# Patient Record
Sex: FEMALE | Race: BLACK OR AFRICAN AMERICAN | NOT HISPANIC OR LATINO | Employment: OTHER | URBAN - METROPOLITAN AREA
[De-identification: names, ages, dates, MRNs, and addresses within clinical notes are randomized per-mention and may not be internally consistent; named-entity substitution may affect disease eponyms.]

---

## 2019-02-28 ENCOUNTER — HOSPITAL ENCOUNTER (EMERGENCY)
Facility: HOSPITAL | Age: 84
Discharge: HOME | End: 2019-02-28
Attending: EMERGENCY MEDICINE
Payer: MEDICARE

## 2019-02-28 VITALS
OXYGEN SATURATION: 99 % | DIASTOLIC BLOOD PRESSURE: 74 MMHG | RESPIRATION RATE: 18 BRPM | TEMPERATURE: 98 F | WEIGHT: 240 LBS | BODY MASS INDEX: 38.74 KG/M2 | HEART RATE: 68 BPM | SYSTOLIC BLOOD PRESSURE: 186 MMHG

## 2019-02-28 DIAGNOSIS — J20.9 ACUTE BRONCHITIS WITH BRONCHOSPASM: Primary | ICD-10-CM

## 2019-02-28 DIAGNOSIS — J06.9 VIRAL URI WITH COUGH: ICD-10-CM

## 2019-02-28 DIAGNOSIS — R05.9 COUGH: ICD-10-CM

## 2019-02-28 LAB
CTP QC/QA: YES
FLUAV AG NPH QL: NEGATIVE
FLUBV AG NPH QL: NEGATIVE

## 2019-02-28 PROCEDURE — 87804 INFLUENZA ASSAY W/OPTIC: CPT

## 2019-02-28 PROCEDURE — 25000003 PHARM REV CODE 250: Performed by: EMERGENCY MEDICINE

## 2019-02-28 PROCEDURE — 63600175 PHARM REV CODE 636 W HCPCS: Performed by: EMERGENCY MEDICINE

## 2019-02-28 PROCEDURE — 25000242 PHARM REV CODE 250 ALT 637 W/ HCPCS: Performed by: EMERGENCY MEDICINE

## 2019-02-28 PROCEDURE — 94640 AIRWAY INHALATION TREATMENT: CPT

## 2019-02-28 PROCEDURE — 99284 EMERGENCY DEPT VISIT MOD MDM: CPT | Mod: 25

## 2019-02-28 RX ORDER — HYDRALAZINE HYDROCHLORIDE 25 MG/1
100 TABLET, FILM COATED ORAL
Status: COMPLETED | OUTPATIENT
Start: 2019-02-28 | End: 2019-02-28

## 2019-02-28 RX ORDER — PREDNISONE 20 MG/1
60 TABLET ORAL DAILY
Qty: 15 TABLET | Refills: 0 | Status: SHIPPED | OUTPATIENT
Start: 2019-02-28 | End: 2019-03-05

## 2019-02-28 RX ORDER — ALENDRONATE SODIUM 70 MG/1
70 TABLET ORAL
COMMUNITY

## 2019-02-28 RX ORDER — AMLODIPINE BESYLATE 5 MG/1
10 TABLET ORAL
Status: COMPLETED | OUTPATIENT
Start: 2019-02-28 | End: 2019-02-28

## 2019-02-28 RX ORDER — CLOPIDOGREL BISULFATE 75 MG/1
75 TABLET ORAL DAILY
COMMUNITY

## 2019-02-28 RX ORDER — FENOFIBRATE 160 MG/1
160 TABLET ORAL DAILY
COMMUNITY
End: 2023-07-12

## 2019-02-28 RX ORDER — ATORVASTATIN CALCIUM 80 MG/1
80 TABLET, FILM COATED ORAL DAILY
COMMUNITY
End: 2023-07-12

## 2019-02-28 RX ORDER — ALBUTEROL SULFATE 90 UG/1
1-2 AEROSOL, METERED RESPIRATORY (INHALATION) EVERY 6 HOURS PRN
Qty: 1 INHALER | Refills: 1 | Status: SHIPPED | OUTPATIENT
Start: 2019-02-28 | End: 2023-07-12

## 2019-02-28 RX ORDER — HYDRALAZINE HYDROCHLORIDE 100 MG/1
100 TABLET, FILM COATED ORAL 2 TIMES DAILY
COMMUNITY
End: 2023-07-12

## 2019-02-28 RX ORDER — ASPIRIN 81 MG/1
81 TABLET ORAL DAILY
Status: ON HOLD | COMMUNITY
End: 2023-07-18 | Stop reason: HOSPADM

## 2019-02-28 RX ORDER — LOSARTAN POTASSIUM 25 MG/1
100 TABLET ORAL DAILY
Status: DISCONTINUED | OUTPATIENT
Start: 2019-02-28 | End: 2019-02-28 | Stop reason: HOSPADM

## 2019-02-28 RX ORDER — HYDROCHLOROTHIAZIDE 25 MG/1
25 TABLET ORAL DAILY
COMMUNITY

## 2019-02-28 RX ORDER — AMLODIPINE BESYLATE 10 MG/1
10 TABLET ORAL DAILY
COMMUNITY

## 2019-02-28 RX ORDER — LOSARTAN POTASSIUM 100 MG/1
100 TABLET ORAL DAILY
COMMUNITY

## 2019-02-28 RX ORDER — IPRATROPIUM BROMIDE AND ALBUTEROL SULFATE 2.5; .5 MG/3ML; MG/3ML
3 SOLUTION RESPIRATORY (INHALATION)
Status: COMPLETED | OUTPATIENT
Start: 2019-02-28 | End: 2019-02-28

## 2019-02-28 RX ORDER — PROMETHAZINE HYDROCHLORIDE AND DEXTROMETHORPHAN HYDROBROMIDE 6.25; 15 MG/5ML; MG/5ML
5 SYRUP ORAL EVERY 6 HOURS PRN
Qty: 180 ML | Refills: 0 | Status: SHIPPED | OUTPATIENT
Start: 2019-02-28 | End: 2019-03-10

## 2019-02-28 RX ORDER — CLONIDINE HYDROCHLORIDE 0.1 MG/1
0.1 TABLET ORAL
Status: COMPLETED | OUTPATIENT
Start: 2019-02-28 | End: 2019-02-28

## 2019-02-28 RX ORDER — POTASSIUM CHLORIDE 750 MG/1
10 CAPSULE, EXTENDED RELEASE ORAL ONCE
COMMUNITY

## 2019-02-28 RX ORDER — PREDNISONE 20 MG/1
60 TABLET ORAL
Status: COMPLETED | OUTPATIENT
Start: 2019-02-28 | End: 2019-02-28

## 2019-02-28 RX ADMIN — IPRATROPIUM BROMIDE AND ALBUTEROL SULFATE 3 ML: .5; 3 SOLUTION RESPIRATORY (INHALATION) at 08:02

## 2019-02-28 RX ADMIN — PREDNISONE 60 MG: 20 TABLET ORAL at 08:02

## 2019-02-28 RX ADMIN — HYDRALAZINE HYDROCHLORIDE 100 MG: 25 TABLET ORAL at 09:02

## 2019-02-28 RX ADMIN — LOSARTAN POTASSIUM 100 MG: 25 TABLET, FILM COATED ORAL at 09:02

## 2019-02-28 RX ADMIN — CLONIDINE HYDROCHLORIDE 0.1 MG: 0.1 TABLET ORAL at 09:02

## 2019-02-28 RX ADMIN — AMLODIPINE BESYLATE 10 MG: 5 TABLET ORAL at 09:02

## 2019-02-28 NOTE — DISCHARGE INSTRUCTIONS
Prednisone, Ventolin, Phenergan DM as directed.  Please return immediately if you get worse or if new problems develop.  Please follow-up with your primary care doctor this week.  Return immediately for fever shortness of breath. Take her blood pressure medicines as directed.

## 2019-02-28 NOTE — ED PROVIDER NOTES
Encounter Date: 2/28/2019    SCRIBE #1 NOTE: I, Perry Bowling, am scribing for, and in the presence of,  Moi Goodwin MD. I have scribed the following portions of the note - Other sections scribed: HPI, ROS.       History     Chief Complaint   Patient presents with    Cough     productive cough x 1 wk; denies any pain; hx of HTN;     CC: Cough    HPI: This 84 y.o. Female with L above knee amputation, arthritis, hyperlipidemia and HTN presents to the ED for an evaluation of productive cough with clear sputum, rhinorrhea and sore throat for the past week. She did not take her BP meds this morning. Pt usually complies with at home meds. She denies fever, chills, chest pain, SOB, headaches, ear pain, abdominal pain, nausea, emesis, diarrhea or rash.      The history is provided by the patient. No  was used.     Review of patient's allergies indicates:  No Known Allergies  Past Medical History:   Diagnosis Date    Above knee amputation status 1994    Hyperlipidemia     Hypertension      Past Surgical History:   Procedure Laterality Date    LEG AMPUTATION THROUGH KNEE       History reviewed. No pertinent family history.  Social History     Tobacco Use    Smoking status: Former Smoker    Smokeless tobacco: Never Used   Substance Use Topics    Alcohol use: No    Drug use: No     Review of Systems   Constitutional: Negative for chills and fever.   HENT: Positive for rhinorrhea and sore throat. Negative for ear pain.    Eyes: Negative for redness.   Respiratory: Positive for cough. Negative for shortness of breath.    Cardiovascular: Negative for chest pain.   Gastrointestinal: Negative for abdominal pain, diarrhea, nausea and vomiting.   Genitourinary: Negative for dysuria and hematuria.   Musculoskeletal: Negative for back pain and neck pain.   Skin: Negative for rash.   Neurological: Negative for weakness, numbness and headaches.   Hematological: Does not bruise/bleed easily.    Psychiatric/Behavioral: The patient is not nervous/anxious.        Physical Exam     Initial Vitals [02/28/19 0826]   BP Pulse Resp Temp SpO2   (!) 227/103 70 20 97.9 °F (36.6 °C) 97 %      MAP       --         Physical Exam  The patient was examined specifically for the following:   General:No significant distress, Good color, Warm and dry. Head and neck:Scalp atraumatic, Neck supple. Neurological:Appropriate conversation, Gross motor deficits. Eyes:Conjugate gaze, Clear corneas. ENT: No epistaxis. Cardiac: Regular rate and rhythm, Grossly normal heart tones. Pulmonary: Wheezing, Rales. Gastrointestinal: Abdominal tenderness, Abdominal distention. Musculoskeletal: Extremity deformity, Apparent pain with range of motion of the joints. Skin: Rash.   The findings on examination were normal except for the following:  The patient's blood pressure is 227/103.  Lungs are remarkable for a mild bilateral wheezing.  There is no evidence of respiratory distress. I do not hear rales.  The patient is not febrile or warm to touch.  The abdomen is nontender there is a left above-the-knee amputation.  ED Course   Procedures  Labs Reviewed   INFLUENZA A AND B ANTIGEN   POCT INFLUENZA A/B          Imaging Results          X-Ray Chest 1 View (Final result)  Result time 02/28/19 09:19:41   Procedure changed from X-Ray Chest PA And Lateral     Final result by Dontrell Manuel MD (02/28/19 09:19:41)                 Impression:      Prominence of the cardiac silhouette, partially related to the magnification on this AP projection.    No focal pulmonary consolidation is identified.      Electronically signed by: Dontrell Manuel MD  Date:    02/28/2019  Time:    09:19             Narrative:    EXAMINATION:  XR CHEST 1 VIEW    CLINICAL HISTORY:  cough; Cough    TECHNIQUE:  Single frontal view of the chest was performed.    COMPARISON:  None    FINDINGS:  The cardiac silhouette is prominent in size although this is partially related to the  magnification on this AP projection.  Atherosclerotic calcification is present within the aortic arch.  Superior mediastinal structures are unremarkable.  Pulmonary vasculature is within normal limits.  The lungs are free of focal consolidations.  There is no evidence for pneumothorax or large pleural effusions.  Bony structures are grossly intact.                              Medical decision making:  Given the above, this patient presents to the emergency room with cough.  The patient has bilateral wheezing.  She has a history of smoking in the distant past.  There is no evidence of pulmonary edema.  The patient has no ankle swelling.  She is otherwise well.  The sputum is clear.  She is not febrile.  There is no pneumonia on chest x-ray.  I will discharge on Phenergan DM albuterol and prednisone.  I will have the patient follow up with primary care                Scribe Attestation:   Scribe #1: I performed the above scribed service and the documentation accurately describes the services I performed. I attest to the accuracy of the note.    Attending Attestation:           Physician Attestation for Scribe:  Physician Attestation Statement for Scribe #1: I, Moi Goodwin MD, reviewed documentation, as scribed by Perry Bowling in my presence, and it is both accurate and complete.                    Clinical Impression:       ICD-10-CM ICD-9-CM   1. Acute bronchitis with bronchospasm J20.9 466.0   2. Cough R05 786.2   3. Viral URI with cough J06.9 465.9    B97.89                                 Moi Goodwin MD  02/28/19 0933

## 2019-02-28 NOTE — ED TRIAGE NOTES
Pt arrived to ED with c/o cough x1 week. Pt denies any pain at this time. Hx high BP, did not take medicine this morning. NAD.   
The resident's documentation has been prepared under my direction and personally reviewed by me in its entirety. I confirm that the note above accurately reflects all work, treatment, procedures, and medical decision making performed by me.  nakia Scott MD

## 2019-06-19 ENCOUNTER — HOSPITAL ENCOUNTER (EMERGENCY)
Facility: HOSPITAL | Age: 84
Discharge: HOME | End: 2019-06-19
Attending: EMERGENCY MEDICINE
Payer: MEDICARE

## 2019-06-19 VITALS
WEIGHT: 240 LBS | HEART RATE: 63 BPM | RESPIRATION RATE: 18 BRPM | SYSTOLIC BLOOD PRESSURE: 150 MMHG | TEMPERATURE: 98 F | BODY MASS INDEX: 44.16 KG/M2 | DIASTOLIC BLOOD PRESSURE: 69 MMHG | OXYGEN SATURATION: 97 % | HEIGHT: 62 IN

## 2019-06-19 DIAGNOSIS — R05.9 COUGH: ICD-10-CM

## 2019-06-19 DIAGNOSIS — I10 UNCONTROLLED HYPERTENSION: Primary | ICD-10-CM

## 2019-06-19 LAB
ANION GAP SERPL CALC-SCNC: 10 MMOL/L (ref 8–16)
BNP SERPL-MCNC: 122 PG/ML (ref 0–99)
BUN SERPL-MCNC: 16 MG/DL (ref 8–23)
CALCIUM SERPL-MCNC: 9.2 MG/DL (ref 8.7–10.5)
CHLORIDE SERPL-SCNC: 107 MMOL/L (ref 95–110)
CO2 SERPL-SCNC: 26 MMOL/L (ref 23–29)
CREAT SERPL-MCNC: 0.9 MG/DL (ref 0.5–1.4)
EST. GFR  (AFRICAN AMERICAN): >60 ML/MIN/1.73 M^2
EST. GFR  (NON AFRICAN AMERICAN): 60 ML/MIN/1.73 M^2
GLUCOSE SERPL-MCNC: 100 MG/DL (ref 70–110)
POTASSIUM SERPL-SCNC: 4 MMOL/L (ref 3.5–5.1)
SODIUM SERPL-SCNC: 143 MMOL/L (ref 136–145)

## 2019-06-19 PROCEDURE — 80048 BASIC METABOLIC PNL TOTAL CA: CPT

## 2019-06-19 PROCEDURE — 96374 THER/PROPH/DIAG INJ IV PUSH: CPT

## 2019-06-19 PROCEDURE — 63600175 PHARM REV CODE 636 W HCPCS: Performed by: EMERGENCY MEDICINE

## 2019-06-19 PROCEDURE — 99284 EMERGENCY DEPT VISIT MOD MDM: CPT | Mod: 25

## 2019-06-19 PROCEDURE — 83880 ASSAY OF NATRIURETIC PEPTIDE: CPT

## 2019-06-19 PROCEDURE — 25000003 PHARM REV CODE 250: Performed by: EMERGENCY MEDICINE

## 2019-06-19 RX ORDER — AMLODIPINE BESYLATE 5 MG/1
10 TABLET ORAL
Status: COMPLETED | OUTPATIENT
Start: 2019-06-19 | End: 2019-06-19

## 2019-06-19 RX ORDER — CLONIDINE HYDROCHLORIDE 0.1 MG/1
0.1 TABLET ORAL
Status: COMPLETED | OUTPATIENT
Start: 2019-06-19 | End: 2019-06-19

## 2019-06-19 RX ORDER — LOSARTAN POTASSIUM 25 MG/1
100 TABLET ORAL DAILY
Status: DISCONTINUED | OUTPATIENT
Start: 2019-06-19 | End: 2019-06-19 | Stop reason: HOSPADM

## 2019-06-19 RX ORDER — HYDRALAZINE HYDROCHLORIDE 25 MG/1
100 TABLET, FILM COATED ORAL
Status: COMPLETED | OUTPATIENT
Start: 2019-06-19 | End: 2019-06-19

## 2019-06-19 RX ORDER — DIPHENHYDRAMINE HCL 25 MG
25 CAPSULE ORAL EVERY 6 HOURS PRN
Qty: 20 CAPSULE | Refills: 0 | Status: SHIPPED | OUTPATIENT
Start: 2019-06-19 | End: 2023-07-12

## 2019-06-19 RX ORDER — HYDRALAZINE HYDROCHLORIDE 20 MG/ML
10 INJECTION INTRAMUSCULAR; INTRAVENOUS
Status: COMPLETED | OUTPATIENT
Start: 2019-06-19 | End: 2019-06-19

## 2019-06-19 RX ORDER — BENZONATATE 100 MG/1
100 CAPSULE ORAL 3 TIMES DAILY PRN
Qty: 20 CAPSULE | Refills: 0 | Status: SHIPPED | OUTPATIENT
Start: 2019-06-19 | End: 2019-06-29

## 2019-06-19 RX ADMIN — LOSARTAN POTASSIUM 100 MG: 25 TABLET, FILM COATED ORAL at 09:06

## 2019-06-19 RX ADMIN — HYDRALAZINE HYDROCHLORIDE 10 MG: 20 INJECTION INTRAMUSCULAR; INTRAVENOUS at 10:06

## 2019-06-19 RX ADMIN — AMLODIPINE BESYLATE 10 MG: 5 TABLET ORAL at 09:06

## 2019-06-19 RX ADMIN — CLONIDINE HYDROCHLORIDE 0.1 MG: 0.1 TABLET ORAL at 09:06

## 2019-06-19 RX ADMIN — HYDRALAZINE HYDROCHLORIDE 100 MG: 25 TABLET ORAL at 09:06

## 2019-06-19 NOTE — ED TRIAGE NOTES
"Pt comes to ED today with complaints of a cold.  Pt reports that she's had a cold for about a month now.  Pt reports that she has also begun to lose her voice.  Pt came to ED today because her son "would not stop bugging her." so she decided to come in.  At this time, pt is alert and oriented x4, VSS and she appears to be in no acute distress at this time.  "

## 2019-06-19 NOTE — DISCHARGE INSTRUCTIONS
Please return immediately if you get worse or if new problems develop.  Please follow-up with her primary care doctor this week.  Benadryl and Tessalon for cough.  Rest.

## 2019-06-19 NOTE — ED PROVIDER NOTES
"Encounter Date: 6/19/2019    SCRIBE #1 NOTE: I, Merissamonica Condon, am scribing for, and in the presence of,  Moi Goodwin MD. I have scribed the following portions of the note - Other sections scribed: HPI and ROS.       History     Chief Complaint   Patient presents with    Cough     Pt with cough and chest congestion x 1 month. Denies fever. BBS clear      CC: Cough    HPI: This 84 y.o. Female, with a medical history of above knee amputation status, hyperlipidemia, and hypertension, presents to the ED c/o a non-productive cough. Pt reports that she has been experiencing the symptoms "every half an hour" for the last 1x month. She states that this is her second time being seen in the ED for the same symptoms. Pt denies sore throat, rhinorrhea, ear pain, eye pain, chest pain, shortness of breath, abdominal pain, emesis, diarrhea, dysuria, fever, chills and headache. No other associated symptoms. No alleviating factors. Pt notes that she did not take her blood pressure medications this morning.     The history is provided by the patient. No  was used.     Review of patient's allergies indicates:  No Known Allergies  Past Medical History:   Diagnosis Date    Above knee amputation status 1994    Hyperlipidemia     Hypertension      Past Surgical History:   Procedure Laterality Date    LEG AMPUTATION THROUGH KNEE       History reviewed. No pertinent family history.  Social History     Tobacco Use    Smoking status: Former Smoker    Smokeless tobacco: Never Used   Substance Use Topics    Alcohol use: No    Drug use: No     Review of Systems   Constitutional: Negative for chills and fever.   HENT: Negative for congestion, ear pain, rhinorrhea and sore throat.    Eyes: Negative for pain and visual disturbance.   Respiratory: Positive for cough (non-productive). Negative for shortness of breath.    Cardiovascular: Negative for chest pain.   Gastrointestinal: Negative for abdominal pain, " diarrhea, nausea and vomiting.   Genitourinary: Negative for dysuria.   Musculoskeletal: Negative for back pain and neck pain.   Skin: Negative for rash.   Neurological: Negative for headaches.       Physical Exam     Initial Vitals [06/19/19 0840]   BP Pulse Resp Temp SpO2   (!) 225/119 65 16 97.7 °F (36.5 °C) 98 %      MAP       --         Physical Exam  The patient was examined specifically for the following:   General:No significant distress, Good color, Warm and dry. Head and neck:Scalp atraumatic, Neck supple. Neurological:Appropriate conversation, Gross motor deficits. Eyes:Conjugate gaze, Clear corneas. ENT: No epistaxis. Cardiac: Regular rate and rhythm, Grossly normal heart tones. Pulmonary: Wheezing, Rales. Gastrointestinal: Abdominal tenderness, Abdominal distention. Musculoskeletal: Extremity deformity, Apparent pain with range of motion of the joints. Skin: Rash.   The findings on examination were normal except for the following:  The lungs are clear and free of wheezing rales or rhonchi.  The patient did not cough during the physical examination. Her blood pressure was 225/119.  There is no evidence of respiratory distress.  The patient had a left above-the-knee amputation.  ED Course   Procedures  Labs Reviewed   B-TYPE NATRIURETIC PEPTIDE - Abnormal; Notable for the following components:       Result Value     (*)     All other components within normal limits   BASIC METABOLIC PANEL          Imaging Results          X-Ray Chest 1 View (Final result)  Result time 06/19/19 10:44:16   Procedure changed from X-Ray Chest PA And Lateral     Final result by Eulogio Mejia MD (06/19/19 10:44:16)                 Impression:      Mild basilar atelectasis.  Otherwise, no acute radiographic findings in the chest.      Electronically signed by: Eulogio Mejia MD  Date:    06/19/2019  Time:    10:44             Narrative:    EXAMINATION:  XR CHEST 1 VIEW    CLINICAL HISTORY:  Chest pain;  Cough    TECHNIQUE:  Single frontal view of the chest was performed.    COMPARISON:  02/28/2019    FINDINGS:  Cardiomediastinal silhouette is enlarged but is stable.  There is aortic atherosclerosis.  Lungs are symmetrically expanded with mild basilar atelectasis.  No evidence of significant focal consolidation, large effusion or pneumothorax.  Bones demonstrate degenerative changes.                              Medical decision making:  Given the above, this patient presents to the emergency room with a cough.  The patient reports that I cough every half an hour.  Patient is maintained on losartan.  That may be the cause.  I will refer this patient to primary care for evaluation chest x-ray fails to reveal pneumonia.  The patient has a normal BNP.  Chemistries are unremarkable. Her cough may also be related to mild failure from uncontrolled hypertension.  Her blood pressure is nicely controlled in the emergency room.  I will discharge to outpatient evaluation and treatment.                Scribe Attestation:   Scribe #1: I performed the above scribed service and the documentation accurately describes the services I performed. I attest to the accuracy of the note.    Attending Attestation:           Physician Attestation for Scribe:  Physician Attestation Statement for Scribe #1: I, Moi Goodwin MD, reviewed documentation, as scribed by Merissa Condon in my presence, and it is both accurate and complete.                    Clinical Impression:       ICD-10-CM ICD-9-CM   1. Uncontrolled hypertension I10 401.9   2. Cough R05 786.2                                Moi Goodwin MD  06/19/19 2419

## 2020-05-30 ENCOUNTER — HOSPITAL ENCOUNTER (EMERGENCY)
Facility: HOSPITAL | Age: 85
Discharge: HOME | End: 2020-05-30
Attending: EMERGENCY MEDICINE
Payer: MEDICARE

## 2020-05-30 VITALS
SYSTOLIC BLOOD PRESSURE: 176 MMHG | RESPIRATION RATE: 18 BRPM | DIASTOLIC BLOOD PRESSURE: 77 MMHG | HEIGHT: 61 IN | HEART RATE: 58 BPM | BODY MASS INDEX: 41.54 KG/M2 | TEMPERATURE: 98 F | WEIGHT: 220 LBS | OXYGEN SATURATION: 91 %

## 2020-05-30 DIAGNOSIS — Z89.612 S/P AKA (ABOVE KNEE AMPUTATION) UNILATERAL, LEFT: ICD-10-CM

## 2020-05-30 DIAGNOSIS — M79.2 NEUROPATHIC PAIN, LEG, LEFT: Primary | ICD-10-CM

## 2020-05-30 DIAGNOSIS — M79.605 PAIN IN LEFT LEG: ICD-10-CM

## 2020-05-30 PROCEDURE — 25000003 PHARM REV CODE 250: Performed by: EMERGENCY MEDICINE

## 2020-05-30 PROCEDURE — 99284 EMERGENCY DEPT VISIT MOD MDM: CPT | Mod: 25

## 2020-05-30 RX ORDER — GABAPENTIN 100 MG/1
100 CAPSULE ORAL NIGHTLY
Qty: 30 CAPSULE | Refills: 0 | Status: SHIPPED | OUTPATIENT
Start: 2020-05-30 | End: 2023-07-12

## 2020-05-30 RX ORDER — ACETAMINOPHEN 500 MG
1000 TABLET ORAL EVERY 8 HOURS PRN
Qty: 30 TABLET | Refills: 0 | Status: ON HOLD | OUTPATIENT
Start: 2020-05-30 | End: 2023-07-18 | Stop reason: SDUPTHER

## 2020-05-30 RX ORDER — LIDOCAINE 50 MG/G
1 PATCH TOPICAL DAILY
Qty: 30 PATCH | Refills: 0 | Status: SHIPPED | OUTPATIENT
Start: 2020-05-30 | End: 2023-07-12

## 2020-05-30 RX ORDER — HYDROCODONE BITARTRATE AND ACETAMINOPHEN 5; 325 MG/1; MG/1
1 TABLET ORAL
Status: COMPLETED | OUTPATIENT
Start: 2020-05-30 | End: 2020-05-30

## 2020-05-30 RX ADMIN — HYDROCODONE BITARTRATE AND ACETAMINOPHEN 1 TABLET: 5; 325 TABLET ORAL at 09:05

## 2020-05-30 NOTE — DISCHARGE INSTRUCTIONS
X-ray show no emergent abnormality with your amputation site.  Your symptoms may be related to neuropathic pain or phantom limb pain.  You have been prescribed low-dose gabapentin as well as lidocaine patches.  Be cautious as gabapentin he cause you to become drowsy and decrease your coordination.  It is very important that you schedule close follow-up with your vascular surgeon, pain management doctor and primary physician to monitor and further evaluate your symptoms.  Return to the emergency department for any new, worsening or significantly concerning symptoms.    Thank you for coming to our Emergency Department today. It is important to remember that some problems are difficult to diagnose and may not be found during your first visit. Be sure to follow up with your primary care doctor and review any labs/imaging that was performed with them. If you do not have a primary care doctor, you may contact the one listed on your discharge paperwork or you may also call the [facility] Appointment Desk at [phone] to schedule an appointment with one.     All medications may potentially have side effects and it is impossible to predict which medications may give you side effects. If you feel that you are having a negative effect of any medication you should immediately stop taking them and seek medical attention.    Return to the ER with any questions/concerns, new/concerning symptoms, worsening or failure to improve. Do not drive or make any important decisions for 24 hours if you have received any pain medications, sedatives or mood altering drugs during your ER visit.

## 2020-05-30 NOTE — ED TRIAGE NOTES
Patient reports started to have pain in her left stump site and has been keeping her awake for two days. Reports pain level of 10, patient is noted to be calm, took Tylenol with out relief. Denies fall, or hitting site.

## 2020-05-30 NOTE — ED PROVIDER NOTES
Encounter Date: 5/30/2020    SCRIBE #1 NOTE: I, Jairon Coates, am scribing for, and in the presence of,  Cloeen Novoa MD. I have scribed the following portions of the note - Other sections scribed: HPI, ROS, PE.       History     Chief Complaint   Patient presents with    Leg Pain     Right AKA pain ,AKA since 1994     Abbie Barragan is a 85 y.o. female with history of left AKA (1994) who presents to the ED for evaluation of left leg pain that started yesterday. Patient states that feeling there is pain where her limb is missing. Denies any recent falls or injuries to the area. Denies any wounds or drainage anywhere. Denies taking any medications for this pain. Patient has a history of hypertension, but no other medical problems. She denies having had this pain in the past. She states that the amputation was done at [facility].       The history is provided by the patient.     Review of patient's allergies indicates:  No Known Allergies  Past Medical History:   Diagnosis Date    Above knee amputation status 1994    Hyperlipidemia     Hypertension      Past Surgical History:   Procedure Laterality Date    LEG AMPUTATION THROUGH KNEE       No family history on file.  Social History     Tobacco Use    Smoking status: Former Smoker    Smokeless tobacco: Never Used   Substance Use Topics    Alcohol use: No    Drug use: No     Review of Systems   Constitutional: Negative for chills and fever.   HENT: Negative for congestion and sore throat.    Eyes: Negative for visual disturbance.   Respiratory: Negative for cough and shortness of breath.    Cardiovascular: Negative for chest pain.   Gastrointestinal: Negative for abdominal pain, nausea and vomiting.   Genitourinary: Negative for dysuria and vaginal discharge.   Musculoskeletal: Positive for myalgias (left leg pain).   Skin: Negative for rash.   Neurological: Negative for headaches.   Psychiatric/Behavioral: Negative for decreased concentration.       Physical  Exam     Initial Vitals [05/30/20 0902]   BP Pulse Resp Temp SpO2   (!) 201/95 66 18 98.3 °F (36.8 °C) 96 %      MAP       --         Physical Exam    Nursing note and vitals reviewed.  Constitutional: She is not diaphoretic. No distress.   HENT:   Head: Normocephalic and atraumatic.   Mouth/Throat: Oropharynx is clear and moist.   Eyes: EOM are normal. Pupils are equal, round, and reactive to light. No scleral icterus.   Neck: Normal range of motion. Neck supple. No JVD present.   Cardiovascular: Normal rate, regular rhythm and intact distal pulses.   Pulmonary/Chest: Breath sounds normal. No stridor. No respiratory distress.   Abdominal: Soft. Bowel sounds are normal. She exhibits no distension. There is no tenderness.   Musculoskeletal: Normal range of motion. She exhibits no edema or tenderness.   Neurological: She is alert. She has normal strength. No cranial nerve deficit or sensory deficit. GCS score is 15. GCS eye subscore is 4. GCS verbal subscore is 5. GCS motor subscore is 6.   Skin: Skin is warm and dry.   Left aka site without tenderness erythema, induration, wound, or purulence.   Psychiatric: She has a normal mood and affect.         ED Course   Procedures  Labs Reviewed - No data to display       Imaging Results          X-Ray Femur Ap/Lat Left (Final result)  Result time 05/30/20 10:26:13    Final result by Louise Singh MD (05/30/20 10:26:13)                 Impression:      As above.      Electronically signed by: Louise Singh MD  Date:    05/30/2020  Time:    10:26             Narrative:    EXAMINATION:  XR FEMUR 2 VIEW LEFT    CLINICAL HISTORY:  Pain in left leg    TECHNIQUE:  AP and lateral views of the left femur were performed.    COMPARISON:  None    FINDINGS:  Patient is status post amputation of the left femur at the level of the mid shaft.  No acute fracture is seen.  There is some heterotopic ossification at the amputation site.  There are surgical clips in the soft tissues.   "There are degenerative changes at the hip joint with joint space narrowing.                                 Medical Decision Making:   History:   Old Medical Records: I decided to obtain old medical records.  Old Records Summarized: other records.       <> Summary of Records: Past ED visit for uncontrolled hypertension  Differential Diagnosis:   My initial differential diagnoses include but are not limited to phantom limb syndrome, neuropathic pain, occult infection, contusion, strain, sprain.   Clinical Tests:   Radiological Study: Ordered and Reviewed            Scribe Attestation:   Scribe #1: I performed the above scribed service and the documentation accurately describes the services I performed. I attest to the accuracy of the note.            ED Course as of May 30 1123   Sat May 30, 2020   0929 Patient is afebrile and in no acute distress at time of history and physical.  She is hypertensive but does not appear to be symptomatic with her hypertension.  She reports she took her schedule a blood pressure medications shortly prior to coming to the emergency department.  She complains only of vague pain to the left lower extremity stump that feels like "a new leg wants to come out" symptoms concerning for phantom limb syndrome.  Will give analgesia and obtain an x-ray to rule out bony abnormality.  Will monitor patient's blood pressure as she is asymptomatic with her hypertension and reports compliance with medications    [SG]   1035 X-rays notable for heterotopic ossification.  There are no obvious fractures erosive changes concerning for osteomyelitis.  Patient clinically does not have cellulitis.  Patient given analgesia in the emergency department.  On reassessment she is resting comfortably and reports improvement in symptoms.  Blood pressure has improved without antihypertensive.  Patient is clinically stable for discharge to follow up with her primary physician, vascular surgeon, pain management. " counseled on supportive care, appropriate medication usage, concerning symptoms for which to return to ER and the importance of follow up. Understanding and agreement with treatment plan was expressed.     [SG]      ED Course User Index  [SG] Coleen Novoa MD            This chart was completed using dictation software, as a result there may be some transcription errors.     Clinical Impression:       ICD-10-CM ICD-9-CM   1. Neuropathic pain, leg, left M79.2 355.8   2. Pain in left leg M79.605 729.5   3. S/P AKA (above knee amputation) unilateral, left Z89.612 V49.76         Disposition:   Disposition: Discharged  Condition: Stable     ED Disposition Condition    Discharge Stable        ED Prescriptions     Medication Sig Dispense Start Date End Date Auth. Provider    gabapentin (NEURONTIN) 100 MG capsule Take 1 capsule (100 mg total) by mouth every evening. 30 capsule 5/30/2020  Coleen Novoa MD    lidocaine (LIDODERM) 5 % Place 1 patch onto the skin once daily. Remove & Discard patch within 12 hours or as directed by MD 30 patch 5/30/2020  Coleen Novoa MD    acetaminophen (TYLENOL) 500 MG tablet Take 2 tablets (1,000 mg total) by mouth every 8 (eight) hours as needed for Pain. 30 tablet 5/30/2020  Coleen Novoa MD        Follow-up Information     Follow up With Specialties Details Why Contact Info    Kailash Carvalho NP Family Medicine Schedule an appointment as soon as possible for a visit   [address]  [address]  [facility] [address]  [phone]      Ray Walden MD Vascular Surgery Schedule an appointment as soon as possible for a visit   [address] [facility] [address]  [phone]      [facility]NAG[facility] Pain Medicine Schedule an appointment as soon as possible for a visit   [address] [facility] [id number] [phone]                                 IColeen , personally performed the services described in this documentation. All  medical record entries made by the scribe were at my direction and in my presence.  I have reviewed the chart and agree that the record reflects my personal performance and is accurate and complete.       Coleen Novoa MD  05/30/20 1125

## 2023-07-12 ENCOUNTER — HOSPITAL ENCOUNTER (INPATIENT)
Facility: HOSPITAL | Age: 88
LOS: 6 days | Discharge: HOME HEALTH | DRG: 872 | End: 2023-07-18
Attending: EMERGENCY MEDICINE | Admitting: EMERGENCY MEDICINE
Payer: MEDICARE

## 2023-07-12 DIAGNOSIS — R09.02 HYPOXIA: ICD-10-CM

## 2023-07-12 DIAGNOSIS — I48.91 ATRIAL FIBRILLATION, UNSPECIFIED TYPE: ICD-10-CM

## 2023-07-12 DIAGNOSIS — I48.91 A-FIB: ICD-10-CM

## 2023-07-12 DIAGNOSIS — J18.9 PNEUMONIA DUE TO INFECTIOUS ORGANISM, UNSPECIFIED LATERALITY, UNSPECIFIED PART OF LUNG: ICD-10-CM

## 2023-07-12 DIAGNOSIS — I49.9 ABNORMAL HEART RHYTHM: ICD-10-CM

## 2023-07-12 DIAGNOSIS — R07.9 CHEST PAIN: ICD-10-CM

## 2023-07-12 DIAGNOSIS — K81.9 CHOLECYSTITIS: Primary | ICD-10-CM

## 2023-07-12 DIAGNOSIS — R10.11 RIGHT UPPER QUADRANT ABDOMINAL PAIN: ICD-10-CM

## 2023-07-12 DIAGNOSIS — I10 HYPERTENSION, UNSPECIFIED TYPE: ICD-10-CM

## 2023-07-12 DIAGNOSIS — A41.9 SEPSIS, DUE TO UNSPECIFIED ORGANISM, UNSPECIFIED WHETHER ACUTE ORGAN DYSFUNCTION PRESENT: ICD-10-CM

## 2023-07-12 PROBLEM — I73.9 PAD (PERIPHERAL ARTERY DISEASE): Status: ACTIVE | Noted: 2023-07-12

## 2023-07-12 PROBLEM — I48.20 CHRONIC ATRIAL FIBRILLATION: Status: ACTIVE | Noted: 2023-07-12

## 2023-07-12 PROBLEM — I65.23 BILATERAL CAROTID ARTERY STENOSIS: Status: ACTIVE | Noted: 2022-07-14

## 2023-07-12 PROBLEM — K80.00 CHOLELITHIASIS WITH ACUTE CHOLECYSTITIS: Status: ACTIVE | Noted: 2023-07-12

## 2023-07-12 PROBLEM — I73.9 PVD (PERIPHERAL VASCULAR DISEASE): Status: ACTIVE | Noted: 2022-07-14

## 2023-07-12 PROBLEM — Z89.619: Status: ACTIVE | Noted: 2022-07-14

## 2023-07-12 LAB
ALBUMIN SERPL BCP-MCNC: 3.7 G/DL (ref 3.5–5.2)
ALLENS TEST: ABNORMAL
ALP SERPL-CCNC: 121 U/L (ref 55–135)
ALT SERPL W/O P-5'-P-CCNC: 9 U/L (ref 10–44)
ANION GAP SERPL CALC-SCNC: 13 MMOL/L (ref 8–16)
APTT PPP: 22.7 SEC (ref 21–32)
APTT PPP: <21 SEC (ref 21–32)
AST SERPL-CCNC: 15 U/L (ref 10–40)
BASOPHILS # BLD AUTO: 0.04 K/UL (ref 0–0.2)
BASOPHILS NFR BLD: 0.2 % (ref 0–1.9)
BILIRUB SERPL-MCNC: 0.9 MG/DL (ref 0.1–1)
BILIRUB UR QL STRIP: NEGATIVE
BNP SERPL-MCNC: 289 PG/ML (ref 0–99)
BUN SERPL-MCNC: 7 MG/DL (ref 8–23)
CALCIUM SERPL-MCNC: 9.9 MG/DL (ref 8.7–10.5)
CHLORIDE SERPL-SCNC: 93 MMOL/L (ref 95–110)
CLARITY UR: CLEAR
CO2 SERPL-SCNC: 24 MMOL/L (ref 23–29)
COLOR UR: COLORLESS
CREAT SERPL-MCNC: 0.7 MG/DL (ref 0.5–1.4)
DIFFERENTIAL METHOD: ABNORMAL
EOSINOPHIL # BLD AUTO: 0 K/UL (ref 0–0.5)
EOSINOPHIL NFR BLD: 0.1 % (ref 0–8)
ERYTHROCYTE [DISTWIDTH] IN BLOOD BY AUTOMATED COUNT: 14.7 % (ref 11.5–14.5)
EST. GFR  (NO RACE VARIABLE): >60 ML/MIN/1.73 M^2
GLUCOSE SERPL-MCNC: 131 MG/DL (ref 70–110)
GLUCOSE UR QL STRIP: NEGATIVE
HCT VFR BLD AUTO: 41.3 % (ref 37–48.5)
HGB BLD-MCNC: 13 G/DL (ref 12–16)
HGB UR QL STRIP: NEGATIVE
IMM GRANULOCYTES # BLD AUTO: 0.08 K/UL (ref 0–0.04)
IMM GRANULOCYTES NFR BLD AUTO: 0.4 % (ref 0–0.5)
INR PPP: 1.1 (ref 0.8–1.2)
KETONES UR QL STRIP: NEGATIVE
LACTATE SERPL-SCNC: 1.5 MMOL/L (ref 0.5–2.2)
LDH SERPL L TO P-CCNC: 2.24 MMOL/L (ref 0.5–2.2)
LEUKOCYTE ESTERASE UR QL STRIP: NEGATIVE
LIPASE SERPL-CCNC: 58 U/L (ref 4–60)
LYMPHOCYTES # BLD AUTO: 1.8 K/UL (ref 1–4.8)
LYMPHOCYTES NFR BLD: 9.8 % (ref 18–48)
MCH RBC QN AUTO: 25.8 PG (ref 27–31)
MCHC RBC AUTO-ENTMCNC: 31.5 G/DL (ref 32–36)
MCV RBC AUTO: 82 FL (ref 82–98)
MONOCYTES # BLD AUTO: 0.7 K/UL (ref 0.3–1)
MONOCYTES NFR BLD: 3.6 % (ref 4–15)
NEUTROPHILS # BLD AUTO: 15.4 K/UL (ref 1.8–7.7)
NEUTROPHILS NFR BLD: 85.9 % (ref 38–73)
NITRITE UR QL STRIP: NEGATIVE
NRBC BLD-RTO: 0 /100 WBC
PH UR STRIP: 7 [PH] (ref 5–8)
PLATELET # BLD AUTO: 251 K/UL (ref 150–450)
PMV BLD AUTO: 12.6 FL (ref 9.2–12.9)
POTASSIUM SERPL-SCNC: 3.2 MMOL/L (ref 3.5–5.1)
PROCALCITONIN SERPL IA-MCNC: 0.12 NG/ML
PROT SERPL-MCNC: 7.9 G/DL (ref 6–8.4)
PROT UR QL STRIP: NEGATIVE
PROTHROMBIN TIME: 11.1 SEC (ref 9–12.5)
RBC # BLD AUTO: 5.04 M/UL (ref 4–5.4)
SAMPLE: ABNORMAL
SITE: ABNORMAL
SODIUM SERPL-SCNC: 130 MMOL/L (ref 136–145)
SP GR UR STRIP: >1.03 (ref 1–1.03)
URN SPEC COLLECT METH UR: ABNORMAL
UROBILINOGEN UR STRIP-ACNC: NEGATIVE EU/DL
WBC # BLD AUTO: 17.91 K/UL (ref 3.9–12.7)

## 2023-07-12 PROCEDURE — 93010 EKG 12-LEAD: ICD-10-PCS | Mod: ,,, | Performed by: INTERNAL MEDICINE

## 2023-07-12 PROCEDURE — 99223 PR INITIAL HOSPITAL CARE,LEVL III: ICD-10-PCS | Mod: ,,, | Performed by: INTERNAL MEDICINE

## 2023-07-12 PROCEDURE — 83605 ASSAY OF LACTIC ACID: CPT

## 2023-07-12 PROCEDURE — 87186 SC STD MICRODIL/AGAR DIL: CPT | Performed by: STUDENT IN AN ORGANIZED HEALTH CARE EDUCATION/TRAINING PROGRAM

## 2023-07-12 PROCEDURE — 87077 CULTURE AEROBIC IDENTIFY: CPT | Performed by: STUDENT IN AN ORGANIZED HEALTH CARE EDUCATION/TRAINING PROGRAM

## 2023-07-12 PROCEDURE — 87070 CULTURE OTHR SPECIMN AEROBIC: CPT | Performed by: STUDENT IN AN ORGANIZED HEALTH CARE EDUCATION/TRAINING PROGRAM

## 2023-07-12 PROCEDURE — 87075 CULTR BACTERIA EXCEPT BLOOD: CPT | Performed by: STUDENT IN AN ORGANIZED HEALTH CARE EDUCATION/TRAINING PROGRAM

## 2023-07-12 PROCEDURE — 96375 TX/PRO/DX INJ NEW DRUG ADDON: CPT

## 2023-07-12 PROCEDURE — 11000001 HC ACUTE MED/SURG PRIVATE ROOM

## 2023-07-12 PROCEDURE — 94761 N-INVAS EAR/PLS OXIMETRY MLT: CPT

## 2023-07-12 PROCEDURE — 25000003 PHARM REV CODE 250: Performed by: EMERGENCY MEDICINE

## 2023-07-12 PROCEDURE — 63600175 PHARM REV CODE 636 W HCPCS: Performed by: EMERGENCY MEDICINE

## 2023-07-12 PROCEDURE — 25000003 PHARM REV CODE 250: Performed by: STUDENT IN AN ORGANIZED HEALTH CARE EDUCATION/TRAINING PROGRAM

## 2023-07-12 PROCEDURE — 85025 COMPLETE CBC W/AUTO DIFF WBC: CPT | Performed by: EMERGENCY MEDICINE

## 2023-07-12 PROCEDURE — 63600175 PHARM REV CODE 636 W HCPCS: Performed by: STUDENT IN AN ORGANIZED HEALTH CARE EDUCATION/TRAINING PROGRAM

## 2023-07-12 PROCEDURE — 87205 SMEAR GRAM STAIN: CPT | Performed by: STUDENT IN AN ORGANIZED HEALTH CARE EDUCATION/TRAINING PROGRAM

## 2023-07-12 PROCEDURE — 80053 COMPREHEN METABOLIC PANEL: CPT | Performed by: EMERGENCY MEDICINE

## 2023-07-12 PROCEDURE — 93010 ELECTROCARDIOGRAM REPORT: CPT | Mod: ,,, | Performed by: INTERNAL MEDICINE

## 2023-07-12 PROCEDURE — 85730 THROMBOPLASTIN TIME PARTIAL: CPT | Performed by: EMERGENCY MEDICINE

## 2023-07-12 PROCEDURE — 83880 ASSAY OF NATRIURETIC PEPTIDE: CPT | Performed by: EMERGENCY MEDICINE

## 2023-07-12 PROCEDURE — 25500020 PHARM REV CODE 255: Performed by: STUDENT IN AN ORGANIZED HEALTH CARE EDUCATION/TRAINING PROGRAM

## 2023-07-12 PROCEDURE — 84145 PROCALCITONIN (PCT): CPT | Performed by: STUDENT IN AN ORGANIZED HEALTH CARE EDUCATION/TRAINING PROGRAM

## 2023-07-12 PROCEDURE — 63600175 PHARM REV CODE 636 W HCPCS: Performed by: RADIOLOGY

## 2023-07-12 PROCEDURE — 83690 ASSAY OF LIPASE: CPT | Performed by: EMERGENCY MEDICINE

## 2023-07-12 PROCEDURE — 36415 COLL VENOUS BLD VENIPUNCTURE: CPT | Performed by: STUDENT IN AN ORGANIZED HEALTH CARE EDUCATION/TRAINING PROGRAM

## 2023-07-12 PROCEDURE — 99223 1ST HOSP IP/OBS HIGH 75: CPT | Mod: ,,, | Performed by: INTERNAL MEDICINE

## 2023-07-12 PROCEDURE — 25500020 PHARM REV CODE 255: Performed by: EMERGENCY MEDICINE

## 2023-07-12 PROCEDURE — 99900035 HC TECH TIME PER 15 MIN (STAT)

## 2023-07-12 PROCEDURE — 96366 THER/PROPH/DIAG IV INF ADDON: CPT

## 2023-07-12 PROCEDURE — 99285 EMERGENCY DEPT VISIT HI MDM: CPT | Mod: 25

## 2023-07-12 PROCEDURE — 93005 ELECTROCARDIOGRAM TRACING: CPT

## 2023-07-12 PROCEDURE — 96365 THER/PROPH/DIAG IV INF INIT: CPT

## 2023-07-12 PROCEDURE — 83605 ASSAY OF LACTIC ACID: CPT | Performed by: EMERGENCY MEDICINE

## 2023-07-12 PROCEDURE — 81003 URINALYSIS AUTO W/O SCOPE: CPT | Performed by: EMERGENCY MEDICINE

## 2023-07-12 PROCEDURE — 85730 THROMBOPLASTIN TIME PARTIAL: CPT | Mod: 91 | Performed by: STUDENT IN AN ORGANIZED HEALTH CARE EDUCATION/TRAINING PROGRAM

## 2023-07-12 PROCEDURE — 85610 PROTHROMBIN TIME: CPT | Performed by: EMERGENCY MEDICINE

## 2023-07-12 RX ORDER — NALOXONE HCL 0.4 MG/ML
0.02 VIAL (ML) INJECTION
Status: DISCONTINUED | OUTPATIENT
Start: 2023-07-12 | End: 2023-07-18 | Stop reason: HOSPADM

## 2023-07-12 RX ORDER — HEPARIN SODIUM,PORCINE/D5W 25000/250
0-40 INTRAVENOUS SOLUTION INTRAVENOUS CONTINUOUS
Status: DISCONTINUED | OUTPATIENT
Start: 2023-07-12 | End: 2023-07-13

## 2023-07-12 RX ORDER — FENTANYL CITRATE 50 UG/ML
INJECTION, SOLUTION INTRAMUSCULAR; INTRAVENOUS
Status: COMPLETED | OUTPATIENT
Start: 2023-07-12 | End: 2023-07-12

## 2023-07-12 RX ORDER — LOSARTAN POTASSIUM 25 MG/1
100 TABLET ORAL DAILY
Status: DISCONTINUED | OUTPATIENT
Start: 2023-07-12 | End: 2023-07-14

## 2023-07-12 RX ORDER — AMLODIPINE BESYLATE 5 MG/1
10 TABLET ORAL DAILY
Status: DISCONTINUED | OUTPATIENT
Start: 2023-07-12 | End: 2023-07-14

## 2023-07-12 RX ORDER — MORPHINE SULFATE 4 MG/ML
4 INJECTION, SOLUTION INTRAMUSCULAR; INTRAVENOUS
Status: COMPLETED | OUTPATIENT
Start: 2023-07-12 | End: 2023-07-12

## 2023-07-12 RX ORDER — IBUPROFEN 200 MG
24 TABLET ORAL
Status: DISCONTINUED | OUTPATIENT
Start: 2023-07-12 | End: 2023-07-18 | Stop reason: HOSPADM

## 2023-07-12 RX ORDER — GLUCAGON 1 MG
1 KIT INJECTION
Status: DISCONTINUED | OUTPATIENT
Start: 2023-07-12 | End: 2023-07-18 | Stop reason: HOSPADM

## 2023-07-12 RX ORDER — SODIUM CHLORIDE 0.9 % (FLUSH) 0.9 %
10 SYRINGE (ML) INJECTION EVERY 12 HOURS PRN
Status: DISCONTINUED | OUTPATIENT
Start: 2023-07-12 | End: 2023-07-18 | Stop reason: HOSPADM

## 2023-07-12 RX ORDER — ONDANSETRON 2 MG/ML
4 INJECTION INTRAMUSCULAR; INTRAVENOUS EVERY 8 HOURS PRN
Status: DISCONTINUED | OUTPATIENT
Start: 2023-07-12 | End: 2023-07-18 | Stop reason: HOSPADM

## 2023-07-12 RX ORDER — ROSUVASTATIN CALCIUM 40 MG/1
1 TABLET, COATED ORAL DAILY
COMMUNITY
Start: 2023-05-18

## 2023-07-12 RX ORDER — HYDROCODONE BITARTRATE AND ACETAMINOPHEN 5; 325 MG/1; MG/1
1 TABLET ORAL EVERY 6 HOURS PRN
Status: DISCONTINUED | OUTPATIENT
Start: 2023-07-12 | End: 2023-07-18 | Stop reason: HOSPADM

## 2023-07-12 RX ORDER — ONDANSETRON 2 MG/ML
4 INJECTION INTRAMUSCULAR; INTRAVENOUS
Status: COMPLETED | OUTPATIENT
Start: 2023-07-12 | End: 2023-07-12

## 2023-07-12 RX ORDER — MIDAZOLAM HYDROCHLORIDE 1 MG/ML
INJECTION INTRAMUSCULAR; INTRAVENOUS
Status: COMPLETED | OUTPATIENT
Start: 2023-07-12 | End: 2023-07-12

## 2023-07-12 RX ORDER — VIT C/E/ZN/COPPR/LUTEIN/ZEAXAN 250MG-90MG
1000 CAPSULE ORAL DAILY
COMMUNITY
Start: 2023-02-02

## 2023-07-12 RX ORDER — IBUPROFEN 200 MG
16 TABLET ORAL
Status: DISCONTINUED | OUTPATIENT
Start: 2023-07-12 | End: 2023-07-18 | Stop reason: HOSPADM

## 2023-07-12 RX ADMIN — FENTANYL CITRATE 50 MCG: 50 INJECTION, SOLUTION INTRAMUSCULAR; INTRAVENOUS at 03:07

## 2023-07-12 RX ADMIN — SODIUM CHLORIDE 1000 ML: 9 INJECTION, SOLUTION INTRAVENOUS at 01:07

## 2023-07-12 RX ADMIN — MIDAZOLAM HYDROCHLORIDE 2 MG: 1 INJECTION, SOLUTION INTRAMUSCULAR; INTRAVENOUS at 03:07

## 2023-07-12 RX ADMIN — IOHEXOL 30 ML: 300 INJECTION, SOLUTION INTRAVENOUS at 03:07

## 2023-07-12 RX ADMIN — MORPHINE SULFATE 4 MG: 4 INJECTION INTRAVENOUS at 10:07

## 2023-07-12 RX ADMIN — PIPERACILLIN AND TAZOBACTAM 4.5 G: 4; .5 INJECTION, POWDER, LYOPHILIZED, FOR SOLUTION INTRAVENOUS; PARENTERAL at 06:07

## 2023-07-12 RX ADMIN — ONDANSETRON 4 MG: 2 INJECTION INTRAMUSCULAR; INTRAVENOUS at 10:07

## 2023-07-12 RX ADMIN — LOSARTAN POTASSIUM 100 MG: 25 TABLET, FILM COATED ORAL at 02:07

## 2023-07-12 RX ADMIN — IOHEXOL 100 ML: 350 INJECTION, SOLUTION INTRAVENOUS at 11:07

## 2023-07-12 RX ADMIN — AMLODIPINE BESYLATE 10 MG: 5 TABLET ORAL at 02:07

## 2023-07-12 RX ADMIN — PIPERACILLIN AND TAZOBACTAM 4.5 G: 4; .5 INJECTION, POWDER, LYOPHILIZED, FOR SOLUTION INTRAVENOUS; PARENTERAL at 10:07

## 2023-07-12 RX ADMIN — HEPARIN SODIUM 12 UNITS/KG/HR: 10000 INJECTION, SOLUTION INTRAVENOUS at 08:07

## 2023-07-12 RX ADMIN — HEPARIN SODIUM 12 UNITS/KG/HR: 10000 INJECTION, SOLUTION INTRAVENOUS at 01:07

## 2023-07-12 NOTE — CONSULTS
[facility] Emergency Dept  Cardiology  Consult Note    Patient Name: Abbie Barragan  MRN: [id number]  Admission Date: 7/12/2023  Hospital Length of Stay: 0 days  Code Status: No Order   Attending Provider: Noble Peñaloza MD   Consulting Provider: Rell Mcfadden MD  Primary Care Physician: Kailash Carvalho NP  Principal Problem:Cholelithiasis with acute cholecystitis    Patient information was obtained from patient and ER records.     Inpatient consult to Cardiology  Consult performed by: Rell Mcfadden MD  Consult ordered by: Neeta Prabhakar MD        Subjective:     Chief Complaint:  A-fib       HPI: History is provided by independent historian. This is an 88 y.o. F who has a PMHx of Above knee amputation status, HLD, and HTN who presents to the ED for emergent evaluation of acute and constant RLQ abdominal pain with associated nausea, and vomiting that began yesterday. Pt reports 1 episode of watery emesis last night. She attempted taking Tylenol without relief. The pt denies a Hx of abdominal surgeries. Pt reports a Hx of amputation to the left lower extremity secondary to poor blood circulation. She takes Plavix. The pt's medications at home includes Tylenol, Plavix, Aspirin, Potassium, Rosuvastatin, Amlodipine 10mg, HCTZ 25mg, and Losartan 100mg. Pt states that she took her blood pressure medications this morning. Pt denies constipation, diarrhea, dysuria, or hip pain.    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     Current Outpatient Medications on File Prior to Encounter   Medication Sig    acetaminophen (TYLENOL) 500 MG tablet Take 2 tablets (1,000 mg total) by mouth every 8 (eight) hours as needed for Pain.    alendronate (FOSAMAX) 70 MG tablet Take 70 mg by mouth every 7 days.    amLODIPine (NORVASC) 10 MG tablet Take 10 mg by mouth once daily.    aspirin (ECOTRIN) 81 MG EC tablet Take 81 mg by mouth once daily.    cholecalciferol, vitamin D3, (VITAMIN D3) 25 mcg (1,000 unit) capsule Take 1,000 Units by mouth once daily.    clopidogrel (PLAVIX) 75 mg tablet Take 75 mg by mouth once daily.    hydroCHLOROthiazide (HYDRODIURIL) 25 MG tablet Take 25 mg by mouth once daily.    losartan (COZAAR) 100 MG tablet Take 100 mg by mouth once daily.    potassium chloride (MICRO-K) 10 MEQ CpSR Take 10 mEq by mouth once.    rosuvastatin (CRESTOR) 40 MG Tab Take 1 tablet by mouth once daily.    [DISCONTINUED] acyclovir (ZOVIRAX) 800 MG Tab Take 1 tablet (800 mg total) by mouth 5 (five) times daily.    [DISCONTINUED] albuterol (PROVENTIL/VENTOLIN HFA) 90 mcg/actuation inhaler Inhale 1-2 puffs into the lungs every 6 (six) hours as needed for Wheezing.    [DISCONTINUED] atorvastatin (LIPITOR) 80 MG tablet Take 80 mg by mouth once daily.    [DISCONTINUED] calcium carbonate-mag hydroxid 1,000-200 mg Chew Take by mouth.    [DISCONTINUED] diphenhydrAMINE (BENADRYL) 25 mg capsule Take 1 capsule (25 mg total) by mouth every 6 (six) hours as needed (Cough).    [DISCONTINUED] fenofibrate 160 MG Tab Take 160 mg by mouth once daily.    [DISCONTINUED] gabapentin (NEURONTIN) 100 MG capsule Take 1 capsule (100 mg total) by mouth every evening.    [DISCONTINUED]  hydrALAZINE (APRESOLINE) 100 MG tablet Take 100 mg by mouth 2 (two) times daily.    [DISCONTINUED] lidocaine (LIDODERM) 5 % Place 1 patch onto the skin once daily. Remove & Discard patch within 12 hours or as directed by MD    [DISCONTINUED] oxycodone-acetaminophen 5-325 mg (PERCOCET) 5-325 mg per tablet Take 1 tablet by mouth every 4 (four) hours as needed for Pain.    [DISCONTINUED] UNKNOWN TO PATIENT      Family History    None       Tobacco Use    Smoking status: Former    Smokeless tobacco: Never   Substance and Sexual Activity    Alcohol use: No    Drug use: Never    Sexual activity: Not on file     Review of Systems   Constitutional: Negative for decreased appetite.   HENT:  Negative for ear discharge.    Eyes:  Negative for blurred vision.   Respiratory:  Negative for hemoptysis.    Endocrine: Negative for polyphagia.   Hematologic/Lymphatic: Negative for adenopathy.   Skin:  Negative for color change.   Musculoskeletal:  Negative for joint swelling.   Genitourinary:  Negative for bladder incontinence.   Neurological:  Negative for brief paralysis.   Psychiatric/Behavioral:  Negative for hallucinations.    Allergic/Immunologic: Negative for hives.   Objective:     Vital Signs (Most Recent):  Temp: 97.8 °F (36.6 °C) (07/12/23 0752)  Pulse: 89 (07/12/23 1332)  Resp: 16 (07/12/23 1043)  BP: (!) 176/78 (07/12/23 1332)  SpO2: 98 % (07/12/23 1332) Vital Signs (24h Range):  Temp:  [97.8 °F (36.6 °C)] 97.8 °F (36.6 °C)  Pulse:  [77-92] 89  Resp:  [16-18] 16  SpO2:  [84 %-98 %] 98 %  BP: (164-238)/() 176/78     Weight: 124.3 kg (274 lb)  Body mass index is 51.77 kg/m².    SpO2: 98 %         Intake/Output Summary (Last 24 hours) at 7/12/2023 1359  Last data filed at 7/12/2023 1242  Gross per 24 hour   Intake 99 ml   Output --   Net 99 ml       Lines/Drains/Airways       Peripheral Intravenous Line  Duration                  Peripheral IV - Single Lumen 07/12/23 1000 20 G Left Forearm <1 day          Peripheral IV - Single Lumen 07/12/23 1311 20 G;1 3/4 in Right Forearm <1 day                     Physical Exam  Constitutional:       Appearance: She is well-developed.   HENT:      Head: Normocephalic and atraumatic.   Eyes:      Conjunctiva/sclera: Conjunctivae normal.      Pupils: Pupils are equal, round, and reactive to light.   Cardiovascular:      Rate and Rhythm: Normal rate. Rhythm irregular.      Pulses: Intact distal pulses.      Heart sounds: Normal heart sounds.   Pulmonary:      Effort: Pulmonary effort is normal.      Breath sounds: Normal breath sounds.   Abdominal:      General: Bowel sounds are normal.      Palpations: Abdomen is soft.   Musculoskeletal:         General: Normal range of motion.      Cervical back: Normal range of motion and neck supple.   Skin:     General: Skin is warm and dry.   Neurological:      Mental Status: She is alert and oriented to person, place, and time.        Significant Labs: All pertinent lab results from the last 24 hours have been reviewed.    Significant Imaging: Echocardiogram: 2D echo with color flow doppler: No results found for this or any previous visit.    Assessment and Plan:     * Cholelithiasis with acute cholecystitis  Per surgery. If echo stable then cleared at moderate cardiac risk    PAD (peripheral artery disease)  Followed at . Hx left AKA    Chronic atrial fibrillation  New Dx. Duration unknown - possibly chronic. Rate controlled. Check echo. On heparin - switch to DOAC if no invasive procedures planned. Will f/u prn        VTE Risk Mitigation (From admission, onward)         Ordered     heparin 25,000 units in dextrose 5% (100 units/ml) IV bolus from bag - ADDITIONAL PRN BOLUS - 60 units/kg  As needed (PRN)        Question:  Heparin Infusion Adjustment (DO NOT MODIFY ANSWER)  Answer:  [email or web address]\epic\Images\Pharmacy\HeparinInfusions\heparin LOW INTENSITY nomogram for [facility] [id number]    07/12/23 1244     heparin 25,000 units in dextrose 5%  (100 units/ml) IV bolus from bag - ADDITIONAL PRN BOLUS - 30 units/kg  As needed (PRN)        Question:  Heparin Infusion Adjustment (DO NOT MODIFY ANSWER)  Answer:  [email or web address]\epic\Images\Pharmacy\HeparinInfusions\heparin LOW INTENSITY nomogram for [facility] [id number]    07/12/23 1244     heparin 25,000 units in dextrose 5% 250 mL (100 units/mL) infusion LOW INTENSITY nomogram - [facility]  Continuous        Question Answer Comment   Heparin Infusion Adjustment (DO NOT MODIFY ANSWER) [email or web address]\epic\Images\Pharmacy\HeparinInfusions\heparin LOW INTENSITY nomogram for [facility] [id number]    Begin at (in units/kg/hr) 12        07/12/23 1244                Thank you for your consult. I will follow-up with patient. Please contact us if you have any additional questions.    Rell Mcfadden MD  Cardiology   [facility] - Emergency Dept

## 2023-07-12 NOTE — HPI
HPI: History is provided by independent historian. This is an 88 y.o. F who has a PMHx of Above knee amputation status, HLD, and HTN who presents to the ED for emergent evaluation of acute and constant RLQ abdominal pain with associated nausea, and vomiting that began yesterday. Pt reports 1 episode of watery emesis last night. She attempted taking Tylenol without relief. The pt denies a Hx of abdominal surgeries. Pt reports a Hx of amputation to the left lower extremity secondary to poor blood circulation. She takes Plavix. The pt's medications at home includes Tylenol, Plavix, Aspirin, Potassium, Rosuvastatin, Amlodipine 10mg, HCTZ 25mg, and Losartan 100mg. Pt states that she took her blood pressure medications this morning. Pt denies constipation, diarrhea, dysuria, or hip pain.    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

## 2023-07-12 NOTE — CONSULTS
Inpatient Radiology Pre-procedure Note    History of Present Illness:  Abbie Barragan is a 88 y.o. female who arrived to [facility] ED with 1 day of RUQ pain associated with N/V with new CT revealing hydropic GB, gallstones, mural hyperemia and thickening, pericholecystic fluid and pericholecystic fat stranding with constellation of imaging, lab and PE findings consistent with acute calculus cholecystitis requiring image-guided decompression and fluid-sampling for symptom relief, source control, diagnosis and treatment planning.    A new inpatient IR consult received for US and fluoroscopic-guided placement of a RUQ-approach cholecystostomy tube.    Admission H&P reviewed.  Past Medical History:   Diagnosis Date    Above knee amputation status 1994    History of DVT of lower extremity 1994    LEFT    Hyperlipidemia     Hypertension     PAD (peripheral artery disease)      Past Surgical History:   Procedure Laterality Date    LEG AMPUTATION THROUGH KNEE      PARTIAL HYSTERECTOMY      1960's    VASCULAR SURGERY Left 1994    AORTOFEM BYPASS     Review of Systems:   As documented in primary team H&P    Home Meds:   Prior to Admission medications    Medication Sig Start Date End Date Taking? Authorizing Provider   acetaminophen (TYLENOL) 500 MG tablet Take 2 tablets (1,000 mg total) by mouth every 8 (eight) hours as needed for Pain. 5/30/20  Yes Coleen Novoa MD   alendronate (FOSAMAX) 70 MG tablet Take 70 mg by mouth every 7 days.   Yes Historical Provider   amLODIPine (NORVASC) 10 MG tablet Take 10 mg by mouth once daily.   Yes Historical Provider   aspirin (ECOTRIN) 81 MG EC tablet Take 81 mg by mouth once daily.   Yes Historical Provider   cholecalciferol, vitamin D3, (VITAMIN D3) 25 mcg (1,000 unit) capsule Take 1,000 Units by mouth once daily. 2/2/23  Yes Historical Provider   clopidogrel (PLAVIX) 75 mg tablet Take 75 mg by mouth once daily.   Yes Historical Provider   hydroCHLOROthiazide (HYDRODIURIL) 25 MG tablet  Take 25 mg by mouth once daily.   Yes Historical Provider   losartan (COZAAR) 100 MG tablet Take 100 mg by mouth once daily.   Yes Historical Provider   potassium chloride (MICRO-K) 10 MEQ CpSR Take 10 mEq by mouth once.   Yes Historical Provider   rosuvastatin (CRESTOR) 40 MG Tab Take 1 tablet by mouth once daily. 5/18/23  Yes Historical Provider   acyclovir (ZOVIRAX) 800 MG Tab Take 1 tablet (800 mg total) by mouth 5 (five) times daily. 3/2/14 7/12/23  Moi Goodwin MD   albuterol (PROVENTIL/VENTOLIN HFA) 90 mcg/actuation inhaler Inhale 1-2 puffs into the lungs every 6 (six) hours as needed for Wheezing. 2/28/19 7/12/23  Moi Goodwin MD   atorvastatin (LIPITOR) 80 MG tablet Take 80 mg by mouth once daily.  7/12/23  Historical Provider   calcium carbonate-mag hydroxid 1,000-200 mg Chew Take by mouth.  7/12/23  Historical Provider   diphenhydrAMINE (BENADRYL) 25 mg capsule Take 1 capsule (25 mg total) by mouth every 6 (six) hours as needed (Cough). 6/19/19 7/12/23  Moi Goodwin MD   fenofibrate 160 MG Tab Take 160 mg by mouth once daily.  7/12/23  Historical Provider   gabapentin (NEURONTIN) 100 MG capsule Take 1 capsule (100 mg total) by mouth every evening. 5/30/20 7/12/23  Coleen Novoa MD   hydrALAZINE (APRESOLINE) 100 MG tablet Take 100 mg by mouth 2 (two) times daily.  7/12/23  Historical Provider   lidocaine (LIDODERM) 5 % Place 1 patch onto the skin once daily. Remove & Discard patch within 12 hours or as directed by MD 5/30/20 7/12/23  Coleen Novoa MD   oxycodone-acetaminophen 5-325 mg (PERCOCET) 5-325 mg per tablet Take 1 tablet by mouth every 4 (four) hours as needed for Pain. 3/2/14 7/12/23  Moi Goodwin MD   UNKNOWN TO PATIENT   7/12/23  Historical Provider     Scheduled Meds:    sodium chloride 0.9%  1,000 mL Intravenous ED 1 Time     Continuous Infusions:    heparin (porcine) in D5W 12 Units/kg/hr (07/12/23 1340)     PRN Meds:heparin (PORCINE), heparin  (PORCINE)    Anticoagulants/Antiplatelets: aspirin, Plavix, and Heparin    Allergies: Review of patient's allergies indicates:  No Known Allergies    Sedation Hx: have not been any systemic reactions    Labs:  Recent Labs   Lab 07/12/23  1311   INR 1.1       Recent Labs   Lab 07/12/23  0940   WBC 17.91*   HGB 13.0   HCT 41.3   MCV 82         Recent Labs   Lab 07/12/23  0940   *   *   K 3.2*   CL 93*   CO2 24   BUN 7*   CREATININE 0.7   CALCIUM 9.9   ALT 9*   AST 15   ALBUMIN 3.7   BILITOT 0.9     Vitals:  Temp: 97.8 °F (36.6 °C) (07/12/23 0752)  Pulse: 89 (07/12/23 1332)  Resp: 16 (07/12/23 1043)  BP: (!) 176/78 (07/12/23 1332)  SpO2: 98 % (07/12/23 1332)     Physical Exam:  ASA: III  Mallampati: II    General: no acute distress  Mental Status: alert and oriented to person, place and time  HEENT: normocephalic, atraumatic  Chest: unlabored breathing  Heart: regular heart rate  Abdomen: nondistended  Extremity: moves all extremities    A/P:  88 y.o. female who arrived to [facility] ED with 1 day of RUQ pain associated with N/V with new CT revealing hydropic GB, gallstones, mural hyperemia and thickening, pericholecystic fluid and pericholecystic fat stranding with constellation of imaging, lab and PE findings consistent with acute calculus cholecystitis requiring image-guided decompression and fluid-sampling for symptom relief, source control, diagnosis and treatment planning.    Acute calculus cholecystitis - Will attempt US and fluoroscopic-guided placement of a RUQ-approach cholecystostomy tube with local anesthetic and moderate conscious sedation at approximately 1500 today.    Please continue to hold all non-essential anti-platelets, anti-coagulants and keep pt NPO.    Please place all pertinent fluid studies in epic prior to the procedure to ensure that the studies the ordering provider/team deem appropriate are performed.    Risks (including, but not limited to, pain, bleeding, infection,  damage to nearby structures, failure to obtain sufficient material for a diagnosis, the need for additional procedures, and death), benefits, and alternatives were discussed with the patient. All questions were answered to the best of my abilities. The patient wishes to proceed with the procedure. Written informed consent was obtained.    Thank you for considering IR for the care of your patient.     Nicola Jeffrey MD  Interventional Radiology

## 2023-07-12 NOTE — H&P
"[facility] Medicine  History & Physical    Patient Name: Abbie Barragan  MRN: [id number]  Patient Class: IP- Inpatient  Admission Date: 7/12/2023  Attending Physician: Noble Peñaloza MD   Primary Care Provider: Kailash Carvalho NP         Patient information was obtained from patient, past medical records and ER records.     Subjective:     Principal Problem:Sepsis    Chief Complaint:   Chief Complaint   Patient presents with    Hip Pain     Pt reports right hip pain since yesterday, denies fall or injury. Pt uses a wheelchair for mobility. Pt has left BKA. Pt hypertensive in triage (238/100), reports took her BP meds this morning.         HPI: Ms. Barragan is an 88 year old female with a past medical history of left AKA, hypertension, PAD who presents to the ED today for abdominal pain. She states it's on her right lower abdomen and thought it was her hip. This started yesterday and has progressively gotten worse. She was also found to be severely hypertensive in ED but improved with pain medications. She believes that she had fevers at home but denies any issues breathing, chest pain, diarrhea.  In the ED, she was found to have WBC 17.91, Na 130, K 3.2, Cl 93,  and lactate 2.24. CT scan showed "gallbladder distended with a few gallstones with hyperemia of the gallbladder wall, gallbladder wall thickening and pericholecystic fluid. Concerning for acute cholecystitis." Patient also noted to be in atrial fibrillation with rate 80s in ED. General Surgery and Cardiology consulted. General surgery recommending IR cholecystostomy drain. IR consulted, drain to be placed today.      Past Medical History:   Diagnosis Date    Above knee amputation status 1994    History of DVT of lower extremity 1994    LEFT    Hyperlipidemia     Hypertension     PAD (peripheral artery disease)        Past Surgical History:   Procedure Laterality Date    LEG AMPUTATION THROUGH KNEE      PARTIAL " HYSTERECTOMY      1960's    VASCULAR SURGERY Left 1994    AORTOFEM BYPASS       Review of patient's allergies indicates:  No Known Allergies    No current facility-administered medications on file prior to encounter.     Current Outpatient Medications on File Prior to Encounter   Medication Sig    acetaminophen (TYLENOL) 500 MG tablet Take 2 tablets (1,000 mg total) by mouth every 8 (eight) hours as needed for Pain.    alendronate (FOSAMAX) 70 MG tablet Take 70 mg by mouth every 7 days.    amLODIPine (NORVASC) 10 MG tablet Take 10 mg by mouth once daily.    aspirin (ECOTRIN) 81 MG EC tablet Take 81 mg by mouth once daily.    cholecalciferol, vitamin D3, (VITAMIN D3) 25 mcg (1,000 unit) capsule Take 1,000 Units by mouth once daily.    clopidogrel (PLAVIX) 75 mg tablet Take 75 mg by mouth once daily.    hydroCHLOROthiazide (HYDRODIURIL) 25 MG tablet Take 25 mg by mouth once daily.    losartan (COZAAR) 100 MG tablet Take 100 mg by mouth once daily.    potassium chloride (MICRO-K) 10 MEQ CpSR Take 10 mEq by mouth once.    rosuvastatin (CRESTOR) 40 MG Tab Take 1 tablet by mouth once daily.    [DISCONTINUED] acyclovir (ZOVIRAX) 800 MG Tab Take 1 tablet (800 mg total) by mouth 5 (five) times daily.    [DISCONTINUED] albuterol (PROVENTIL/VENTOLIN HFA) 90 mcg/actuation inhaler Inhale 1-2 puffs into the lungs every 6 (six) hours as needed for Wheezing.    [DISCONTINUED] atorvastatin (LIPITOR) 80 MG tablet Take 80 mg by mouth once daily.    [DISCONTINUED] calcium carbonate-mag hydroxid 1,000-200 mg Chew Take by mouth.    [DISCONTINUED] diphenhydrAMINE (BENADRYL) 25 mg capsule Take 1 capsule (25 mg total) by mouth every 6 (six) hours as needed (Cough).    [DISCONTINUED] fenofibrate 160 MG Tab Take 160 mg by mouth once daily.    [DISCONTINUED] gabapentin (NEURONTIN) 100 MG capsule Take 1 capsule (100 mg total) by mouth every evening.    [DISCONTINUED] hydrALAZINE (APRESOLINE) 100 MG tablet Take 100 mg by mouth  2 (two) times daily.    [DISCONTINUED] lidocaine (LIDODERM) 5 % Place 1 patch onto the skin once daily. Remove & Discard patch within 12 hours or as directed by MD    [DISCONTINUED] oxycodone-acetaminophen 5-325 mg (PERCOCET) 5-325 mg per tablet Take 1 tablet by mouth every 4 (four) hours as needed for Pain.    [DISCONTINUED] UNKNOWN TO PATIENT      Family History    None       Tobacco Use    Smoking status: Former    Smokeless tobacco: Never   Substance and Sexual Activity    Alcohol use: No    Drug use: Never    Sexual activity: Not on file     Review of Systems  Objective:     Vital Signs (Most Recent):  Temp: 97.8 °F (36.6 °C) (07/12/23 0752)  Pulse: 96 (07/12/23 1503)  Resp: 14 (07/12/23 1503)  BP: (!) 209/95 (07/12/23 1503)  SpO2: 95 % (07/12/23 1503) Vital Signs (24h Range):  Temp:  [97.8 °F (36.6 °C)] 97.8 °F (36.6 °C)  Pulse:  [77-96] 96  Resp:  [14-18] 14  SpO2:  [84 %-98 %] 95 %  BP: (164-238)/() 209/95     Weight: 124.3 kg (274 lb)  Body mass index is 51.77 kg/m².     Physical Exam  Vitals and nursing note reviewed.   Constitutional:       General: She is not in acute distress.     Appearance: She is obese. She is ill-appearing.   HENT:      Head: Normocephalic.   Cardiovascular:      Rate and Rhythm: Normal rate and regular rhythm.      Pulses: Normal pulses.   Pulmonary:      Effort: Pulmonary effort is normal. No respiratory distress.   Abdominal:      General: Bowel sounds are normal.      Tenderness: There is abdominal tenderness.   Musculoskeletal:         General: No swelling.      Comments: Left AKA   Neurological:      General: No focal deficit present.      Mental Status: She is alert and oriented to person, place, and time.   Psychiatric:         Mood and Affect: Mood normal.         Thought Content: Thought content normal.         Judgment: Judgment normal.              Significant Labs: All pertinent labs within the past 24 hours have been reviewed.    Significant Imaging: I  have reviewed all pertinent imaging results/findings within the past 24 hours.    Assessment/Plan:     * Sepsis  This patient does have evidence of infective focus  My overall impression is sepsis.  Source: acute cholecystitis  Antibiotics given-   Antibiotics (72h ago, onward)    Start     Stop Route Frequency Ordered    07/12/23 1845  piperacillin-tazobactam (ZOSYN) 4.5 g in dextrose 5 % in water (D5W) 5 % 100 mL IVPB (MB+)         -- IV Every 8 hours (non-standard times) 07/12/23 1442        Latest lactate reviewed-  Recent Labs   Lab 07/12/23  1311   LACTATE 1.5     Organ dysfunction indicated by Acute respiratory failure    Fluid challenge Ideal Body Weight- The patient's ideal body weight is Ideal body weight: 47.8 kg (105 lb 6.1 oz) which will be used to calculate fluid bolus of 30 ml/kg for treatment of septic shock.      Post- resuscitation assessment Yes Perfusion exam was performed within 6 hours of septic shock presentation after bolus shows Adequate tissue perfusion assessed by non-invasive monitoring       Will Not start Pressors- Levophed for MAP of 65  Source control achieved by: Cholecystostomy drain and IV antibiotics    Cholelithiasis with acute cholecystitis  Acute cholecystitis, on plavix last dose this AM and has hx of ex-laparotomy  - General surgery consulted and recommending IR placement of cholecystostomy drain  - plan for drain today      PVD (peripheral vascular disease)  -noted  -resume plavix once cleared by Gen Surgery      Chronic atrial fibrillation  Patient with Paroxysmal (<7 days) atrial fibrillation which is controlled currently with no meds. Patient is currently in atrial fibrillation.[id number] Score: 4. . Anticoagulation on heparin drip, held for procedure  - check echocardiogram  - check tsh  - appreciate Cardiology recommendations      VTE Risk Mitigation (From admission, onward)         Ordered     IP VTE HIGH RISK PATIENT  Once         07/12/23 1427     Place sequential  compression device  Until discontinued         07/12/23 1427     heparin 25,000 units in dextrose 5% (100 units/ml) IV bolus from bag - ADDITIONAL PRN BOLUS - 60 units/kg  As needed (PRN)        Question:  Heparin Infusion Adjustment (DO NOT MODIFY ANSWER)  Answer:  [email or web address]\epic\Images\Pharmacy\HeparinInfusions\heparin LOW INTENSITY nomogram for [facility] [id number]    07/12/23 1244     heparin 25,000 units in dextrose 5% (100 units/ml) IV bolus from bag - ADDITIONAL PRN BOLUS - 30 units/kg  As needed (PRN)        Question:  Heparin Infusion Adjustment (DO NOT MODIFY ANSWER)  Answer:  [email or web address]\epic\Images\Pharmacy\HeparinInfusions\heparin LOW INTENSITY nomogram for [facility] [id number]    07/12/23 1244     heparin 25,000 units in dextrose 5% 250 mL (100 units/mL) infusion LOW INTENSITY nomogram - [facility]  Continuous        Question Answer Comment   Heparin Infusion Adjustment (DO NOT MODIFY ANSWER) [email or web address]\epic\Images\Pharmacy\HeparinInfusions\heparin LOW INTENSITY nomogram for [facility] [id number]    Begin at (in units/kg/hr) 12        07/12/23 1244                           Noble Peñaloza MD  Department of Hospital Medicine  [facility] [place]

## 2023-07-12 NOTE — ED NOTES
Pt.'s oxygen saturation assessed at 84% on room air via pulse oximetry. Pt denies use of at home O2. 3L of O2 placed via nasal cannula. Oxygenation increased to 95%. MD notified.

## 2023-07-12 NOTE — ASSESSMENT & PLAN NOTE
This is an 88-year-old female patient is presenting to the ED with a 1 day history of right upper quadrant abdominal pain nausea and vomiting who has a diagnosis of acute cholecystitis.  Given her medical comorbidities and the fact that she took her Plavix this morning she is at very high risk for a surgical complication should we proceed with a laparoscopic cholecystectomy, therefore we will recommend placement of a cholecystostomy tube at this time for management of her acute cholecystitis.    -patient will be admitted to Hospital Medicine   -recommend broad-spectrum antibiotics, Zosyn is appropriate   -multimodal pain control   -we will have her follow-up with general surgery as an outpatient to discuss surgical intervention after she is out of this acute phase, as well as the fact that she will have to have her Plavix held for at least 5 days to decrease the risk of surgical morbidity and mortality  -general surgery will follow along

## 2023-07-12 NOTE — ED NOTES
Consulted with MD about plan for pt to go to surgery regarding starting patient on Heparin drip. Per Dr. Prabhakar, ok to start pt on Heparin d/t pt not going to surgery in the near future.

## 2023-07-12 NOTE — BRIEF OP NOTE
Radiology Post-Procedure Note    Pre Op Diagnosis: Acute calculus cholecystitis  Post Op Diagnosis: Same    Procedure: 1. US and fluoroscopic-guided placement of a 10-Fr RUQ-approach cholecystostomy tube    Procedure performed by: Nicola Jeffrey MD    Written Informed Consent Obtained: Yes  Specimen Removed: YES, 20-cc's of moderately thick, turbid and debris-filled yellow blood-tinged bile  Estimated Blood Loss: none    Findings:   Cholangiogram with multiple filling defects/stones in the GB lumen with partial opacification of the proximal cystic duct however, there is no visualization/opacification of the mid and distal segments of the cystic duct, the CBD and small bowel consistent with suspicions of cystic duct obstruction and acute calculus cholecystitis.    Successful US and fluoroscopic-guided placement of a 10-Fr RUQ-approach cholecystostomy tube with local anesthetic and moderate conscious sedation. Patient tolerated the procedure well. No immediate post-procedural complications noted.     Drain must be flushed with 10-cc of sterile NS BID to assist with drainage and prevent catheter clogging for as long as drain remains indwelling. Consider removal of drainage catheter when daily input = daily output (ie output = 20-cc/day) for 2-3 consecutive days.     Recommend patient follow up in Surgery Clinic after discharge to assess if patient will be a candidate for cholecystectomy after optimization and acute phase of disease process complete. If patient is still deemed not to be a candidate for cholecystectomy at that time, patient will need to be scheduled for cholangiogram via existing catheter and cholecystostomy tube exchange vs removal in 4 weeks from date of placement (ie week of 8/7/23).    Thank you for considering IR for the care of your patient.     Nicola Jeffrey MD  Interventional Radiology

## 2023-07-12 NOTE — HPI
"Ms. Barragan is an 88 year old female with a past medical history of left AKA, hypertension, PAD who presents to the ED today for abdominal pain. She states it's on her right lower abdomen and thought it was her hip. This started yesterday and has progressively gotten worse. She was also found to be severely hypertensive in ED but improved with pain medications. She believes that she had fevers at home but denies any issues breathing, chest pain, diarrhea.  In the ED, she was found to have WBC 17.91, Na 130, K 3.2, Cl 93,  and lactate 2.24. CT scan showed "gallbladder distended with a few gallstones with hyperemia of the gallbladder wall, gallbladder wall thickening and pericholecystic fluid. Concerning for acute cholecystitis." Patient also noted to be in atrial fibrillation with rate 80s in ED. General Surgery and Cardiology consulted. General surgery recommending IR cholecystostomy drain. IR consulted, drain to be placed today.  "

## 2023-07-12 NOTE — ED PROVIDER NOTES
Encounter Date: 7/12/2023    SCRIBE #1 NOTE: I, Camille Tidwell, am scribing for, and in the presence of,  Neeta Prabhakar MD. Other sections scribed: HPI, ROS, PE.     History     Chief Complaint   Patient presents with    Hip Pain     Pt reports right hip pain since yesterday, denies fall or injury. Pt uses a wheelchair for mobility. Pt has left BKA. Pt hypertensive in triage (238/100), reports took her BP meds this morning.      CC: Abdominal pain    HPI: History is provided by independent historian. This is an 88 y.o. F who has a PMHx of Above knee amputation status, HLD, and HTN who presents to the ED for emergent evaluation of acute and constant RLQ abdominal pain with associated nausea, and vomiting that began yesterday. Pt reports 1 episode of watery emesis last night. She attempted taking Tylenol without relief. The pt denies a Hx of abdominal surgeries. Pt reports a Hx of amputation to the left lower extremity secondary to poor blood circulation. She takes Plavix. The pt's medications at home includes Tylenol, Plavix, Aspirin, Potassium, Rosuvastatin, Amlodipine 10mg, HCTZ 25mg, and Losartan 100mg. Pt states that she took her blood pressure medications this morning. Pt denies constipation, diarrhea, dysuria, or hip pain. She denies previous abdominal surgeries.       The history is provided by the patient. No  was used.   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    Hyperlipidemia     Hypertension     PAD (peripheral artery disease)      Past Surgical History:   Procedure Laterality Date    LEG AMPUTATION THROUGH KNEE      PARTIAL HYSTERECTOMY      1960's    VASCULAR SURGERY Left 1994    AORTOFEM BYPASS     History reviewed. No pertinent family history.  Social History     Tobacco Use    Smoking status: Former    Smokeless tobacco: Never    Substance Use Topics    Alcohol use: No    Drug use: Never     Review of Systems   Constitutional:  Negative for chills and fever.   HENT:  Negative for congestion and sore throat.    Eyes:  Negative for visual disturbance.   Respiratory:  Negative for cough and shortness of breath.    Cardiovascular:  Negative for chest pain.   Gastrointestinal:  Positive for abdominal pain, nausea and vomiting. Negative for constipation and diarrhea.   Genitourinary:  Negative for dysuria.   Musculoskeletal:  Negative for arthralgias.   Skin:  Negative for rash.   Neurological:  Negative for headaches.   Psychiatric/Behavioral:  Negative for decreased concentration.      Physical Exam     Initial Vitals [07/12/23 0752]   BP Pulse Resp Temp SpO2   (!) 238/100 84 18 97.8 °F (36.6 °C) 96 %      MAP       --         Physical Exam    Nursing note and vitals reviewed.  Constitutional: She appears well-developed and well-nourished. She is not diaphoretic. No distress.   Body mass index is 51.77 kg/m².     HENT:   Mouth/Throat: Oropharynx is clear and moist.   Eyes: Pupils are equal, round, and reactive to light.   Neck: Neck supple.   Cardiovascular:  Normal rate and regular rhythm.           Pulmonary/Chest: Breath sounds normal.   Abdominal:   There is tenderness in the right upper abdomen, and mid abdomen without rigidity, or guarding.   Musculoskeletal:         General: No edema.      Cervical back: Neck supple.      Right hip: Normal. No deformity or bony tenderness. Normal range of motion.      Comments: Left lower leg amputation.     Neurological: She is alert and oriented to person, place, and time.   Skin: Skin is warm and dry.   Psychiatric: She has a normal mood and affect.       ED Course   Critical Care    Date/Time: 7/12/2023 2:40 PM  Performed by: Neeta Prabhakar MD  Authorized by: Noble Peñaloza MD   Total critical care time (exclusive of procedural time) : 0 minutes  Comments: Please put in 35 minutes of critical  care due to patient having a high risk of respiratory failure, infection requiring IV antibiotics.   Separate from teaching and exclusive of procedure and ekg time  Includes:  Time at bedside  Time reviewing test results  Time discussing case with staff  Time documenting the medical record  Time spent with family members  Time spent with consults  Management          Labs Reviewed   CBC W/ AUTO DIFFERENTIAL - Abnormal; Notable for the following components:       Result Value    WBC 17.91 (*)     MCH 25.8 (*)     MCHC 31.5 (*)     RDW 14.7 (*)     Gran # (ANC) 15.4 (*)     Immature Grans (Abs) 0.08 (*)     Gran % 85.9 (*)     Lymph % 9.8 (*)     Mono % 3.6 (*)     All other components within normal limits   COMPREHENSIVE METABOLIC PANEL - Abnormal; Notable for the following components:    Sodium 130 (*)     Potassium 3.2 (*)     Chloride 93 (*)     Glucose 131 (*)     BUN 7 (*)     ALT 9 (*)     All other components within normal limits   B-TYPE NATRIURETIC PEPTIDE - Abnormal; Notable for the following components:     (*)     All other components within normal limits   ISTAT LACTATE - Abnormal; Notable for the following components:    POC Lactate 2.24 (*)     All other components within normal limits   LIPASE   B-TYPE NATRIURETIC PEPTIDE   LACTIC ACID, PLASMA    Narrative:     Draw baseline aPTT prior to starting the heparin bolus or  infusion  (if patient is on warfarin prior to heparin therapy)   APTT    Narrative:     Draw baseline aPTT prior to starting the heparin bolus or  infusion  (if patient is on warfarin prior to heparin therapy)   PROTIME-INR    Narrative:     Draw baseline aPTT prior to starting the heparin bolus or  infusion  (if patient is on warfarin prior to heparin therapy)   URINALYSIS, REFLEX TO URINE CULTURE   PROCALCITONIN        ECG Results              EKG 12-lead (Preliminary result)  Result time 07/12/23 12:45:38      Wet Read by Neeta Prabhakar MD (07/12/23 12:45:38, [facility] [place]  Emergency Dept, Emergency Medicine)    Atrial fibrillation, rate 72 beats per minute,  milliseconds, no STEMI.  Right bundle branch block present.                                  Imaging Results              X-Ray Chest AP Portable (Final result)  Result time 07/12/23 12:10:23      Final result by Karlos Hilton MD (07/12/23 12:10:23)                   Impression:      Ill-defined basilar opacities may relate to atelectasis, aspiration or pneumonia.      Electronically signed by: Karlos Hilton  Date:    07/12/2023  Time:    12:10               Narrative:    EXAMINATION:  XR CHEST AP PORTABLE    CLINICAL HISTORY:  Hypoxemia    TECHNIQUE:  Single frontal view of the chest was performed.    COMPARISON:  Chest radiograph performed 06/19/2019    FINDINGS:  Monitoring leads overlie the chest.    Grossly unchanged cardiomediastinal contours, again noting enlargement the cardiac silhouette.  Ill-defined basilar opacities, left greater than right.    No definite pneumothorax or large volume pleural effusion.    No acute findings the visualized abdomen.    Osseous and soft tissue structures appear without definite acute change.                                       CT Abdomen Pelvis With Contrast (Final result)  Result time 07/12/23 11:32:38      Final result by Karlos Taylor MD (07/12/23 11:32:38)                   Impression:      Gallbladder is distended with a few gallstones with hyperemia of the gallbladder wall, gallbladder wall thickening, and pericholecystic fluid.  Findings are concerning for acute cholecystitis.  Clinical correlation is recommended.  Consider right upper quadrant ultrasound as clinically indicated.    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      Electronically signed by: Karlos Taylor MD  Date:    07/12/2023  Time:    11:32               Narrative:    EXAMINATION:  CT ABDOMEN PELVIS WITH CONTRAST    CLINICAL HISTORY:  RLQ abdominal pain (Age  >= 14y);and RUQ pain;    TECHNIQUE:  Axial images of the abdomen and pelvis were acquired  after the use of 100 cc [id number] IV contrast. No oral contrast was administered.  Coronal and sagittal reconstructions were also obtained    COMPARISON:  None    FINDINGS:  Heart: Normal in size. No pericardial effusion. Moderate calcific coronary atherosclerosis.    Lungs: Small amount of opacity in the lower lobes bilaterally which may be seen with atelectasis.    Liver: Liver is enlarged in size measuring 20.2 cm.  Diffuse hypoattenuation of the liver suggestive of hepatic steatosis.  There is increased attenuation surrounding the gallbladder fossa, likely reactive from adjacent gallbladder.    Gallbladder: The gallbladder is distended with diffuse wall thickening and hyperemia.  A few gallstones are visualized.  There is a moderate amount of pericholecystic free fluid.    Bile Ducts: No evidence of dilated ducts.    Pancreas: No mass or peripancreatic fat stranding.    Spleen: Unremarkable.    Stomach and duodenum: Unremarkable.    Adrenals: Unremarkable.    Kidneys/ Ureters: Normal in size and location. Normal enhancement. No hydronephrosis or nephrolithiasis. No ureteral dilatation.    Bladder: No evidence of wall thickening.    Reproductive organs: Uterus is surgically absent.    Bowel/Mesentery: Small bowel is normal in caliber with no evidence of obstruction. No evidence of inflammation or wall thickening.  Colon demonstrates no focal wall thickening.    Lymph nodes: No lymphadenapathy.    Vasculature: No aneurysm. Severe calcific atherosclerosis.  High-grade stenosis of the SMA.  Postsurgical changes of the aorta with a bypass extending from the infrarenal aorta to the bilateral common femoral arteries.    Abdominal wall:  Ventral hernia containing a loop of small bowel.  No evidence of obstruction.    Bones: No acute fracture. No suspicious osseous lesions.                                       Medications   heparin  25,000 units in dextrose 5% 250 mL (100 units/mL) infusion LOW INTENSITY nomogram - [facility] (0 Units/kg/hr × 78.4 kg (Adjusted) Intravenous Stopped 7/12/23 1419)   heparin 25,000 units in dextrose 5% (100 units/ml) IV bolus from bag - ADDITIONAL PRN BOLUS - 60 units/kg (has no administration in time range)   heparin 25,000 units in dextrose 5% (100 units/ml) IV bolus from bag - ADDITIONAL PRN BOLUS - 30 units/kg (has no administration in time range)   amLODIPine tablet 10 mg (10 mg Oral Given 7/12/23 1437)   losartan tablet 100 mg (100 mg Oral Given 7/12/23 1437)   sodium chloride 0.9% flush 10 mL (has no administration in time range)   naloxone 0.4 mg/mL injection 0.02 mg (has no administration in time range)   glucose chewable tablet 16 g (has no administration in time range)   glucose chewable tablet 24 g (has no administration in time range)   glucagon (human recombinant) injection 1 mg (has no administration in time range)   dextrose 10% bolus 125 mL 125 mL (has no administration in time range)   dextrose 10% bolus 250 mL 250 mL (has no administration in time range)   HYDROcodone-acetaminophen 5-325 mg per tablet 1 tablet (has no administration in time range)   ondansetron injection 4 mg (has no administration in time range)   morphine injection 4 mg (4 mg Intravenous Given 7/12/23 1043)   ondansetron injection 4 mg (4 mg Intravenous Given 7/12/23 1044)   piperacillin-tazobactam (ZOSYN) 4.5 g in dextrose 5 % in water (D5W) 5 % 100 mL IVPB (MB+) (0 g Intravenous Stopped 7/12/23 1242)   iohexoL (OMNIPAQUE 350) injection 100 mL (100 mLs Intravenous Given 7/12/23 1114)   sodium chloride 0.9% bolus 1,000 mL 1,000 mL (1,000 mLs Intravenous New Bag 7/12/23 1311)   heparin 25,000 units in dextrose 5% (100 units/ml) IV bolus from bag INITIAL BOLUS (4,700 Units Intravenous Bolus from Bag 7/12/23 1340)     Medical Decision Making:   History:   Old Medical Records: I decided to obtain old medical records.  Old Records  "Summarized: other records.       <> Summary of Records: External documents reviewed by me.    Initial Assessment:   88-year-old female with history of hypertension, peripheral vascular disease presents to the emergency department with abdominal pain.  Symptoms started yesterday, describes constant pain to the right-sided abdomen.  She had some nausea and vomiting last night.  She denies fever, urinary symptoms, constipation or diarrhea.  Last bowel movement yesterday.  No previous abdominal surgeries.  On exam, the patient is hypertensive.  Abdomen is soft, tender in the right upper and right mid abdomen without rigidity or guarding.  She is noted to have a left lower leg amputation.  Differential includes but not limited to mesenteric ischemia, appendicitis, cholecystitis, cholelithiasis.  Workup initiated with labs, CT abdomen pelvis, treating with morphine and Zofran.  Patient reports taking her blood pressure medications this morning prior to coming to the ED, will monitor blood pressure.    Clinical Tests:   Lab Tests: Reviewed and Ordered  Radiological Study: Reviewed and Ordered  Sepsis Perfusion Assessment: "I attest a sepsis perfusion exam was performed within 6 hours of sepsis, severe sepsis, or septic shock presentation, following fluid resuscitation."  ED Management:  Patient's lactic acid elevated, there is a leukocytosis.  I gave 1 L of fluids, I did not give a 30 cc/kilos bolus due to the patient's blood pressure being very elevated.  Shared decision making with patient.  Family member updated at bedside with patient regarding test results and care plan.          Scribe Attestation:   Scribe #1: I performed the above scribed service and the documentation accurately describes the services I performed. I attest to the accuracy of the note.      ED Course as of 07/12/23 1442   Wed Jul 12, 2023   1224 Patient reassessed, she reports feeling improved.  Test results reviewed with patient, appears to have " cholecystitis, also possible pneumonia with hypoxia.  The patient is hesitant to be admitted to the hospital but agreeable after discussion.  She is been covered with Zosyn.  Labs show leukocytosis, lactate 2.24, sodium 130, potassium 3.2, .  A repeat lactate has been ordered.  On reassessment, patient appears to be in AFib on the monitor with a heart rate of 70-80.  I do not see history of AFib in her chart, will perform EKG. [LH]   1228 Case reviewed with Dr. Bolivar, general surgery, for evaluation.  [LH]   1244 Patient's EKG shows atrial fibrillation, rate controlled.  The case was discussed with Dr. Peñaloza for hospital admission to continue her workup.  I will start her on a heparin drip and place a consult to cardiology. [LH]      ED Course User Index  [LH] Neeta Prabhakar MD               I, Neeta Prabhakar MD, personally performed the services described in this documentation. All medical record entries made by the scribe were at my direction and in my presence. I have reviewed the chart and agree that the record reflects my personal performance and is accurate and complete.    This dictation has been generated using M-Modal Fluency Direct dictation; some phonetic errors may occur.       Clinical Impression:   Final diagnoses:  [R10.11] Right upper quadrant abdominal pain  [I10] Hypertension, unspecified type  [R09.02] Hypoxia  [I49.9] Abnormal heart rhythm  [K81.9] Cholecystitis (Primary)  [J18.9] Pneumonia due to infectious organism, unspecified laterality, unspecified part of lung  [I48.91] Atrial fibrillation, unspecified type  [A41.9] Sepsis, due to unspecified organism, unspecified whether acute organ dysfunction present        ED Disposition Condition    Admit Stable                Neeta Prabhakar MD  07/12/23 3755

## 2023-07-12 NOTE — PLAN OF CARE
Problem: Adult Inpatient Plan of Care  Goal: Plan of Care Review  Outcome: Ongoing, Progressing  Flowsheets (Taken 7/12/2023 1730)  Plan of Care Reviewed With: patient  Goal: Patient-Specific Goal (Individualized)  Outcome: Ongoing, Progressing     Problem: Adult Inpatient Plan of Care  Goal: Plan of Care Review  Outcome: Ongoing, Progressing  Flowsheets (Taken 7/12/2023 1730)  Plan of Care Reviewed With: patient     Problem: Adult Inpatient Plan of Care  Goal: Plan of Care Review  Outcome: Ongoing, Progressing  Flowsheets (Taken 7/12/2023 1730)  Plan of Care Reviewed With: patient     Problem: Adult Inpatient Plan of Care  Goal: Patient-Specific Goal (Individualized)  Outcome: Ongoing, Progressing     Problem: Adult Inpatient Plan of Care  Goal: Patient-Specific Goal (Individualized)  Outcome: Ongoing, Progressing

## 2023-07-12 NOTE — ED NOTES
Stopped Heparin drip Per verbal order by Dr. Prabhakar in preparation for patient procedure. Per Dr. Prabhakar, ok to resume Heparin therapy 4 hours post procedure.

## 2023-07-12 NOTE — ASSESSMENT & PLAN NOTE
New Dx. Duration unknown - possibly chronic. Rate controlled. Check echo. On heparin - switch to DOAC if no invasive procedures planned. Will f/u prn

## 2023-07-12 NOTE — Clinical Note
Diagnosis: Right upper quadrant abdominal pain [397600]   Admitting Provider:: SAMEER JEFFREY [[id number]]   Future Attending Provider: SASHA GRAVES [[id number]]   Reason for IP Medical Treatment  (Clinical interventions that can only be accomplished in the IP setting? ) :: IV antibiotics, cardiology and general surgery consultation   I certify that Inpatient services for greater than or equal to 2 midnights are medically necessary:: Yes   Plans for Post-Acute care--if anticipated (pick the single best option):: B. Discharge home with medical equipment

## 2023-07-12 NOTE — CONSULTS
[facility] Emergency Dept  General Surgery  Consult Note    Patient Name: Abbie Barragan  MRN: [id number]  Code Status: No Order  Admission Date: 7/12/2023  Hospital Length of Stay: 0 days  Attending Physician: Noble Peñaloza MD  Primary Care Provider: Kailash Carvalho NP    Patient information was obtained from patient and ER records.     Inpatient consult to General surgery  Consult performed by: Franky Bolivar MD  Consult ordered by: Neeta Prabhakar MD        Subjective:     Principal Problem: Cholelithiasis with acute cholecystitis    History of Present Illness: This is an 88-year-old female patient with a past medical history of peripheral vascular disease, hypertension and a past surgical history of left above-the-knee amputation and which he reports as abdominal aortic work via an open laparotomy incision.  She presents to the ED with a one-day history of right upper quadrant abdominal pain that is associated with nausea and vomiting.  She last vomited while in the ED. She does feel that she is had some fevers and chills as well.     She states that the only medications that she takes are some blood pressure medications as well as Plavix which she last took this morning.    In the ED, CT scan of her abdomen and pelvis was performed which showed evidence of acute cholecystitis.  She has a leukocytosis of 17.9, she is afebrile and hypertensive.  General surgery was consulted in the setting of acute cholecystitis for evaluation and management.      No current facility-administered medications on file prior to encounter.     Current Outpatient Medications on File Prior to Encounter   Medication Sig    acetaminophen (TYLENOL) 500 MG tablet Take 2 tablets (1,000 mg total) by mouth every 8 (eight) hours as needed for Pain.    acyclovir (ZOVIRAX) 800 MG Tab Take 1 tablet (800 mg total) by mouth 5 (five) times daily.    albuterol (PROVENTIL/VENTOLIN HFA) 90 mcg/actuation inhaler Inhale 1-2 puffs into  the lungs every 6 (six) hours as needed for Wheezing.    alendronate (FOSAMAX) 70 MG tablet Take 70 mg by mouth every 7 days.    amLODIPine (NORVASC) 10 MG tablet Take 10 mg by mouth once daily.    aspirin (ECOTRIN) 81 MG EC tablet Take 81 mg by mouth once daily.    atorvastatin (LIPITOR) 80 MG tablet Take 80 mg by mouth once daily.    calcium carbonate-mag hydroxid 1,000-200 mg Chew Take by mouth.    clopidogrel (PLAVIX) 75 mg tablet Take 75 mg by mouth once daily.    diphenhydrAMINE (BENADRYL) 25 mg capsule Take 1 capsule (25 mg total) by mouth every 6 (six) hours as needed (Cough).    fenofibrate 160 MG Tab Take 160 mg by mouth once daily.    gabapentin (NEURONTIN) 100 MG capsule Take 1 capsule (100 mg total) by mouth every evening.    hydrALAZINE (APRESOLINE) 100 MG tablet Take 100 mg by mouth 2 (two) times daily.    hydroCHLOROthiazide (HYDRODIURIL) 25 MG tablet Take 25 mg by mouth once daily.    lidocaine (LIDODERM) 5 % Place 1 patch onto the skin once daily. Remove & Discard patch within 12 hours or as directed by MD    losartan (COZAAR) 100 MG tablet Take 100 mg by mouth once daily.    oxycodone-acetaminophen 5-325 mg (PERCOCET) 5-325 mg per tablet Take 1 tablet by mouth every 4 (four) hours as needed for Pain.    potassium chloride (MICRO-K) 10 MEQ CpSR Take 10 mEq by mouth once.    UNKNOWN TO PATIENT        Review of patient's allergies indicates:  No Known Allergies    Past Medical History:   Diagnosis Date    Above knee amputation status 1994    Hyperlipidemia     Hypertension      Past Surgical History:   Procedure Laterality Date    LEG AMPUTATION THROUGH KNEE       Family History    None       Tobacco Use    Smoking status: Former    Smokeless tobacco: Never   Substance and Sexual Activity    Alcohol use: No    Drug use: No    Sexual activity: Not on file     Review of Systems   Constitutional:  Positive for chills and fever.   Respiratory:  Negative for shortness of breath.     Cardiovascular:  Negative for chest pain.   Gastrointestinal:  Positive for abdominal pain, nausea and vomiting. Negative for constipation and diarrhea.   Skin: Negative.    Hematological: Negative.    Objective:     Vital Signs (Most Recent):  Temp: 97.8 °F (36.6 °C) (07/12/23 0752)  Pulse: 92 (07/12/23 1302)  Resp: 16 (07/12/23 1043)  BP: (!) 191/90 (07/12/23 1302)  SpO2: 97 % (07/12/23 1302) Vital Signs (24h Range):  Temp:  [97.8 °F (36.6 °C)] 97.8 °F (36.6 °C)  Pulse:  [77-92] 92  Resp:  [16-18] 16  SpO2:  [84 %-97 %] 97 %  BP: (164-238)/() 191/90     Weight: 124.3 kg (274 lb)  Body mass index is 51.77 kg/m².     Physical Exam  Vitals and nursing note reviewed.   Constitutional:       General: She is not in acute distress.     Appearance: Normal appearance. She is not toxic-appearing.   HENT:      Head: Normocephalic and atraumatic.   Cardiovascular:      Rate and Rhythm: Rhythm irregular.      Comments: Hypertension  Pulmonary:      Effort: Pulmonary effort is normal. No respiratory distress.      Comments: Nasal cannula  Abdominal:      General: Abdomen is flat. There is no distension.      Palpations: Abdomen is soft.      Tenderness: There is abdominal tenderness (Right upper quadrant). There is no guarding.      Comments: Well-healed midline laparotomy scar   Musculoskeletal:      Comments: Left above-the-knee amputation well-healed scar   Skin:     General: Skin is warm and dry.   Neurological:      General: No focal deficit present.      Mental Status: She is alert and oriented to person, place, and time.          I have reviewed all pertinent lab results within the past 24 hours.  CBC:   Recent Labs   Lab 07/12/23  0940   WBC 17.91*   RBC 5.04   HGB 13.0   HCT 41.3      MCV 82   MCH 25.8*   MCHC 31.5*     CMP:   Recent Labs   Lab 07/12/23  0940   *   CALCIUM 9.9   ALBUMIN 3.7   PROT 7.9   *   K 3.2*   CO2 24   CL 93*   BUN 7*   CREATININE 0.7   ALKPHOS 121   ALT 9*   AST 15    BILITOT 0.9       Significant Diagnostics:  I have reviewed all pertinent imaging results/findings within the past 24 hours.      Assessment/Plan:     * Cholelithiasis with acute cholecystitis  This is an 88-year-old female patient is presenting to the ED with a 1 day history of right upper quadrant abdominal pain nausea and vomiting who has a diagnosis of acute cholecystitis.  Given her medical comorbidities and the fact that she took her Plavix this morning she is at very high risk for a surgical complication should we proceed with a laparoscopic cholecystectomy, therefore we will recommend placement of a cholecystostomy tube at this time for management of her acute cholecystitis.    -patient will be admitted to Hospital Medicine   -recommend broad-spectrum antibiotics, Zosyn is appropriate   -multimodal pain control   -we will have her follow-up with general surgery as an outpatient to discuss surgical intervention after she is out of this acute phase, as well as the fact that she will have to have her Plavix held for at least 5 days to decrease the risk of surgical morbidity and mortality  -general surgery will follow along      VTE Risk Mitigation (From admission, onward)         Ordered     heparin 25,000 units in dextrose 5% (100 units/ml) IV bolus from bag - ADDITIONAL PRN BOLUS - 60 units/kg  As needed (PRN)        Question:  Heparin Infusion Adjustment (DO NOT MODIFY ANSWER)  Answer:  \\[email or web address]\epic\Images\Pharmacy\HeparinInfusions\heparin LOW INTENSITY nomogram for [facility] [id number].pdf    07/12/23 1244     heparin 25,000 units in dextrose 5% (100 units/ml) IV bolus from bag - ADDITIONAL PRN BOLUS - 30 units/kg  As needed (PRN)        Question:  Heparin Infusion Adjustment (DO NOT MODIFY ANSWER)  Answer:  \\[email or web address]\epic\Images\Pharmacy\HeparinInfusions\heparin LOW INTENSITY nomogram for [facility] [id number].pdf    07/12/23 1244     heparin 25,000 units in dextrose 5% (100 units/ml) IV bolus from bag INITIAL  BOLUS  Once        Question:  Heparin Infusion Adjustment (DO NOT MODIFY ANSWER)  Answer:  [email or web address]\epic\Images\Pharmacy\HeparinInfusions\heparin LOW INTENSITY nomogram for [facility] [id number].pdf    07/12/23 1244     heparin 25,000 units in dextrose 5% 250 mL (100 units/mL) infusion LOW INTENSITY nomogram - [facility]  Continuous        Question Answer Comment   Heparin Infusion Adjustment (DO NOT MODIFY ANSWER) \\[email or web address]\epic\Images\Pharmacy\HeparinInfusions\heparin LOW INTENSITY nomogram for [facility] [id number].pdf    Begin at (in units/kg/hr) 12        07/12/23 1244                Thank you for your consult. I will follow-up with patient. Please contact us if you have any additional questions.    Franky Bolivar MD  General Surgery  [facility] [place] - Emergency Dept

## 2023-07-12 NOTE — SUBJECTIVE & OBJECTIVE
Past Medical History:   Diagnosis Date    Above knee amputation status 1994    History of DVT of lower extremity 1994    LEFT    Hyperlipidemia     Hypertension     PAD (peripheral artery disease)        Past Surgical History:   Procedure Laterality Date    LEG AMPUTATION THROUGH KNEE      PARTIAL HYSTERECTOMY      1960's    VASCULAR SURGERY Left 1994    AORTOFEM BYPASS       Review of patient's allergies indicates:  No Known Allergies    No current facility-administered medications on file prior to encounter.     Current Outpatient Medications on File Prior to Encounter   Medication Sig    acetaminophen (TYLENOL) 500 MG tablet Take 2 tablets (1,000 mg total) by mouth every 8 (eight) hours as needed for Pain.    alendronate (FOSAMAX) 70 MG tablet Take 70 mg by mouth every 7 days.    amLODIPine (NORVASC) 10 MG tablet Take 10 mg by mouth once daily.    aspirin (ECOTRIN) 81 MG EC tablet Take 81 mg by mouth once daily.    cholecalciferol, vitamin D3, (VITAMIN D3) 25 mcg (1,000 unit) capsule Take 1,000 Units by mouth once daily.    clopidogrel (PLAVIX) 75 mg tablet Take 75 mg by mouth once daily.    hydroCHLOROthiazide (HYDRODIURIL) 25 MG tablet Take 25 mg by mouth once daily.    losartan (COZAAR) 100 MG tablet Take 100 mg by mouth once daily.    potassium chloride (MICRO-K) 10 MEQ CpSR Take 10 mEq by mouth once.    rosuvastatin (CRESTOR) 40 MG Tab Take 1 tablet by mouth once daily.    [DISCONTINUED] acyclovir (ZOVIRAX) 800 MG Tab Take 1 tablet (800 mg total) by mouth 5 (five) times daily.    [DISCONTINUED] albuterol (PROVENTIL/VENTOLIN HFA) 90 mcg/actuation inhaler Inhale 1-2 puffs into the lungs every 6 (six) hours as needed for Wheezing.    [DISCONTINUED] atorvastatin (LIPITOR) 80 MG tablet Take 80 mg by mouth once daily.    [DISCONTINUED] calcium carbonate-mag hydroxid 1,000-200 mg Chew Take by mouth.    [DISCONTINUED] diphenhydrAMINE (BENADRYL) 25 mg capsule Take 1 capsule (25 mg total) by mouth every 6 (six) hours  as needed (Cough).    [DISCONTINUED] fenofibrate 160 MG Tab Take 160 mg by mouth once daily.    [DISCONTINUED] gabapentin (NEURONTIN) 100 MG capsule Take 1 capsule (100 mg total) by mouth every evening.    [DISCONTINUED] hydrALAZINE (APRESOLINE) 100 MG tablet Take 100 mg by mouth 2 (two) times daily.    [DISCONTINUED] lidocaine (LIDODERM) 5 % Place 1 patch onto the skin once daily. Remove & Discard patch within 12 hours or as directed by MD    [DISCONTINUED] oxycodone-acetaminophen 5-325 mg (PERCOCET) 5-325 mg per tablet Take 1 tablet by mouth every 4 (four) hours as needed for Pain.    [DISCONTINUED] UNKNOWN TO PATIENT      Family History    None       Tobacco Use    Smoking status: Former    Smokeless tobacco: Never   Substance and Sexual Activity    Alcohol use: No    Drug use: Never    Sexual activity: Not on file     Review of Systems  Objective:     Vital Signs (Most Recent):  Temp: 97.8 °F (36.6 °C) (07/12/23 0752)  Pulse: 96 (07/12/23 1503)  Resp: 14 (07/12/23 1503)  BP: (!) 209/95 (07/12/23 1503)  SpO2: 95 % (07/12/23 1503) Vital Signs (24h Range):  Temp:  [97.8 °F (36.6 °C)] 97.8 °F (36.6 °C)  Pulse:  [77-96] 96  Resp:  [14-18] 14  SpO2:  [84 %-98 %] 95 %  BP: (164-238)/() 209/95     Weight: 124.3 kg (274 lb)  Body mass index is 51.77 kg/m².     Physical Exam  Vitals and nursing note reviewed.   Constitutional:       General: She is not in acute distress.     Appearance: She is obese. She is ill-appearing.   HENT:      Head: Normocephalic.   Cardiovascular:      Rate and Rhythm: Normal rate and regular rhythm.      Pulses: Normal pulses.   Pulmonary:      Effort: Pulmonary effort is normal. No respiratory distress.   Abdominal:      General: Bowel sounds are normal.      Tenderness: There is abdominal tenderness.   Musculoskeletal:         General: No swelling.      Comments: Left AKA   Neurological:      General: No focal deficit present.      Mental Status: She is alert and oriented to person,  place, and time.   Psychiatric:         Mood and Affect: Mood normal.         Thought Content: Thought content normal.         Judgment: Judgment normal.              Significant Labs: All pertinent labs within the past 24 hours have been reviewed.    Significant Imaging: I have reviewed all pertinent imaging results/findings within the past 24 hours.

## 2023-07-12 NOTE — SUBJECTIVE & OBJECTIVE
Past Medical History:   Diagnosis Date    Above knee amputation status 1994    History of DVT of lower extremity 1994    LEFT    Hyperlipidemia     Hypertension     PAD (peripheral artery disease)        Past Surgical History:   Procedure Laterality Date    LEG AMPUTATION THROUGH KNEE      PARTIAL HYSTERECTOMY      1960's    VASCULAR SURGERY Left 1994    AORTOFEM BYPASS       Review of patient's allergies indicates:  No Known Allergies    No current facility-administered medications on file prior to encounter.     Current Outpatient Medications on File Prior to Encounter   Medication Sig    acetaminophen (TYLENOL) 500 MG tablet Take 2 tablets (1,000 mg total) by mouth every 8 (eight) hours as needed for Pain.    alendronate (FOSAMAX) 70 MG tablet Take 70 mg by mouth every 7 days.    amLODIPine (NORVASC) 10 MG tablet Take 10 mg by mouth once daily.    aspirin (ECOTRIN) 81 MG EC tablet Take 81 mg by mouth once daily.    cholecalciferol, vitamin D3, (VITAMIN D3) 25 mcg (1,000 unit) capsule Take 1,000 Units by mouth once daily.    clopidogrel (PLAVIX) 75 mg tablet Take 75 mg by mouth once daily.    hydroCHLOROthiazide (HYDRODIURIL) 25 MG tablet Take 25 mg by mouth once daily.    losartan (COZAAR) 100 MG tablet Take 100 mg by mouth once daily.    potassium chloride (MICRO-K) 10 MEQ CpSR Take 10 mEq by mouth once.    rosuvastatin (CRESTOR) 40 MG Tab Take 1 tablet by mouth once daily.    [DISCONTINUED] acyclovir (ZOVIRAX) 800 MG Tab Take 1 tablet (800 mg total) by mouth 5 (five) times daily.    [DISCONTINUED] albuterol (PROVENTIL/VENTOLIN HFA) 90 mcg/actuation inhaler Inhale 1-2 puffs into the lungs every 6 (six) hours as needed for Wheezing.    [DISCONTINUED] atorvastatin (LIPITOR) 80 MG tablet Take 80 mg by mouth once daily.    [DISCONTINUED] calcium carbonate-mag hydroxid 1,000-200 mg Chew Take by mouth.    [DISCONTINUED] diphenhydrAMINE (BENADRYL) 25 mg capsule Take 1 capsule (25 mg total) by mouth every 6 (six) hours  as needed (Cough).    [DISCONTINUED] fenofibrate 160 MG Tab Take 160 mg by mouth once daily.    [DISCONTINUED] gabapentin (NEURONTIN) 100 MG capsule Take 1 capsule (100 mg total) by mouth every evening.    [DISCONTINUED] hydrALAZINE (APRESOLINE) 100 MG tablet Take 100 mg by mouth 2 (two) times daily.    [DISCONTINUED] lidocaine (LIDODERM) 5 % Place 1 patch onto the skin once daily. Remove & Discard patch within 12 hours or as directed by MD    [DISCONTINUED] oxycodone-acetaminophen 5-325 mg (PERCOCET) 5-325 mg per tablet Take 1 tablet by mouth every 4 (four) hours as needed for Pain.    [DISCONTINUED] UNKNOWN TO PATIENT      Family History    None       Tobacco Use    Smoking status: Former    Smokeless tobacco: Never   Substance and Sexual Activity    Alcohol use: No    Drug use: Never    Sexual activity: Not on file     Review of Systems   Constitutional: Negative for decreased appetite.   HENT:  Negative for ear discharge.    Eyes:  Negative for blurred vision.   Respiratory:  Negative for hemoptysis.    Endocrine: Negative for polyphagia.   Hematologic/Lymphatic: Negative for adenopathy.   Skin:  Negative for color change.   Musculoskeletal:  Negative for joint swelling.   Genitourinary:  Negative for bladder incontinence.   Neurological:  Negative for brief paralysis.   Psychiatric/Behavioral:  Negative for hallucinations.    Allergic/Immunologic: Negative for hives.   Objective:     Vital Signs (Most Recent):  Temp: 97.8 °F (36.6 °C) (07/12/23 0752)  Pulse: 89 (07/12/23 1332)  Resp: 16 (07/12/23 1043)  BP: (!) 176/78 (07/12/23 1332)  SpO2: 98 % (07/12/23 1332) Vital Signs (24h Range):  Temp:  [97.8 °F (36.6 °C)] 97.8 °F (36.6 °C)  Pulse:  [77-92] 89  Resp:  [16-18] 16  SpO2:  [84 %-98 %] 98 %  BP: (164-238)/() 176/78     Weight: 124.3 kg (274 lb)  Body mass index is 51.77 kg/m².    SpO2: 98 %         Intake/Output Summary (Last 24 hours) at 7/12/2023 1352  Last data filed at 7/12/2023 1242  Gross per 24  hour   Intake 99 ml   Output --   Net 99 ml       Lines/Drains/Airways       Peripheral Intravenous Line  Duration                  Peripheral IV - Single Lumen 07/12/23 1000 20 G Left Forearm <1 day         Peripheral IV - Single Lumen 07/12/23 1311 20 G;1 3/4 in Right Forearm <1 day                     Physical Exam  Constitutional:       Appearance: She is well-developed.   HENT:      Head: Normocephalic and atraumatic.   Eyes:      Conjunctiva/sclera: Conjunctivae normal.      Pupils: Pupils are equal, round, and reactive to light.   Cardiovascular:      Rate and Rhythm: Normal rate. Rhythm irregular.      Pulses: Intact distal pulses.      Heart sounds: Normal heart sounds.   Pulmonary:      Effort: Pulmonary effort is normal.      Breath sounds: Normal breath sounds.   Abdominal:      General: Bowel sounds are normal.      Palpations: Abdomen is soft.   Musculoskeletal:         General: Normal range of motion.      Cervical back: Normal range of motion and neck supple.   Skin:     General: Skin is warm and dry.   Neurological:      Mental Status: She is alert and oriented to person, place, and time.        Significant Labs: All pertinent lab results from the last 24 hours have been reviewed.    Significant Imaging: Echocardiogram: 2D echo with color flow doppler: No results found for this or any previous visit.

## 2023-07-12 NOTE — ASSESSMENT & PLAN NOTE
Acute cholecystitis, on plavix last dose this AM and has hx of ex-laparotomy  - General surgery consulted and recommending IR placement of cholecystostomy drain  - plan for drain today

## 2023-07-12 NOTE — ASSESSMENT & PLAN NOTE
Patient with Paroxysmal (<7 days) atrial fibrillation which is controlled currently with no meds. Patient is currently in atrial fibrillation.[id number] Score: 4. . Anticoagulation on heparin drip, held for procedure  - check echocardiogram  - check tsh  - appreciate Cardiology recommendations

## 2023-07-12 NOTE — NURSING
[facility] [place]  Nurses Note -- 4 Eyes      7/12/2023       Skin assessed on: Admit      [x] No Pressure Injuries Present    []Prevention Measures Documented    [] Yes LDA  for Pressure Injury Previously documented     [] Yes New Pressure Injury Discovered   [] LDA for New Pressure Injury Added      Attending RN:  Debo Thacker, RN     Second : Irma

## 2023-07-12 NOTE — SUBJECTIVE & OBJECTIVE
No current facility-administered medications on file prior to encounter.     Current Outpatient Medications on File Prior to Encounter   Medication Sig    acetaminophen (TYLENOL) 500 MG tablet Take 2 tablets (1,000 mg total) by mouth every 8 (eight) hours as needed for Pain.    acyclovir (ZOVIRAX) 800 MG Tab Take 1 tablet (800 mg total) by mouth 5 (five) times daily.    albuterol (PROVENTIL/VENTOLIN HFA) 90 mcg/actuation inhaler Inhale 1-2 puffs into the lungs every 6 (six) hours as needed for Wheezing.    alendronate (FOSAMAX) 70 MG tablet Take 70 mg by mouth every 7 days.    amLODIPine (NORVASC) 10 MG tablet Take 10 mg by mouth once daily.    aspirin (ECOTRIN) 81 MG EC tablet Take 81 mg by mouth once daily.    atorvastatin (LIPITOR) 80 MG tablet Take 80 mg by mouth once daily.    calcium carbonate-mag hydroxid 1,000-200 mg Chew Take by mouth.    clopidogrel (PLAVIX) 75 mg tablet Take 75 mg by mouth once daily.    diphenhydrAMINE (BENADRYL) 25 mg capsule Take 1 capsule (25 mg total) by mouth every 6 (six) hours as needed (Cough).    fenofibrate 160 MG Tab Take 160 mg by mouth once daily.    gabapentin (NEURONTIN) 100 MG capsule Take 1 capsule (100 mg total) by mouth every evening.    hydrALAZINE (APRESOLINE) 100 MG tablet Take 100 mg by mouth 2 (two) times daily.    hydroCHLOROthiazide (HYDRODIURIL) 25 MG tablet Take 25 mg by mouth once daily.    lidocaine (LIDODERM) 5 % Place 1 patch onto the skin once daily. Remove & Discard patch within 12 hours or as directed by MD    losartan (COZAAR) 100 MG tablet Take 100 mg by mouth once daily.    oxycodone-acetaminophen 5-325 mg (PERCOCET) 5-325 mg per tablet Take 1 tablet by mouth every 4 (four) hours as needed for Pain.    potassium chloride (MICRO-K) 10 MEQ CpSR Take 10 mEq by mouth once.    UNKNOWN TO PATIENT        Review of patient's allergies indicates:  No Known Allergies    Past Medical History:   Diagnosis Date    Above knee amputation status 1994     Hyperlipidemia     Hypertension      Past Surgical History:   Procedure Laterality Date    LEG AMPUTATION THROUGH KNEE       Family History    None       Tobacco Use    Smoking status: Former    Smokeless tobacco: Never   Substance and Sexual Activity    Alcohol use: No    Drug use: No    Sexual activity: Not on file     Review of Systems   Constitutional:  Positive for chills and fever.   Respiratory:  Negative for shortness of breath.    Cardiovascular:  Negative for chest pain.   Gastrointestinal:  Positive for abdominal pain, nausea and vomiting. Negative for constipation and diarrhea.   Skin: Negative.    Hematological: Negative.    Objective:     Vital Signs (Most Recent):  Temp: 97.8 °F (36.6 °C) (07/12/23 0752)  Pulse: 92 (07/12/23 1302)  Resp: 16 (07/12/23 1043)  BP: (!) 191/90 (07/12/23 1302)  SpO2: 97 % (07/12/23 1302) Vital Signs (24h Range):  Temp:  [97.8 °F (36.6 °C)] 97.8 °F (36.6 °C)  Pulse:  [77-92] 92  Resp:  [16-18] 16  SpO2:  [84 %-97 %] 97 %  BP: (164-238)/() 191/90     Weight: 124.3 kg (274 lb)  Body mass index is 51.77 kg/m².     Physical Exam  Vitals and nursing note reviewed.   Constitutional:       General: She is not in acute distress.     Appearance: Normal appearance. She is not toxic-appearing.   HENT:      Head: Normocephalic and atraumatic.   Cardiovascular:      Rate and Rhythm: Rhythm irregular.      Comments: Hypertension  Pulmonary:      Effort: Pulmonary effort is normal. No respiratory distress.      Comments: Nasal cannula  Abdominal:      General: Abdomen is flat. There is no distension.      Palpations: Abdomen is soft.      Tenderness: There is abdominal tenderness (Right upper quadrant). There is no guarding.      Comments: Well-healed midline laparotomy scar   Musculoskeletal:      Comments: Left above-the-knee amputation well-healed scar   Skin:     General: Skin is warm and dry.   Neurological:      General: No focal deficit present.      Mental Status: She is  alert and oriented to person, place, and time.          I have reviewed all pertinent lab results within the past 24 hours.  CBC:   Recent Labs   Lab 07/12/23  0940   WBC 17.91*   RBC 5.04   HGB 13.0   HCT 41.3      MCV 82   MCH 25.8*   MCHC 31.5*     CMP:   Recent Labs   Lab 07/12/23  0940   *   CALCIUM 9.9   ALBUMIN 3.7   PROT 7.9   *   K 3.2*   CO2 24   CL 93*   BUN 7*   CREATININE 0.7   ALKPHOS 121   ALT 9*   AST 15   BILITOT 0.9       Significant Diagnostics:  I have reviewed all pertinent imaging results/findings within the past 24 hours.

## 2023-07-12 NOTE — ASSESSMENT & PLAN NOTE
This patient does have evidence of infective focus  My overall impression is sepsis.  Source: acute cholecystitis  Antibiotics given-   Antibiotics (72h ago, onward)    Start     Stop Route Frequency Ordered    07/12/23 1845  piperacillin-tazobactam (ZOSYN) 4.5 g in dextrose 5 % in water (D5W) 5 % 100 mL IVPB (MB+)         -- IV Every 8 hours (non-standard times) 07/12/23 1442        Latest lactate reviewed-  Recent Labs   Lab 07/12/23  1311   LACTATE 1.5     Organ dysfunction indicated by Acute respiratory failure    Fluid challenge Ideal Body Weight- The patient's ideal body weight is Ideal body weight: 47.8 kg (105 lb 6.1 oz) which will be used to calculate fluid bolus of 30 ml/kg for treatment of septic shock.      Post- resuscitation assessment Yes Perfusion exam was performed within 6 hours of septic shock presentation after bolus shows Adequate tissue perfusion assessed by non-invasive monitoring       Will Not start Pressors- Levophed for MAP of 65  Source control achieved by: Cholecystostomy drain and IV antibiotics

## 2023-07-12 NOTE — HPI
This is an 88-year-old female patient with a past medical history of peripheral vascular disease, hypertension and a past surgical history of left above-the-knee amputation and which he reports as abdominal aortic work via an open laparotomy incision.  She presents to the ED with a one-day history of right upper quadrant abdominal pain that is associated with nausea and vomiting.  She last vomited while in the ED. She does feel that she is had some fevers and chills as well.     She states that the only medications that she takes are some blood pressure medications as well as Plavix which she last took this morning.    In the ED, CT scan of her abdomen and pelvis was performed which showed evidence of acute cholecystitis.  She has a leukocytosis of 17.9, she is afebrile and hypertensive.  General surgery was consulted in the setting of acute cholecystitis for evaluation and management.

## 2023-07-13 LAB
ALBUMIN SERPL BCP-MCNC: 3 G/DL (ref 3.5–5.2)
ALP SERPL-CCNC: 131 U/L (ref 55–135)
ALT SERPL W/O P-5'-P-CCNC: 33 U/L (ref 10–44)
ANION GAP SERPL CALC-SCNC: 14 MMOL/L (ref 8–16)
APTT PPP: 40.2 SEC (ref 21–32)
APTT PPP: 42.9 SEC (ref 21–32)
ASCENDING AORTA: 3.57 CM
AST SERPL-CCNC: 35 U/L (ref 10–40)
AV INDEX (PROSTH): 0.73
AV MEAN GRADIENT: 4 MMHG
AV PEAK GRADIENT: 8 MMHG
AV VALVE AREA: 2.56 CM2
AV VELOCITY RATIO: 0.7
BASOPHILS # BLD AUTO: 0.03 K/UL (ref 0–0.2)
BASOPHILS # BLD AUTO: 0.03 K/UL (ref 0–0.2)
BASOPHILS NFR BLD: 0.2 % (ref 0–1.9)
BASOPHILS NFR BLD: 0.2 % (ref 0–1.9)
BILIRUB SERPL-MCNC: 1.3 MG/DL (ref 0.1–1)
BSA FOR ECHO PROCEDURE: 2.83 M2
BUN SERPL-MCNC: 13 MG/DL (ref 8–23)
CALCIUM SERPL-MCNC: 9.9 MG/DL (ref 8.7–10.5)
CHLORIDE SERPL-SCNC: 93 MMOL/L (ref 95–110)
CO2 SERPL-SCNC: 26 MMOL/L (ref 23–29)
CREAT SERPL-MCNC: 0.9 MG/DL (ref 0.5–1.4)
CV ECHO LV RWT: 0.47 CM
DIFFERENTIAL METHOD: ABNORMAL
DIFFERENTIAL METHOD: ABNORMAL
DOP CALC AO PEAK VEL: 1.41 M/S
DOP CALC AO VTI: 25.5 CM
DOP CALC LVOT AREA: 3.5 CM2
DOP CALC LVOT DIAMETER: 2.11 CM
DOP CALC LVOT PEAK VEL: 0.98 M/S
DOP CALC LVOT STROKE VOLUME: 65.35 CM3
DOP CALCLVOT PEAK VEL VTI: 18.7 CM
E WAVE DECELERATION TIME: 259.33 MSEC
E/A RATIO: 4.45
E/E' RATIO: 17.82 M/S
ECHO LV POSTERIOR WALL: 1 CM (ref 0.6–1.1)
EJECTION FRACTION: 55 %
EOSINOPHIL # BLD AUTO: 0 K/UL (ref 0–0.5)
EOSINOPHIL # BLD AUTO: 0 K/UL (ref 0–0.5)
EOSINOPHIL NFR BLD: 0.1 % (ref 0–8)
EOSINOPHIL NFR BLD: 0.1 % (ref 0–8)
ERYTHROCYTE [DISTWIDTH] IN BLOOD BY AUTOMATED COUNT: 15 % (ref 11.5–14.5)
ERYTHROCYTE [DISTWIDTH] IN BLOOD BY AUTOMATED COUNT: 15 % (ref 11.5–14.5)
EST. GFR  (NO RACE VARIABLE): >60 ML/MIN/1.73 M^2
FRACTIONAL SHORTENING: 34 % (ref 28–44)
GLUCOSE SERPL-MCNC: 97 MG/DL (ref 70–110)
HCT VFR BLD AUTO: 42 % (ref 37–48.5)
HCT VFR BLD AUTO: 42 % (ref 37–48.5)
HGB BLD-MCNC: 13.1 G/DL (ref 12–16)
HGB BLD-MCNC: 13.1 G/DL (ref 12–16)
IMM GRANULOCYTES # BLD AUTO: 0.09 K/UL (ref 0–0.04)
IMM GRANULOCYTES # BLD AUTO: 0.09 K/UL (ref 0–0.04)
IMM GRANULOCYTES NFR BLD AUTO: 0.6 % (ref 0–0.5)
IMM GRANULOCYTES NFR BLD AUTO: 0.6 % (ref 0–0.5)
INTERVENTRICULAR SEPTUM: 1.04 CM (ref 0.6–1.1)
IVC DIAMETER: 2.03 CM
IVRT: 168.41 MSEC
LA MAJOR: 7.3 CM
LA MINOR: 4.73 CM
LA WIDTH: 4.7 CM
LEFT ATRIUM SIZE: 5.48 CM
LEFT ATRIUM VOLUME INDEX: 69.1 ML/M2
LEFT ATRIUM VOLUME: 125.67 CM3
LEFT INTERNAL DIMENSION IN SYSTOLE: 2.8 CM (ref 2.1–4)
LEFT VENTRICLE DIASTOLIC VOLUME INDEX: 43.66 ML/M2
LEFT VENTRICLE DIASTOLIC VOLUME: 79.46 ML
LEFT VENTRICLE MASS INDEX: 78 G/M2
LEFT VENTRICLE SYSTOLIC VOLUME INDEX: 16.2 ML/M2
LEFT VENTRICLE SYSTOLIC VOLUME: 29.52 ML
LEFT VENTRICULAR INTERNAL DIMENSION IN DIASTOLE: 4.22 CM (ref 3.5–6)
LEFT VENTRICULAR MASS: 142.18 G
LV LATERAL E/E' RATIO: 16.33 M/S
LV SEPTAL E/E' RATIO: 19.6 M/S
LVOT MG: 2.18 MMHG
LVOT MV: 0.7 CM/S
LYMPHOCYTES # BLD AUTO: 2.8 K/UL (ref 1–4.8)
LYMPHOCYTES # BLD AUTO: 2.8 K/UL (ref 1–4.8)
LYMPHOCYTES NFR BLD: 17.3 % (ref 18–48)
LYMPHOCYTES NFR BLD: 17.3 % (ref 18–48)
MAGNESIUM SERPL-MCNC: 1.6 MG/DL (ref 1.6–2.6)
MCH RBC QN AUTO: 25.9 PG (ref 27–31)
MCH RBC QN AUTO: 25.9 PG (ref 27–31)
MCHC RBC AUTO-ENTMCNC: 31.2 G/DL (ref 32–36)
MCHC RBC AUTO-ENTMCNC: 31.2 G/DL (ref 32–36)
MCV RBC AUTO: 83 FL (ref 82–98)
MCV RBC AUTO: 83 FL (ref 82–98)
MONOCYTES # BLD AUTO: 0.9 K/UL (ref 0.3–1)
MONOCYTES # BLD AUTO: 0.9 K/UL (ref 0.3–1)
MONOCYTES NFR BLD: 5.7 % (ref 4–15)
MONOCYTES NFR BLD: 5.7 % (ref 4–15)
MV PEAK A VEL: 0.22 M/S
MV PEAK E VEL: 0.98 M/S
MV STENOSIS PRESSURE HALF TIME: 75.21 MS
MV VALVE AREA P 1/2 METHOD: 2.93 CM2
NEUTROPHILS # BLD AUTO: 12.3 K/UL (ref 1.8–7.7)
NEUTROPHILS # BLD AUTO: 12.3 K/UL (ref 1.8–7.7)
NEUTROPHILS NFR BLD: 76.1 % (ref 38–73)
NEUTROPHILS NFR BLD: 76.1 % (ref 38–73)
NRBC BLD-RTO: 0 /100 WBC
NRBC BLD-RTO: 0 /100 WBC
PHOSPHATE SERPL-MCNC: 3.6 MG/DL (ref 2.7–4.5)
PISA TR MAX VEL: 2.94 M/S
PLATELET # BLD AUTO: 263 K/UL (ref 150–450)
PLATELET # BLD AUTO: 263 K/UL (ref 150–450)
PMV BLD AUTO: 13.1 FL (ref 9.2–12.9)
PMV BLD AUTO: 13.1 FL (ref 9.2–12.9)
POTASSIUM SERPL-SCNC: 3.1 MMOL/L (ref 3.5–5.1)
PROT SERPL-MCNC: 7.1 G/DL (ref 6–8.4)
PV PEAK VELOCITY: 0.89 CM/S
RA MAJOR: 5.41 CM
RA PRESSURE: 3 MMHG
RA WIDTH: 4.36 CM
RBC # BLD AUTO: 5.06 M/UL (ref 4–5.4)
RBC # BLD AUTO: 5.06 M/UL (ref 4–5.4)
RIGHT VENTRICULAR END-DIASTOLIC DIMENSION: 3.73 CM
SINUS: 3.18 CM
SODIUM SERPL-SCNC: 133 MMOL/L (ref 136–145)
STJ: 2.54 CM
TDI LATERAL: 0.06 M/S
TDI SEPTAL: 0.05 M/S
TDI: 0.06 M/S
TR MAX PG: 35 MMHG
TRICUSPID ANNULAR PLANE SYSTOLIC EXCURSION: 1.39 CM
TSH SERPL DL<=0.005 MIU/L-ACNC: 0.9 UIU/ML (ref 0.4–4)
TV PEAK GRADIENT: 0.87 MMHG
TV REST PULMONARY ARTERY PRESSURE: 38 MMHG
WBC # BLD AUTO: 16.15 K/UL (ref 3.9–12.7)
WBC # BLD AUTO: 16.15 K/UL (ref 3.9–12.7)

## 2023-07-13 PROCEDURE — 25000003 PHARM REV CODE 250: Performed by: STUDENT IN AN ORGANIZED HEALTH CARE EDUCATION/TRAINING PROGRAM

## 2023-07-13 PROCEDURE — 99223 1ST HOSP IP/OBS HIGH 75: CPT | Mod: ,,, | Performed by: SURGERY

## 2023-07-13 PROCEDURE — 99232 SBSQ HOSP IP/OBS MODERATE 35: CPT | Mod: ,,, | Performed by: SURGERY

## 2023-07-13 PROCEDURE — 94761 N-INVAS EAR/PLS OXIMETRY MLT: CPT

## 2023-07-13 PROCEDURE — 85730 THROMBOPLASTIN TIME PARTIAL: CPT | Performed by: STUDENT IN AN ORGANIZED HEALTH CARE EDUCATION/TRAINING PROGRAM

## 2023-07-13 PROCEDURE — 99232 PR SUBSEQUENT HOSPITAL CARE,LEVL II: ICD-10-PCS | Mod: ,,, | Performed by: SURGERY

## 2023-07-13 PROCEDURE — 11000001 HC ACUTE MED/SURG PRIVATE ROOM

## 2023-07-13 PROCEDURE — 97530 THERAPEUTIC ACTIVITIES: CPT

## 2023-07-13 PROCEDURE — 99223 PR INITIAL HOSPITAL CARE,LEVL III: ICD-10-PCS | Mod: ,,, | Performed by: SURGERY

## 2023-07-13 PROCEDURE — 84443 ASSAY THYROID STIM HORMONE: CPT | Performed by: STUDENT IN AN ORGANIZED HEALTH CARE EDUCATION/TRAINING PROGRAM

## 2023-07-13 PROCEDURE — 97161 PT EVAL LOW COMPLEX 20 MIN: CPT

## 2023-07-13 PROCEDURE — 63600175 PHARM REV CODE 636 W HCPCS: Performed by: STUDENT IN AN ORGANIZED HEALTH CARE EDUCATION/TRAINING PROGRAM

## 2023-07-13 PROCEDURE — 84100 ASSAY OF PHOSPHORUS: CPT | Performed by: STUDENT IN AN ORGANIZED HEALTH CARE EDUCATION/TRAINING PROGRAM

## 2023-07-13 PROCEDURE — 85025 COMPLETE CBC W/AUTO DIFF WBC: CPT | Performed by: EMERGENCY MEDICINE

## 2023-07-13 PROCEDURE — 94799 UNLISTED PULMONARY SVC/PX: CPT

## 2023-07-13 PROCEDURE — 99900035 HC TECH TIME PER 15 MIN (STAT)

## 2023-07-13 PROCEDURE — 83735 ASSAY OF MAGNESIUM: CPT | Performed by: STUDENT IN AN ORGANIZED HEALTH CARE EDUCATION/TRAINING PROGRAM

## 2023-07-13 PROCEDURE — 80053 COMPREHEN METABOLIC PANEL: CPT | Performed by: STUDENT IN AN ORGANIZED HEALTH CARE EDUCATION/TRAINING PROGRAM

## 2023-07-13 PROCEDURE — 36415 COLL VENOUS BLD VENIPUNCTURE: CPT | Performed by: STUDENT IN AN ORGANIZED HEALTH CARE EDUCATION/TRAINING PROGRAM

## 2023-07-13 PROCEDURE — 27000221 HC OXYGEN, UP TO 24 HOURS

## 2023-07-13 RX ORDER — CLOPIDOGREL BISULFATE 75 MG/1
75 TABLET ORAL DAILY
Status: DISCONTINUED | OUTPATIENT
Start: 2023-07-13 | End: 2023-07-18 | Stop reason: HOSPADM

## 2023-07-13 RX ORDER — POTASSIUM CHLORIDE 20 MEQ/1
40 TABLET, EXTENDED RELEASE ORAL ONCE
Status: COMPLETED | OUTPATIENT
Start: 2023-07-13 | End: 2023-07-13

## 2023-07-13 RX ORDER — HEPARIN SODIUM,PORCINE/D5W 25000/250
0-40 INTRAVENOUS SOLUTION INTRAVENOUS CONTINUOUS
Status: DISPENSED | OUTPATIENT
Start: 2023-07-13 | End: 2023-07-13

## 2023-07-13 RX ADMIN — PIPERACILLIN AND TAZOBACTAM 4.5 G: 4; .5 INJECTION, POWDER, LYOPHILIZED, FOR SOLUTION INTRAVENOUS; PARENTERAL at 03:07

## 2023-07-13 RX ADMIN — APIXABAN 5 MG: 5 TABLET, FILM COATED ORAL at 09:07

## 2023-07-13 RX ADMIN — AMLODIPINE BESYLATE 10 MG: 5 TABLET ORAL at 07:07

## 2023-07-13 RX ADMIN — LOSARTAN POTASSIUM 100 MG: 25 TABLET, FILM COATED ORAL at 07:07

## 2023-07-13 RX ADMIN — PIPERACILLIN AND TAZOBACTAM 4.5 G: 4; .5 INJECTION, POWDER, LYOPHILIZED, FOR SOLUTION INTRAVENOUS; PARENTERAL at 06:07

## 2023-07-13 RX ADMIN — PIPERACILLIN AND TAZOBACTAM 4.5 G: 4; .5 INJECTION, POWDER, LYOPHILIZED, FOR SOLUTION INTRAVENOUS; PARENTERAL at 11:07

## 2023-07-13 RX ADMIN — CLOPIDOGREL BISULFATE 75 MG: 75 TABLET ORAL at 02:07

## 2023-07-13 RX ADMIN — POTASSIUM CHLORIDE 40 MEQ: 1500 TABLET, EXTENDED RELEASE ORAL at 08:07

## 2023-07-13 NOTE — PLAN OF CARE
Problem: Adult Inpatient Plan of Care  Goal: Plan of Care Review  Outcome: Ongoing, Progressing  Goal: Absence of Hospital-Acquired Illness or Injury  Outcome: Ongoing, Progressing  Goal: Optimal Comfort and Wellbeing  Outcome: Ongoing, Progressing     Problem: Adult Inpatient Plan of Care  Goal: Plan of Care Review  Outcome: Ongoing, Progressing     Problem: Adult Inpatient Plan of Care  Goal: Absence of Hospital-Acquired Illness or Injury  Outcome: Ongoing, Progressing     Problem: Adult Inpatient Plan of Care  Goal: Optimal Comfort and Wellbeing  Outcome: Ongoing, Progressing

## 2023-07-13 NOTE — NURSING
[facility]  Nurses Note -- 4 Eyes      7/13/2023       Skin assessed on: Q Shift      [x] No Pressure Injuries Present    []Prevention Measures Documented    [] Yes LDA  for Pressure Injury Previously documented     [] Yes New Pressure Injury Discovered   [] LDA for New Pressure Injury Added      Attending RN:  Virginia Flores RN     Second RN:  Bharti CORTEZ

## 2023-07-13 NOTE — ASSESSMENT & PLAN NOTE
This patient does have evidence of infective focus  My overall impression is sepsis.  Source: acute cholecystitis  Antibiotics given-   Antibiotics (72h ago, onward)    Start     Stop Route Frequency Ordered    07/12/23 1845  piperacillin-tazobactam (ZOSYN) 4.5 g in dextrose 5 % in water (D5W) 5 % 100 mL IVPB (MB+)         -- IV Every 8 hours (non-standard times) 07/12/23 1442        Latest lactate reviewed-  Recent Labs   Lab 07/12/23  1311   LACTATE 1.5     Organ dysfunction indicated by Acute respiratory failure    Fluid challenge Ideal Body Weight- The patient's ideal body weight is Ideal body weight: 47.8 kg (105 lb 6.1 oz) which will be used to calculate fluid bolus of 30 ml/kg for treatment of septic shock.      Post- resuscitation assessment Yes Perfusion exam was performed within 6 hours of septic shock presentation after bolus shows Adequate tissue perfusion assessed by non-invasive monitoring       Will Not start Pressors- Levophed for MAP of 65  Source control achieved by: Cholecystostomy drain and IV antibiotics  - WBC trending down

## 2023-07-13 NOTE — PROGRESS NOTES
[facility] Surg  General Surgery  Progress Note    Subjective:     History of Present Illness:  This is an 88-year-old female patient with a past medical history of peripheral vascular disease, hypertension and a past surgical history of left above-the-knee amputation and which he reports as abdominal aortic work via an open laparotomy incision.  She presents to the ED with a one-day history of right upper quadrant abdominal pain that is associated with nausea and vomiting.  She last vomited while in the ED. She does feel that she is had some fevers and chills as well.     She states that the only medications that she takes are some blood pressure medications as well as Plavix which she last took this morning.    In the ED, CT scan of her abdomen and pelvis was performed which showed evidence of acute cholecystitis.  She has a leukocytosis of 17.9, she is afebrile and hypertensive.  General surgery was consulted in the setting of acute cholecystitis for evaluation and management.      Post-Op Info:  * No surgery found *         Interval History: Patient seen and examined. No acute events overnight. S/p cholecystostomy tube placement.     Medications:  Continuous Infusions:   heparin (porcine) in D5W 12 Units/kg/hr (07/12/23 2009)     Scheduled Meds:   amLODIPine  10 mg Oral Daily    losartan  100 mg Oral Daily    piperacillin-tazobactam (Zosyn) IV (PEDS and ADULTS) (extended infusion is not appropriate)  4.5 g Intravenous Q8H    potassium chloride  40 mEq Oral Once     PRN Meds:dextrose 10%, dextrose 10%, glucagon (human recombinant), glucose, glucose, heparin (PORCINE), heparin (PORCINE), HYDROcodone-acetaminophen, naloxone, ondansetron, sodium chloride 0.9%     Review of patient's allergies indicates:  No Known Allergies  Objective:     Vital Signs (Most Recent):  Temp: 97.8 °F (36.6 °C) (07/13/23 0720)  Pulse: 75 (07/13/23 0720)  Resp: 20 (07/13/23 0720)  BP: 137/80 (07/13/23 0720)  SpO2: 98 % (07/13/23  0720) Vital Signs (24h Range):  Temp:  [97.7 °F (36.5 °C)-98.7 °F (37.1 °C)] 97.8 °F (36.6 °C)  Pulse:  [75-97] 75  Resp:  [14-20] 20  SpO2:  [84 %-99 %] 98 %  BP: (121-219)/(59-95) 137/80     Weight: 82.8 kg (182 lb 9.6 oz)  Body mass index is 34.5 kg/m².    Intake/Output - Last 3 Shifts         07/11 0700 07/12 0659 07/12 0700 07/13 0659 07/13 0700 07/14 0659    P.O.  120     I.V. (mL/kg)  48.4 (0.6)     Other  10     IV Piggyback  1098     Total Intake(mL/kg)  1276.4 (15.4)     Urine (mL/kg/hr)  0     Other  50 80    Stool  0     Total Output  50 80    Net  +1226.4 -80           Urine Occurrence  1 x     Stool Occurrence  0 x              Physical Exam  Vitals and nursing note reviewed.   Constitutional:       General: She is not in acute distress.     Appearance: Normal appearance. She is not toxic-appearing.   HENT:      Head: Normocephalic and atraumatic.   Cardiovascular:      Rate and Rhythm: Rhythm irregular.      Comments: Hypertension  Pulmonary:      Effort: Pulmonary effort is normal. No respiratory distress.      Comments: Nasal cannula  Abdominal:      General: Abdomen is flat. There is no distension.      Palpations: Abdomen is soft.      Tenderness: There is abdominal tenderness (Right upper quadrant). There is no guarding.      Comments: Myah tube in place draining bile  Well-healed midline laparotomy scar   Musculoskeletal:      Comments: Left above-the-knee amputation well-healed scar   Skin:     General: Skin is warm and dry.   Neurological:      General: No focal deficit present.      Mental Status: She is alert and oriented to person, place, and time.        Significant Labs:  I have reviewed all pertinent lab results within the past 24 hours.  CBC:   Recent Labs   Lab 07/13/23  6392   WBC 16.15*  16.15*   RBC 5.06  5.06   HGB 13.1  13.1   HCT 42.0  42.0     263   MCV 83  83   MCH 25.9*  25.9*   MCHC 31.2*  31.2*     CMP:   Recent Labs   Lab 07/13/23 0419   GLU 97   CALCIUM  9.9   ALBUMIN 3.0*   PROT 7.1   *   K 3.1*   CO2 26   CL 93*   BUN 13   CREATININE 0.9   ALKPHOS 131   ALT 33   AST 35   BILITOT 1.3*       Significant Diagnostics:  I have reviewed all pertinent imaging results/findings within the past 24 hours.    Assessment/Plan:     Cholelithiasis with acute cholecystitis  This is an 88-year-old female patient is presenting to the ED with a 1 day history of right upper quadrant abdominal pain nausea and vomiting who has a diagnosis of acute cholecystitis.  Given her medical comorbidities and the fact that she took her Plavix this morning she is at very high risk for a surgical complication should we proceed with a laparoscopic cholecystectomy, therefore we will recommend placement of a cholecystostomy tube at this time for management of her acute cholecystitis.    - patient seen and examined this morning  - continue antibiotics  - lata tube in place   -multimodal pain control   - we will have her follow-up with general surgery as an outpatient to discuss surgical intervention after she is out of this acute phase, as well as the fact that she will have to have her Plavix held for at least 5 days to decrease the risk of surgical morbidity and mortality  -general surgery will follow along        Isra Baca MD  General Surgery  [facility]

## 2023-07-13 NOTE — MED STUDENT SUBJECTIVE & OBJECTIVE
"Medical Student Subjective & Objective     Principal Problem: Sepsis    Chief Complaint:   Chief Complaint   Patient presents with    Hip Pain     Pt reports right hip pain since yesterday, denies fall or injury. Pt uses a wheelchair for mobility. Pt has left BKA. Pt hypertensive in triage (238/100), reports took her BP meds this morning.        HPI: Ms. Barragan is an 88 year old female with a past medical history of left AKA, hypertension, PAD who presents to the ED today for abdominal pain. She states it's on her right lower abdomen and thought it was her hip. This started yesterday and has progressively gotten worse. She was also found to be severely hypertensive in ED but improved with pain medications. She believes that she had fevers at home but denies any issues breathing, chest pain, diarrhea.  In the ED, she was found to have WBC 17.91, Na 130, K 3.2, Cl 93,  and lactate 2.24. CT scan showed "gallbladder distended with a few gallstones with hyperemia of the gallbladder wall, gallbladder wall thickening and pericholecystic fluid. Concerning for acute cholecystitis." Patient also noted to be in atrial fibrillation with rate 80s in ED. General Surgery and Cardiology consulted. General surgery recommending IR cholecystostomy drain. IR consulted, drain to be placed today.    Hospital Course: Cholecystostomy drain placed 7/12/23. Per surgery, drain will need to be in place for 4-6 weeks and f/u as outpatient for possible cholecystectomy after holding AC for 5 days.    Interval History: NAEO. Patient is resting comfortably with no complaints at this time. States that she is feeling much better than yesterday. Reports no issues with drain. She is not on oxygen at home. She denies any fever, chills, abd pain, CP, SOB, N/V/D, dysuria, HA, dizziness, or any other sxs at this time.     Past Medical History:   Diagnosis Date    Above knee amputation status 1994    History of DVT of lower extremity 1994    LEFT    " Hyperlipidemia     Hypertension     PAD (peripheral artery disease)        Past Surgical History:   Procedure Laterality Date    LEG AMPUTATION THROUGH KNEE      PARTIAL HYSTERECTOMY      1960's    VASCULAR SURGERY Left 1994    AORTOFEM BYPASS       Review of patient's allergies indicates:  No Known Allergies    No current facility-administered medications on file prior to encounter.     Current Outpatient Medications on File Prior to Encounter   Medication Sig    acetaminophen (TYLENOL) 500 MG tablet Take 2 tablets (1,000 mg total) by mouth every 8 (eight) hours as needed for Pain.    alendronate (FOSAMAX) 70 MG tablet Take 70 mg by mouth every 7 days.    amLODIPine (NORVASC) 10 MG tablet Take 10 mg by mouth once daily.    aspirin (ECOTRIN) 81 MG EC tablet Take 81 mg by mouth once daily.    cholecalciferol, vitamin D3, (VITAMIN D3) 25 mcg (1,000 unit) capsule Take 1,000 Units by mouth once daily.    clopidogrel (PLAVIX) 75 mg tablet Take 75 mg by mouth once daily.    hydroCHLOROthiazide (HYDRODIURIL) 25 MG tablet Take 25 mg by mouth once daily.    losartan (COZAAR) 100 MG tablet Take 100 mg by mouth once daily.    potassium chloride (MICRO-K) 10 MEQ CpSR Take 10 mEq by mouth once.    rosuvastatin (CRESTOR) 40 MG Tab Take 1 tablet by mouth once daily.     Family History    None       Tobacco Use    Smoking status: Former    Smokeless tobacco: Never   Substance and Sexual Activity    Alcohol use: No    Drug use: Never    Sexual activity: Not on file     Review of Systems   Constitutional:  Negative for chills and fever.   HENT:  Negative for congestion, sore throat and trouble swallowing.    Eyes:  Negative for visual disturbance.   Respiratory:  Negative for cough and shortness of breath.    Cardiovascular:  Negative for chest pain and leg swelling.   Gastrointestinal:  Negative for abdominal pain, constipation, diarrhea, nausea and vomiting.   Genitourinary:  Negative for dysuria.   Musculoskeletal:  Negative for  back pain.   Skin:  Negative for rash.   Neurological:  Negative for dizziness, weakness, numbness and headaches.   Objective:     Vital Signs (Most Recent):  Temp: 97.8 °F (36.6 °C) (07/13/23 0720)  Pulse: 71 (07/13/23 0859)  Resp: 20 (07/13/23 0720)  BP: 137/80 (07/13/23 0720)  SpO2: 96 % (07/13/23 0859) Vital Signs (24h Range):  Temp:  [97.7 °F (36.5 °C)-98.7 °F (37.1 °C)] 97.8 °F (36.6 °C)  Pulse:  [71-97] 71  Resp:  [14-20] 20  SpO2:  [94 %-99 %] 96 %  BP: (121-219)/(59-95) 137/80     Weight: 82.8 kg (182 lb 9.6 oz)  Body mass index is 34.5 kg/m².    Intake/Output Summary (Last 24 hours) at 7/13/2023 1051  Last data filed at 7/13/2023 1008  Gross per 24 hour   Intake 1396.41 ml   Output 130 ml   Net 1266.41 ml      Physical Exam  Vitals and nursing note reviewed.   Constitutional:       General: She is not in acute distress.     Appearance: Normal appearance.   HENT:      Head: Normocephalic and atraumatic.      Right Ear: External ear normal.      Left Ear: External ear normal.      Nose: Nose normal.      Mouth/Throat:      Mouth: Mucous membranes are moist.   Eyes:      Extraocular Movements: Extraocular movements intact.      Pupils: Pupils are equal, round, and reactive to light.   Cardiovascular:      Pulses: Normal pulses.      Comments: Irregularly irregular rate and rhythm  Pulmonary:      Effort: Pulmonary effort is normal. No respiratory distress.      Breath sounds: Normal breath sounds.      Comments: On 2L of O2 NC  Abdominal:      General: Abdomen is flat. There is no distension.      Palpations: Abdomen is soft.      Tenderness: There is no abdominal tenderness. There is no guarding or rebound.      Comments: Cholecystostomy drain in place to R abdomen, draining appropriately.    Musculoskeletal:         General: Normal range of motion.      Cervical back: Normal range of motion.      Comments: Left AKA noted   Skin:     General: Skin is warm.   Neurological:      General: No focal deficit  present.      Mental Status: She is alert and oriented to person, place, and time.   Psychiatric:         Mood and Affect: Mood normal.       Significant Labs: All pertinent labs within the past 24 hours have been reviewed.  BMP:   Recent Labs   Lab 07/13/23  0419   GLU 97   *   K 3.1*   CL 93*   CO2 26   BUN 13   CREATININE 0.9   CALCIUM 9.9   MG 1.6     CBC:   Recent Labs   Lab 07/12/23  0940 07/13/23  0419   WBC 17.91* 16.15*  16.15*   HGB 13.0 13.1  13.1   HCT 41.3 42.0  42.0    263  263     CMP:   Recent Labs   Lab 07/12/23  0940 07/13/23  0419   * 133*   K 3.2* 3.1*   CL 93* 93*   CO2 24 26   * 97   BUN 7* 13   CREATININE 0.7 0.9   CALCIUM 9.9 9.9   PROT 7.9 7.1   ALBUMIN 3.7 3.0*   BILITOT 0.9 1.3*   ALKPHOS 121 131   AST 15 35   ALT 9* 33   ANIONGAP 13 14     Cardiac Markers:   Recent Labs   Lab 07/12/23  0940   *     Coagulation:   Recent Labs   Lab 07/12/23  1311 07/12/23  1936 07/13/23  0817   INR 1.1  --   --    APTT 22.7   < > 42.9*    < > = values in this interval not displayed.     TSH: No results for input(s): TSH in the last 4320 hours.    Significant Imaging: I have reviewed all pertinent imaging results/findings within the past 24 hours.  Echo: pending  Assessment/Plan:      * Sepsis  This patient does have evidence of infective focus  My overall impression is sepsis.  Source: acute cholecystitis  Antibiotics given-             Antibiotics (72h ago, onward)     Start     Stop Route Frequency Ordered     07/12/23 1845   piperacillin-tazobactam (ZOSYN) 4.5 g in dextrose 5 % in water (D5W) 5 % 100 mL IVPB (MB+)         -- IV Every 8 hours (non-standard times) 07/12/23 1442          Latest lactate reviewed-      Recent Labs   Lab 07/12/23  1311   LACTATE 1.5      Organ dysfunction indicated by Acute respiratory failure   Fluid challenge Ideal Body Weight- The patient's ideal body weight is Ideal body weight: 47.8 kg (105 lb 6.1 oz) which will be used to calculate  fluid bolus of 30 ml/kg for treatment of septic shock.     Post- resuscitation assessment Yes Perfusion exam was performed within 6 hours of septic shock presentation after bolus shows Adequate tissue perfusion assessed by non-invasive monitoring    Will Not start Pressors- Levophed for MAP of 65  Source control achieved by: Cholecystostomy drain and IV antibiotics     -awaiting cultures, will switch to PO abx   -ID consult    Cholelithiasis with acute cholecystitis  - IR placement of cholecystostomy drain 7/12/23  - WBC 16.15 today  -Awaiting cultures, plan to switch to PO abx for discharge  -ID consult  -Come down on O2, currently on 2 L NC        PVD (peripheral vascular disease)  -Noted, history of L AKA, transfers herself from bed to chair at home  -Takes ASA and plavix at home.   -resume plavix once cleared by Gen Surgery        Chronic atrial fibrillation  Patient with Paroxysmal (<7 days) atrial fibrillation which is controlled currently with no meds. Patient is currently in atrial fibrillation.[id number] Score: 4. . Anticoagulation on heparin drip, held for procedure  - Echo pending  - check tsh  - appreciate Cardiology recommendations  - Currently on heparin, plan to switch to oral eliquis and d/c ASA for dual with plavix and eliquis.     Hypokalemia   -replace potassium     Essential Hypertension  -Continue losartan, amlodipine           VTE Risk Mitigation (From admission, onward)           Ordered       IP VTE HIGH RISK PATIENT  Once         07/12/23 1427       Place sequential compression device  Until discontinued         07/12/23 1427       heparin 25,000 units in dextrose 5% (100 units/ml) IV bolus from bag - ADDITIONAL PRN BOLUS - 60 units/kg  As needed (PRN)        Question:  Heparin Infusion Adjustment (DO NOT MODIFY ANSWER)  Answer:  [email or web address]\Images\Pharmacy\HeparinInfusions\heparin LOW INTENSITY nomogram for [facility] [id number]    07/12/23 1244       heparin 25,000 units in dextrose 5%  (100 units/ml) IV bolus from bag - ADDITIONAL PRN BOLUS - 30 units/kg  As needed (PRN)        Question:  Heparin Infusion Adjustment (DO NOT MODIFY ANSWER)  Answer:  [email or web address]\Images\Pharmacy\HeparinInfusions\heparin LOW INTENSITY nomogram for [facility] [id number]    07/12/23 1244       heparin 25,000 units in dextrose 5% 250 mL (100 units/mL) infusion LOW INTENSITY nomogram - [facility]  Continuous        Question Answer Comment   Heparin Infusion Adjustment (DO NOT MODIFY ANSWER) [email or web address]\[email or web address]\Images\Pharmacy\HeparinInfusions\heparin LOW INTENSITY nomogram for [facility] [id number]     Begin at (in units/kg/hr) 12         07/12/23 1244                                   KELTON Hickman-S  [facility]            Medical Student Subjective & Objective

## 2023-07-13 NOTE — ASSESSMENT & PLAN NOTE
-noted  -resume plavix tomorrow  - stop aspirin as patient will now be on Eliquis for atrial fibrillation  - discussed with patient

## 2023-07-13 NOTE — MEDICAL/APP STUDENT
"Medical Student Subjective & Objective     Principal Problem: Sepsis    Chief Complaint:   Chief Complaint   Patient presents with    Hip Pain     Pt reports right hip pain since yesterday, denies fall or injury. Pt uses a wheelchair for mobility. Pt has left BKA. Pt hypertensive in triage (238/100), reports took her BP meds this morning.        HPI: Ms. Barragan is an 88 year old female with a past medical history of left AKA, hypertension, PAD who presents to the ED today for abdominal pain. She states it's on her right lower abdomen and thought it was her hip. This started yesterday and has progressively gotten worse. She was also found to be severely hypertensive in ED but improved with pain medications. She believes that she had fevers at home but denies any issues breathing, chest pain, diarrhea.  In the ED, she was found to have WBC 17.91, Na 130, K 3.2, Cl 93,  and lactate 2.24. CT scan showed "gallbladder distended with a few gallstones with hyperemia of the gallbladder wall, gallbladder wall thickening and pericholecystic fluid. Concerning for acute cholecystitis." Patient also noted to be in atrial fibrillation with rate 80s in ED. General Surgery and Cardiology consulted. General surgery recommending IR cholecystostomy drain. IR consulted, drain to be placed today.    Hospital Course: Cholecystostomy drain placed 7/12/23. Per surgery, drain will need to be in place for 4-6 weeks and f/u as outpatient for possible cholecystectomy after holding AC for 5 days.    Interval History: NAEO. Patient is resting comfortably with no complaints at this time. States that she is feeling much better than yesterday. Reports no issues with drain. She is not on oxygen at home. She denies any fever, chills, abd pain, CP, SOB, N/V/D, dysuria, HA, dizziness, or any other sxs at this time.     Past Medical History:   Diagnosis Date    Above knee amputation status 1994    History of DVT of lower extremity 1994    LEFT    " Hyperlipidemia     Hypertension     PAD (peripheral artery disease)        Past Surgical History:   Procedure Laterality Date    LEG AMPUTATION THROUGH KNEE      PARTIAL HYSTERECTOMY      1960's    VASCULAR SURGERY Left 1994    AORTOFEM BYPASS       Review of patient's allergies indicates:  No Known Allergies    No current facility-administered medications on file prior to encounter.     Current Outpatient Medications on File Prior to Encounter   Medication Sig    acetaminophen (TYLENOL) 500 MG tablet Take 2 tablets (1,000 mg total) by mouth every 8 (eight) hours as needed for Pain.    alendronate (FOSAMAX) 70 MG tablet Take 70 mg by mouth every 7 days.    amLODIPine (NORVASC) 10 MG tablet Take 10 mg by mouth once daily.    aspirin (ECOTRIN) 81 MG EC tablet Take 81 mg by mouth once daily.    cholecalciferol, vitamin D3, (VITAMIN D3) 25 mcg (1,000 unit) capsule Take 1,000 Units by mouth once daily.    clopidogrel (PLAVIX) 75 mg tablet Take 75 mg by mouth once daily.    hydroCHLOROthiazide (HYDRODIURIL) 25 MG tablet Take 25 mg by mouth once daily.    losartan (COZAAR) 100 MG tablet Take 100 mg by mouth once daily.    potassium chloride (MICRO-K) 10 MEQ CpSR Take 10 mEq by mouth once.    rosuvastatin (CRESTOR) 40 MG Tab Take 1 tablet by mouth once daily.     Family History    None       Tobacco Use    Smoking status: Former    Smokeless tobacco: Never   Substance and Sexual Activity    Alcohol use: No    Drug use: Never    Sexual activity: Not on file     Review of Systems   Constitutional:  Negative for chills and fever.   HENT:  Negative for congestion, sore throat and trouble swallowing.    Eyes:  Negative for visual disturbance.   Respiratory:  Negative for cough and shortness of breath.    Cardiovascular:  Negative for chest pain and leg swelling.   Gastrointestinal:  Negative for abdominal pain, constipation, diarrhea, nausea and vomiting.   Genitourinary:  Negative for dysuria.   Musculoskeletal:  Negative for  back pain.   Skin:  Negative for rash.   Neurological:  Negative for dizziness, weakness, numbness and headaches.   Objective:     Vital Signs (Most Recent):  Temp: 97.8 °F (36.6 °C) (07/13/23 0720)  Pulse: 71 (07/13/23 0859)  Resp: 20 (07/13/23 0720)  BP: 137/80 (07/13/23 0720)  SpO2: 96 % (07/13/23 0859) Vital Signs (24h Range):  Temp:  [97.7 °F (36.5 °C)-98.7 °F (37.1 °C)] 97.8 °F (36.6 °C)  Pulse:  [71-97] 71  Resp:  [14-20] 20  SpO2:  [94 %-99 %] 96 %  BP: (121-219)/(59-95) 137/80     Weight: 82.8 kg (182 lb 9.6 oz)  Body mass index is 34.5 kg/m².    Intake/Output Summary (Last 24 hours) at 7/13/2023 1051  Last data filed at 7/13/2023 1008  Gross per 24 hour   Intake 1396.41 ml   Output 130 ml   Net 1266.41 ml      Physical Exam  Vitals and nursing note reviewed.   Constitutional:       General: She is not in acute distress.     Appearance: Normal appearance.   HENT:      Head: Normocephalic and atraumatic.      Right Ear: External ear normal.      Left Ear: External ear normal.      Nose: Nose normal.      Mouth/Throat:      Mouth: Mucous membranes are moist.   Eyes:      Extraocular Movements: Extraocular movements intact.      Pupils: Pupils are equal, round, and reactive to light.   Cardiovascular:      Pulses: Normal pulses.      Comments: Irregularly irregular rate and rhythm  Pulmonary:      Effort: Pulmonary effort is normal. No respiratory distress.      Breath sounds: Normal breath sounds.      Comments: On 2L of O2 NC  Abdominal:      General: Abdomen is flat. There is no distension.      Palpations: Abdomen is soft.      Tenderness: There is no abdominal tenderness. There is no guarding or rebound.      Comments: Cholecystostomy drain in place to R abdomen, draining appropriately.    Musculoskeletal:         General: Normal range of motion.      Cervical back: Normal range of motion.      Comments: Left AKA noted   Skin:     General: Skin is warm.   Neurological:      General: No focal deficit  present.      Mental Status: She is alert and oriented to person, place, and time.   Psychiatric:         Mood and Affect: Mood normal.       Significant Labs: All pertinent labs within the past 24 hours have been reviewed.  BMP:   Recent Labs   Lab 07/13/23  0419   GLU 97   *   K 3.1*   CL 93*   CO2 26   BUN 13   CREATININE 0.9   CALCIUM 9.9   MG 1.6     CBC:   Recent Labs   Lab 07/12/23  0940 07/13/23  0419   WBC 17.91* 16.15*  16.15*   HGB 13.0 13.1  13.1   HCT 41.3 42.0  42.0    263  263     CMP:   Recent Labs   Lab 07/12/23  0940 07/13/23  0419   * 133*   K 3.2* 3.1*   CL 93* 93*   CO2 24 26   * 97   BUN 7* 13   CREATININE 0.7 0.9   CALCIUM 9.9 9.9   PROT 7.9 7.1   ALBUMIN 3.7 3.0*   BILITOT 0.9 1.3*   ALKPHOS 121 131   AST 15 35   ALT 9* 33   ANIONGAP 13 14     Cardiac Markers:   Recent Labs   Lab 07/12/23  0940   *     Coagulation:   Recent Labs   Lab 07/12/23  1311 07/12/23  1936 07/13/23  0817   INR 1.1  --   --    APTT 22.7   < > 42.9*    < > = values in this interval not displayed.     TSH: No results for input(s): TSH in the last 4320 hours.    Significant Imaging: I have reviewed all pertinent imaging results/findings within the past 24 hours.  Echo 7/13/23:   The left ventricle is normal in size with concentric hypertrophy and normal systolic function.  The estimated ejection fraction is 55%.  Grade III left ventricular diastolic dysfunction.  Normal right ventricular size with normal right ventricular systolic function.  Severe left atrial enlargement.  Mild right atrial enlargement.  Moderate tricuspid regurgitation.  Mild pulmonic regurgitation.  Normal central venous pressure (3 mmHg).  The estimated PA systolic pressure is 38 mmHg.     Assessment/Plan:      * Sepsis  This patient does have evidence of infective focus  My overall impression is sepsis.  Source: acute cholecystitis  Antibiotics given-             Antibiotics (72h ago, onward)     Start     Stop  Route Frequency Ordered     07/12/23 1845   piperacillin-tazobactam (ZOSYN) 4.5 g in dextrose 5 % in water (D5W) 5 % 100 mL IVPB (MB+)         -- IV Every 8 hours (non-standard times) 07/12/23 1442          Latest lactate reviewed-      Recent Labs   Lab 07/12/23  1311   LACTATE 1.5      Organ dysfunction indicated by Acute respiratory failure   Fluid challenge Ideal Body Weight- The patient's ideal body weight is Ideal body weight: 47.8 kg (105 lb 6.1 oz) which will be used to calculate fluid bolus of 30 ml/kg for treatment of septic shock.     Post- resuscitation assessment Yes Perfusion exam was performed within 6 hours of septic shock presentation after bolus shows Adequate tissue perfusion assessed by non-invasive monitoring    Will Not start Pressors- Levophed for MAP of 65  Source control achieved by: Cholecystostomy drain and IV antibiotics     -awaiting cultures, will switch to PO abx   -ID consult    Cholelithiasis with acute cholecystitis  - IR placement of cholecystostomy drain 7/12/23, plan for outpatient cholecystectomy with echo and cardiac clearance-will need to hold AC 5 days prior  - WBC 16.15 today  -Awaiting cultures, plan to switch to PO abx for discharge  -ID consult  -Come down on O2, currently on 2 L NC        PVD (peripheral vascular disease)  -Noted, history of L AKA, transfers herself from bed to chair at home  -Takes ASA and plavix at home.   -resume plavix once cleared by Gen Surgery        Chronic atrial fibrillation  Patient with Paroxysmal (<7 days) atrial fibrillation which is controlled currently with no meds. Patient is currently in atrial fibrillation.[id number] Score: 4. . Anticoagulation on heparin drip, held for procedure  - Echo reviewed  - check tsh  - appreciate Cardiology recommendations  - Currently on heparin, plan to switch to oral eliquis and d/c ASA for dual with plavix and eliquis.     Hypokalemia   -replace potassium     Essential Hypertension  -Continue losartan,  amlodipine           VTE Risk Mitigation (From admission, onward)           Ordered       IP VTE HIGH RISK PATIENT  Once         07/12/23 1427       Place sequential compression device  Until discontinued         07/12/23 1427       heparin 25,000 units in dextrose 5% (100 units/ml) IV bolus from bag - ADDITIONAL PRN BOLUS - 60 units/kg  As needed (PRN)        Question:  Heparin Infusion Adjustment (DO NOT MODIFY ANSWER)  Answer:  \\[email or web address]\epic\Images\Pharmacy\HeparinInfusions\heparin LOW INTENSITY nomogram for [facility] [id number].pdf    07/12/23 1244       heparin 25,000 units in dextrose 5% (100 units/ml) IV bolus from bag - ADDITIONAL PRN BOLUS - 30 units/kg  As needed (PRN)        Question:  Heparin Infusion Adjustment (DO NOT MODIFY ANSWER)  Answer:  \\[email or web address]\epic\Images\Pharmacy\HeparinInfusions\heparin LOW INTENSITY nomogram for [facility] [id number].pdf    07/12/23 1244       heparin 25,000 units in dextrose 5% 250 mL (100 units/mL) infusion LOW INTENSITY nomogram - [facility]  Continuous        Question Answer Comment   Heparin Infusion Adjustment (DO NOT MODIFY ANSWER) \\[email or web address]\epic\Images\Pharmacy\HeparinInfusions\heparin LOW INTENSITY nomogram for [facility] [id number].pdf     Begin at (in units/kg/hr) 12         07/12/23 1244                                   KELTON Hickman-S  [facility] Medicine  [facility]            Medical Student Subjective & Objective

## 2023-07-13 NOTE — ASSESSMENT & PLAN NOTE
Patient with Paroxysmal (<7 days) atrial fibrillation which is controlled currently with no meds. Patient is currently in atrial fibrillation.[id number] Score: 4. . Anticoagulation on heparin drip - transition to Eliquis  - check echocardiogram - noted diastolic dysfunction with normal EF  - check tsh - pending  - appreciate Cardiology recommendations

## 2023-07-13 NOTE — ASSESSMENT & PLAN NOTE
This is an 88-year-old female patient is presenting to the ED with a 1 day history of right upper quadrant abdominal pain nausea and vomiting who has a diagnosis of acute cholecystitis.  Given her medical comorbidities and the fact that she took her Plavix this morning she is at very high risk for a surgical complication should we proceed with a laparoscopic cholecystectomy, therefore we will recommend placement of a cholecystostomy tube at this time for management of her acute cholecystitis.    - patient seen and examined this morning  - continue antibiotics  - lata tube in place   -multimodal pain control   - we will have her follow-up with general surgery as an outpatient to discuss surgical intervention after she is out of this acute phase, as well as the fact that she will have to have her Plavix held for at least 5 days to decrease the risk of surgical morbidity and mortality  -general surgery will follow along

## 2023-07-13 NOTE — PLAN OF CARE
Problem: Physical Therapy  Goal: Physical Therapy Goal  Description: Goals to be met by: 23     Patient will increase functional independence with mobility by performin. Pt to be mod I with bed mobility.  2. Pt to transfer bed<>wheelchair with (S).  3. Pt to be (I) with written HEP.    Outcome: Ongoing, Progressing   Initial eval completed, see in chart for details.

## 2023-07-13 NOTE — PT/OT/SLP EVAL
Physical Therapy Evaluation and Treatment    Patient Name: Abbie Barragan   MRN: [id number]  Recent Surgery: * No surgery found *      Recommendations:     Discharge Recommendations: home health PT   Discharge Equipment Recommendations: none       Assessment:     Abbie Barragan is a 88 y.o. female admitted with a medical diagnosis of Sepsis. She presents with the following impairments/functional limitations: impaired endurance, weakness, impaired functional mobility, pain, impaired skin.     Rehab Prognosis: Good; patient would benefit from acute PT services to address these deficits and reach maximum level of function.    Plan:     During this hospitalization, patient to be seen 3 x/week to address the above listed problems via therapeutic activities, therapeutic exercises    Plan of Care Expires: 07/20/23    Subjective     Chief Complaint: Pain (R) side at incision site  Patient Comments/Goals: Pt agreeable to PT evaluation and treatment.  Pain/Comfort:  Pain Rating 1: 8/10  Location - Side 1: Right  Location - Orientation 1: lower  Location 1: abdomen  Pain Addressed 1: Reposition    Social History:  Living Environment: Patient lives alone in a first floor apartment   Prior Level of Function: Prior to admission, patient was modified independent  Equipment Used at Home: wheelchair, shower chair, grab bar  DME owned (not currently used): none  Assistance Upon Discharge: unknown    Objective:     Communicated with Virginia villalba prior to session. Patient found supine with bed alarm, oxygen, pressure relief boots, PureWick, peripheral IV, telemetry, Other (comments) (boot (L) foot; biliary tube) upon PT entry to room.    General Precautions: Standard, fall   Orthopedic Precautions: N/A   Braces: N/A    Respiratory Status: Nasal cannula, flow 2 L/min    Exams:  Cognition: Patient is oriented to Person, Place, Time, Situation  RLE ROM:  (R) AKA  RLE Strength:  NT  LLE ROM: WFL  LLE Strength: WFL  NT    Functional  Mobility:  Gait belt applied - Yes  Bed Mobility  Supine to Sit: stand by assistance for all mobility.  Transfers  Bed to Chair: contact guard assistance and minimum assistance with no AD using Stand Pivot  Gait  Non-ambulatory  Balance  Sitting: modified independence and supervision  Standing:  NT      Therapeutic Activities and Exercises:   Patient educated on role of acute care PT and PT POC  Nurse, Virginia present to observe transfer and to assist patient back to bed.    AM-PAC 6 CLICK MOBILITY  Total Score:16    Patient left  in wheelchair  with all lines intact, call button in reach, and PCT and daughter present.    GOALS:   Multidisciplinary Problems       Physical Therapy Goals          Problem: Physical Therapy    Goal Priority Disciplines Outcome Goal Variances Interventions   Physical Therapy Goal     PT, PT/OT Ongoing, Progressing     Description: Goals to be met by: 23     Patient will increase functional independence with mobility by performin. Pt to be mod I with bed mobility.  2. Pt to transfer bed<>wheelchair with (S).  3. Pt to be (I) with written HEP.                         History:     Past Medical History:   Diagnosis Date    Above knee amputation status     History of DVT of lower extremity     LEFT    Hyperlipidemia     Hypertension     PAD (peripheral artery disease)        Past Surgical History:   Procedure Laterality Date    LEG AMPUTATION THROUGH KNEE      PARTIAL HYSTERECTOMY          VASCULAR SURGERY Left     AORTOFEM BYPASS       Time Tracking:     PT Received On: 23  PT Start Time: 1547  PT Stop Time: 1610  PT Total Time (min): 23 min     Billable Minutes: Evaluation 15 and Therapeutic Activity 8    2023

## 2023-07-13 NOTE — PROGRESS NOTES
"[facility] Medicine  Progress Note    Patient Name: Abbie Barragan  MRN: [id number]  Patient Class: IP- Inpatient   Admission Date: 7/12/2023  Length of Stay: 1 days  Attending Physician: Noble Peñaloza MD  Primary Care Provider: Kailash Carvalho NP        Subjective:     Principal Problem:Sepsis        HPI:  Ms. Barragan is an 88 year old female with a past medical history of left AKA, hypertension, PAD who presents to the ED today for abdominal pain. She states it's on her right lower abdomen and thought it was her hip. This started yesterday and has progressively gotten worse. She was also found to be severely hypertensive in ED but improved with pain medications. She believes that she had fevers at home but denies any issues breathing, chest pain, diarrhea.  In the ED, she was found to have WBC 17.91, Na 130, K 3.2, Cl 93,  and lactate 2.24. CT scan showed "gallbladder distended with a few gallstones with hyperemia of the gallbladder wall, gallbladder wall thickening and pericholecystic fluid. Concerning for acute cholecystitis." Patient also noted to be in atrial fibrillation with rate 80s in ED. General Surgery and Cardiology consulted. General surgery recommending IR cholecystostomy drain. IR consulted, drain to be placed today.      Overview/Hospital Course:  No notes on file    Interval History: no acute issues, feeling better today. Pain has improved, no difficult breathing today. Drain in place with bilious output.    Review of Systems  Objective:     Vital Signs (Most Recent):  Temp: 97.9 °F (36.6 °C) (07/13/23 1109)  Pulse: 82 (07/13/23 1109)  Resp: 20 (07/13/23 1109)  BP: 127/60 (07/13/23 1109)  SpO2: 96 % (07/13/23 1109) Vital Signs (24h Range):  Temp:  [97.7 °F (36.5 °C)-98.7 °F (37.1 °C)] 97.9 °F (36.6 °C)  Pulse:  [71-97] 82  Resp:  [14-20] 20  SpO2:  [94 %-99 %] 96 %  BP: (121-209)/(59-95) 127/60     Weight: 82.8 kg (182 lb 9.6 oz)  Body mass index is 34.5 " kg/m².    Intake/Output Summary (Last 24 hours) at 7/13/2023 1408  Last data filed at 7/13/2023 1008  Gross per 24 hour   Intake 1297.41 ml   Output 130 ml   Net 1167.41 ml         Physical Exam  Vitals and nursing note reviewed.   Constitutional:       General: She is not in acute distress.     Appearance: She is obese. She is ill-appearing.   HENT:      Head: Normocephalic.   Cardiovascular:      Rate and Rhythm: Normal rate and regular rhythm.      Pulses: Normal pulses.   Pulmonary:      Effort: Pulmonary effort is normal. No respiratory distress.   Abdominal:      General: Bowel sounds are normal.      Comments: Right sided cholecystostomy drain in place, bilious output with some noted blood   Musculoskeletal:         General: No swelling.      Comments: Left AKA   Neurological:      General: No focal deficit present.      Mental Status: She is alert and oriented to person, place, and time.   Psychiatric:         Mood and Affect: Mood normal.         Thought Content: Thought content normal.         Judgment: Judgment normal.           Significant Labs: All pertinent labs within the past 24 hours have been reviewed.    Significant Imaging: I have reviewed all pertinent imaging results/findings within the past 24 hours.      Assessment/Plan:      * Sepsis  This patient does have evidence of infective focus  My overall impression is sepsis.  Source: acute cholecystitis  Antibiotics given-   Antibiotics (72h ago, onward)    Start     Stop Route Frequency Ordered    07/12/23 1845  piperacillin-tazobactam (ZOSYN) 4.5 g in dextrose 5 % in water (D5W) 5 % 100 mL IVPB (MB+)         -- IV Every 8 hours (non-standard times) 07/12/23 1442        Latest lactate reviewed-  Recent Labs   Lab 07/12/23  1311   LACTATE 1.5     Organ dysfunction indicated by Acute respiratory failure    Fluid challenge Ideal Body Weight- The patient's ideal body weight is Ideal body weight: 47.8 kg (105 lb 6.1 oz) which will be used to calculate  fluid bolus of 30 ml/kg for treatment of septic shock.      Post- resuscitation assessment Yes Perfusion exam was performed within 6 hours of septic shock presentation after bolus shows Adequate tissue perfusion assessed by non-invasive monitoring       Will Not start Pressors- Levophed for MAP of 65  Source control achieved by: Cholecystostomy drain and IV antibiotics  - WBC trending down    Cholelithiasis with acute cholecystitis  Acute cholecystitis, on plavix last dose this AM and has hx of ex-laparotomy  - General surgery consulted and recommending IR placement of cholecystostomy drain  - doing well with drain and flushing bid  - Currently on zosyn and awaiting culture data  - will consult ID for recommendations for antibiotics    - Plan to follow up with surgery clinic at discharge to see if candidate for cholecystectomy once acute phase has been treated.   - If deemed not candidate for cholecystectomy, will need cholangiogram and cholecystostomy tube exchange vs removal around 8/7/23  -     PVD (peripheral vascular disease)  -noted  -resume plavix tomorrow  - stop aspirin as patient will now be on Eliquis for atrial fibrillation  - discussed with patient      Chronic atrial fibrillation  Patient with Paroxysmal (<7 days) atrial fibrillation which is controlled currently with no meds. Patient is currently in atrial fibrillation.[id number] Score: 4. . Anticoagulation on heparin drip - transition to Eliquis  - check echocardiogram - noted diastolic dysfunction with normal EF  - check tsh - pending  - appreciate Cardiology recommendations      VTE Risk Mitigation (From admission, onward)         Ordered     apixaban tablet 5 mg  2 times daily         07/13/23 1415     heparin 25,000 units in dextrose 5% 250 mL (100 units/mL) infusion LOW INTENSITY nomogram - [facility]  Continuous        Question Answer Comment   Heparin Infusion Adjustment (DO NOT MODIFY ANSWER)  \\[email or web address]\epic\Images\Pharmacy\HeparinInfusions\heparin LOW INTENSITY nomogram for [facility] [id number].pdf    Begin at (in units/kg/hr) 12        07/13/23 1415     IP VTE HIGH RISK PATIENT  Once         07/12/23 1427     Place sequential compression device  Until discontinued         07/12/23 1427     heparin 25,000 units in dextrose 5% (100 units/ml) IV bolus from bag - ADDITIONAL PRN BOLUS - 60 units/kg  As needed (PRN)        Question:  Heparin Infusion Adjustment (DO NOT MODIFY ANSWER)  Answer:  \\[email or web address]\epic\Images\Pharmacy\HeparinInfusions\heparin LOW INTENSITY nomogram for [facility] [id number].pdf    07/12/23 1244     heparin 25,000 units in dextrose 5% (100 units/ml) IV bolus from bag - ADDITIONAL PRN BOLUS - 30 units/kg  As needed (PRN)        Question:  Heparin Infusion Adjustment (DO NOT MODIFY ANSWER)  Answer:  \\[email or web address]\epic\Images\Pharmacy\HeparinInfusions\heparin LOW INTENSITY nomogram for [facility] [id number].pdf    07/12/23 1244                Discharge Planning   JULIÁN:      Code Status: Full Code   Is the patient medically ready for discharge?:     Reason for patient still in hospital (select all that apply): Treatment  Discharge Plan A: Home                  Noble Peñaloza MD  Department of Hospital Medicine   [facility] [place]

## 2023-07-13 NOTE — SUBJECTIVE & OBJECTIVE
Interval History: Patient seen and examined. No acute events overnight. S/p cholecystostomy tube placement.     Medications:  Continuous Infusions:   heparin (porcine) in D5W 12 Units/kg/hr (07/12/23 2009)     Scheduled Meds:   amLODIPine  10 mg Oral Daily    losartan  100 mg Oral Daily    piperacillin-tazobactam (Zosyn) IV (PEDS and ADULTS) (extended infusion is not appropriate)  4.5 g Intravenous Q8H    potassium chloride  40 mEq Oral Once     PRN Meds:dextrose 10%, dextrose 10%, glucagon (human recombinant), glucose, glucose, heparin (PORCINE), heparin (PORCINE), HYDROcodone-acetaminophen, naloxone, ondansetron, sodium chloride 0.9%     Review of patient's allergies indicates:  No Known Allergies  Objective:     Vital Signs (Most Recent):  Temp: 97.8 °F (36.6 °C) (07/13/23 0720)  Pulse: 75 (07/13/23 0720)  Resp: 20 (07/13/23 0720)  BP: 137/80 (07/13/23 0720)  SpO2: 98 % (07/13/23 0720) Vital Signs (24h Range):  Temp:  [97.7 °F (36.5 °C)-98.7 °F (37.1 °C)] 97.8 °F (36.6 °C)  Pulse:  [75-97] 75  Resp:  [14-20] 20  SpO2:  [84 %-99 %] 98 %  BP: (121-219)/(59-95) 137/80     Weight: 82.8 kg (182 lb 9.6 oz)  Body mass index is 34.5 kg/m².    Intake/Output - Last 3 Shifts         07/11 0700 07/12 0659 07/12 0700 07/13 0659 07/13 0700 07/14 0659    P.O.  120     I.V. (mL/kg)  48.4 (0.6)     Other  10     IV Piggyback  1098     Total Intake(mL/kg)  1276.4 (15.4)     Urine (mL/kg/hr)  0     Other  50 80    Stool  0     Total Output  50 80    Net  +1226.4 -80           Urine Occurrence  1 x     Stool Occurrence  0 x              Physical Exam  Vitals and nursing note reviewed.   Constitutional:       General: She is not in acute distress.     Appearance: Normal appearance. She is not toxic-appearing.   HENT:      Head: Normocephalic and atraumatic.   Cardiovascular:      Rate and Rhythm: Rhythm irregular.      Comments: Hypertension  Pulmonary:      Effort: Pulmonary effort is normal. No respiratory distress.       Comments: Nasal cannula  Abdominal:      General: Abdomen is flat. There is no distension.      Palpations: Abdomen is soft.      Tenderness: There is abdominal tenderness (Right upper quadrant). There is no guarding.      Comments: Myah tube in place draining bile  Well-healed midline laparotomy scar   Musculoskeletal:      Comments: Left above-the-knee amputation well-healed scar   Skin:     General: Skin is warm and dry.   Neurological:      General: No focal deficit present.      Mental Status: She is alert and oriented to person, place, and time.        Significant Labs:  I have reviewed all pertinent lab results within the past 24 hours.  CBC:   Recent Labs   Lab 07/13/23 0419   WBC 16.15*  16.15*   RBC 5.06  5.06   HGB 13.1  13.1   HCT 42.0  42.0     263   MCV 83  83   MCH 25.9*  25.9*   MCHC 31.2*  31.2*     CMP:   Recent Labs   Lab 07/13/23 0419   GLU 97   CALCIUM 9.9   ALBUMIN 3.0*   PROT 7.1   *   K 3.1*   CO2 26   CL 93*   BUN 13   CREATININE 0.9   ALKPHOS 131   ALT 33   AST 35   BILITOT 1.3*       Significant Diagnostics:  I have reviewed all pertinent imaging results/findings within the past 24 hours.

## 2023-07-13 NOTE — SUBJECTIVE & OBJECTIVE
Interval History: no acute issues, feeling better today. Pain has improved, no difficult breathing today. Drain in place with bilious output.    Review of Systems  Objective:     Vital Signs (Most Recent):  Temp: 97.9 °F (36.6 °C) (07/13/23 1109)  Pulse: 82 (07/13/23 1109)  Resp: 20 (07/13/23 1109)  BP: 127/60 (07/13/23 1109)  SpO2: 96 % (07/13/23 1109) Vital Signs (24h Range):  Temp:  [97.7 °F (36.5 °C)-98.7 °F (37.1 °C)] 97.9 °F (36.6 °C)  Pulse:  [71-97] 82  Resp:  [14-20] 20  SpO2:  [94 %-99 %] 96 %  BP: (121-209)/(59-95) 127/60     Weight: 82.8 kg (182 lb 9.6 oz)  Body mass index is 34.5 kg/m².    Intake/Output Summary (Last 24 hours) at 7/13/2023 1408  Last data filed at 7/13/2023 1008  Gross per 24 hour   Intake 1297.41 ml   Output 130 ml   Net 1167.41 ml         Physical Exam  Vitals and nursing note reviewed.   Constitutional:       General: She is not in acute distress.     Appearance: She is obese. She is ill-appearing.   HENT:      Head: Normocephalic.   Cardiovascular:      Rate and Rhythm: Normal rate and regular rhythm.      Pulses: Normal pulses.   Pulmonary:      Effort: Pulmonary effort is normal. No respiratory distress.   Abdominal:      General: Bowel sounds are normal.      Comments: Right sided cholecystostomy drain in place, bilious output with some noted blood   Musculoskeletal:         General: No swelling.      Comments: Left AKA   Neurological:      General: No focal deficit present.      Mental Status: She is alert and oriented to person, place, and time.   Psychiatric:         Mood and Affect: Mood normal.         Thought Content: Thought content normal.         Judgment: Judgment normal.           Significant Labs: All pertinent labs within the past 24 hours have been reviewed.    Significant Imaging: I have reviewed all pertinent imaging results/findings within the past 24 hours.

## 2023-07-13 NOTE — PLAN OF CARE
Case Management Assessment     PCP: Kailash Carvalho NP    Pharmacy:   [id number] - [place], [place] - [address]  [address] [address]  Phone: [phone] Fax: [phone]        Patient Arrived From: Home  Existing Help at Home: son and family members    Barriers to Discharge: no barriers    Discharge Plan:    A. Home   B. Home       07/13/23 1154   Discharge Assessment   Assessment Type Discharge Planning Assessment   Confirmed/corrected address, phone number and insurance Yes   Confirmed Demographics Correct on Facesheet   Source of Information health record   Communicated JULIÁN with patient/caregiver Date not available/Unable to determine   Reason For Admission Sepsis   People in Home alone   Do you expect to return to your current living situation? Yes   Do you have help at home or someone to help you manage your care at home? Yes   Who are your caregiver(s) and their phone number(s)? Vernell Barragan (Son)   [phone] (Mobile)   Prior to hospitilization cognitive status: Alert/Oriented   Current cognitive status: Alert/Oriented   Equipment Currently Used at Home bath bench;grab bar;shower chair  (power wheelchair)   Readmission within 30 days? No   Patient currently being followed by outpatient case management? No   Do you currently have service(s) that help you manage your care at home? No   Do you take prescription medications? Yes   Do you have prescription coverage? Yes   Coverage PHN   Do you have any problems affording any of your prescribed medications? No   Is the patient taking medications as prescribed? yes   Who is going to help you get home at discharge? Vernell Barragan (Son)   [phone] (Mobile)   How do you get to doctors appointments? family or friend will provide   Are you on dialysis? No   Do you take coumadin? No   Discharge Plan A Home   Discharge Plan B Home   Transition of Care Barriers None

## 2023-07-13 NOTE — ASSESSMENT & PLAN NOTE
Acute cholecystitis, on plavix last dose this AM and has hx of ex-laparotomy  - General surgery consulted and recommending IR placement of cholecystostomy drain  - doing well with drain and flushing bid  - Currently on zosyn and awaiting culture data  - will consult ID for recommendations for antibiotics    - Plan to follow up with surgery clinic at discharge to see if candidate for cholecystectomy once acute phase has been treated.   - If deemed not candidate for cholecystectomy, will need cholangiogram and cholecystostomy tube exchange vs removal around 8/7/23  -

## 2023-07-14 PROBLEM — D64.9 ANEMIA: Status: ACTIVE | Noted: 2023-07-14

## 2023-07-14 LAB
ALBUMIN SERPL BCP-MCNC: 2.7 G/DL (ref 3.5–5.2)
ALP SERPL-CCNC: 115 U/L (ref 55–135)
ALT SERPL W/O P-5'-P-CCNC: 23 U/L (ref 10–44)
ANION GAP SERPL CALC-SCNC: 12 MMOL/L (ref 8–16)
APTT PPP: 21.4 SEC (ref 21–32)
AST SERPL-CCNC: 20 U/L (ref 10–40)
BACTERIA FLD AEROBE CULT: ABNORMAL
BASOPHILS # BLD AUTO: 0.02 K/UL (ref 0–0.2)
BASOPHILS # BLD AUTO: 0.02 K/UL (ref 0–0.2)
BASOPHILS NFR BLD: 0.2 % (ref 0–1.9)
BASOPHILS NFR BLD: 0.2 % (ref 0–1.9)
BILIRUB SERPL-MCNC: 1 MG/DL (ref 0.1–1)
BUN SERPL-MCNC: 16 MG/DL (ref 8–23)
CALCIUM SERPL-MCNC: 9.2 MG/DL (ref 8.7–10.5)
CHLORIDE SERPL-SCNC: 95 MMOL/L (ref 95–110)
CO2 SERPL-SCNC: 25 MMOL/L (ref 23–29)
CREAT SERPL-MCNC: 0.8 MG/DL (ref 0.5–1.4)
DIFFERENTIAL METHOD: ABNORMAL
DIFFERENTIAL METHOD: ABNORMAL
EOSINOPHIL # BLD AUTO: 0.1 K/UL (ref 0–0.5)
EOSINOPHIL # BLD AUTO: 0.1 K/UL (ref 0–0.5)
EOSINOPHIL NFR BLD: 0.5 % (ref 0–8)
EOSINOPHIL NFR BLD: 0.5 % (ref 0–8)
ERYTHROCYTE [DISTWIDTH] IN BLOOD BY AUTOMATED COUNT: 15 % (ref 11.5–14.5)
ERYTHROCYTE [DISTWIDTH] IN BLOOD BY AUTOMATED COUNT: 15 % (ref 11.5–14.5)
EST. GFR  (NO RACE VARIABLE): >60 ML/MIN/1.73 M^2
GLUCOSE SERPL-MCNC: 92 MG/DL (ref 70–110)
GRAM STN SPEC: ABNORMAL
GRAM STN SPEC: ABNORMAL
HCT VFR BLD AUTO: 32.6 % (ref 37–48.5)
HCT VFR BLD AUTO: 32.6 % (ref 37–48.5)
HGB BLD-MCNC: 10 G/DL (ref 12–16)
HGB BLD-MCNC: 10 G/DL (ref 12–16)
IMM GRANULOCYTES # BLD AUTO: 0.05 K/UL (ref 0–0.04)
IMM GRANULOCYTES # BLD AUTO: 0.05 K/UL (ref 0–0.04)
IMM GRANULOCYTES NFR BLD AUTO: 0.4 % (ref 0–0.5)
IMM GRANULOCYTES NFR BLD AUTO: 0.4 % (ref 0–0.5)
LYMPHOCYTES # BLD AUTO: 2.4 K/UL (ref 1–4.8)
LYMPHOCYTES # BLD AUTO: 2.4 K/UL (ref 1–4.8)
LYMPHOCYTES NFR BLD: 20.5 % (ref 18–48)
LYMPHOCYTES NFR BLD: 20.5 % (ref 18–48)
MAGNESIUM SERPL-MCNC: 1.8 MG/DL (ref 1.6–2.6)
MCH RBC QN AUTO: 25.5 PG (ref 27–31)
MCH RBC QN AUTO: 25.5 PG (ref 27–31)
MCHC RBC AUTO-ENTMCNC: 30.7 G/DL (ref 32–36)
MCHC RBC AUTO-ENTMCNC: 30.7 G/DL (ref 32–36)
MCV RBC AUTO: 83 FL (ref 82–98)
MCV RBC AUTO: 83 FL (ref 82–98)
MONOCYTES # BLD AUTO: 0.5 K/UL (ref 0.3–1)
MONOCYTES # BLD AUTO: 0.5 K/UL (ref 0.3–1)
MONOCYTES NFR BLD: 4.6 % (ref 4–15)
MONOCYTES NFR BLD: 4.6 % (ref 4–15)
NEUTROPHILS # BLD AUTO: 8.5 K/UL (ref 1.8–7.7)
NEUTROPHILS # BLD AUTO: 8.5 K/UL (ref 1.8–7.7)
NEUTROPHILS NFR BLD: 73.8 % (ref 38–73)
NEUTROPHILS NFR BLD: 73.8 % (ref 38–73)
NRBC BLD-RTO: 0 /100 WBC
NRBC BLD-RTO: 0 /100 WBC
PHOSPHATE SERPL-MCNC: 2.8 MG/DL (ref 2.7–4.5)
PLATELET # BLD AUTO: ABNORMAL K/UL (ref 150–450)
PLATELET # BLD AUTO: ABNORMAL K/UL (ref 150–450)
PMV BLD AUTO: ABNORMAL FL (ref 9.2–12.9)
PMV BLD AUTO: ABNORMAL FL (ref 9.2–12.9)
POTASSIUM SERPL-SCNC: 3.4 MMOL/L (ref 3.5–5.1)
PROT SERPL-MCNC: 6.7 G/DL (ref 6–8.4)
RBC # BLD AUTO: 3.92 M/UL (ref 4–5.4)
RBC # BLD AUTO: 3.92 M/UL (ref 4–5.4)
SODIUM SERPL-SCNC: 132 MMOL/L (ref 136–145)
WBC # BLD AUTO: 11.54 K/UL (ref 3.9–12.7)
WBC # BLD AUTO: 11.54 K/UL (ref 3.9–12.7)

## 2023-07-14 PROCEDURE — 94760 N-INVAS EAR/PLS OXIMETRY 1: CPT

## 2023-07-14 PROCEDURE — 99223 1ST HOSP IP/OBS HIGH 75: CPT | Mod: ,,, | Performed by: STUDENT IN AN ORGANIZED HEALTH CARE EDUCATION/TRAINING PROGRAM

## 2023-07-14 PROCEDURE — 83735 ASSAY OF MAGNESIUM: CPT | Performed by: STUDENT IN AN ORGANIZED HEALTH CARE EDUCATION/TRAINING PROGRAM

## 2023-07-14 PROCEDURE — 99223 PR INITIAL HOSPITAL CARE,LEVL III: ICD-10-PCS | Mod: ,,, | Performed by: STUDENT IN AN ORGANIZED HEALTH CARE EDUCATION/TRAINING PROGRAM

## 2023-07-14 PROCEDURE — 11000001 HC ACUTE MED/SURG PRIVATE ROOM

## 2023-07-14 PROCEDURE — 27000221 HC OXYGEN, UP TO 24 HOURS

## 2023-07-14 PROCEDURE — 80053 COMPREHEN METABOLIC PANEL: CPT | Performed by: STUDENT IN AN ORGANIZED HEALTH CARE EDUCATION/TRAINING PROGRAM

## 2023-07-14 PROCEDURE — 97110 THERAPEUTIC EXERCISES: CPT

## 2023-07-14 PROCEDURE — 85025 COMPLETE CBC W/AUTO DIFF WBC: CPT | Performed by: EMERGENCY MEDICINE

## 2023-07-14 PROCEDURE — 63600175 PHARM REV CODE 636 W HCPCS: Performed by: STUDENT IN AN ORGANIZED HEALTH CARE EDUCATION/TRAINING PROGRAM

## 2023-07-14 PROCEDURE — 25000003 PHARM REV CODE 250: Performed by: STUDENT IN AN ORGANIZED HEALTH CARE EDUCATION/TRAINING PROGRAM

## 2023-07-14 PROCEDURE — 85730 THROMBOPLASTIN TIME PARTIAL: CPT | Performed by: STUDENT IN AN ORGANIZED HEALTH CARE EDUCATION/TRAINING PROGRAM

## 2023-07-14 PROCEDURE — 84100 ASSAY OF PHOSPHORUS: CPT | Performed by: STUDENT IN AN ORGANIZED HEALTH CARE EDUCATION/TRAINING PROGRAM

## 2023-07-14 RX ORDER — LEVOFLOXACIN 750 MG/1
750 TABLET ORAL EVERY OTHER DAY
Status: DISCONTINUED | OUTPATIENT
Start: 2023-07-14 | End: 2023-07-18 | Stop reason: HOSPADM

## 2023-07-14 RX ORDER — METRONIDAZOLE 500 MG/1
500 TABLET ORAL EVERY 12 HOURS
Status: DISCONTINUED | OUTPATIENT
Start: 2023-07-14 | End: 2023-07-18 | Stop reason: HOSPADM

## 2023-07-14 RX ORDER — AMLODIPINE BESYLATE 5 MG/1
5 TABLET ORAL DAILY
Status: DISCONTINUED | OUTPATIENT
Start: 2023-07-15 | End: 2023-07-17

## 2023-07-14 RX ORDER — LOSARTAN POTASSIUM 25 MG/1
50 TABLET ORAL DAILY
Status: DISCONTINUED | OUTPATIENT
Start: 2023-07-15 | End: 2023-07-16

## 2023-07-14 RX ADMIN — APIXABAN 5 MG: 5 TABLET, FILM COATED ORAL at 08:07

## 2023-07-14 RX ADMIN — AMLODIPINE BESYLATE 10 MG: 5 TABLET ORAL at 08:07

## 2023-07-14 RX ADMIN — METRONIDAZOLE 500 MG: 500 TABLET ORAL at 08:07

## 2023-07-14 RX ADMIN — HYDROCODONE BITARTRATE AND ACETAMINOPHEN 1 TABLET: 5; 325 TABLET ORAL at 06:07

## 2023-07-14 RX ADMIN — METRONIDAZOLE 500 MG: 500 TABLET ORAL at 10:07

## 2023-07-14 RX ADMIN — CLOPIDOGREL BISULFATE 75 MG: 75 TABLET ORAL at 08:07

## 2023-07-14 RX ADMIN — LEVOFLOXACIN 750 MG: 750 TABLET, FILM COATED ORAL at 10:07

## 2023-07-14 RX ADMIN — PIPERACILLIN AND TAZOBACTAM 4.5 G: 4; .5 INJECTION, POWDER, LYOPHILIZED, FOR SOLUTION INTRAVENOUS; PARENTERAL at 04:07

## 2023-07-14 RX ADMIN — LOSARTAN POTASSIUM 100 MG: 25 TABLET, FILM COATED ORAL at 08:07

## 2023-07-14 NOTE — ASSESSMENT & PLAN NOTE
-noted  -resume plavix   - stop aspirin as patient will now be on Eliquis for atrial fibrillation  - discussed with patient

## 2023-07-14 NOTE — ASSESSMENT & PLAN NOTE
Hb did drop to 10 today - noted to be around 13 yesterday  - possibly related to cholecystostomy drain, also restarted on on plavix and now eliquis  - plan to monitor with CBC in AM

## 2023-07-14 NOTE — NURSING
[facility] [place]  Nurses Note -- 4 Eyes      7/14/2023       Skin assessed on: Q Shift      [x] No Pressure Injuries Present    []Prevention Measures Documented    [] Yes LDA  for Pressure Injury Previously documented     [] Yes New Pressure Injury Discovered   [] LDA for New Pressure Injury Added      Attending RN:  Virginia Flores RN     Second RN:  Bharti CORTEZ

## 2023-07-14 NOTE — NURSING
[facility] [place]  Nurses Note -- 4 Eyes      7-13-23      Skin assessed on: Q Shift      [x] No Pressure Injuries Present    [x]Prevention Measures Documented    [] Yes LDA  for Pressure Injury Previously documented     [] Yes New Pressure Injury Discovered   [] LDA for New Pressure Injury Added      Attending RN:  Bharti Wynn RN     Second RN:  Virginia Flores RN

## 2023-07-14 NOTE — HPI
89 yo female with PAD, L AKA and HTN admitted for abdominal pain, found to have acute cholecystitis. ID consulted for abx recs. Admission course notable for leukocytosis and CT scan with distended GB with pericholecystic fluid s/p IR cholecystostomy drain placed with cx with Citrobacter freundii, anaerobic cx in process. Pt is currently on zosyn. Pt reports interval improvement in abdominal pain since drain placement.

## 2023-07-14 NOTE — SUBJECTIVE & OBJECTIVE
Past Medical History:   Diagnosis Date    Above knee amputation status 1994    History of DVT of lower extremity 1994    LEFT    Hyperlipidemia     Hypertension     PAD (peripheral artery disease)        Past Surgical History:   Procedure Laterality Date    LEG AMPUTATION THROUGH KNEE      PARTIAL HYSTERECTOMY      1960's    VASCULAR SURGERY Left 1994    AORTOFEM BYPASS       Review of patient's allergies indicates:  No Known Allergies    Medications:  Medications Prior to Admission   Medication Sig    acetaminophen (TYLENOL) 500 MG tablet Take 2 tablets (1,000 mg total) by mouth every 8 (eight) hours as needed for Pain.    alendronate (FOSAMAX) 70 MG tablet Take 70 mg by mouth every 7 days.    amLODIPine (NORVASC) 10 MG tablet Take 10 mg by mouth once daily.    aspirin (ECOTRIN) 81 MG EC tablet Take 81 mg by mouth once daily.    cholecalciferol, vitamin D3, (VITAMIN D3) 25 mcg (1,000 unit) capsule Take 1,000 Units by mouth once daily.    clopidogrel (PLAVIX) 75 mg tablet Take 75 mg by mouth once daily.    hydroCHLOROthiazide (HYDRODIURIL) 25 MG tablet Take 25 mg by mouth once daily.    losartan (COZAAR) 100 MG tablet Take 100 mg by mouth once daily.    potassium chloride (MICRO-K) 10 MEQ CpSR Take 10 mEq by mouth once.    rosuvastatin (CRESTOR) 40 MG Tab Take 1 tablet by mouth once daily.     Antibiotics (From admission, onward)      Start     Stop Route Frequency Ordered    07/12/23 1845  piperacillin-tazobactam (ZOSYN) 4.5 g in dextrose 5 % in water (D5W) 5 % 100 mL IVPB (MB+)         -- IV Every 8 hours (non-standard times) 07/12/23 1442          Antifungals (From admission, onward)      None          Antivirals (From admission, onward)      None               There is no immunization history on file for this patient.    Family History    None       Social History     Socioeconomic History    Marital status: Single   Tobacco Use    Smoking status: Former    Smokeless tobacco: Never   Substance and Sexual  Activity    Alcohol use: No    Drug use: Never     Review of Systems   Constitutional:  Negative for chills and fever.   Gastrointestinal:  Positive for abdominal pain (improving).   Objective:     Vital Signs (Most Recent):  Temp: 98.3 °F (36.8 °C) (07/14/23 0742)  Pulse: 86 (07/14/23 0742)  Resp: 20 (07/14/23 0742)  BP: 130/61 (07/14/23 0742)  SpO2: 95 % (07/14/23 0800) Vital Signs (24h Range):  Temp:  [97.9 °F (36.6 °C)-99 °F (37.2 °C)] 98.3 °F (36.8 °C)  Pulse:  [71-97] 86  Resp:  [19-20] 20  SpO2:  [92 %-99 %] 95 %  BP: (119-130)/(58-63) 130/61     Weight: 82.8 kg (182 lb 9.6 oz)  Body mass index is 34.5 kg/m².    Estimated Creatinine Clearance: 49.2 mL/min (based on SCr of 0.8 mg/dL).     Physical Exam  Constitutional:       General: She is not in acute distress.     Appearance: She is not ill-appearing or toxic-appearing.   HENT:      Head: Normocephalic and atraumatic.      Mouth/Throat:      Mouth: Mucous membranes are moist.      Comments: edentulous  Cardiovascular:      Rate and Rhythm: Normal rate and regular rhythm.   Pulmonary:      Effort: Pulmonary effort is normal. No respiratory distress.   Abdominal:      General: There is no distension.      Palpations: Abdomen is soft.      Tenderness: There is no abdominal tenderness. There is no guarding.      Comments: Myah drain - bloody fluid   Musculoskeletal:      Comments: L AKA   Skin:     General: Skin is warm and dry.   Neurological:      Mental Status: She is alert.        Significant Labs:   Microbiology Results (last 7 days)       Procedure Component Value Units Date/Time    Culture, Body Fluid (Aerobic) w/ GS [[id number]]  (Abnormal)  (Susceptibility) Collected: 07/12/23 1528    Order Status: Completed Specimen: Bile Updated: 07/14/23 0829     AEROBIC CULTURE - FLUID CITROBACTER FREUNDII  Few       Gram Stain Result Moderate WBC's      No organisms seen    Culture, Anaerobe [[id number]] Collected: 07/12/23 1528    Order Status: Completed Specimen:  Body Fluid from Abdominal Updated: 07/14/23 0743     Anaerobic Culture Culture in progress    Gram stain [[id number]] Collected: 07/12/23 1528    Order Status: Canceled Specimen: Body Fluid from Abdominal             Significant Imaging: I have reviewed all pertinent imaging results/findings within the past 24 hours.

## 2023-07-14 NOTE — PT/OT/SLP PROGRESS
"Physical Therapy Treatment    Patient Name:  Abbie Barragan   MRN:  [id number]    Recommendations:     Discharge Recommendations: home health PT  Discharge Equipment Recommendations: none      Assessment:     Abbie Barragan is a 88 y.o. female admitted with a medical diagnosis of Sepsis.  She presents with the following impairments/functional limitations: impaired endurance, weakness, impaired functional mobility, pain, impaired skin.    Rehab Prognosis: Good; patient would benefit from acute skilled PT services to address these deficits and reach maximum level of function.    Recent Surgery: * No surgery found *      Plan:     During this hospitalization, patient to be seen 3 x/week to address the identified rehab impairments via therapeutic activities, therapeutic exercises and progress toward the following goals:    Plan of Care Expires:  07/20/23    Subjective     Chief Complaint: "I'm tired, I didn't sleep well last night.."  Patient/Family Comments/goals: Pt agreeable to therapy treatment.  Pain/Comfort:  Pain Rating 1: 8/10  Location - Side 1: Right  Location - Orientation 1: lower  Location 1: abdomen  Pain Addressed 1: Nurse notified (Pt wants pain meds /p return to bed)      Objective:     Communicated with Virginia villalba prior to session.  Patient found  seated in wheelchair  with oxygen, PureWick, pulse ox (continuous), telemetry upon PT entry to room.     General Precautions: Standard, fall  Orthopedic Precautions: N/A  Braces: N/A  Respiratory Status: O2 via nasal canula         AM-PAC 6 CLICK MOBILITY  Turning over in bed (including adjusting bedclothes, sheets and blankets)?: 4  Sitting down on and standing up from a chair with arms (e.g., wheelchair, bedside commode, etc.): 3  Moving from lying on back to sitting on the side of the bed?: 4  Moving to and from a bed to a chair (including a wheelchair)?: 3  Need to walk in hospital room?: 1  Climbing 3-5 steps with a railing?: 1  Basic Mobility Total Score: " 16       Treatment & Education:  Pt instructed on/performed 1x10 reps RLE APs, GRANT de [place] rosa; pt educated on need to reposition/provide pressure relief while seated in wheelchair.    Patient left  seated in wheelchair per request  with all lines intact, call button in reach, and visitor present..    GOALS:   Multidisciplinary Problems       Physical Therapy Goals          Problem: Physical Therapy    Goal Priority Disciplines Outcome Goal Variances Interventions   Physical Therapy Goal     PT, PT/OT Ongoing, Progressing     Description: Goals to be met by: 23     Patient will increase functional independence with mobility by performin. Pt to be mod I with bed mobility.  2. Pt to transfer bed<>wheelchair with (S).  3. Pt to be (I) with written HEP.                         Time Tracking:     PT Received On: 23  PT Start Time: 1330     PT Stop Time: 1344  PT Total Time (min): 14 min     Billable Minutes: Therapeutic Exercise 14    Treatment Type: Treatment  PT/PTA: PT           2023

## 2023-07-14 NOTE — PLAN OF CARE
Problem: Fall Injury Risk  Goal: Absence of Fall and Fall-Related Injury  Outcome: Ongoing, Progressing     Problem: Fall Injury Risk  Goal: Absence of Fall and Fall-Related Injury  Outcome: Ongoing, Progressing     Problem: Adjustment to Illness (Sepsis/Septic Shock)  Goal: Optimal Coping  Outcome: Ongoing, Progressing     Problem: Adjustment to Illness (Sepsis/Septic Shock)  Goal: Optimal Coping  Outcome: Ongoing, Progressing     Problem: Bleeding (Sepsis/Septic Shock)  Goal: Absence of Bleeding  Outcome: Ongoing, Progressing     Problem: Bleeding (Sepsis/Septic Shock)  Goal: Absence of Bleeding  Outcome: Ongoing, Progressing

## 2023-07-14 NOTE — PLAN OF CARE
Problem: Adult Inpatient Plan of Care  Goal: Plan of Care Review  Outcome: Ongoing, Progressing  Goal: Patient-Specific Goal (Individualized)  Outcome: Ongoing, Progressing  Goal: Optimal Comfort and Wellbeing  Outcome: Ongoing, Progressing  Goal: Readiness for Transition of Care  Outcome: Ongoing, Progressing     Problem: Bariatric Environmental Safety  Goal: Safety Maintained with Care  Outcome: Ongoing, Progressing     Problem: Fall Injury Risk  Goal: Absence of Fall and Fall-Related Injury  Outcome: Ongoing, Progressing     Problem: Infection Progression (Sepsis/Septic Shock)  Goal: Absence of Infection Signs and Symptoms  Outcome: Ongoing, Progressing     Problem: Skin Injury Risk Increased  Goal: Skin Health and Integrity  Outcome: Ongoing, Progressing

## 2023-07-14 NOTE — CONSULTS
[facility] - [facility] Surg  Infectious Disease  Consult Note    Patient Name: Abbie Barragan  MRN: [id number]  Admission Date: 7/12/2023  Hospital Length of Stay: 2 days  Attending Physician: Noble Peñaloza MD  Primary Care Provider: Kailash Carvalho NP     Isolation Status: No active isolations    Patient information was obtained from patient, past medical records and ER records.      Inpatient consult to Infectious Diseases  Consult performed by: Sofia Collier MD  Consult ordered by: Noble Peñaloza MD        Assessment/Plan:     GI  Cholelithiasis with acute cholecystitis  I independently reviewed patient's lab work and images as documented. Admitted for abdominal pain found to have cholecystitis on CT. S/p IR lata drain placement on 7/12 - cx with citrobacter freundii, anaerobic cx in process. Pt notably had a leukocytosis on admit that have resolved since drain placement. She also reports interval improvement in her abdominal pain.  and no aneurysm seen on recent CT.     Recommendations:  -discontinue zosyn (dc'd)  -transition to PO levofloxacin 750 mg q48h (based on crcl) to cover citrobacter and PO flagyl q12 hr (given recent clinical studies/PK data) for empiric anaerobic coverage (ordered)   -anticipate 7d course from drain placement/source control, marlene 7/19  -I discussed risks/benefits of IV vs PO abx and potential side effects of FQ - pt preferred PO.           Thank you for your consult. I will sign off. Please contact us if you have any additional questions. Above d/w primary team.     Sofia Collier MD  Infectious Disease  [facility] - Med Surg    Subjective:     Principal Problem: Sepsis    HPI: 87 yo female with PAD, L AKA and HTN admitted for abdominal pain, found to have acute cholecystitis. ID consulted for abx recs. Admission course notable for leukocytosis and CT scan with distended GB with pericholecystic fluid s/p IR cholecystostomy drain placed with cx with Citrobacter  freundii, anaerobic cx in process. Pt is currently on zosyn. Pt reports interval improvement in abdominal pain since drain placement.           Past Medical History:   Diagnosis Date    Above knee amputation status 1994    History of DVT of lower extremity 1994    LEFT    Hyperlipidemia     Hypertension     PAD (peripheral artery disease)        Past Surgical History:   Procedure Laterality Date    LEG AMPUTATION THROUGH KNEE      PARTIAL HYSTERECTOMY      1960's    VASCULAR SURGERY Left 1994    AORTOFEM BYPASS       Review of patient's allergies indicates:  No Known Allergies    Medications:  Medications Prior to Admission   Medication Sig    acetaminophen (TYLENOL) 500 MG tablet Take 2 tablets (1,000 mg total) by mouth every 8 (eight) hours as needed for Pain.    alendronate (FOSAMAX) 70 MG tablet Take 70 mg by mouth every 7 days.    amLODIPine (NORVASC) 10 MG tablet Take 10 mg by mouth once daily.    aspirin (ECOTRIN) 81 MG EC tablet Take 81 mg by mouth once daily.    cholecalciferol, vitamin D3, (VITAMIN D3) 25 mcg (1,000 unit) capsule Take 1,000 Units by mouth once daily.    clopidogrel (PLAVIX) 75 mg tablet Take 75 mg by mouth once daily.    hydroCHLOROthiazide (HYDRODIURIL) 25 MG tablet Take 25 mg by mouth once daily.    losartan (COZAAR) 100 MG tablet Take 100 mg by mouth once daily.    potassium chloride (MICRO-K) 10 MEQ CpSR Take 10 mEq by mouth once.    rosuvastatin (CRESTOR) 40 MG Tab Take 1 tablet by mouth once daily.     Antibiotics (From admission, onward)      Start     Stop Route Frequency Ordered    07/12/23 1845  piperacillin-tazobactam (ZOSYN) 4.5 g in dextrose 5 % in water (D5W) 5 % 100 mL IVPB (MB+)         -- IV Every 8 hours (non-standard times) 07/12/23 1442          Antifungals (From admission, onward)      None          Antivirals (From admission, onward)      None               There is no immunization history on file for this patient.    Family History    None        Social History     Socioeconomic History    Marital status: Single   Tobacco Use    Smoking status: Former    Smokeless tobacco: Never   Substance and Sexual Activity    Alcohol use: No    Drug use: Never     Review of Systems   Constitutional:  Negative for chills and fever.   Gastrointestinal:  Positive for abdominal pain (improving).   Objective:     Vital Signs (Most Recent):  Temp: 98.3 °F (36.8 °C) (07/14/23 0742)  Pulse: 86 (07/14/23 0742)  Resp: 20 (07/14/23 0742)  BP: 130/61 (07/14/23 0742)  SpO2: 95 % (07/14/23 0800) Vital Signs (24h Range):  Temp:  [97.9 °F (36.6 °C)-99 °F (37.2 °C)] 98.3 °F (36.8 °C)  Pulse:  [71-97] 86  Resp:  [19-20] 20  SpO2:  [92 %-99 %] 95 %  BP: (119-130)/(58-63) 130/61     Weight: 82.8 kg (182 lb 9.6 oz)  Body mass index is 34.5 kg/m².    Estimated Creatinine Clearance: 49.2 mL/min (based on SCr of 0.8 mg/dL).     Physical Exam  Constitutional:       General: She is not in acute distress.     Appearance: She is not ill-appearing or toxic-appearing.   HENT:      Head: Normocephalic and atraumatic.      Mouth/Throat:      Mouth: Mucous membranes are moist.      Comments: edentulous  Cardiovascular:      Rate and Rhythm: Normal rate and regular rhythm.   Pulmonary:      Effort: Pulmonary effort is normal. No respiratory distress.   Abdominal:      General: There is no distension.      Palpations: Abdomen is soft.      Tenderness: There is no abdominal tenderness. There is no guarding.      Comments: Myah drain - bloody fluid   Musculoskeletal:      Comments: L AKA   Skin:     General: Skin is warm and dry.   Neurological:      Mental Status: She is alert.        Significant Labs:   Microbiology Results (last 7 days)       Procedure Component Value Units Date/Time    Culture, Body Fluid (Aerobic) w/ GS [[id number]]  (Abnormal)  (Susceptibility) Collected: 07/12/23 1528    Order Status: Completed Specimen: Bile Updated: 07/14/23 0829     AEROBIC CULTURE - FLUID CITROBACTER  FREUNDII  Few       Gram Stain Result Moderate WBC's      No organisms seen    Culture, Anaerobe [[id number]] Collected: 07/12/23 1528    Order Status: Completed Specimen: Body Fluid from Abdominal Updated: 07/14/23 0743     Anaerobic Culture Culture in progress    Gram stain [[id number]] Collected: 07/12/23 1528    Order Status: Canceled Specimen: Body Fluid from Abdominal             Significant Imaging: I have reviewed all pertinent imaging results/findings within the past 24 hours.

## 2023-07-14 NOTE — ASSESSMENT & PLAN NOTE
Acute cholecystitis, on plavix last dose this AM and has hx of ex-laparotomy  - General surgery consulted and recommending IR placement of cholecystostomy drain  - doing well with drain and flushing bid  - Currently on zosyn and awaiting culture data  - will consult ID for recommendations for antibiotics    - Plan to follow up with surgery clinic at discharge to see if candidate for cholecystectomy once acute phase has been treated.   - If deemed not candidate for cholecystectomy, will need cholangiogram and cholecystostomy tube exchange vs removal around 8/7/23  - Levaquin/Flagyl PO until 7/19

## 2023-07-14 NOTE — SUBJECTIVE & OBJECTIVE
Interval History: doing well today, weaning off oxygen.     Review of Systems  Objective:     Vital Signs (Most Recent):  Temp: 98.3 °F (36.8 °C) (07/14/23 1145)  Pulse: 83 (07/14/23 1145)  Resp: 19 (07/14/23 1145)  BP: (!) 119/59 (07/14/23 1145)  SpO2: 96 % (07/14/23 1145) Vital Signs (24h Range):  Temp:  [98.1 °F (36.7 °C)-99 °F (37.2 °C)] 98.3 °F (36.8 °C)  Pulse:  [76-97] 83  Resp:  [19-20] 19  SpO2:  [92 %-99 %] 96 %  BP: (119-130)/(58-63) 119/59     Weight: 82.8 kg (182 lb 9.6 oz)  Body mass index is 34.5 kg/m².    Intake/Output Summary (Last 24 hours) at 7/14/2023 1606  Last data filed at 7/14/2023 1112  Gross per 24 hour   Intake 190 ml   Output 2155 ml   Net -1965 ml           Physical Exam  Vitals and nursing note reviewed.   Constitutional:       General: She is not in acute distress.     Appearance: She is obese. She is ill-appearing.   HENT:      Head: Normocephalic.   Cardiovascular:      Rate and Rhythm: Normal rate and regular rhythm.      Pulses: Normal pulses.   Pulmonary:      Effort: Pulmonary effort is normal. No respiratory distress.   Abdominal:      General: Bowel sounds are normal.      Comments: Right sided cholecystostomy drain in place, bilious output with some noted blood   Musculoskeletal:         General: No swelling.      Comments: Left AKA   Neurological:      General: No focal deficit present.      Mental Status: She is alert and oriented to person, place, and time.   Psychiatric:         Mood and Affect: Mood normal.         Thought Content: Thought content normal.         Judgment: Judgment normal.           Significant Labs: All pertinent labs within the past 24 hours have been reviewed.    Significant Imaging: I have reviewed all pertinent imaging results/findings within the past 24 hours.

## 2023-07-14 NOTE — PLAN OF CARE
Problem: Physical Therapy  Goal: Physical Therapy Goal  Description: Goals to be met by: 23     Patient will increase functional independence with mobility by performin. Pt to be mod I with bed mobility.  2. Pt to transfer bed<>wheelchair with (S).  3. Pt to be (I) with written HEP.    Outcome: Ongoing, Progressing   Pt found in wheelchair at bedside, requested to remain seated /p tx, call button and all needs in reach.

## 2023-07-14 NOTE — ASSESSMENT & PLAN NOTE
Patient with Paroxysmal (<7 days) atrial fibrillation which is controlled currently with no meds. Patient is currently in atrial fibrillation.[id number] Score: 4. . Anticoagulation on heparin drip - transition to Eliquis  - check echocardiogram - noted diastolic dysfunction with normal EF  - check tsh - normal  - appreciate Cardiology recommendations

## 2023-07-14 NOTE — ASSESSMENT & PLAN NOTE
I independently reviewed patient's lab work and images as documented. Admitted for abdominal pain found to have cholecystitis on CT. S/p IR lata drain placement on 7/12 - cx with citrobacter freundii, anaerobic cx in process. Pt notably had a leukocytosis on admit that have resolved since drain placement. She also reports interval improvement in her abdominal pain.  and no aneurysm seen on recent CT.     Recommendations:  -transition to PO levofloxacin 750 mg q48h (based on crcl) to cover citrobacter and PO flagyl q12 hr (given recent clinical studies/PK data) for empiric anaerobic coverage   -anticipate 7d course from drain placement/source control, marlene 7/19  -I discussed risks/benefits of IV vs PO abx and potential side effects of FQ - pt preferred PO.

## 2023-07-14 NOTE — PROGRESS NOTES
"[facility] Medicine  Progress Note    Patient Name: Abbie Barragan  MRN: [id number]  Patient Class: IP- Inpatient   Admission Date: 7/12/2023  Length of Stay: 2 days  Attending Physician: Noble Peñaloza MD  Primary Care Provider: Kailash Carvalho NP        Subjective:     Principal Problem:Sepsis        HPI:  Ms. Barragan is an 88 year old female with a past medical history of left AKA, hypertension, PAD who presents to the ED today for abdominal pain. She states it's on her right lower abdomen and thought it was her hip. This started yesterday and has progressively gotten worse. She was also found to be severely hypertensive in ED but improved with pain medications. She believes that she had fevers at home but denies any issues breathing, chest pain, diarrhea.  In the ED, she was found to have WBC 17.91, Na 130, K 3.2, Cl 93,  and lactate 2.24. CT scan showed "gallbladder distended with a few gallstones with hyperemia of the gallbladder wall, gallbladder wall thickening and pericholecystic fluid. Concerning for acute cholecystitis." Patient also noted to be in atrial fibrillation with rate 80s in ED. General Surgery and Cardiology consulted. General surgery recommending IR cholecystostomy drain. IR consulted, drain to be placed today.      Overview/Hospital Course:  No notes on file    Interval History: doing well today, weaning off oxygen.     Review of Systems  Objective:     Vital Signs (Most Recent):  Temp: 98.3 °F (36.8 °C) (07/14/23 1145)  Pulse: 83 (07/14/23 1145)  Resp: 19 (07/14/23 1145)  BP: (!) 119/59 (07/14/23 1145)  SpO2: 96 % (07/14/23 1145) Vital Signs (24h Range):  Temp:  [98.1 °F (36.7 °C)-99 °F (37.2 °C)] 98.3 °F (36.8 °C)  Pulse:  [76-97] 83  Resp:  [19-20] 19  SpO2:  [92 %-99 %] 96 %  BP: (119-130)/(58-63) 119/59     Weight: 82.8 kg (182 lb 9.6 oz)  Body mass index is 34.5 kg/m².    Intake/Output Summary (Last 24 hours) at 7/14/2023 1603  Last data filed at " 7/14/2023 1112  Gross per 24 hour   Intake 190 ml   Output 2155 ml   Net -1965 ml           Physical Exam  Vitals and nursing note reviewed.   Constitutional:       General: She is not in acute distress.     Appearance: She is obese. She is ill-appearing.   HENT:      Head: Normocephalic.   Cardiovascular:      Rate and Rhythm: Normal rate and regular rhythm.      Pulses: Normal pulses.   Pulmonary:      Effort: Pulmonary effort is normal. No respiratory distress.   Abdominal:      General: Bowel sounds are normal.      Comments: Right sided cholecystostomy drain in place, bilious output with some noted blood   Musculoskeletal:         General: No swelling.      Comments: Left AKA   Neurological:      General: No focal deficit present.      Mental Status: She is alert and oriented to person, place, and time.   Psychiatric:         Mood and Affect: Mood normal.         Thought Content: Thought content normal.         Judgment: Judgment normal.           Significant Labs: All pertinent labs within the past 24 hours have been reviewed.    Significant Imaging: I have reviewed all pertinent imaging results/findings within the past 24 hours.      Assessment/Plan:      * Sepsis  This patient does have evidence of infective focus  My overall impression is sepsis.  Source: acute cholecystitis  Antibiotics given-   Antibiotics (72h ago, onward)    Start     Stop Route Frequency Ordered    07/12/23 1845  piperacillin-tazobactam (ZOSYN) 4.5 g in dextrose 5 % in water (D5W) 5 % 100 mL IVPB (MB+)         -- IV Every 8 hours (non-standard times) 07/12/23 1442        Latest lactate reviewed-  Recent Labs   Lab 07/12/23  1311   LACTATE 1.5     Organ dysfunction indicated by Acute respiratory failure    Fluid challenge Ideal Body Weight- The patient's ideal body weight is Ideal body weight: 47.8 kg (105 lb 6.1 oz) which will be used to calculate fluid bolus of 30 ml/kg for treatment of septic shock.      Post- resuscitation  assessment Yes Perfusion exam was performed within 6 hours of septic shock presentation after bolus shows Adequate tissue perfusion assessed by non-invasive monitoring       Will Not start Pressors- Levophed for MAP of 65  Source control achieved by: Cholecystostomy drain and IV antibiotics  - WBC trending down    Cholelithiasis with acute cholecystitis  Acute cholecystitis, on plavix last dose this AM and has hx of ex-laparotomy  - General surgery consulted and recommending IR placement of cholecystostomy drain  - doing well with drain and flushing bid  - Currently on zosyn and awaiting culture data  - will consult ID for recommendations for antibiotics    - Plan to follow up with surgery clinic at discharge to see if candidate for cholecystectomy once acute phase has been treated.   - If deemed not candidate for cholecystectomy, will need cholangiogram and cholecystostomy tube exchange vs removal around 8/7/23  - Levaquin/Flagyl PO until 7/19    Anemia  Hb did drop to 10 today - noted to be around 13 yesterday  - possibly related to cholecystostomy drain, also restarted on on plavix and now eliquis  - plan to monitor with CBC in AM      PVD (peripheral vascular disease)  -noted  -resume plavix   - stop aspirin as patient will now be on Eliquis for atrial fibrillation  - discussed with patient      Chronic atrial fibrillation  Patient with Paroxysmal (<7 days) atrial fibrillation which is controlled currently with no meds. Patient is currently in atrial fibrillation.[id number] Score: 4. . Anticoagulation on heparin drip - transition to Eliquis  - check echocardiogram - noted diastolic dysfunction with normal EF  - check tsh - normal  - appreciate Cardiology recommendations      VTE Risk Mitigation (From admission, onward)         Ordered     apixaban tablet 5 mg  2 times daily         07/13/23 1415     heparin 25,000 units in dextrose 5% 250 mL (100 units/mL) infusion LOW INTENSITY nomogram - [facility]  Continuous         Question Answer Comment   Heparin Infusion Adjustment (DO NOT MODIFY ANSWER) [email or web address]\[email or web address]\Images\Pharmacy\HeparinInfusions\heparin LOW INTENSITY nomogram for [facility] [id number]    Begin at (in units/kg/hr) 12        07/13/23 1415     IP VTE HIGH RISK PATIENT  Once         07/12/23 1427     Place sequential compression device  Until discontinued         07/12/23 1427     heparin 25,000 units in dextrose 5% (100 units/ml) IV bolus from bag - ADDITIONAL PRN BOLUS - 60 units/kg  As needed (PRN)        Question:  Heparin Infusion Adjustment (DO NOT MODIFY ANSWER)  Answer:  [email or web address]\Images\Pharmacy\HeparinInfusions\heparin LOW INTENSITY nomogram for [facility] [id number].pdf    07/12/23 1244     heparin 25,000 units in dextrose 5% (100 units/ml) IV bolus from bag - ADDITIONAL PRN BOLUS - 30 units/kg  As needed (PRN)        Question:  Heparin Infusion Adjustment (DO NOT MODIFY ANSWER)  Answer:  \\[email or web address]\[email or web address]\Images\Pharmacy\HeparinInfusions\heparin LOW INTENSITY nomogram for [facility] [id number].pdf    07/12/23 1244                Discharge Planning   JULIÁN:      Code Status: Full Code   Is the patient medically ready for discharge?:     Reason for patient still in hospital (select all that apply): Treatment  Discharge Plan A: Home                  Noble Peñaloza MD  Department of Hospital Medicine   [facility]

## 2023-07-15 LAB
ALBUMIN SERPL BCP-MCNC: 2.6 G/DL (ref 3.5–5.2)
ALP SERPL-CCNC: 109 U/L (ref 55–135)
ALT SERPL W/O P-5'-P-CCNC: 20 U/L (ref 10–44)
ANION GAP SERPL CALC-SCNC: 10 MMOL/L (ref 8–16)
AST SERPL-CCNC: 14 U/L (ref 10–40)
BASOPHILS # BLD AUTO: 0.02 K/UL (ref 0–0.2)
BASOPHILS # BLD AUTO: 0.02 K/UL (ref 0–0.2)
BASOPHILS NFR BLD: 0.2 % (ref 0–1.9)
BASOPHILS NFR BLD: 0.2 % (ref 0–1.9)
BILIRUB SERPL-MCNC: 0.6 MG/DL (ref 0.1–1)
BUN SERPL-MCNC: 19 MG/DL (ref 8–23)
CALCIUM SERPL-MCNC: 9.5 MG/DL (ref 8.7–10.5)
CHLORIDE SERPL-SCNC: 96 MMOL/L (ref 95–110)
CO2 SERPL-SCNC: 31 MMOL/L (ref 23–29)
CREAT SERPL-MCNC: 0.8 MG/DL (ref 0.5–1.4)
DIFFERENTIAL METHOD: ABNORMAL
DIFFERENTIAL METHOD: ABNORMAL
EOSINOPHIL # BLD AUTO: 0.1 K/UL (ref 0–0.5)
EOSINOPHIL # BLD AUTO: 0.1 K/UL (ref 0–0.5)
EOSINOPHIL NFR BLD: 0.8 % (ref 0–8)
EOSINOPHIL NFR BLD: 0.8 % (ref 0–8)
ERYTHROCYTE [DISTWIDTH] IN BLOOD BY AUTOMATED COUNT: 15 % (ref 11.5–14.5)
ERYTHROCYTE [DISTWIDTH] IN BLOOD BY AUTOMATED COUNT: 15 % (ref 11.5–14.5)
EST. GFR  (NO RACE VARIABLE): >60 ML/MIN/1.73 M^2
GLUCOSE SERPL-MCNC: 92 MG/DL (ref 70–110)
HCT VFR BLD AUTO: 32.5 % (ref 37–48.5)
HCT VFR BLD AUTO: 32.5 % (ref 37–48.5)
HGB BLD-MCNC: 10.2 G/DL (ref 12–16)
HGB BLD-MCNC: 10.2 G/DL (ref 12–16)
IMM GRANULOCYTES # BLD AUTO: 0.03 K/UL (ref 0–0.04)
IMM GRANULOCYTES # BLD AUTO: 0.03 K/UL (ref 0–0.04)
IMM GRANULOCYTES NFR BLD AUTO: 0.3 % (ref 0–0.5)
IMM GRANULOCYTES NFR BLD AUTO: 0.3 % (ref 0–0.5)
LYMPHOCYTES # BLD AUTO: 3 K/UL (ref 1–4.8)
LYMPHOCYTES # BLD AUTO: 3 K/UL (ref 1–4.8)
LYMPHOCYTES NFR BLD: 30.3 % (ref 18–48)
LYMPHOCYTES NFR BLD: 30.3 % (ref 18–48)
MAGNESIUM SERPL-MCNC: 2 MG/DL (ref 1.6–2.6)
MCH RBC QN AUTO: 25.6 PG (ref 27–31)
MCH RBC QN AUTO: 25.6 PG (ref 27–31)
MCHC RBC AUTO-ENTMCNC: 31.4 G/DL (ref 32–36)
MCHC RBC AUTO-ENTMCNC: 31.4 G/DL (ref 32–36)
MCV RBC AUTO: 82 FL (ref 82–98)
MCV RBC AUTO: 82 FL (ref 82–98)
MONOCYTES # BLD AUTO: 0.5 K/UL (ref 0.3–1)
MONOCYTES # BLD AUTO: 0.5 K/UL (ref 0.3–1)
MONOCYTES NFR BLD: 5.3 % (ref 4–15)
MONOCYTES NFR BLD: 5.3 % (ref 4–15)
NEUTROPHILS # BLD AUTO: 6.3 K/UL (ref 1.8–7.7)
NEUTROPHILS # BLD AUTO: 6.3 K/UL (ref 1.8–7.7)
NEUTROPHILS NFR BLD: 63.1 % (ref 38–73)
NEUTROPHILS NFR BLD: 63.1 % (ref 38–73)
NRBC BLD-RTO: 0 /100 WBC
NRBC BLD-RTO: 0 /100 WBC
PHOSPHATE SERPL-MCNC: 2.5 MG/DL (ref 2.7–4.5)
PLATELET # BLD AUTO: 244 K/UL (ref 150–450)
PLATELET # BLD AUTO: 244 K/UL (ref 150–450)
PMV BLD AUTO: 12.9 FL (ref 9.2–12.9)
PMV BLD AUTO: 12.9 FL (ref 9.2–12.9)
POTASSIUM SERPL-SCNC: 3.4 MMOL/L (ref 3.5–5.1)
PROT SERPL-MCNC: 6.7 G/DL (ref 6–8.4)
RBC # BLD AUTO: 3.98 M/UL (ref 4–5.4)
RBC # BLD AUTO: 3.98 M/UL (ref 4–5.4)
SODIUM SERPL-SCNC: 137 MMOL/L (ref 136–145)
WBC # BLD AUTO: 9.94 K/UL (ref 3.9–12.7)
WBC # BLD AUTO: 9.94 K/UL (ref 3.9–12.7)

## 2023-07-15 PROCEDURE — 27000221 HC OXYGEN, UP TO 24 HOURS

## 2023-07-15 PROCEDURE — 94799 UNLISTED PULMONARY SVC/PX: CPT

## 2023-07-15 PROCEDURE — 11000001 HC ACUTE MED/SURG PRIVATE ROOM

## 2023-07-15 PROCEDURE — 84100 ASSAY OF PHOSPHORUS: CPT | Performed by: STUDENT IN AN ORGANIZED HEALTH CARE EDUCATION/TRAINING PROGRAM

## 2023-07-15 PROCEDURE — 25000003 PHARM REV CODE 250: Performed by: STUDENT IN AN ORGANIZED HEALTH CARE EDUCATION/TRAINING PROGRAM

## 2023-07-15 PROCEDURE — 85025 COMPLETE CBC W/AUTO DIFF WBC: CPT | Performed by: EMERGENCY MEDICINE

## 2023-07-15 PROCEDURE — 83735 ASSAY OF MAGNESIUM: CPT | Performed by: STUDENT IN AN ORGANIZED HEALTH CARE EDUCATION/TRAINING PROGRAM

## 2023-07-15 PROCEDURE — 36415 COLL VENOUS BLD VENIPUNCTURE: CPT | Performed by: STUDENT IN AN ORGANIZED HEALTH CARE EDUCATION/TRAINING PROGRAM

## 2023-07-15 PROCEDURE — 63600175 PHARM REV CODE 636 W HCPCS: Performed by: STUDENT IN AN ORGANIZED HEALTH CARE EDUCATION/TRAINING PROGRAM

## 2023-07-15 PROCEDURE — 80053 COMPREHEN METABOLIC PANEL: CPT | Performed by: STUDENT IN AN ORGANIZED HEALTH CARE EDUCATION/TRAINING PROGRAM

## 2023-07-15 RX ORDER — POTASSIUM CHLORIDE 20 MEQ/1
40 TABLET, EXTENDED RELEASE ORAL 2 TIMES DAILY
Status: DISCONTINUED | OUTPATIENT
Start: 2023-07-15 | End: 2023-07-17

## 2023-07-15 RX ORDER — POLYETHYLENE GLYCOL 3350 17 G/17G
17 POWDER, FOR SOLUTION ORAL 2 TIMES DAILY
Status: DISCONTINUED | OUTPATIENT
Start: 2023-07-15 | End: 2023-07-18 | Stop reason: HOSPADM

## 2023-07-15 RX ORDER — FUROSEMIDE 10 MG/ML
40 INJECTION INTRAMUSCULAR; INTRAVENOUS ONCE
Status: COMPLETED | OUTPATIENT
Start: 2023-07-15 | End: 2023-07-15

## 2023-07-15 RX ORDER — POTASSIUM CHLORIDE 20 MEQ/1
40 TABLET, EXTENDED RELEASE ORAL ONCE
Status: COMPLETED | OUTPATIENT
Start: 2023-07-15 | End: 2023-07-15

## 2023-07-15 RX ADMIN — APIXABAN 5 MG: 5 TABLET, FILM COATED ORAL at 09:07

## 2023-07-15 RX ADMIN — AMLODIPINE BESYLATE 5 MG: 5 TABLET ORAL at 09:07

## 2023-07-15 RX ADMIN — LOSARTAN POTASSIUM 50 MG: 25 TABLET, FILM COATED ORAL at 09:07

## 2023-07-15 RX ADMIN — CLOPIDOGREL BISULFATE 75 MG: 75 TABLET ORAL at 09:07

## 2023-07-15 RX ADMIN — POLYETHYLENE GLYCOL 3350 17 G: 17 POWDER, FOR SOLUTION ORAL at 12:07

## 2023-07-15 RX ADMIN — POTASSIUM CHLORIDE 40 MEQ: 1500 TABLET, EXTENDED RELEASE ORAL at 09:07

## 2023-07-15 RX ADMIN — FUROSEMIDE 40 MG: 10 INJECTION, SOLUTION INTRAMUSCULAR; INTRAVENOUS at 12:07

## 2023-07-15 RX ADMIN — POLYETHYLENE GLYCOL 3350 17 G: 17 POWDER, FOR SOLUTION ORAL at 09:07

## 2023-07-15 RX ADMIN — METRONIDAZOLE 500 MG: 500 TABLET ORAL at 09:07

## 2023-07-15 NOTE — SUBJECTIVE & OBJECTIVE
Interval History:  Acute issues, patient states she is feeling well.  She still on 1 L nasal cannula.  Still has not had a bowel movement.    Review of Systems  Objective:     Vital Signs (Most Recent):  Temp: 98.6 °F (37 °C) (07/15/23 1149)  Pulse: 79 (07/15/23 1149)  Resp: 20 (07/15/23 1149)  BP: 139/65 (07/15/23 1149)  SpO2: 96 % (07/15/23 1149) Vital Signs (24h Range):  Temp:  [97.8 °F (36.6 °C)-98.6 °F (37 °C)] 98.6 °F (37 °C)  Pulse:  [79-88] 79  Resp:  [16-20] 20  SpO2:  [91 %-98 %] 96 %  BP: (109-139)/(50-68) 139/65     Weight: 82.8 kg (182 lb 9.6 oz)  Body mass index is 34.5 kg/m².    Intake/Output Summary (Last 24 hours) at 7/15/2023 1241  Last data filed at 7/15/2023 0830  Gross per 24 hour   Intake 480 ml   Output 330 ml   Net 150 ml           Physical Exam  Vitals and nursing note reviewed.   Constitutional:       General: She is not in acute distress.     Appearance: She is obese. She is ill-appearing.   HENT:      Head: Normocephalic.   Cardiovascular:      Rate and Rhythm: Normal rate and regular rhythm.      Pulses: Normal pulses.   Pulmonary:      Effort: Pulmonary effort is normal. No respiratory distress.   Abdominal:      General: Bowel sounds are normal.      Comments: Right sided cholecystostomy drain in place, bilious output with mild tinge of blood   Musculoskeletal:         General: No swelling.      Comments: Left AKA   Neurological:      General: No focal deficit present.      Mental Status: She is alert and oriented to person, place, and time.   Psychiatric:         Mood and Affect: Mood normal.         Thought Content: Thought content normal.         Judgment: Judgment normal.           Significant Labs: All pertinent labs within the past 24 hours have been reviewed.    Significant Imaging: I have reviewed all pertinent imaging results/findings within the past 24 hours.

## 2023-07-15 NOTE — PLAN OF CARE
Problem: Adult Inpatient Plan of Care  Goal: Plan of Care Review  Outcome: Ongoing, Progressing  Flowsheets (Taken 7/15/2023 0326)  Plan of Care Reviewed With: patient    Patient remains free from injury and falls. Remains on 1 liters O2. Sats 90-93%. Myah tube in place with sanguineous output. Patient resting comfortably. Plan of care continued.

## 2023-07-15 NOTE — ASSESSMENT & PLAN NOTE
Hb did drop to 10 today - noted to be around 13 yesterday  - possibly related to cholecystostomy drain, also restarted on on plavix and now eliquis  - plan to monitor with CBC in AM  Hemoglobin stable at 10.2 today

## 2023-07-15 NOTE — ASSESSMENT & PLAN NOTE
This patient does have evidence of infective focus  My overall impression is sepsis.  Source: acute cholecystitis  Antibiotics given-   Antibiotics (72h ago, onward)    Start     Stop Route Frequency Ordered    07/14/23 1115  metroNIDAZOLE tablet 500 mg         -- Oral Every 12 hours 07/14/23 1009    07/14/23 1015  levoFLOXacin tablet 750 mg         -- Oral Every other day 07/14/23 1009        Latest lactate reviewed-  Recent Labs   Lab 07/12/23  1311   LACTATE 1.5     Organ dysfunction indicated by Acute respiratory failure    Fluid challenge Ideal Body Weight- The patient's ideal body weight is Ideal body weight: 47.8 kg (105 lb 6.1 oz) which will be used to calculate fluid bolus of 30 ml/kg for treatment of septic shock.      Post- resuscitation assessment Yes Perfusion exam was performed within 6 hours of septic shock presentation after bolus shows Adequate tissue perfusion assessed by non-invasive monitoring       Will Not start Pressors- Levophed for MAP of 65  Source control achieved by: Cholecystostomy drain and IV antibiotics  - WBC trending down

## 2023-07-15 NOTE — NURSING
[facility] [place]  Nurses Note -- 4 Eyes      7/15/2023       Skin assessed on: Q Shift      [x] No Pressure Injuries Present    []Prevention Measures Documented    [] Yes LDA  for Pressure Injury Previously documented     [] Yes New Pressure Injury Discovered   [] LDA for New Pressure Injury Added      Attending RN:  Aura Zambrano LPN     Second RN:  Merissa Spencer RN

## 2023-07-15 NOTE — PROGRESS NOTES
"[facility]  Progress Note    Patient Name: Abbie Barragan  MRN: [id number]  Patient Class: IP- Inpatient   Admission Date: 7/12/2023  Length of Stay: 3 days  Attending Physician: Noble Peñaloza MD  Primary Care Provider: Kailash Carvalho NP        Subjective:     Principal Problem:Sepsis        HPI:  Ms. Barragan is an 88 year old female with a past medical history of left AKA, hypertension, PAD who presents to the ED today for abdominal pain. She states it's on her right lower abdomen and thought it was her hip. This started yesterday and has progressively gotten worse. She was also found to be severely hypertensive in ED but improved with pain medications. She believes that she had fevers at home but denies any issues breathing, chest pain, diarrhea.  In the ED, she was found to have WBC 17.91, Na 130, K 3.2, Cl 93,  and lactate 2.24. CT scan showed "gallbladder distended with a few gallstones with hyperemia of the gallbladder wall, gallbladder wall thickening and pericholecystic fluid. Concerning for acute cholecystitis." Patient also noted to be in atrial fibrillation with rate 80s in ED. General Surgery and Cardiology consulted. General surgery recommending IR cholecystostomy drain. IR consulted, drain to be placed today.      Overview/Hospital Course:  No notes on file    Interval History:  Acute issues, patient states she is feeling well.  She still on 1 L nasal cannula.  Still has not had a bowel movement.    Review of Systems  Objective:     Vital Signs (Most Recent):  Temp: 98.6 °F (37 °C) (07/15/23 1149)  Pulse: 79 (07/15/23 1149)  Resp: 20 (07/15/23 1149)  BP: 139/65 (07/15/23 1149)  SpO2: 96 % (07/15/23 1149) Vital Signs (24h Range):  Temp:  [97.8 °F (36.6 °C)-98.6 °F (37 °C)] 98.6 °F (37 °C)  Pulse:  [79-88] 79  Resp:  [16-20] 20  SpO2:  [91 %-98 %] 96 %  BP: (109-139)/(50-68) 139/65     Weight: 82.8 kg (182 lb 9.6 oz)  Body mass index is 34.5 " kg/m².    Intake/Output Summary (Last 24 hours) at 7/15/2023 1241  Last data filed at 7/15/2023 0830  Gross per 24 hour   Intake 480 ml   Output 330 ml   Net 150 ml           Physical Exam  Vitals and nursing note reviewed.   Constitutional:       General: She is not in acute distress.     Appearance: She is obese. She is ill-appearing.   HENT:      Head: Normocephalic.   Cardiovascular:      Rate and Rhythm: Normal rate and regular rhythm.      Pulses: Normal pulses.   Pulmonary:      Effort: Pulmonary effort is normal. No respiratory distress.   Abdominal:      General: Bowel sounds are normal.      Comments: Right sided cholecystostomy drain in place, bilious output with mild tinge of blood   Musculoskeletal:         General: No swelling.      Comments: Left AKA   Neurological:      General: No focal deficit present.      Mental Status: She is alert and oriented to person, place, and time.   Psychiatric:         Mood and Affect: Mood normal.         Thought Content: Thought content normal.         Judgment: Judgment normal.           Significant Labs: All pertinent labs within the past 24 hours have been reviewed.    Significant Imaging: I have reviewed all pertinent imaging results/findings within the past 24 hours.      Assessment/Plan:      * Sepsis  This patient does have evidence of infective focus  My overall impression is sepsis.  Source: acute cholecystitis  Antibiotics given-   Antibiotics (72h ago, onward)    Start     Stop Route Frequency Ordered    07/14/23 1115  metroNIDAZOLE tablet 500 mg         -- Oral Every 12 hours 07/14/23 1009    07/14/23 1015  levoFLOXacin tablet 750 mg         -- Oral Every other day 07/14/23 1009        Latest lactate reviewed-  Recent Labs   Lab 07/12/23  1311   LACTATE 1.5     Organ dysfunction indicated by Acute respiratory failure    Fluid challenge Ideal Body Weight- The patient's ideal body weight is Ideal body weight: 47.8 kg (105 lb 6.1 oz) which will be used to  calculate fluid bolus of 30 ml/kg for treatment of septic shock.      Post- resuscitation assessment Yes Perfusion exam was performed within 6 hours of septic shock presentation after bolus shows Adequate tissue perfusion assessed by non-invasive monitoring       Will Not start Pressors- Levophed for MAP of 65  Source control achieved by: Cholecystostomy drain and IV antibiotics  - WBC trending down    Cholelithiasis with acute cholecystitis  Acute cholecystitis, on plavix last dose this AM and has hx of ex-laparotomy  - General surgery consulted and recommending IR placement of cholecystostomy drain  - doing well with drain and flushing bid  - Currently on zosyn and awaiting culture data  - will consult ID for recommendations for antibiotics    - Plan to follow up with surgery clinic at discharge to see if candidate for cholecystectomy once acute phase has been treated.   - If deemed not candidate for cholecystectomy, will need cholangiogram and cholecystostomy tube exchange vs removal around 8/7/23  - Levaquin/Flagyl PO until 7/19    Anemia  Hb did drop to 10 today - noted to be around 13 yesterday  - possibly related to cholecystostomy drain, also restarted on on plavix and now eliquis  - plan to monitor with CBC in AM  Hemoglobin stable at 10.2 today      PVD (peripheral vascular disease)  -noted  -resume plavix   - stop aspirin as patient will now be on Eliquis for atrial fibrillation  - discussed with patient      Chronic atrial fibrillation  Patient with Paroxysmal (<7 days) atrial fibrillation which is controlled currently with no meds. Patient is currently in atrial fibrillation.[id number] Score: 4. . Anticoagulation on heparin drip - transition to Eliquis  - check echocardiogram - noted diastolic dysfunction with normal EF  - check tsh - normal  - appreciate Cardiology recommendations      VTE Risk Mitigation (From admission, onward)         Ordered     apixaban tablet 5 mg  2 times daily         07/13/23  1415     heparin 25,000 units in dextrose 5% 250 mL (100 units/mL) infusion LOW INTENSITY nomogram - [facility]  Continuous        Question Answer Comment   Heparin Infusion Adjustment (DO NOT MODIFY ANSWER) \\[email or web address]\epic\Images\Pharmacy\HeparinInfusions\heparin LOW INTENSITY nomogram for [facility] [id number].pdf    Begin at (in units/kg/hr) 12        07/13/23 1415     IP VTE HIGH RISK PATIENT  Once         07/12/23 1427     Place sequential compression device  Until discontinued         07/12/23 1427     heparin 25,000 units in dextrose 5% (100 units/ml) IV bolus from bag - ADDITIONAL PRN BOLUS - 60 units/kg  As needed (PRN)        Question:  Heparin Infusion Adjustment (DO NOT MODIFY ANSWER)  Answer:  \\[email or web address]\epic\Images\Pharmacy\HeparinInfusions\heparin LOW INTENSITY nomogram for [facility] [id number].pdf    07/12/23 1244     heparin 25,000 units in dextrose 5% (100 units/ml) IV bolus from bag - ADDITIONAL PRN BOLUS - 30 units/kg  As needed (PRN)        Question:  Heparin Infusion Adjustment (DO NOT MODIFY ANSWER)  Answer:  \[email or web address]\epic\Images\Pharmacy\HeparinInfusions\heparin LOW INTENSITY nomogram for [facility] [id number].pdf    07/12/23 1244                Discharge Planning   JULIÁN:      Code Status: Full Code   Is the patient medically ready for discharge?:     Reason for patient still in hospital (select all that apply): Treatment  Discharge Plan A: Home                  Noble Peñaloza MD  Department of Hospital Medicine   [facility]

## 2023-07-15 NOTE — NURSING
[facility] [place]  Nurses Note -- 4 Eyes      7/14/2023       Skin assessed on: Q Shift      [x] No Pressure Injuries Present    [x]Prevention Measures Documented    [] Yes LDA  for Pressure Injury Previously documented     [] Yes New Pressure Injury Discovered   [] LDA for New Pressure Injury Added      Attending RN:  Merissa Spencer RN     Second RN:  Virginia MADRID RN

## 2023-07-15 NOTE — ASSESSMENT & PLAN NOTE
Patient with Paroxysmal (<7 days) atrial fibrillation which is controlled currently with no meds. Patient is currently in atrial fibrillation.[id number] Score: 4. . Anticoagulation on heparin drip - transition to Eliquis  - check echocardiogram - noted diastolic dysfunction with normal EF  - check tsh - normal  - appreciate Cardiology recommendations

## 2023-07-16 PROBLEM — R09.02 HYPOXIA: Status: ACTIVE | Noted: 2023-07-16

## 2023-07-16 LAB
BACTERIA SPEC ANAEROBE CULT: NORMAL
BASOPHILS # BLD AUTO: 0.03 K/UL (ref 0–0.2)
BASOPHILS NFR BLD: 0.4 % (ref 0–1.9)
DIFFERENTIAL METHOD: ABNORMAL
EOSINOPHIL # BLD AUTO: 0.1 K/UL (ref 0–0.5)
EOSINOPHIL NFR BLD: 1.7 % (ref 0–8)
ERYTHROCYTE [DISTWIDTH] IN BLOOD BY AUTOMATED COUNT: 15.3 % (ref 11.5–14.5)
HCT VFR BLD AUTO: 34 % (ref 37–48.5)
HGB BLD-MCNC: 10.5 G/DL (ref 12–16)
IMM GRANULOCYTES # BLD AUTO: 0.01 K/UL (ref 0–0.04)
IMM GRANULOCYTES NFR BLD AUTO: 0.1 % (ref 0–0.5)
LYMPHOCYTES # BLD AUTO: 3.2 K/UL (ref 1–4.8)
LYMPHOCYTES NFR BLD: 38.9 % (ref 18–48)
MCH RBC QN AUTO: 26.1 PG (ref 27–31)
MCHC RBC AUTO-ENTMCNC: 30.9 G/DL (ref 32–36)
MCV RBC AUTO: 85 FL (ref 82–98)
MONOCYTES # BLD AUTO: 0.4 K/UL (ref 0.3–1)
MONOCYTES NFR BLD: 5.1 % (ref 4–15)
NEUTROPHILS # BLD AUTO: 4.5 K/UL (ref 1.8–7.7)
NEUTROPHILS NFR BLD: 53.8 % (ref 38–73)
NRBC BLD-RTO: 0 /100 WBC
PLATELET # BLD AUTO: 272 K/UL (ref 150–450)
PMV BLD AUTO: 12.7 FL (ref 9.2–12.9)
RBC # BLD AUTO: 4.02 M/UL (ref 4–5.4)
WBC # BLD AUTO: 8.3 K/UL (ref 3.9–12.7)

## 2023-07-16 PROCEDURE — 25000003 PHARM REV CODE 250: Performed by: STUDENT IN AN ORGANIZED HEALTH CARE EDUCATION/TRAINING PROGRAM

## 2023-07-16 PROCEDURE — 63600175 PHARM REV CODE 636 W HCPCS: Performed by: STUDENT IN AN ORGANIZED HEALTH CARE EDUCATION/TRAINING PROGRAM

## 2023-07-16 PROCEDURE — 97530 THERAPEUTIC ACTIVITIES: CPT | Mod: CQ

## 2023-07-16 PROCEDURE — 36415 COLL VENOUS BLD VENIPUNCTURE: CPT | Performed by: EMERGENCY MEDICINE

## 2023-07-16 PROCEDURE — 85025 COMPLETE CBC W/AUTO DIFF WBC: CPT | Performed by: EMERGENCY MEDICINE

## 2023-07-16 PROCEDURE — 11000001 HC ACUTE MED/SURG PRIVATE ROOM

## 2023-07-16 RX ORDER — LACTULOSE 10 G/15ML
30 SOLUTION ORAL ONCE
Status: COMPLETED | OUTPATIENT
Start: 2023-07-16 | End: 2023-07-16

## 2023-07-16 RX ORDER — LOSARTAN POTASSIUM 25 MG/1
100 TABLET ORAL DAILY
Status: DISCONTINUED | OUTPATIENT
Start: 2023-07-17 | End: 2023-07-18 | Stop reason: HOSPADM

## 2023-07-16 RX ORDER — HYDRALAZINE HYDROCHLORIDE 20 MG/ML
10 INJECTION INTRAMUSCULAR; INTRAVENOUS EVERY 6 HOURS PRN
Status: DISCONTINUED | OUTPATIENT
Start: 2023-07-16 | End: 2023-07-18 | Stop reason: HOSPADM

## 2023-07-16 RX ADMIN — LACTULOSE 30 G: 20 SOLUTION ORAL at 10:07

## 2023-07-16 RX ADMIN — POTASSIUM CHLORIDE 40 MEQ: 1500 TABLET, EXTENDED RELEASE ORAL at 08:07

## 2023-07-16 RX ADMIN — AMLODIPINE BESYLATE 5 MG: 5 TABLET ORAL at 08:07

## 2023-07-16 RX ADMIN — METRONIDAZOLE 500 MG: 500 TABLET ORAL at 08:07

## 2023-07-16 RX ADMIN — CLOPIDOGREL BISULFATE 75 MG: 75 TABLET ORAL at 08:07

## 2023-07-16 RX ADMIN — POLYETHYLENE GLYCOL 3350 17 G: 17 POWDER, FOR SOLUTION ORAL at 08:07

## 2023-07-16 RX ADMIN — APIXABAN 5 MG: 5 TABLET, FILM COATED ORAL at 08:07

## 2023-07-16 RX ADMIN — LEVOFLOXACIN 750 MG: 750 TABLET, FILM COATED ORAL at 08:07

## 2023-07-16 RX ADMIN — LOSARTAN POTASSIUM 50 MG: 25 TABLET, FILM COATED ORAL at 08:07

## 2023-07-16 RX ADMIN — HYDRALAZINE HYDROCHLORIDE 10 MG: 20 INJECTION, SOLUTION INTRAMUSCULAR; INTRAVENOUS at 05:07

## 2023-07-16 NOTE — PROGRESS NOTES
"[facility]  Progress Note    Patient Name: Abbie Barragan  MRN: [id number]  Patient Class: IP- Inpatient   Admission Date: 7/12/2023  Length of Stay: 4 days  Attending Physician: Noble Peñaloza MD  Primary Care Provider: Kailash Carvalho NP        Subjective:     Principal Problem:Sepsis        HPI:  Ms. Barragan is an 88 year old female with a past medical history of left AKA, hypertension, PAD who presents to the ED today for abdominal pain. She states it's on her right lower abdomen and thought it was her hip. This started yesterday and has progressively gotten worse. She was also found to be severely hypertensive in ED but improved with pain medications. She believes that she had fevers at home but denies any issues breathing, chest pain, diarrhea.  In the ED, she was found to have WBC 17.91, Na 130, K 3.2, Cl 93,  and lactate 2.24. CT scan showed "gallbladder distended with a few gallstones with hyperemia of the gallbladder wall, gallbladder wall thickening and pericholecystic fluid. Concerning for acute cholecystitis." Patient also noted to be in atrial fibrillation with rate 80s in ED. General Surgery and Cardiology consulted. General surgery recommending IR cholecystostomy drain. IR consulted, drain to be placed today.      Overview/Hospital Course:  No notes on file    Interval History:  no new issues, she is feeling well. Still no BM - add lactulose. CXR pending due to persistent hypoxia    Review of Systems  Objective:     Vital Signs (Most Recent):  Temp: 97.8 °F (36.6 °C) (07/16/23 0752)  Pulse: 77 (07/16/23 0752)  Resp: 18 (07/16/23 0752)  BP: 139/68 (07/16/23 0752)  SpO2: (!) 94 % (07/16/23 0752) Vital Signs (24h Range):  Temp:  [97.8 °F (36.6 °C)-98.6 °F (37 °C)] 97.8 °F (36.6 °C)  Pulse:  [72-86] 77  Resp:  [18-20] 18  SpO2:  [91 %-96 %] 94 %  BP: (125-141)/(57-70) 139/68     Weight: 82.8 kg (182 lb 9.6 oz)  Body mass index is 34.5 kg/m².    Intake/Output " Summary (Last 24 hours) at 7/16/2023 0926  Last data filed at 7/16/2023 0400  Gross per 24 hour   Intake 600 ml   Output 1450 ml   Net -850 ml           Physical Exam  Vitals and nursing note reviewed.   Constitutional:       General: She is not in acute distress.     Appearance: She is obese. She is ill-appearing.   HENT:      Head: Normocephalic.   Cardiovascular:      Rate and Rhythm: Normal rate and regular rhythm.      Pulses: Normal pulses.   Pulmonary:      Effort: Pulmonary effort is normal. No respiratory distress.   Abdominal:      General: Bowel sounds are normal.      Comments: Right sided cholecystostomy drain in place, bilious output with mild tinge of blood   Musculoskeletal:         General: No swelling.      Comments: Left AKA   Neurological:      General: No focal deficit present.      Mental Status: She is alert and oriented to person, place, and time.   Psychiatric:         Mood and Affect: Mood normal.         Thought Content: Thought content normal.         Judgment: Judgment normal.           Significant Labs: All pertinent labs within the past 24 hours have been reviewed.    Significant Imaging: I have reviewed all pertinent imaging results/findings within the past 24 hours.      Assessment/Plan:      * Sepsis  This patient does have evidence of infective focus  My overall impression is sepsis.  Source: acute cholecystitis  Antibiotics given-   Antibiotics (72h ago, onward)    Start     Stop Route Frequency Ordered    07/14/23 1115  metroNIDAZOLE tablet 500 mg         -- Oral Every 12 hours 07/14/23 1009    07/14/23 1015  levoFLOXacin tablet 750 mg         -- Oral Every other day 07/14/23 1009        Latest lactate reviewed-  Recent Labs   Lab 07/12/23  1311   LACTATE 1.5     Organ dysfunction indicated by Acute respiratory failure    Fluid challenge Ideal Body Weight- The patient's ideal body weight is Ideal body weight: 47.8 kg (105 lb 6.1 oz) which will be used to calculate fluid bolus of  30 ml/kg for treatment of septic shock.      Post- resuscitation assessment Yes Perfusion exam was performed within 6 hours of septic shock presentation after bolus shows Adequate tissue perfusion assessed by non-invasive monitoring       Will Not start Pressors- Levophed for MAP of 65  Source control achieved by: Cholecystostomy drain and IV antibiotics  - WBC trending down    Cholelithiasis with acute cholecystitis  Acute cholecystitis, on plavix last dose this AM and has hx of ex-laparotomy  - General surgery consulted and recommending IR placement of cholecystostomy drain  - doing well with drain and flushing bid  - Currently on zosyn and awaiting culture data  - will consult ID for recommendations for antibiotics    - Plan to follow up with surgery clinic at discharge to see if candidate for cholecystectomy once acute phase has been treated.   - If deemed not candidate for cholecystectomy, will need cholangiogram and cholecystostomy tube exchange vs removal around 8/7/23  - Levaquin/Flagyl PO until 7/19    Hypoxia  Developed hypoxia on admission, likely from sepsis poor inspiration with abdominal pain  - slowly improving, now on 1 liter NC - given 40 mg IV lasix x1 on 7/15  - check CXR today      Anemia  Hb did drop to 10 today - noted to be around 13 yesterday  - possibly related to cholecystostomy drain, also restarted on on plavix and now eliquis  - plan to monitor with CBC in AM  Hemoglobin stable at 10.5        PVD (peripheral vascular disease)  -noted  -resume plavix   - stop aspirin as patient will now be on Eliquis for atrial fibrillation  - discussed with patient      Chronic atrial fibrillation  Patient with Paroxysmal (<7 days) atrial fibrillation which is controlled currently with no meds. Patient is currently in atrial fibrillation.[id number] Score: 4. . Anticoagulation on heparin drip - transition to Eliquis  - check echocardiogram - noted diastolic dysfunction with normal EF  - check tsh -  normal  - appreciate Cardiology recommendations      VTE Risk Mitigation (From admission, onward)         Ordered     apixaban tablet 5 mg  2 times daily         07/13/23 1415     heparin 25,000 units in dextrose 5% 250 mL (100 units/mL) infusion LOW INTENSITY nomogram - [facility]  Continuous        Question Answer Comment   Heparin Infusion Adjustment (DO NOT MODIFY ANSWER) \\[email or web address]\epic\Images\Pharmacy\HeparinInfusions\heparin LOW INTENSITY nomogram for [facility] [id number].pdf    Begin at (in units/kg/hr) 12        07/13/23 1415     IP VTE HIGH RISK PATIENT  Once         07/12/23 1427     Place sequential compression device  Until discontinued         07/12/23 1427                Discharge Planning   JULIÁN:      Code Status: Full Code   Is the patient medically ready for discharge?:     Reason for patient still in hospital (select all that apply): Treatment  Discharge Plan A: Home                  Noble Peñaloza MD  Department of Hospital Medicine   [facility]

## 2023-07-16 NOTE — PLAN OF CARE
Problem: Adult Inpatient Plan of Care  Goal: Plan of Care Review  Outcome: Ongoing, Progressing  Goal: Patient-Specific Goal (Individualized)  Outcome: Ongoing, Progressing  Goal: Optimal Comfort and Wellbeing  Outcome: Ongoing, Progressing  Goal: Readiness for Transition of Care  Outcome: Ongoing, Progressing     Problem: Fall Injury Risk  Goal: Absence of Fall and Fall-Related Injury  Outcome: Ongoing, Progressing     Problem: Skin Injury Risk Increased  Goal: Skin Health and Integrity  Outcome: Ongoing, Progressing

## 2023-07-16 NOTE — ASSESSMENT & PLAN NOTE
Hb did drop to 10 today - noted to be around 13 yesterday  - possibly related to cholecystostomy drain, also restarted on on plavix and now eliquis  - plan to monitor with CBC in AM  Hemoglobin stable at 10.5

## 2023-07-16 NOTE — NURSING
[facility] [place]  Nurses Note -- 4 Eyes      7/16/2023       Skin assessed on: Q Shift      [x] No Pressure Injuries Present    []Prevention Measures Documented    [] Yes LDA  for Pressure Injury Previously documented     [] Yes New Pressure Injury Discovered   [] LDA for New Pressure Injury Added      Attending RN:  Aura Zambrano LPN     Second RN:  Bharti Wynn RN

## 2023-07-16 NOTE — PT/OT/SLP PROGRESS
Physical Therapy Treatment    Patient Name:  Abbie Barragan   MRN:  [id number]    Recommendations:     Discharge Recommendations: home health PT  Discharge Equipment Recommendations: none      Assessment:     Abbie Barragan is a 88 y.o. female admitted with a medical diagnosis of Sepsis.  She presents with the following impairments/functional limitations: impaired endurance, decreased coordination, decreased lower extremity function, impaired functional mobility.    Rehab Prognosis: Good; patient would benefit from acute skilled PT services to address these deficits and reach maximum level of function.    Recent Surgery: * No surgery found *      Plan:     During this hospitalization, patient to be seen 3 x/week to address the identified rehab impairments via therapeutic activities, therapeutic exercises and progress toward the following goals:    Plan of Care Expires:  07/20/23    Subjective     Chief Complaint: Pt with no complaints or concerns at this time.   Patient/Family Comments/goals: Pt agreeable to PT treatment.   Pain/Comfort:  Pain Rating 1: 0/10      Objective:     Patient found HOB elevated with oxygen, PureWick, telemetry, pulse ox (continuous) upon PT entry to room.     General Precautions: Standard, fall  Orthopedic Precautions: N/A  Braces: N/A       Functional Mobility:  Bed Mobility:     Supine to Sit: stand by assistance  Transfers:     Bed to Wheelchair: contact guard assistance with  no AD  using  Scoot Pivot  Balance: Pt with good sitting balance.       AM-PAC 6 CLICK MOBILITY  Turning over in bed (including adjusting bedclothes, sheets and blankets)?: 4  Sitting down on and standing up from a chair with arms (e.g., wheelchair, bedside commode, etc.): 3  Moving from lying on back to sitting on the side of the bed?: 4  Moving to and from a bed to a chair (including a wheelchair)?: 3  Need to walk in hospital room?: 1  Climbing 3-5 steps with a railing?: 1  Basic Mobility Total Score:  16       Treatment & Education:  Pt performed R LE exercises x 20 reps seated at EOB:  LAQ's, ankle pumps, seated hip flexion.     Patient left up in chair with all lines intact, call button in reach, and nursing notified..    GOALS:   Multidisciplinary Problems       Physical Therapy Goals          Problem: Physical Therapy    Goal Priority Disciplines Outcome Goal Variances Interventions   Physical Therapy Goal     PT, PT/OT Ongoing, Progressing     Description: Goals to be met by: 23     Patient will increase functional independence with mobility by performin. Pt to be mod I with bed mobility.  2. Pt to transfer bed<>wheelchair with (S).  3. Pt to be (I) with written HEP.                         Time Tracking:     PT Received On: 23  PT Start Time: 1037     PT Stop Time: 1055  PT Total Time (min): 18 min     Billable Minutes: Therapeutic Activity 18    Treatment Type: Treatment  PT/PTA: PTA     Number of PTA visits since last PT visit: 2023

## 2023-07-16 NOTE — SUBJECTIVE & OBJECTIVE
Interval History:  no new issues, she is feeling well. Still no BM - add lactulose. CXR pending due to persistent hypoxia    Review of Systems  Objective:     Vital Signs (Most Recent):  Temp: 97.8 °F (36.6 °C) (07/16/23 0752)  Pulse: 77 (07/16/23 0752)  Resp: 18 (07/16/23 0752)  BP: 139/68 (07/16/23 0752)  SpO2: (!) 94 % (07/16/23 0752) Vital Signs (24h Range):  Temp:  [97.8 °F (36.6 °C)-98.6 °F (37 °C)] 97.8 °F (36.6 °C)  Pulse:  [72-86] 77  Resp:  [18-20] 18  SpO2:  [91 %-96 %] 94 %  BP: (125-141)/(57-70) 139/68     Weight: 82.8 kg (182 lb 9.6 oz)  Body mass index is 34.5 kg/m².    Intake/Output Summary (Last 24 hours) at 7/16/2023 0926  Last data filed at 7/16/2023 0400  Gross per 24 hour   Intake 600 ml   Output 1450 ml   Net -850 ml           Physical Exam  Vitals and nursing note reviewed.   Constitutional:       General: She is not in acute distress.     Appearance: She is obese. She is ill-appearing.   HENT:      Head: Normocephalic.   Cardiovascular:      Rate and Rhythm: Normal rate and regular rhythm.      Pulses: Normal pulses.   Pulmonary:      Effort: Pulmonary effort is normal. No respiratory distress.   Abdominal:      General: Bowel sounds are normal.      Comments: Right sided cholecystostomy drain in place, bilious output with mild tinge of blood   Musculoskeletal:         General: No swelling.      Comments: Left AKA   Neurological:      General: No focal deficit present.      Mental Status: She is alert and oriented to person, place, and time.   Psychiatric:         Mood and Affect: Mood normal.         Thought Content: Thought content normal.         Judgment: Judgment normal.           Significant Labs: All pertinent labs within the past 24 hours have been reviewed.    Significant Imaging: I have reviewed all pertinent imaging results/findings within the past 24 hours.

## 2023-07-16 NOTE — NURSING
.[facility], [facility] [place], [place]  Nurses Note -- 4 Eyes      7/15/2023       Skin assessed on: Q Shift      [x] No Pressure Injuries Present    [x]Prevention Measures Documented    [] Yes LDA  for Pressure Injury Previously documented     [] Yes New Pressure Injury Discovered   [] LDA for New Pressure Injury Added      Attending RN:  Bharti Wynn, RN     Second RN:  Helga Zambrano LPN

## 2023-07-17 LAB
BASOPHILS # BLD AUTO: 0.05 K/UL (ref 0–0.2)
BASOPHILS NFR BLD: 0.9 % (ref 0–1.9)
DIFFERENTIAL METHOD: ABNORMAL
EOSINOPHIL # BLD AUTO: 0.4 K/UL (ref 0–0.5)
EOSINOPHIL NFR BLD: 6.7 % (ref 0–8)
ERYTHROCYTE [DISTWIDTH] IN BLOOD BY AUTOMATED COUNT: 19.1 % (ref 11.5–14.5)
HCT VFR BLD AUTO: 28.2 % (ref 37–48.5)
HGB BLD-MCNC: 13.5 G/DL (ref 12–16)
HGB BLD-MCNC: 8.8 G/DL (ref 12–16)
IMM GRANULOCYTES # BLD AUTO: 0.03 K/UL (ref 0–0.04)
IMM GRANULOCYTES NFR BLD AUTO: 0.5 % (ref 0–0.5)
LYMPHOCYTES # BLD AUTO: 1.6 K/UL (ref 1–4.8)
LYMPHOCYTES NFR BLD: 28.7 % (ref 18–48)
MCH RBC QN AUTO: 25.4 PG (ref 27–31)
MCHC RBC AUTO-ENTMCNC: 31.2 G/DL (ref 32–36)
MCV RBC AUTO: 81 FL (ref 82–98)
MONOCYTES # BLD AUTO: 1 K/UL (ref 0.3–1)
MONOCYTES NFR BLD: 17.3 % (ref 4–15)
NEUTROPHILS # BLD AUTO: 2.6 K/UL (ref 1.8–7.7)
NEUTROPHILS NFR BLD: 45.9 % (ref 38–73)
NRBC BLD-RTO: 0 /100 WBC
PLATELET # BLD AUTO: 228 K/UL (ref 150–450)
PMV BLD AUTO: 11.3 FL (ref 9.2–12.9)
RBC # BLD AUTO: 3.47 M/UL (ref 4–5.4)
WBC # BLD AUTO: 5.71 K/UL (ref 3.9–12.7)

## 2023-07-17 PROCEDURE — 63600175 PHARM REV CODE 636 W HCPCS: Performed by: STUDENT IN AN ORGANIZED HEALTH CARE EDUCATION/TRAINING PROGRAM

## 2023-07-17 PROCEDURE — 94799 UNLISTED PULMONARY SVC/PX: CPT

## 2023-07-17 PROCEDURE — 25000003 PHARM REV CODE 250: Performed by: HOSPITALIST

## 2023-07-17 PROCEDURE — 85018 HEMOGLOBIN: CPT | Performed by: STUDENT IN AN ORGANIZED HEALTH CARE EDUCATION/TRAINING PROGRAM

## 2023-07-17 PROCEDURE — 97110 THERAPEUTIC EXERCISES: CPT | Mod: CQ

## 2023-07-17 PROCEDURE — 25000003 PHARM REV CODE 250: Performed by: STUDENT IN AN ORGANIZED HEALTH CARE EDUCATION/TRAINING PROGRAM

## 2023-07-17 PROCEDURE — 94761 N-INVAS EAR/PLS OXIMETRY MLT: CPT

## 2023-07-17 PROCEDURE — 11000001 HC ACUTE MED/SURG PRIVATE ROOM

## 2023-07-17 PROCEDURE — 36415 COLL VENOUS BLD VENIPUNCTURE: CPT | Performed by: EMERGENCY MEDICINE

## 2023-07-17 PROCEDURE — 85025 COMPLETE CBC W/AUTO DIFF WBC: CPT | Performed by: EMERGENCY MEDICINE

## 2023-07-17 PROCEDURE — 97530 THERAPEUTIC ACTIVITIES: CPT | Mod: CQ

## 2023-07-17 RX ORDER — AMLODIPINE BESYLATE 5 MG/1
10 TABLET ORAL DAILY
Status: DISCONTINUED | OUTPATIENT
Start: 2023-07-18 | End: 2023-07-18 | Stop reason: HOSPADM

## 2023-07-17 RX ORDER — METOPROLOL TARTRATE 25 MG/1
25 TABLET, FILM COATED ORAL 2 TIMES DAILY
Status: DISCONTINUED | OUTPATIENT
Start: 2023-07-17 | End: 2023-07-18 | Stop reason: HOSPADM

## 2023-07-17 RX ORDER — TALC
6 POWDER (GRAM) TOPICAL NIGHTLY PRN
Status: DISCONTINUED | OUTPATIENT
Start: 2023-07-17 | End: 2023-07-18 | Stop reason: HOSPADM

## 2023-07-17 RX ADMIN — MELATONIN TAB 3 MG 6 MG: 3 TAB at 08:07

## 2023-07-17 RX ADMIN — HYDRALAZINE HYDROCHLORIDE 10 MG: 20 INJECTION, SOLUTION INTRAMUSCULAR; INTRAVENOUS at 05:07

## 2023-07-17 RX ADMIN — METRONIDAZOLE 500 MG: 500 TABLET ORAL at 08:07

## 2023-07-17 RX ADMIN — HYDRALAZINE HYDROCHLORIDE 10 MG: 20 INJECTION, SOLUTION INTRAMUSCULAR; INTRAVENOUS at 12:07

## 2023-07-17 RX ADMIN — LOSARTAN POTASSIUM 100 MG: 25 TABLET, FILM COATED ORAL at 08:07

## 2023-07-17 RX ADMIN — APIXABAN 5 MG: 5 TABLET, FILM COATED ORAL at 08:07

## 2023-07-17 RX ADMIN — CLOPIDOGREL BISULFATE 75 MG: 75 TABLET ORAL at 08:07

## 2023-07-17 RX ADMIN — METOPROLOL TARTRATE 25 MG: 25 TABLET, FILM COATED ORAL at 04:07

## 2023-07-17 RX ADMIN — ONDANSETRON 4 MG: 2 INJECTION INTRAMUSCULAR; INTRAVENOUS at 04:07

## 2023-07-17 RX ADMIN — POTASSIUM CHLORIDE 40 MEQ: 1500 TABLET, EXTENDED RELEASE ORAL at 08:07

## 2023-07-17 RX ADMIN — AMLODIPINE BESYLATE 5 MG: 5 TABLET ORAL at 08:07

## 2023-07-17 NOTE — ASSESSMENT & PLAN NOTE
Patient with Paroxysmal (<7 days) atrial fibrillation which is controlled currently with no meds. Patient is currently in atrial fibrillation.[id number] Score: 4. . Anticoagulation on heparin drip - transition to Eliquis  - check echocardiogram - noted diastolic dysfunction with normal EF  - check tsh - normal  - appreciate Cardiology recommendations  - Start on metoprolol for rate control

## 2023-07-17 NOTE — NURSING
[facility], [facility] [place]  Nurses Note -- 4 Eyes      7/17/2023       Skin assessed on: Q Shift      [x] No Pressure Injuries Present    []Prevention Measures Documented    [] Yes LDA  for Pressure Injury Previously documented     [] Yes New Pressure Injury Discovered   [] LDA for New Pressure Injury Added      Attending RN:  Aura Zambrano LPN     Second RN:  Carlie Shepherd RN

## 2023-07-17 NOTE — HOSPITAL COURSE
Admitted with sepsis secondary to acute cholecystitis. Started on IV antibiotics and General Surgery consulted. Surgery recommending IR to place cholecystostomy drain. This was placed successfully. Also found to have atrial fibrillation, Cardiology consulted. On heparin drip, now transitioned to eliquis. Already on Plavix and ASA stopped.  ID consulted for antibiotic recommendations. Patient hypoxic on admission and slowly weaning down.  Given dose of Lasix and encouraging incentive spirometry. Repeat chest x-ray showing bibasilar atelectasis. Continued on treatment and currently doing well on RA.  Started on metoprolol for rate control.  She has remained afebrile and hemodynamically stable. Will need one more day of PO Levaquin and Flagyl.  She will be discharged home with drain in place.  Will arrange HH.  She will follow up with PCP, Surgery and Cardiology.

## 2023-07-17 NOTE — PROGRESS NOTES
"[facility]  Progress Note    Patient Name: Abbie Barragan  MRN: [id number]  Patient Class: IP- Inpatient   Admission Date: 7/12/2023  Length of Stay: 5 days  Attending Physician: Noble Peñaloza MD  Primary Care Provider: Kailash Carvalho NP        Subjective:     Principal Problem:Sepsis        HPI:  Ms. Barragan is an 88 year old female with a past medical history of left AKA, hypertension, PAD who presents to the ED today for abdominal pain. She states it's on her right lower abdomen and thought it was her hip. This started yesterday and has progressively gotten worse. She was also found to be severely hypertensive in ED but improved with pain medications. She believes that she had fevers at home but denies any issues breathing, chest pain, diarrhea.  In the ED, she was found to have WBC 17.91, Na 130, K 3.2, Cl 93,  and lactate 2.24. CT scan showed "gallbladder distended with a few gallstones with hyperemia of the gallbladder wall, gallbladder wall thickening and pericholecystic fluid. Concerning for acute cholecystitis." Patient also noted to be in atrial fibrillation with rate 80s in ED. General Surgery and Cardiology consulted. General surgery recommending IR cholecystostomy drain. IR consulted, drain to be placed today.      Overview/Hospital Course:  Admitted with sepsis secondary to acute cholecystitis. Started on IV antibiotics and General Surgery consulted. Surgery recommending IR to place cholecystostomy drain. This was placed successfully. Also found to have atrial fibrillation, Cardiology consulted. On heparin drip, now transitioned to eliquis. ID consulted for antibiotic recommendations.  Noted drop in hemoglobin however remained stable.  Patient hypoxic on admission and slowly weaning down.  Given dose of Lasix and encouraging incentive spirometry.  Repeat chest x-ray showing bibasilar atelectasis. Started on metoprolol for rate control.       Interval History: "  Doing well today though O2 and 90% and atrial fibrillation in 110s.  Started on metoprolol.  Hemoglobin noted to be 8.8 today and on recheck it was 13? Do not suspect significant bleeding    Review of Systems  Objective:     Vital Signs (Most Recent):  Temp: 97.9 °F (36.6 °C) (07/17/23 1119)  Pulse: (!) 117 (07/17/23 1250)  Resp: 18 (07/17/23 1119)  BP: (!) 154/74 (07/17/23 1250)  SpO2: (!) 93 % (07/17/23 1119) Vital Signs (24h Range):  Temp:  [97.4 °F (36.3 °C)-98.5 °F (36.9 °C)] 97.9 °F (36.6 °C)  Pulse:  [] 117  Resp:  [17-22] 18  SpO2:  [92 %-96 %] 93 %  BP: (140-207)/(62-88) 154/74     Weight: 82.8 kg (182 lb 9.6 oz)  Body mass index is 34.5 kg/m².    Intake/Output Summary (Last 24 hours) at 7/17/2023 1552  Last data filed at 7/17/2023 1217  Gross per 24 hour   Intake 610 ml   Output 1130 ml   Net -520 ml           Physical Exam  Vitals and nursing note reviewed.   Constitutional:       General: She is not in acute distress.     Appearance: She is obese. She is ill-appearing.   HENT:      Head: Normocephalic.   Cardiovascular:      Rate and Rhythm: Normal rate and regular rhythm.      Pulses: Normal pulses.   Pulmonary:      Effort: Pulmonary effort is normal. No respiratory distress.   Abdominal:      General: Bowel sounds are normal.      Comments: Right sided cholecystostomy drain in place, bilious output with mild tinge of blood   Musculoskeletal:         General: No swelling.      Comments: Left AKA   Neurological:      General: No focal deficit present.      Mental Status: She is alert and oriented to person, place, and time.   Psychiatric:         Mood and Affect: Mood normal.         Thought Content: Thought content normal.         Judgment: Judgment normal.           Significant Labs: All pertinent labs within the past 24 hours have been reviewed.    Significant Imaging: I have reviewed all pertinent imaging results/findings within the past 24 hours.      Assessment/Plan:      * Sepsis  This  patient does have evidence of infective focus  My overall impression is sepsis.  Source: acute cholecystitis  Antibiotics given-   Antibiotics (72h ago, onward)    Start     Stop Route Frequency Ordered    07/14/23 1115  metroNIDAZOLE tablet 500 mg         -- Oral Every 12 hours 07/14/23 1009    07/14/23 1015  levoFLOXacin tablet 750 mg         -- Oral Every other day 07/14/23 1009        Latest lactate reviewed-  Recent Labs   Lab 07/12/23  1311   LACTATE 1.5     Organ dysfunction indicated by Acute respiratory failure    Fluid challenge Ideal Body Weight- The patient's ideal body weight is Ideal body weight: 47.8 kg (105 lb 6.1 oz) which will be used to calculate fluid bolus of 30 ml/kg for treatment of septic shock.      Post- resuscitation assessment Yes Perfusion exam was performed within 6 hours of septic shock presentation after bolus shows Adequate tissue perfusion assessed by non-invasive monitoring       Will Not start Pressors- Levophed for MAP of 65  Source control achieved by: Cholecystostomy drain and IV antibiotics  - WBC trending down    Cholelithiasis with acute cholecystitis  Acute cholecystitis, on plavix last dose this AM and has hx of ex-laparotomy  - General surgery consulted and recommending IR placement of cholecystostomy drain  - doing well with drain and flushing bid  - Currently on zosyn and awaiting culture data  - will consult ID for recommendations for antibiotics    - Plan to follow up with surgery clinic at discharge to see if candidate for cholecystectomy once acute phase has been treated.   - If deemed not candidate for cholecystectomy, will need cholangiogram and cholecystostomy tube exchange vs removal around 8/7/23  - Levaquin/Flagyl PO until 7/19    Hypoxia  Developed hypoxia on admission, likely from sepsis poor inspiration with abdominal pain  - slowly improving, now on 1 liter NC - given 40 mg IV lasix x1 on 7/15  - check CXR today - noted bibasilar atelectasis  - slowly  weaning off oxygen but still 90% on room air  - continue to encourage IS      Anemia  Hb did drop to 10 today - noted to be around 13 yesterday  - possibly related to cholecystostomy drain, also restarted on on plavix and now eliquis  - plan to monitor with CBC in AM  Hemoglobin stable at 10.5  - Noted Hb 8.8 this morning and then recheck this afternoon 13.5? Unclear true baseline  - Monitoring tonight for continued hypoxia but will also recheck CBC in AM        PVD (peripheral vascular disease)  -noted  -resume plavix   - stop aspirin as patient will now be on Eliquis for atrial fibrillation  - discussed with patient      Chronic atrial fibrillation  Patient with Paroxysmal (<7 days) atrial fibrillation which is controlled currently with no meds. Patient is currently in atrial fibrillation.[id number] Score: 4. . Anticoagulation on heparin drip - transition to Eliquis  - check echocardiogram - noted diastolic dysfunction with normal EF  - check tsh - normal  - appreciate Cardiology recommendations  - Start on metoprolol for rate control      VTE Risk Mitigation (From admission, onward)         Ordered     apixaban tablet 5 mg  2 times daily         07/13/23 1415     heparin 25,000 units in dextrose 5% 250 mL (100 units/mL) infusion LOW INTENSITY nomogram - [facility]  Continuous        Question Answer Comment   Heparin Infusion Adjustment (DO NOT MODIFY ANSWER) [email or web address] LOW INTENSITY nomogram for [facility] [id number]    Begin at (in units/kg/hr) 12        07/13/23 1415     IP VTE HIGH RISK PATIENT  Once         07/12/23 1427     Place sequential compression device  Until discontinued         07/12/23 1427              - not on heparin drip    Discharge Planning   JULIÁN:      Code Status: Full Code   Is the patient medically ready for discharge?:     Reason for patient still in hospital (select all that apply): Treatment  Discharge Plan A: Home                  Noble Peñaloza,  MD  Department of Hospital Medicine   [facility] - Med Surg

## 2023-07-17 NOTE — ASSESSMENT & PLAN NOTE
Developed hypoxia on admission, likely from sepsis poor inspiration with abdominal pain  - slowly improving, now on 1 liter NC - given 40 mg IV lasix x1 on 7/15  - check CXR today - noted bibasilar atelectasis  - slowly weaning off oxygen but still 90% on room air  - continue to encourage IS

## 2023-07-17 NOTE — NURSING
[facility] MEWS TRIGGER FOLLOW UP       MEWS Monitoring, Score is:  Indication for review: BP    Bedside Nurse, Aura napoles MD aware/ following, instructed to call [phone] for further concerns or assistance..

## 2023-07-17 NOTE — PLAN OF CARE
Pt alert able to make needs known, tolerates medications well by mouth, PO antibiotics remains in progress, no signs or symptoms of adverse reactions noted, repositioned self q 2hrs with subtle reminders, pain denied this shift, plan of care explained, diet tolerated, remains free from falls and hospital acquired pressure injuries, heel boot and sacral mepilex in place, safety maintained. Will continue following plan of care.     Pt now on RA tolerating well. Cont Pulse oximeter in place. Pt with high BP today requiring hydralazine and HR elevated as high at 116 but not sustained. MD adding beta blocker. Will continue telemetry and following current treatment plan. Pt now complaining of rash to L lip. MD examined at bedside and recommending ointment to be applied. IS encouraged.    Problem: Adult Inpatient Plan of Care  Goal: Plan of Care Review  Outcome: Ongoing, Progressing  Goal: Patient-Specific Goal (Individualized)  Outcome: Ongoing, Progressing  Goal: Absence of Hospital-Acquired Illness or Injury  Outcome: Ongoing, Progressing  Goal: Optimal Comfort and Wellbeing  Outcome: Ongoing, Progressing  Goal: Readiness for Transition of Care  Outcome: Ongoing, Progressing     Problem: Bariatric Environmental Safety  Goal: Safety Maintained with Care  Outcome: Ongoing, Progressing     Problem: Fall Injury Risk  Goal: Absence of Fall and Fall-Related Injury  Outcome: Ongoing, Progressing     Problem: Adjustment to Illness (Sepsis/Septic Shock)  Goal: Optimal Coping  Outcome: Ongoing, Progressing     Problem: Bleeding (Sepsis/Septic Shock)  Goal: Absence of Bleeding  Outcome: Ongoing, Progressing     Problem: Glycemic Control Impaired (Sepsis/Septic Shock)  Goal: Blood Glucose Level Within Desired Range  Outcome: Ongoing, Progressing     Problem: Infection Progression (Sepsis/Septic Shock)  Goal: Absence of Infection Signs and Symptoms  Outcome: Ongoing, Progressing     Problem: Nutrition Impaired (Sepsis/Septic  Shock)  Goal: Optimal Nutrition Intake  Outcome: Ongoing, Progressing     Problem: Skin Injury Risk Increased  Goal: Skin Health and Integrity  Outcome: Ongoing, Progressing

## 2023-07-17 NOTE — SUBJECTIVE & OBJECTIVE
Interval History:  Doing well today though O2 and 90% and atrial fibrillation in 110s.  Started on metoprolol.  Hemoglobin noted to be 8.8 today and on recheck it was 13? Do not suspect significant bleeding    Review of Systems  Objective:     Vital Signs (Most Recent):  Temp: 97.9 °F (36.6 °C) (07/17/23 1119)  Pulse: (!) 117 (07/17/23 1250)  Resp: 18 (07/17/23 1119)  BP: (!) 154/74 (07/17/23 1250)  SpO2: (!) 93 % (07/17/23 1119) Vital Signs (24h Range):  Temp:  [97.4 °F (36.3 °C)-98.5 °F (36.9 °C)] 97.9 °F (36.6 °C)  Pulse:  [] 117  Resp:  [17-22] 18  SpO2:  [92 %-96 %] 93 %  BP: (140-207)/(62-88) 154/74     Weight: 82.8 kg (182 lb 9.6 oz)  Body mass index is 34.5 kg/m².    Intake/Output Summary (Last 24 hours) at 7/17/2023 1552  Last data filed at 7/17/2023 1217  Gross per 24 hour   Intake 610 ml   Output 1130 ml   Net -520 ml           Physical Exam  Vitals and nursing note reviewed.   Constitutional:       General: She is not in acute distress.     Appearance: She is obese. She is ill-appearing.   HENT:      Head: Normocephalic.   Cardiovascular:      Rate and Rhythm: Normal rate and regular rhythm.      Pulses: Normal pulses.   Pulmonary:      Effort: Pulmonary effort is normal. No respiratory distress.   Abdominal:      General: Bowel sounds are normal.      Comments: Right sided cholecystostomy drain in place, bilious output with mild tinge of blood   Musculoskeletal:         General: No swelling.      Comments: Left AKA   Neurological:      General: No focal deficit present.      Mental Status: She is alert and oriented to person, place, and time.   Psychiatric:         Mood and Affect: Mood normal.         Thought Content: Thought content normal.         Judgment: Judgment normal.           Significant Labs: All pertinent labs within the past 24 hours have been reviewed.    Significant Imaging: I have reviewed all pertinent imaging results/findings within the past 24 hours.

## 2023-07-17 NOTE — ASSESSMENT & PLAN NOTE
Hb did drop to 10 today - noted to be around 13 yesterday  - possibly related to cholecystostomy drain, also restarted on on plavix and now eliquis  - plan to monitor with CBC in AM  Hemoglobin stable at 10.5  - Noted Hb 8.8 this morning and then recheck this afternoon 13.5? Unclear true baseline  - Monitoring tonight for continued hypoxia but will also recheck CBC in AM

## 2023-07-17 NOTE — NURSING
Pt lying in bed. Noted right sided cholecystostomy drain in place; dressing is dry, clean and intact. Pt demonstrated proper use of IS. External urinary catheter in place to suction. Left AKA noted. Turned and repositioned. Bed alarm is set, call light in reach, instructed pt to call for assistance.     [facility]  Nurses Note -- 4 Eyes      7/16/2023       Skin assessed on: Q Shift      [x] No Pressure Injuries Present    [x]Prevention Measures Documented    [] Yes LDA  for Pressure Injury Previously documented     [] Yes New Pressure Injury Discovered   [] LDA for New Pressure Injury Added      Attending RN:  Carlie Shepherd, DANA     Second RN:  DANA Chavarria

## 2023-07-17 NOTE — PT/OT/SLP PROGRESS
Physical Therapy Treatment    Patient Name:  Abbie Barragan   MRN:  [id number]    Recommendations:     Discharge Recommendations: home health PT  Discharge Equipment Recommendations: none  Barriers to discharge: None    Assessment:     Abbie Barragan is a 88 y.o. female admitted with a medical diagnosis of Sepsis.  She presents with the following impairments/functional limitations: weakness, impaired endurance, impaired self care skills, gait instability, impaired functional mobility, decreased lower extremity function, decreased ROM, decreased safety awareness, decreased upper extremity function, impaired balance .    Rehab Prognosis: Good; patient would benefit from acute skilled PT services to address these deficits and reach maximum level of function.    Recent Surgery: * No surgery found *      Plan:     During this hospitalization, patient to be seen 3 x/week to address the identified rehab impairments via therapeutic activities, therapeutic exercises and progress toward the following goals:    Plan of Care Expires:  07/20/23    Subjective     Chief Complaint: tired, unable to rest in the hospital   Patient/Family Comments/goals: pt is pleasant and agreeable to therapy   Pain/Comfort:   Pain Rating 1: 0/10  Pain Rating Post-Intervention 1: 0/10      Objective:     Communicated with nurse prior to session.  Patient found HOB elevated with telemetry, bed alarm, peripheral IV, pressure relief boots, PureWick upon PT entry to room.     General Precautions: Standard, fall  Orthopedic Precautions: N/A  Braces: N/A  Respiratory Status: Room air     Functional Mobility:  Bed Mobility:     Rolling Left:  supervision  Rolling Right: supervision  Scooting: contact guard assistance to anteriorly EOB and to S to scoot to HOB with bed in trendelenburg position.   Supine to Sit: contact guard assistance  Sit to Supine: contact guard assistance  Transfers:   pt declined to transfer to chair 2* to tired and sleepy, wants to rest    Balance:  good in sitting.       AM-PAC 6 CLICK MOBILITY  Turning over in bed (including adjusting bedclothes, sheets and blankets)?: 4  Sitting down on and standing up from a chair with arms (e.g., wheelchair, bedside commode, etc.): 4  Moving from lying on back to sitting on the side of the bed?: 4  Moving to and from a bed to a chair (including a wheelchair)?: 3  Need to walk in hospital room?: 1  Climbing 3-5 steps with a railing?: 1  Basic Mobility Total Score: 17       Treatment & Education:  Lower Extremity Exercises.   Patient educated on the purpose of therapeutic exercise.    Patient verbalized acceptance/understanding of instructions, expectations, and limitations(for safety).  Patient performed: 2 sets of 10 reps (each) of R LE There Ex: AP, LAQ, B Hip abd/add, B Hip flexion , B GS while sitting up on EOB.       Patient required  verbal cues/tactile cues to ensure correct sequence, to maintain proper form, and to allow for self-correction.  Pt reported no complaints or problems with exercise activity.      Patient left HOB elevated with pressure relief boot to RLE  all lines intact, call button in reach, bed alarm on, and nurse notified..    GOALS:   Multidisciplinary Problems       Physical Therapy Goals          Problem: Physical Therapy    Goal Priority Disciplines Outcome Goal Variances Interventions   Physical Therapy Goal     PT, PT/OT Ongoing, Progressing     Description: Goals to be met by: 23     Patient will increase functional independence with mobility by performin. Pt to be mod I with bed mobility.  2. Pt to transfer bed<>wheelchair with (S).  3. Pt to be (I) with written HEP.                         Time Tracking:     PT Received On: 23  PT Start Time: 1444     PT Stop Time: 1507  PT Total Time (min): 23 min     Billable Minutes: Therapeutic Activity 8 and Therapeutic Exercise 15    Treatment Type: Treatment  PT/PTA: PTA     Number of PTA visits since last PT visit: 2      07/17/2023

## 2023-07-17 NOTE — NURSING
[facility] MEWS TRIGGER FOLLOW UP       MEWS Monitoring, Score is: 5  Indication for review: HR, BP    Bedside NurseCarlie contacted, no concerns verbalized at this time, instructed to call [phone] for further concerns or assistance. Will recheck blood pressure after patient settles and administer PRNs if needed.

## 2023-07-18 VITALS
WEIGHT: 182.63 LBS | HEART RATE: 87 BPM | TEMPERATURE: 98 F | DIASTOLIC BLOOD PRESSURE: 58 MMHG | BODY MASS INDEX: 34.48 KG/M2 | SYSTOLIC BLOOD PRESSURE: 124 MMHG | RESPIRATION RATE: 18 BRPM | OXYGEN SATURATION: 92 % | HEIGHT: 61 IN

## 2023-07-18 PROBLEM — A41.9 SEPSIS: Status: RESOLVED | Noted: 2023-07-12 | Resolved: 2023-07-18

## 2023-07-18 PROBLEM — R09.02 HYPOXIA: Status: RESOLVED | Noted: 2023-07-16 | Resolved: 2023-07-18

## 2023-07-18 LAB
ALBUMIN SERPL BCP-MCNC: 2.6 G/DL (ref 3.5–5.2)
ALP SERPL-CCNC: 97 U/L (ref 55–135)
ALT SERPL W/O P-5'-P-CCNC: 12 U/L (ref 10–44)
ANION GAP SERPL CALC-SCNC: 7 MMOL/L (ref 8–16)
AST SERPL-CCNC: 15 U/L (ref 10–40)
BASOPHILS # BLD AUTO: 0.03 K/UL (ref 0–0.2)
BASOPHILS NFR BLD: 0.3 % (ref 0–1.9)
BILIRUB SERPL-MCNC: 0.6 MG/DL (ref 0.1–1)
BUN SERPL-MCNC: 15 MG/DL (ref 8–23)
CALCIUM SERPL-MCNC: 9.1 MG/DL (ref 8.7–10.5)
CHLORIDE SERPL-SCNC: 102 MMOL/L (ref 95–110)
CO2 SERPL-SCNC: 26 MMOL/L (ref 23–29)
CREAT SERPL-MCNC: 0.7 MG/DL (ref 0.5–1.4)
DIFFERENTIAL METHOD: ABNORMAL
EOSINOPHIL # BLD AUTO: 0.1 K/UL (ref 0–0.5)
EOSINOPHIL NFR BLD: 1.2 % (ref 0–8)
ERYTHROCYTE [DISTWIDTH] IN BLOOD BY AUTOMATED COUNT: 15.8 % (ref 11.5–14.5)
EST. GFR  (NO RACE VARIABLE): >60 ML/MIN/1.73 M^2
GLUCOSE SERPL-MCNC: 98 MG/DL (ref 70–110)
HCT VFR BLD AUTO: 36.8 % (ref 37–48.5)
HGB BLD-MCNC: 11.3 G/DL (ref 12–16)
IMM GRANULOCYTES # BLD AUTO: 0.04 K/UL (ref 0–0.04)
IMM GRANULOCYTES NFR BLD AUTO: 0.4 % (ref 0–0.5)
LYMPHOCYTES # BLD AUTO: 3 K/UL (ref 1–4.8)
LYMPHOCYTES NFR BLD: 29.5 % (ref 18–48)
MCH RBC QN AUTO: 25.7 PG (ref 27–31)
MCHC RBC AUTO-ENTMCNC: 30.7 G/DL (ref 32–36)
MCV RBC AUTO: 84 FL (ref 82–98)
MONOCYTES # BLD AUTO: 0.5 K/UL (ref 0.3–1)
MONOCYTES NFR BLD: 5.2 % (ref 4–15)
NEUTROPHILS # BLD AUTO: 6.5 K/UL (ref 1.8–7.7)
NEUTROPHILS NFR BLD: 63.4 % (ref 38–73)
NRBC BLD-RTO: 0 /100 WBC
PLATELET # BLD AUTO: 370 K/UL (ref 150–450)
PMV BLD AUTO: 11.5 FL (ref 9.2–12.9)
POCT GLUCOSE: 98 MG/DL (ref 70–110)
POTASSIUM SERPL-SCNC: 4.7 MMOL/L (ref 3.5–5.1)
PROT SERPL-MCNC: 6.3 G/DL (ref 6–8.4)
RBC # BLD AUTO: 4.4 M/UL (ref 4–5.4)
SODIUM SERPL-SCNC: 135 MMOL/L (ref 136–145)
WBC # BLD AUTO: 10.3 K/UL (ref 3.9–12.7)

## 2023-07-18 PROCEDURE — 85025 COMPLETE CBC W/AUTO DIFF WBC: CPT | Performed by: EMERGENCY MEDICINE

## 2023-07-18 PROCEDURE — 80053 COMPREHEN METABOLIC PANEL: CPT | Performed by: STUDENT IN AN ORGANIZED HEALTH CARE EDUCATION/TRAINING PROGRAM

## 2023-07-18 PROCEDURE — 36415 COLL VENOUS BLD VENIPUNCTURE: CPT | Performed by: STUDENT IN AN ORGANIZED HEALTH CARE EDUCATION/TRAINING PROGRAM

## 2023-07-18 PROCEDURE — 94761 N-INVAS EAR/PLS OXIMETRY MLT: CPT

## 2023-07-18 PROCEDURE — 94799 UNLISTED PULMONARY SVC/PX: CPT

## 2023-07-18 PROCEDURE — 25000003 PHARM REV CODE 250: Performed by: STUDENT IN AN ORGANIZED HEALTH CARE EDUCATION/TRAINING PROGRAM

## 2023-07-18 RX ORDER — ACETAMINOPHEN 325 MG/1
650 TABLET ORAL EVERY 6 HOURS PRN
Start: 2023-07-18

## 2023-07-18 RX ORDER — LEVOFLOXACIN 750 MG/1
750 TABLET ORAL EVERY OTHER DAY
Qty: 1 TABLET | Refills: 0 | Status: SHIPPED | OUTPATIENT
Start: 2023-07-20 | End: 2023-07-21

## 2023-07-18 RX ORDER — METOPROLOL TARTRATE 25 MG/1
25 TABLET, FILM COATED ORAL 2 TIMES DAILY
Qty: 60 TABLET | Refills: 11 | Status: SHIPPED | OUTPATIENT
Start: 2023-07-18 | End: 2024-07-17

## 2023-07-18 RX ORDER — METRONIDAZOLE 500 MG/1
500 TABLET ORAL EVERY 12 HOURS
Qty: 2 TABLET | Refills: 0 | Status: SHIPPED | OUTPATIENT
Start: 2023-07-18 | End: 2023-07-19

## 2023-07-18 RX ADMIN — METRONIDAZOLE 500 MG: 500 TABLET ORAL at 09:07

## 2023-07-18 RX ADMIN — CLOPIDOGREL BISULFATE 75 MG: 75 TABLET ORAL at 09:07

## 2023-07-18 RX ADMIN — LOSARTAN POTASSIUM 100 MG: 25 TABLET, FILM COATED ORAL at 09:07

## 2023-07-18 RX ADMIN — APIXABAN 5 MG: 5 TABLET, FILM COATED ORAL at 09:07

## 2023-07-18 RX ADMIN — AMLODIPINE BESYLATE 10 MG: 5 TABLET ORAL at 09:07

## 2023-07-18 RX ADMIN — LEVOFLOXACIN 750 MG: 750 TABLET, FILM COATED ORAL at 09:07

## 2023-07-18 RX ADMIN — METOPROLOL TARTRATE 25 MG: 25 TABLET, FILM COATED ORAL at 09:07

## 2023-07-18 NOTE — PLAN OF CARE
Problem: Adult Inpatient Plan of Care  Goal: Plan of Care Review  Outcome: Ongoing, Progressing     Problem: Bariatric Environmental Safety  Goal: Safety Maintained with Care  Outcome: Ongoing, Progressing     Problem: Fall Injury Risk  Goal: Absence of Fall and Fall-Related Injury  Outcome: Ongoing, Progressing     Problem: Skin Injury Risk Increased  Goal: Skin Health and Integrity  Outcome: Ongoing, Progressing     Problem: Bowel Elimination/Continence Impairment  Goal: Effective Bowel Elimination/Continence  Outcome: Ongoing, Progressing

## 2023-07-18 NOTE — NURSING
Pt lying in bed, demonstrated IS use. External urinary catheter in place to suction. Turned and repositioned pt. Bed alarm set, call light in reach. Instructed pt to call for assistance.     [facility]  Nurses Note -- 4 Eyes      7/17/2023       Skin assessed on: Q Shift      [x] No Pressure Injuries Present    [x]Prevention Measures Documented    [] Yes LDA  for Pressure Injury Previously documented     [] Yes New Pressure Injury Discovered   [] LDA for New Pressure Injury Added      Attending RN:  Carlie Shepherd RN     Second RN:  DANA Chavarria

## 2023-07-18 NOTE — PLAN OF CARE
[facility] Surg    HOME HEALTH ORDERS  FACE TO FACE ENCOUNTER    Patient Name: Abbie Barragan  YOB: 1935    PCP: Kailash Carvalho NP   PCP Address: [address] / [facility] [address]  PCP Phone Number: [phone]  PCP Fax: [phone]       Encounter Date: 07/18/2023    Admit to Home Health    Diagnoses:  Active Hospital Problems    Diagnosis  POA    Anemia [D64.9]  No    Cholelithiasis with acute cholecystitis [K80.00]  Yes    Chronic atrial fibrillation [I48.20]  Yes    PVD (peripheral vascular disease) [I73.9]  Yes      Resolved Hospital Problems    Diagnosis Date Resolved POA    *Sepsis [A41.9] 07/18/2023 Yes    Hypoxia [R09.02] 07/18/2023 Yes       No future appointments.   Follow-up Information       Kailash Carvalho NP Follow up in 1 week(s).    Specialty: Family Medicine  Contact information:  [address]  [id number]  [facility] [address]  [phone]                               I have seen and examined this patient face to face today. My clinical findings that support the need for the home health skilled services and home bound status are the following:  Weakness/numbness causing balance and gait disturbance due to Infection and Weakness/Debility making it taxing to leave home.  Medical restrictions requiring assistance of another human to leave home due to  Unstable ambulation.    Allergies:Review of patient's allergies indicates:  No Known Allergies    Diet: cardiac diet    Activities: activity as tolerated    Nursing:   SN to complete comprehensive assessment including routine vital signs. Instruct on disease process and s/s of complications to report to MD. Review/verify medication list sent home with the patient at time of discharge  and instruct patient/caregiver as needed. Frequency may be adjusted depending on start of care date.    Notify MD if SBP > 160 or < 90; DBP > 90 or < 50; HR > 120 or < 50; Temp > 101      CONSULTS:     Physical Therapy to evaluate and treat. Evaluate for home safety and equipment needs; Establish/upgrade home exercise program. Perform / instruct on therapeutic exercises, gait training, transfer training, and Range of Motion.  Occupational Therapy to evaluate and treat. Evaluate home environment for safety and equipment needs. Perform/Instruct on transfers, ADL training, ROM, and therapeutic exercises.   to evaluate for community resources/long-range planning.  Aide to provide assistance with personal care, ADLs, and vital signs.    MISCELLANEOUS CARE:  Routine care of cholecystostomy drain.  Drain must be flushed with 10-cc of sterile NS daily to assist with drainage and prevent catheter clogging for as long as drain remains indwelling.      Medications: Review discharge medications with patient and family and provide education.      Current Discharge Medication List        START taking these medications    Details   apixaban (ELIQUIS) 5 mg Tab Take 1 tablet (5 mg total) by mouth 2 (two) times daily.  Qty: 60 tablet, Refills: 11      levoFLOXacin (LEVAQUIN) 750 MG tablet Take 1 tablet (750 mg total) by mouth every other day. for 1 dose  Qty: 1 tablet, Refills: 0      metoprolol tartrate (LOPRESSOR) 25 MG tablet Take 1 tablet (25 mg total) by mouth 2 (two) times daily.  Qty: 60 tablet, Refills: 11    Comments: .      metroNIDAZOLE (FLAGYL) 500 MG tablet Take 1 tablet (500 mg total) by mouth every 12 (twelve) hours. for 1 day  Qty: 2 tablet, Refills: 0           CONTINUE these medications which have CHANGED    Details   acetaminophen (TYLENOL) 325 MG tablet Take 2 tablets (650 mg total) by mouth every 6 (six) hours as needed for Pain.           CONTINUE these medications which have NOT CHANGED    Details   alendronate (FOSAMAX) 70 MG tablet Take 70 mg by mouth every 7 days.      amLODIPine (NORVASC) 10 MG tablet Take 10 mg by mouth once daily.      cholecalciferol, vitamin D3, (VITAMIN D3) 25 mcg  (1,000 unit) capsule Take 1,000 Units by mouth once daily.      clopidogrel (PLAVIX) 75 mg tablet Take 75 mg by mouth once daily.      hydroCHLOROthiazide (HYDRODIURIL) 25 MG tablet Take 25 mg by mouth once daily.      losartan (COZAAR) 100 MG tablet Take 100 mg by mouth once daily.      potassium chloride (MICRO-K) 10 MEQ CpSR Take 10 mEq by mouth once.      rosuvastatin (CRESTOR) 40 MG Tab Take 1 tablet by mouth once daily.           STOP taking these medications       aspirin (ECOTRIN) 81 MG EC tablet Comments:   Reason for Stopping:               I certify that this patient is confined to her home and needs intermittent skilled nursing care, physical therapy, and occupational therapy.

## 2023-07-18 NOTE — DISCHARGE SUMMARY
"[facility] Medicine  Discharge Summary      Patient Name: Abbie Barragan  MRN: [id number]  [employer] [facility] [employer] [facility]: [id number]  Patient Class: IP- Inpatient  Admission Date: 7/12/2023  Hospital Length of Stay: 6 days  Discharge Date and Time:  07/18/2023 9:45 AM  Attending Physician: Lauro Guan MD   Discharging Provider: Lauro Guan MD  Primary Care Provider: Kailash Carvalho NP    Primary Care Team:  HOSP [facility]    HPI:   Ms. Barragan is an 88 year old female with a past medical history of left AKA, hypertension, PAD who presents to the ED today for abdominal pain. She states it's on her right lower abdomen and thought it was her hip. This started yesterday and has progressively gotten worse. She was also found to be severely hypertensive in ED but improved with pain medications. She believes that she had fevers at home but denies any issues breathing, chest pain, diarrhea.  In the ED, she was found to have WBC 17.91, Na 130, K 3.2, Cl 93,  and lactate 2.24. CT scan showed "gallbladder distended with a few gallstones with hyperemia of the gallbladder wall, gallbladder wall thickening and pericholecystic fluid. Concerning for acute cholecystitis." Patient also noted to be in atrial fibrillation with rate 80s in ED. General Surgery and Cardiology consulted. General surgery recommending IR cholecystostomy drain. IR consulted, drain to be placed today.      * No surgery found *      Hospital Course:   Admitted with sepsis secondary to acute cholecystitis. Started on IV antibiotics and General Surgery consulted. Surgery recommending IR to place cholecystostomy drain. This was placed successfully. Also found to have atrial fibrillation, Cardiology consulted. On heparin drip, now transitioned to eliquis. Already on Plavix and ASA stopped.  ID consulted for antibiotic recommendations. Patient hypoxic on admission and slowly weaning down.  Given dose of Lasix and encouraging incentive spirometry. " Repeat chest x-ray showing bibasilar atelectasis. Continued on treatment and currently doing well on RA.  Started on metoprolol for rate control.  She has remained afebrile and hemodynamically stable. Will need one more day of PO Levaquin and Flagyl.  She will be discharged home with drain in place.  Will arrange HH.  She will follow up with PCP, Surgery and Cardiology.       Goals of Care Treatment Preferences:  Code Status: Full Code      Consults:   Consults (From admission, onward)        Status Ordering Provider     Inpatient consult to Infectious Diseases  Once        Provider:  Sofia Collier MD    Completed SASHA GRAVES     Inpatient consult to Interventional Radiology  Once        Provider:  Nicola Jeffrey MD    Completed SAMEER JEFFREY     Inpatient consult to Interventional Radiology  Once        Provider:  Nicola Jeffrey MD    Completed HAYLEY BURRELL     Inpatient consult to Cardiology  Once        Provider:  Rell Mcfadden MD    Completed SAMEER JEFFREY     Inpatient consult to General surgery  Once        Provider:  Isra Baca MD    Completed SAMEER JEFFREY          No new Assessment & Plan notes have been filed under this hospital service since the last note was generated.  Service: Hospital Medicine    Final Active Diagnoses:    Diagnosis Date Noted POA    Anemia [D64.9] 07/14/2023 No    Cholelithiasis with acute cholecystitis [K80.00] 07/12/2023 Yes    Chronic atrial fibrillation [I48.20] 07/12/2023 Yes    PVD (peripheral vascular disease) [I73.9] 07/14/2022 Yes      Problems Resolved During this Admission:    Diagnosis Date Noted Date Resolved POA    PRINCIPAL PROBLEM:  Sepsis [A41.9] 07/12/2023 07/18/2023 Yes    Hypoxia [R09.02] 07/16/2023 07/18/2023 Yes       Discharged Condition: stable    Disposition: Home-Health Care [facility]    Follow Up:   Follow-up Information     Kailash Carvalho NP Follow up in 1 week(s).    Specialty: Family Medicine  Contact  information:  [address]  [address]  [facility] [address]  [phone]                       Patient Instructions:      Ambulatory referral/consult to Cardiology   Standing Status: Future   Referral Priority: Routine Referral Type: Consultation   Referral Reason: Specialty Services Required   Requested Specialty: Cardiology   Number of Visits Requested: 1     Ambulatory referral/consult to General Surgery   Standing Status: Future   Referral Priority: Routine Referral Type: Consultation   Referral Reason: Specialty Services Required   Requested Specialty: General Surgery   Number of Visits Requested: 1     Diet Cardiac     Notify your health care provider if you experience any of the following:  temperature >100.4     Notify your health care provider if you experience any of the following:  persistent nausea and vomiting or diarrhea     Notify your health care provider if you experience any of the following:  severe uncontrolled pain     Notify your health care provider if you experience any of the following:  difficulty breathing or increased cough     Notify your health care provider if you experience any of the following:  persistent dizziness, light-headedness, or visual disturbances     Notify your health care provider if you experience any of the following:  increased confusion or weakness     Activity as tolerated           Pending Diagnostic Studies:     None         Medications:  Reconciled Home Medications:      Medication List      START taking these medications    apixaban 5 mg Tab  Commonly known as: ELIQUIS  Take 1 tablet (5 mg total) by mouth 2 (two) times daily.     levoFLOXacin 750 MG tablet  Commonly known as: LEVAQUIN  Take 1 tablet (750 mg total) by mouth every other day. for 1 dose  Start taking on: July 20, 2023     metoprolol tartrate 25 MG tablet  Commonly known as: LOPRESSOR  Take 1 tablet (25 mg total) by mouth 2 (two) times daily.     metroNIDAZOLE 500 MG tablet  Commonly known as:  FLAGYL  Take 1 tablet (500 mg total) by mouth every 12 (twelve) hours. for 1 day        CHANGE how you take these medications    acetaminophen 325 MG tablet  Commonly known as: TYLENOL  Take 2 tablets (650 mg total) by mouth every 6 (six) hours as needed for Pain.  What changed:   · medication strength  · how much to take  · when to take this        CONTINUE taking these medications    alendronate 70 MG tablet  Commonly known as: FOSAMAX  Take 70 mg by mouth every 7 days.     amLODIPine 10 MG tablet  Commonly known as: NORVASC  Take 10 mg by mouth once daily.     cholecalciferol (vitamin D3) 25 mcg (1,000 unit) capsule  Commonly known as: VITAMIN D3  Take 1,000 Units by mouth once daily.     clopidogreL 75 mg tablet  Commonly known as: PLAVIX  Take 75 mg by mouth once daily.     hydroCHLOROthiazide 25 MG tablet  Commonly known as: HYDRODIURIL  Take 25 mg by mouth once daily.     losartan 100 MG tablet  Commonly known as: COZAAR  Take 100 mg by mouth once daily.     potassium chloride 10 MEQ Cpsr  Commonly known as: MICRO-K  Take 10 mEq by mouth once.     rosuvastatin 40 MG Tab  Commonly known as: CRESTOR  Take 1 tablet by mouth once daily.        STOP taking these medications    aspirin 81 MG EC tablet  Commonly known as: ECOTRIN            Indwelling Lines/Drains at time of discharge:   Lines/Drains/Airways     Drain  Duration                Drain/Device  07/12/23 1540 Right upper upper quadrant gravity 5 days                Time spent on the discharge of patient: >30 minutes         Lauro Guan MD  Department of Hospital Medicine  [facility] [place], [place] - [facility] Surg

## 2023-07-18 NOTE — PLAN OF CARE
07/18/23 0900   Medicare Message   Important Message from Medicare regarding Discharge Appeal Rights Given to patient/caregiver;Explained to patient/caregiver;Signed/date by patient/caregiver;Other (comments)   Date IMM was signed 07/18/23   Time IMM was signed 0900     Patient expressed understanding.    Copy provided to patient.

## 2023-07-18 NOTE — PLAN OF CARE
Problem: Adult Inpatient Plan of Care  Goal: Plan of Care Review  Outcome: Met  Goal: Patient-Specific Goal (Individualized)  Outcome: Met  Goal: Absence of Hospital-Acquired Illness or Injury  Outcome: Met  Goal: Optimal Comfort and Wellbeing  Outcome: Met  Goal: Readiness for Transition of Care  Outcome: Met     Problem: Bariatric Environmental Safety  Goal: Safety Maintained with Care  Outcome: Met     Problem: Fall Injury Risk  Goal: Absence of Fall and Fall-Related Injury  Outcome: Met     Problem: Adjustment to Illness (Sepsis/Septic Shock)  Goal: Optimal Coping  Outcome: Met     Problem: Bleeding (Sepsis/Septic Shock)  Goal: Absence of Bleeding  Outcome: Met     Problem: Glycemic Control Impaired (Sepsis/Septic Shock)  Goal: Blood Glucose Level Within Desired Range  Outcome: Met     Problem: Infection Progression (Sepsis/Septic Shock)  Goal: Absence of Infection Signs and Symptoms  Outcome: Met     Problem: Nutrition Impaired (Sepsis/Septic Shock)  Goal: Optimal Nutrition Intake  Outcome: Met     Problem: Skin Injury Risk Increased  Goal: Skin Health and Integrity  Outcome: Met     Problem: Bowel Elimination/Continence Impairment  Goal: Effective Bowel Elimination/Continence  Outcome: Met

## 2023-07-18 NOTE — NURSING
VSS on RA, NAD, Afebrile, voids per diaper. Discharge instructions explained and handed to pt and family at bedside, Myah tube education done, pt & family verbalize understanding. IV access dc. Cardiac monitoring dc. Discharged home via wheelchair.

## 2023-07-18 NOTE — PLAN OF CARE
TN informed med surg nurse Georgie that patient is cleared for discharge from case management's viewpoint.  Josef Tafoya [facility].   07/18/23 1055   Final Note   Assessment Type Final Discharge Note   Anticipated Discharge Disposition Home-Health   What phone number can be called within the next 1-3 days to see how you are doing after discharge?   (see chart)   Hospital Resources/Appts/Education Provided Provided patient/caregiver with written discharge plan information;Appointments scheduled and added to AVS   Post-Acute Status   Post-Acute Authorization Home Health   Home Health Status Set-up Complete/Auth obtained   Coverage PHN   Discharge Delays None known at this time

## 2023-07-18 NOTE — PHYSICIAN QUERY
"PT Name: Abbie Barragan  MR #: [id number]    DOCUMENTATION CLARIFICATION     CDS/: Dasha Zelaya RN CDI         Contact information: [email or web address]   This form is a permanent document in the medical record.     Query Date: July 18, 2023    By submitting this query, we are merely seeking further clarification of documentation.  Please utilize your independent clinical judgment when addressing the question(s) below.      The Medical Record reflects the following:  Clinical Information Location in Medical Record   Test results reviewed with patient, appears to have cholecystitis, also possible pneumonia with hypoxia.   I gave 1 L of fluids, and Zosyn       Patient hypoxic on admission and slowly weaning down.    Given dose of Lasix and encouraging incentive spirometry.   Repeat chest x-ray showing bibasilar atelectasis.   Continued on treatment and currently doing well on RA.    Will need one more day of PO Levaquin and Flagyl.         Cxr 7/12 -      Ill-defined basilar opacities may relate to atelectasis, aspiration or pneumonia.    Cxr 7/16  Probable small pleural effusions with bibasilar atelectasis.  No pneumothorax.  Cardiac silhouette is enlarged, stable.   ER 7/12 L Aki SANCHEZ          Discharge Summary 7/18  ELTON Guan MD              Radiology         Please clarify/confirm the Emergency Medicine diagnosis of  "Pneumonia"?     [   ] Diagnosis ruled in   [   ] Diagnosis ruled out   [   ] Other diagnosis (please specify): _____________   [ x ] Clinically undetermined       "

## 2023-07-19 ENCOUNTER — PATIENT OUTREACH (OUTPATIENT)
Dept: ADMINISTRATIVE | Facility: CLINIC | Age: 88
End: 2023-07-19
Payer: MEDICARE

## 2023-07-19 NOTE — PROGRESS NOTES
C3 nurse attempted to contact Abbie Barragan for a TCC post hospital discharge follow up call. No answer or voicemail available on the pts son's phone and pts two numbers are invalid. The patient does not have a scheduled HOSFU appointment with her PCP, Kailash Carvalho NP within the first 5-7 days post discharge date of 7/18/23. Unable to route to Kailash Carvalho NP.

## 2023-07-20 NOTE — PROGRESS NOTES
C3 nurse attempted to contact Abbie Barragan for a TCC post hospital discharge follow up call. No answer. Left voicemail with callback information. The patient does not have a scheduled HOSFU appointment with her PCP, Kailash Carvalho NP within the first 5-7 days post discharge date of 7/18/23. Unable to route to Kailash Carvalho NP.

## 2023-07-24 PROCEDURE — G0180 PR HOME HEALTH MD CERTIFICATION: ICD-10-PCS | Mod: ,,, | Performed by: HOSPITALIST

## 2023-07-24 PROCEDURE — G0180 MD CERTIFICATION HHA PATIENT: HCPCS | Mod: ,,, | Performed by: HOSPITALIST

## 2023-08-09 ENCOUNTER — DOCUMENT SCAN (OUTPATIENT)
Dept: HOME HEALTH SERVICES | Facility: HOSPITAL | Age: 88
End: 2023-08-09

## 2023-08-09 ENCOUNTER — DOCUMENT SCAN (OUTPATIENT)
Dept: HOME HEALTH SERVICES | Facility: HOSPITAL | Age: 88
End: 2023-08-09
Payer: MEDICARE

## 2023-08-16 ENCOUNTER — TELEPHONE (OUTPATIENT)
Dept: MEDSURG UNIT | Facility: HOSPITAL | Age: 88
End: 2023-08-16
Payer: MEDICARE

## 2023-08-21 ENCOUNTER — TELEPHONE (OUTPATIENT)
Dept: CARDIOLOGY | Facility: CLINIC | Age: 88
End: 2023-08-21
Payer: MEDICARE

## 2023-08-21 ENCOUNTER — OFFICE VISIT (OUTPATIENT)
Dept: SURGERY | Facility: CLINIC | Age: 88
End: 2023-08-21
Payer: MEDICARE

## 2023-08-21 VITALS
BODY MASS INDEX: 34.5 KG/M2 | HEART RATE: 70 BPM | HEIGHT: 61 IN | OXYGEN SATURATION: 97 % | DIASTOLIC BLOOD PRESSURE: 84 MMHG | SYSTOLIC BLOOD PRESSURE: 125 MMHG

## 2023-08-21 DIAGNOSIS — K81.9 CHOLECYSTITIS: Primary | ICD-10-CM

## 2023-08-21 PROCEDURE — 1126F AMNT PAIN NOTED NONE PRSNT: CPT | Mod: CPTII,S$GLB,, | Performed by: SURGERY

## 2023-08-21 PROCEDURE — 1101F PR PT FALLS ASSESS DOC 0-1 FALLS W/OUT INJ PAST YR: ICD-10-PCS | Mod: CPTII,S$GLB,, | Performed by: SURGERY

## 2023-08-21 PROCEDURE — 99214 OFFICE O/P EST MOD 30 MIN: CPT | Mod: S$GLB,,, | Performed by: SURGERY

## 2023-08-21 PROCEDURE — 99999 PR PBB SHADOW E&M-EST. PATIENT-LVL V: ICD-10-PCS | Mod: PBBFAC,,, | Performed by: SURGERY

## 2023-08-21 PROCEDURE — 99214 PR OFFICE/OUTPT VISIT, EST, LEVL IV, 30-39 MIN: ICD-10-PCS | Mod: S$GLB,,, | Performed by: SURGERY

## 2023-08-21 PROCEDURE — 1159F PR MEDICATION LIST DOCUMENTED IN MEDICAL RECORD: ICD-10-PCS | Mod: CPTII,S$GLB,, | Performed by: SURGERY

## 2023-08-21 PROCEDURE — 1159F MED LIST DOCD IN RCRD: CPT | Mod: CPTII,S$GLB,, | Performed by: SURGERY

## 2023-08-21 PROCEDURE — 3288F FALL RISK ASSESSMENT DOCD: CPT | Mod: CPTII,S$GLB,, | Performed by: SURGERY

## 2023-08-21 PROCEDURE — 1126F PR PAIN SEVERITY QUANTIFIED, NO PAIN PRESENT: ICD-10-PCS | Mod: CPTII,S$GLB,, | Performed by: SURGERY

## 2023-08-21 PROCEDURE — 99999 PR PBB SHADOW E&M-EST. PATIENT-LVL V: CPT | Mod: PBBFAC,,, | Performed by: SURGERY

## 2023-08-21 PROCEDURE — 1101F PT FALLS ASSESS-DOCD LE1/YR: CPT | Mod: CPTII,S$GLB,, | Performed by: SURGERY

## 2023-08-21 PROCEDURE — 3288F PR FALLS RISK ASSESSMENT DOCUMENTED: ICD-10-PCS | Mod: CPTII,S$GLB,, | Performed by: SURGERY

## 2023-08-21 RX ORDER — INDOCYANINE GREEN AND WATER 25 MG
1.25 KIT INJECTION
Status: CANCELLED | OUTPATIENT
Start: 2023-08-21

## 2023-08-21 RX ORDER — SODIUM CHLORIDE 9 MG/ML
INJECTION, SOLUTION INTRAVENOUS CONTINUOUS
Status: CANCELLED | OUTPATIENT
Start: 2023-08-21

## 2023-08-21 NOTE — PROGRESS NOTES
Surgery Clinic Note    Abbie Barragan is a 88 y.o. year old female in clinic today for follow up of IR drain for cholecystitis, recent hospital stay. Drain is in place, with bile in bag. On blood thinners. No pain. Eating OK. Requests drain removal. I explained that with gallstones blocking cystic duct, best approach would be plan for cholecystectomy with removal of bag at that time. She understands. Is in a wheelchair but denies chest pain, shortness of breath. Was seen in hospital by cardiology and given risk stratification by Dr Mcfadden.  NOAC blood thinner managed by her vascular team, will check with them regarding holding the eliquis.  No f/c/n/v/sob/cp    ROS:  Negative except above      Imaging:  Impression:     Gallbladder is distended with a few gallstones with hyperemia of the gallbladder wall, gallbladder wall thickening, and pericholecystic fluid.  Findings are concerning for acute cholecystitis.  Clinical correlation is recommended.  Consider right upper quadrant ultrasound as clinically indicated.     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    PE:  Vitals:    08/21/23 1027   BP: 125/84   Pulse: 70     NAD  In a wheelchair, s/p AKA  No belabored breathing  Abd soft nt nd  RUQ drain with bile in bag.    ----    Cardiology assessment: 7/12/23  * Cholelithiasis with acute cholecystitis  Per surgery. If echo stable then cleared at moderate cardiac risk     PAD (peripheral artery disease)  Followed at . Hx left AKA     Chronic atrial fibrillation  New Dx. Duration unknown - possibly chronic. Rate controlled. Check echo. On heparin - switch to DOAC if no invasive procedures planned. Will f/u prn    ---      Past Medical History:   Diagnosis Date    Above knee amputation status 1994    History of DVT of lower extremity 1994    LEFT    Hyperlipidemia     Hypertension     PAD (peripheral artery disease)        A/P:  Abbie Barragan is a 88 y.o. year old female w hx of DVT, HTN  HLD PAD on eliquis with recent bout of cholecystitis    -To OR 9/14/23 for robotic cholecystectomy  Will reach out to her vascular clinic to obtain clearance to hold blood thinner for 5 days  Will check w Cardiology to review the ECHO which was done after their note.  Risks and side effects discussed with the patient. Alterative therapies discussed. Patient agreeable to procedure and has signed consent which was discussed in detail.    Isidro Bell  General Surgery - [facility]  8/21/2023    ---    PER DR GARCIA ON 8/21/23:  Cleared for surgery at moderate cardiac risk. Ok to hold eliquis 3 days and plavix 5 days before

## 2023-08-21 NOTE — TELEPHONE ENCOUNTER
"----- Message from Isidro Bell MD sent at 8/21/2023 12:35 PM CDT -----  Regarding: cardiac review/ECHO  Hi.    This patient was admitted last month and Dr. Mcfadden put a note, the note had said "pending echo" which was completed afterwards.  She will need general anesthesia next month, could the echo be reviewed and a note be placed regarding pre op assessment?    Thanks    Isidro Bell  General Surgery - [facility]  8/21/2023  12:36 PM        "

## 2023-08-22 ENCOUNTER — ANESTHESIA EVENT (OUTPATIENT)
Dept: SURGERY | Facility: HOSPITAL | Age: 88
End: 2023-08-22
Payer: MEDICARE

## 2023-08-24 ENCOUNTER — TELEPHONE (OUTPATIENT)
Dept: SURGERY | Facility: CLINIC | Age: 88
End: 2023-08-24
Payer: MEDICARE

## 2023-08-24 NOTE — TELEPHONE ENCOUNTER
Spoke with Ms.Yung regarding pre op appt, informed her the number to schedule. pt.verbalized understanding.     ----- Message from Anabelle Dunn sent at 8/24/2023  2:22 PM CDT -----  Type:  Patient Returning Call    Who Called: pt     Who Left Message for Patient: Andrew    Does the patient know what this is regarding?:no    Would the patient rather a call back or a response via [facility]? call    Best Call Back Number:[phone] (home) [phone] (work)      Additional Information:

## 2023-08-24 NOTE — TELEPHONE ENCOUNTER
Attempted to contact MsJonahYung regarding pre op appt, unable to reach, left brief  top contact office.

## 2023-08-30 ENCOUNTER — EXTERNAL HOME HEALTH (OUTPATIENT)
Dept: HOME HEALTH SERVICES | Facility: HOSPITAL | Age: 88
End: 2023-08-30
Payer: MEDICARE

## 2023-09-07 ENCOUNTER — HOSPITAL ENCOUNTER (OUTPATIENT)
Dept: PREADMISSION TESTING | Facility: HOSPITAL | Age: 88
Discharge: HOME | End: 2023-09-07
Attending: SURGERY
Payer: MEDICARE

## 2023-09-07 VITALS
WEIGHT: 182.56 LBS | SYSTOLIC BLOOD PRESSURE: 133 MMHG | TEMPERATURE: 97 F | DIASTOLIC BLOOD PRESSURE: 78 MMHG | HEIGHT: 61 IN | HEART RATE: 56 BPM | BODY MASS INDEX: 34.47 KG/M2 | RESPIRATION RATE: 18 BRPM | OXYGEN SATURATION: 97 %

## 2023-09-07 DIAGNOSIS — K81.9 CHOLECYSTITIS: ICD-10-CM

## 2023-09-07 LAB
ALBUMIN SERPL BCP-MCNC: 3.1 G/DL (ref 3.5–5.2)
ALP SERPL-CCNC: 115 U/L (ref 55–135)
ALT SERPL W/O P-5'-P-CCNC: 7 U/L (ref 10–44)
ANION GAP SERPL CALC-SCNC: 9 MMOL/L (ref 8–16)
AST SERPL-CCNC: 10 U/L (ref 10–40)
BASOPHILS # BLD AUTO: 0.03 K/UL (ref 0–0.2)
BASOPHILS NFR BLD: 0.4 % (ref 0–1.9)
BILIRUB SERPL-MCNC: 0.5 MG/DL (ref 0.1–1)
BUN SERPL-MCNC: 10 MG/DL (ref 8–23)
CALCIUM SERPL-MCNC: 9.2 MG/DL (ref 8.7–10.5)
CHLORIDE SERPL-SCNC: 101 MMOL/L (ref 95–110)
CO2 SERPL-SCNC: 33 MMOL/L (ref 23–29)
CREAT SERPL-MCNC: 0.7 MG/DL (ref 0.5–1.4)
DIFFERENTIAL METHOD: ABNORMAL
EOSINOPHIL # BLD AUTO: 0.2 K/UL (ref 0–0.5)
EOSINOPHIL NFR BLD: 2.2 % (ref 0–8)
ERYTHROCYTE [DISTWIDTH] IN BLOOD BY AUTOMATED COUNT: 15.8 % (ref 11.5–14.5)
EST. GFR  (NO RACE VARIABLE): >60 ML/MIN/1.73 M^2
GLUCOSE SERPL-MCNC: 87 MG/DL (ref 70–110)
HCT VFR BLD AUTO: 35.8 % (ref 37–48.5)
HGB BLD-MCNC: 10.9 G/DL (ref 12–16)
IMM GRANULOCYTES # BLD AUTO: 0.02 K/UL (ref 0–0.04)
IMM GRANULOCYTES NFR BLD AUTO: 0.2 % (ref 0–0.5)
LYMPHOCYTES # BLD AUTO: 2.9 K/UL (ref 1–4.8)
LYMPHOCYTES NFR BLD: 34.6 % (ref 18–48)
MCH RBC QN AUTO: 26.1 PG (ref 27–31)
MCHC RBC AUTO-ENTMCNC: 30.4 G/DL (ref 32–36)
MCV RBC AUTO: 86 FL (ref 82–98)
MONOCYTES # BLD AUTO: 0.3 K/UL (ref 0.3–1)
MONOCYTES NFR BLD: 4.1 % (ref 4–15)
NEUTROPHILS # BLD AUTO: 4.8 K/UL (ref 1.8–7.7)
NEUTROPHILS NFR BLD: 58.5 % (ref 38–73)
NRBC BLD-RTO: 0 /100 WBC
PLATELET # BLD AUTO: 281 K/UL (ref 150–450)
PMV BLD AUTO: 13.4 FL (ref 9.2–12.9)
POTASSIUM SERPL-SCNC: 3.1 MMOL/L (ref 3.5–5.1)
PROT SERPL-MCNC: 7 G/DL (ref 6–8.4)
RBC # BLD AUTO: 4.17 M/UL (ref 4–5.4)
SODIUM SERPL-SCNC: 143 MMOL/L (ref 136–145)
WBC # BLD AUTO: 8.27 K/UL (ref 3.9–12.7)

## 2023-09-07 PROCEDURE — 85025 COMPLETE CBC W/AUTO DIFF WBC: CPT | Performed by: SURGERY

## 2023-09-07 PROCEDURE — 80053 COMPREHEN METABOLIC PANEL: CPT | Performed by: SURGERY

## 2023-09-07 PROCEDURE — 36415 COLL VENOUS BLD VENIPUNCTURE: CPT | Performed by: SURGERY

## 2023-09-07 NOTE — DISCHARGE INSTRUCTIONS
Before 7 AM, enter through the Emergency Entrance..   After 7 AM enter through the Main Entrance.      Your procedure  is scheduled for __9/14/2023________.    Call [phone] between 2pm and 5pm on _9/13/2023______to find out your arrival time for the day of surgery.    You may have two visitors.  No children under 12 years old.     You will be going to the Same Day Surgery Unit on the 2nd floor of the hospital.    Important instructions:  Do not eat anything after midnight.  You may have plain water, non carbonated.  You may also have Gatorade or Powerade after midnight.    Stop all fluids 2 hours before your surgery.    It is okay to brush your teeth.  Do not have gum, candy or mints.    SEE MEDICATION SHEET.   TAKE MEDICATIONS AS DIRECTED WITH SIPS OF WATER.    STOP taking Aspirin, Ibuprofen,  Advil, Motrin, Mobic(meloxicam), Aleve (naproxen), Fish oil, and Vitamin E for at least 7 days before your surgery.     You may take Tylenol if needed which is not a blood thinner.    Please shower the night before and the morning of your surgery.      Use Chlorhexidine soap as instructed by your pre op nurse.   Please place clean linens on your bed the night before surgery. Please wear fresh clean clothing after each shower.    No shaving of procedural area at least 4-5 days before surgery due to increased risk of skin irritation and/or possible infection.    Contact lenses and removable denture work may not be worn during your procedure.    You may wear deodorant only. If you are having breast surgery, do not wear deodorant on the operative side.    Do not wear powder, body lotion, perfume/cologne or make-up.    Do not wear any jewelry or have any metal on your body.    You will be asked to remove any dentures or partials for the procedure.    If you are going home on the same day of surgery, you must arrange for a family member or a friend to drive you home.  Public transportation is prohibited.  You will not  be able to drive home if you were given anesthesia or sedation.    Patients who want to have their Post-op prescriptions filled from our in-house [facility], bring a Credit/Debit Card or cash with you. A co-pay may be required.  The pharmacy closes at 5:30 pm.    Wear loose fitting clothes allowing for bandages.    Please leave money and valuables home.      You may bring your cell phone.    Call the doctor if fever or illness should occur before your surgery.    Call [phone] to contact us here if needed.                            CLOTHES ON DAY OF SURGERY    SHOULDER surgery:  you must have a very oversized shirt.  Very, Very large.  You will probably have a large sling on with your arm strapped to your chest.  You will not be able to put the arm of the operated shoulder into a sleeve.  You can put the arm of the un-operated shoulder into the sleeve, but the shirt will need to be draped over the operated shoulder.       ARM or HAND surgery:  make sure that your sleeves are large and loose enough to pass over large dressings or cast.      BREAST or UNDERARM surgery:  wear a loose, button down shirt so that you can dress without raising your arms over your head.    ABDOMINAL surgery:  wear loose, comfortable clothing.  Nothing tight around the abdomen.  NO JEANS    PENIS or SCROTAL surgery:  loose comfortable clothing.  Large sweat pants, pajama pants or a robe.  ABSOLUTELY NO JEANS      LEG or FOOT surgery:  wear large loose pants that are able to pass over any large dressings or casts.  You could also wear loose shorts or a skirt.

## 2023-09-07 NOTE — ANESTHESIA PREPROCEDURE EVALUATION
[facility]  Anesthesia Pre-Operative Evaluation         Patient Name: Abbie Barragan  YOB: 1935  MRN: [id number]    SUBJECTIVE:     09/14/2023    Procedure(s) (LRB):  ROBOTIC CHOLECYSTECTOMY (N/A)    Abbie Barragan is a 88 y.o. female here for Procedure(s) (LRB):  ROBOTIC CHOLECYSTECTOMY (N/A)    Drips:    sodium chloride 0.9%      lactated ringers 10 mL/hr at 09/14/23 1014       Patient Active Problem List   Diagnosis    Cholelithiasis with acute cholecystitis    Chronic atrial fibrillation    PVD (peripheral vascular disease)    Bilateral carotid artery stenosis    Status post above-knee amputation    Anemia       Review of patient's allergies indicates:  No Known Allergies    No current facility-administered medications on file prior to encounter.     Current Outpatient Medications on File Prior to Encounter   Medication Sig Dispense Refill    acetaminophen (TYLENOL) 325 MG tablet Take 2 tablets (650 mg total) by mouth every 6 (six) hours as needed for Pain.      amLODIPine (NORVASC) 10 MG tablet Take 10 mg by mouth once daily.      cholecalciferol, vitamin D3, (VITAMIN D3) 25 mcg (1,000 unit) capsule Take 1,000 Units by mouth once daily.      losartan (COZAAR) 100 MG tablet Take 100 mg by mouth once daily.      metoprolol tartrate (LOPRESSOR) 25 MG tablet Take 1 tablet (25 mg total) by mouth 2 (two) times daily. 60 tablet 11    potassium chloride (MICRO-K) 10 MEQ CpSR Take 10 mEq by mouth once.      rosuvastatin (CRESTOR) 40 MG Tab Take 1 tablet by mouth once daily.      alendronate (FOSAMAX) 70 MG tablet Take 70 mg by mouth every 7 days.      apixaban (ELIQUIS) 5 mg Tab Take 1 tablet (5 mg total) by mouth 2 (two) times daily. 60 tablet 11    clopidogrel (PLAVIX) 75 mg tablet Take 75 mg by mouth once daily.      hydroCHLOROthiazide (HYDRODIURIL) 25 MG tablet Take 25 mg by mouth once daily.         Past Surgical History:   Procedure Laterality Date    LEG  AMPUTATION THROUGH KNEE      PARTIAL HYSTERECTOMY      1960's    VASCULAR SURGERY Left 1994    AORTOFEM BYPASS       Social History     Socioeconomic History    Marital status: Single   Tobacco Use    Smoking status: Former    Smokeless tobacco: Never   Substance and Sexual Activity    Alcohol use: No    Drug use: Never         OBJECTIVE:     Vital Signs Range (Last 24H):  Temp:  [36.8 °C (98.3 °F)] 36.8 °C (98.3 °F)  Pulse:  [58] 58  Resp:  [18] 18  SpO2:  [98 %] 98 %  BP: (187)/(84) 187/84    Significant Labs:  Lab Results   Component Value Date    WBC 8.27 09/07/2023    HGB 10.9 (L) 09/07/2023    HCT 35.8 (L) 09/07/2023     09/07/2023    ALT 7 (L) 09/07/2023    AST 10 09/07/2023     09/07/2023    K 3.1 (L) 09/07/2023     09/07/2023    CREATININE 0.7 09/07/2023    BUN 10 09/07/2023    CO2 33 (H) 09/07/2023    TSH 0.896 07/13/2023    INR 1.1 07/12/2023       Diagnostic Studies:    EKG:   Results for orders placed or performed during the hospital encounter of 07/12/23   EKG 12-lead    Collection Time: 07/12/23 12:40 PM    Narrative    Test Reason : I49.9,    Vent. Rate : 072 BPM     Atrial Rate : 105 BPM     P-R Int : 000 ms          QRS Dur : 156 ms      QT Int : 438 ms       P-R-T Axes : 000 050 001 degrees     QTc Int : 479 ms    Atrial fibrillation  Right bundle branch block  Abnormal ECG  No previous ECGs available  Confirmed by Rell Mcfadden MD ([id number]) on 7/12/2023 8:42:49 PM    Referred By: [email or web address]   SELF           Confirmed By:Rell Mcfadden MD       2D ECHO:  TTE:  No results found for this or any previous visit.  Results for orders placed or performed during the hospital encounter of 07/12/23   Echo   Result Value Ref Range    BSA 2.83 m2    TDI SEPTAL 0.05 m/s    LV LATERAL E/E' RATIO 16.33 m/s    LV SEPTAL E/E' RATIO 19.60 m/s    LA WIDTH 4.70 cm    IVC diameter 2.03 cm    Left Ventricular Outflow Tract Mean Velocity 0.70 cm/s    Left Ventricular Outflow Tract Mean Gradient 2.18  mmHg    TDI LATERAL 0.06 m/s    PV PEAK VELOCITY 0.89 cm/s    LVIDd 4.22 3.5 - 6.0 cm    IVS 1.04 0.6 - 1.1 cm    Posterior Wall 1.00 0.6 - 1.1 cm    LVIDs 2.80 2.1 - 4.0 cm    FS 34 28 - 44 %    LA volume 125.67 cm3    Sinus 3.18 cm    STJ 2.54 cm    Ascending aorta 3.57 cm    LV mass 142.18 g    LA size 5.48 cm    RVDD 3.73 cm    TAPSE 1.39 cm    Left Ventricle Relative Wall Thickness 0.47 cm    AV mean gradient 4 mmHg    AV valve area 2.56 cm2    AV Velocity Ratio 0.70     AV index (prosthetic) 0.73     MV valve area p 1/2 method 2.93 cm2    E/A ratio 4.45     Mean e' 0.06 m/s    E wave deceleration time 259.33 msec    IVRT 168.41 msec    LVOT diameter 2.11 cm    LVOT area 3.5 cm2    LVOT peak moise 0.98 m/s    LVOT peak VTI 18.70 cm    Ao peak moise 1.41 m/s    Ao VTI 25.5 cm    LVOT stroke volume 65.35 cm3    AV peak gradient 8 mmHg    TV peak gradient 0.87 mmHg    E/E' ratio 17.82 m/s    MV Peak E Moise 0.98 m/s    TR Max Moise 2.94 m/s    MV stenosis pressure 1/2 time 75.21 ms    MV Peak A Moise 0.22 m/s    LV Systolic Volume 29.52 mL    LV Systolic Volume Index 16.2 mL/m2    LV Diastolic Volume 79.46 mL    LV Diastolic Volume Index 43.66 mL/m2    LA Volume Index 69.1 mL/m2    LV Mass Index 78 g/m2    RA Major Axis 5.41 cm    Left Atrium Minor Axis 4.73 cm    Left Atrium Major Axis 7.30 cm    Triscuspid Valve Regurgitation Peak Gradient 35 mmHg    RA Width 4.36 cm    Right Atrial Pressure (from IVC) 3 mmHg    EF 55 %    TV resting pulmonary artery pressure 38 mmHg    Narrative    · The left ventricle is normal in size with concentric hypertrophy and   normal systolic function.  · The estimated ejection fraction is 55%.  · Grade III left ventricular diastolic dysfunction.  · Normal right ventricular size with normal right ventricular systolic   function.  · Severe left atrial enlargement.  · Mild right atrial enlargement.  · Moderate tricuspid regurgitation.  · Mild pulmonic regurgitation.  · Normal central venous  pressure (3 mmHg).  · The estimated PA systolic pressure is 38 mmHg.                                                                     EKG (7/12/23):  Atrial fibrillation   Right bundle branch block    TTE (7/13/23):   The left ventricle is normal in size with concentric hypertrophy and normal systolic function.   The estimated ejection fraction is 55%.   Grade III left ventricular diastolic dysfunction.   Normal right ventricular size with normal right ventricular systolic function.   Severe left atrial enlargement.   Mild right atrial enlargement.   Moderate tricuspid regurgitation.   Mild pulmonic regurgitation.   Normal central venous pressure (3 mmHg).   The estimated PA systolic pressure is 38 mmHg.      Pre-op Assessment    I have reviewed the Patient Summary Reports.     I have reviewed the Nursing Notes. I have reviewed the NPO Status.   I have reviewed the Medications.     Review of Systems  Anesthesia Hx:  No problems with previous Anesthesia  History of prior surgery of interest to airway management or planning: Denies Family Hx of Anesthesia complications.   Denies Personal Hx of Anesthesia complications.   Social:  Former Smoker, Social Alcohol Use    Hematology/Oncology:     Oncology Normal   Hematology Comments: LLE DVT   EENT/Dental:EENT/Dental Normal   Cardiovascular:   Hypertension Dysrhythmias atrial fibrillation PVD hyperlipidemia ECG has been reviewed. 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  Denies Asthma.  Denies Sleep Apnea.    Renal/:  Renal/ Normal     Hepatic/GI:   Denies GERD.  Cholecystitis   Musculoskeletal:   H/O L AKA   Neurological:  Neurology Normal Denies TIA.  Denies CVA. Denies Seizures.    Endocrine:   Denies Diabetes. Denies Hypothyroidism. Denies  "Hyperthyroidism.  Obesity / BMI > 30  Dermatological:  Skin Normal    Psych:  Psychiatric Normal           Physical Exam  General: Well nourished, Cooperative, Alert and Oriented    Airway:  Mallampati: III / II  Mouth Opening: Normal  TM Distance: Normal  Tongue: Normal  Neck ROM: Normal ROM    Dental:  Edentulous, Dentures        Anesthesia Plan  Type of Anesthesia, risks & benefits discussed:    Anesthesia Type: Gen ETT  Intra-op Monitoring Plan: Standard ASA Monitors  Post Op Pain Control Plan: multimodal analgesia and IV/PO Opioids PRN  Induction:  IV  Airway Plan: Video, Post-Induction  Informed Consent: Informed consent signed with the Patient and all parties understand the risks and agree with anesthesia plan.  All questions answered.   ASA Score: 3  Day of Surgery Review of History & Physical: H&P Update referred to the surgeon/provider.  Anesthesia Plan Notes: Patient is cleared by Dr. Mcfadden-"Cleared for surgery at moderate cardiac risk. Ok to hold eliquis 3 days and plavix 5 days before."    Discussed with patient post-op cognitive dysfunction due to age.  No baseline dementia and lower risk for this symptom.     Ready For Surgery From Anesthesia Perspective.     .      "

## 2023-09-14 ENCOUNTER — HOSPITAL ENCOUNTER (OUTPATIENT)
Facility: HOSPITAL | Age: 88
Discharge: HOME | End: 2023-09-14
Attending: SURGERY | Admitting: SURGERY
Payer: MEDICARE

## 2023-09-14 ENCOUNTER — ANESTHESIA (OUTPATIENT)
Dept: SURGERY | Facility: HOSPITAL | Age: 88
End: 2023-09-14
Payer: MEDICARE

## 2023-09-14 VITALS
RESPIRATION RATE: 18 BRPM | BODY MASS INDEX: 34.48 KG/M2 | TEMPERATURE: 98 F | HEART RATE: 78 BPM | WEIGHT: 182.5 LBS | OXYGEN SATURATION: 95 % | SYSTOLIC BLOOD PRESSURE: 175 MMHG | DIASTOLIC BLOOD PRESSURE: 85 MMHG

## 2023-09-14 DIAGNOSIS — K80.20 GALLSTONES: Primary | ICD-10-CM

## 2023-09-14 DIAGNOSIS — K81.9 CHOLECYSTITIS: ICD-10-CM

## 2023-09-14 PROCEDURE — 25000003 PHARM REV CODE 250: Performed by: NURSE ANESTHETIST, CERTIFIED REGISTERED

## 2023-09-14 PROCEDURE — 63600175 PHARM REV CODE 636 W HCPCS: Performed by: SURGERY

## 2023-09-14 PROCEDURE — 71000033 HC RECOVERY, INTIAL HOUR: Performed by: SURGERY

## 2023-09-14 PROCEDURE — 71000016 HC POSTOP RECOV ADDL HR: Performed by: SURGERY

## 2023-09-14 PROCEDURE — 63600175 PHARM REV CODE 636 W HCPCS

## 2023-09-14 PROCEDURE — 37000009 HC ANESTHESIA EA ADD 15 MINS: Performed by: SURGERY

## 2023-09-14 PROCEDURE — 47562 PR LAP,CHOLECYSTECTOMY: ICD-10-PCS | Mod: 53,,, | Performed by: SURGERY

## 2023-09-14 PROCEDURE — 63600175 PHARM REV CODE 636 W HCPCS: Performed by: ANESTHESIOLOGY

## 2023-09-14 PROCEDURE — D9220A PRA ANESTHESIA: ICD-10-PCS | Mod: ANES,,, | Performed by: STUDENT IN AN ORGANIZED HEALTH CARE EDUCATION/TRAINING PROGRAM

## 2023-09-14 PROCEDURE — 36000711: Performed by: SURGERY

## 2023-09-14 PROCEDURE — 25000003 PHARM REV CODE 250: Performed by: ANESTHESIOLOGY

## 2023-09-14 PROCEDURE — 63600175 PHARM REV CODE 636 W HCPCS: Performed by: STUDENT IN AN ORGANIZED HEALTH CARE EDUCATION/TRAINING PROGRAM

## 2023-09-14 PROCEDURE — D9220A PRA ANESTHESIA: Mod: ANES,,, | Performed by: STUDENT IN AN ORGANIZED HEALTH CARE EDUCATION/TRAINING PROGRAM

## 2023-09-14 PROCEDURE — 37000008 HC ANESTHESIA 1ST 15 MINUTES: Performed by: SURGERY

## 2023-09-14 PROCEDURE — 36000710: Performed by: SURGERY

## 2023-09-14 PROCEDURE — 71000015 HC POSTOP RECOV 1ST HR: Performed by: SURGERY

## 2023-09-14 PROCEDURE — C9290 INJ, BUPIVACAINE LIPOSOME: HCPCS

## 2023-09-14 PROCEDURE — 63600175 PHARM REV CODE 636 W HCPCS: Performed by: NURSE ANESTHETIST, CERTIFIED REGISTERED

## 2023-09-14 PROCEDURE — 25000003 PHARM REV CODE 250: Performed by: SURGERY

## 2023-09-14 PROCEDURE — 47562 LAPAROSCOPIC CHOLECYSTECTOMY: CPT | Mod: 53,,, | Performed by: SURGERY

## 2023-09-14 PROCEDURE — D9220A PRA ANESTHESIA: ICD-10-PCS | Mod: CRNA,,, | Performed by: NURSE ANESTHETIST, CERTIFIED REGISTERED

## 2023-09-14 PROCEDURE — 27201423 OPTIME MED/SURG SUP & DEVICES STERILE SUPPLY: Performed by: SURGERY

## 2023-09-14 PROCEDURE — D9220A PRA ANESTHESIA: Mod: CRNA,,, | Performed by: NURSE ANESTHETIST, CERTIFIED REGISTERED

## 2023-09-14 RX ORDER — ONDANSETRON 2 MG/ML
INJECTION INTRAMUSCULAR; INTRAVENOUS
Status: DISCONTINUED | OUTPATIENT
Start: 2023-09-14 | End: 2023-09-14

## 2023-09-14 RX ORDER — CEFAZOLIN SODIUM 2 G/50ML
2 SOLUTION INTRAVENOUS
Status: COMPLETED | OUTPATIENT
Start: 2023-09-14 | End: 2023-09-14

## 2023-09-14 RX ORDER — HYDRALAZINE HYDROCHLORIDE 20 MG/ML
10 INJECTION INTRAMUSCULAR; INTRAVENOUS EVERY 10 MIN PRN
Status: DISCONTINUED | OUTPATIENT
Start: 2023-09-14 | End: 2023-09-14 | Stop reason: HOSPADM

## 2023-09-14 RX ORDER — SODIUM CHLORIDE 9 MG/ML
INJECTION, SOLUTION INTRAVENOUS CONTINUOUS
Status: DISCONTINUED | OUTPATIENT
Start: 2023-09-14 | End: 2023-09-14 | Stop reason: HOSPADM

## 2023-09-14 RX ORDER — BUPIVACAINE HYDROCHLORIDE 2.5 MG/ML
INJECTION, SOLUTION INFILTRATION; PERINEURAL
Status: DISCONTINUED | OUTPATIENT
Start: 2023-09-14 | End: 2023-09-14 | Stop reason: HOSPADM

## 2023-09-14 RX ORDER — FENTANYL CITRATE 50 UG/ML
25 INJECTION, SOLUTION INTRAMUSCULAR; INTRAVENOUS EVERY 5 MIN PRN
Status: DISCONTINUED | OUTPATIENT
Start: 2023-09-14 | End: 2023-09-14 | Stop reason: HOSPADM

## 2023-09-14 RX ORDER — LIDOCAINE HYDROCHLORIDE 20 MG/ML
INJECTION INTRAVENOUS
Status: DISCONTINUED | OUTPATIENT
Start: 2023-09-14 | End: 2023-09-14

## 2023-09-14 RX ORDER — SODIUM CHLORIDE, SODIUM LACTATE, POTASSIUM CHLORIDE, CALCIUM CHLORIDE 600; 310; 30; 20 MG/100ML; MG/100ML; MG/100ML; MG/100ML
INJECTION, SOLUTION INTRAVENOUS CONTINUOUS
Status: ACTIVE | OUTPATIENT
Start: 2023-09-14

## 2023-09-14 RX ORDER — PHENYLEPHRINE HYDROCHLORIDE 10 MG/ML
INJECTION INTRAVENOUS
Status: DISCONTINUED | OUTPATIENT
Start: 2023-09-14 | End: 2023-09-14

## 2023-09-14 RX ORDER — DEXAMETHASONE SODIUM PHOSPHATE 4 MG/ML
INJECTION, SOLUTION INTRA-ARTICULAR; INTRALESIONAL; INTRAMUSCULAR; INTRAVENOUS; SOFT TISSUE
Status: DISCONTINUED | OUTPATIENT
Start: 2023-09-14 | End: 2023-09-14

## 2023-09-14 RX ORDER — ONDANSETRON 2 MG/ML
4 INJECTION INTRAMUSCULAR; INTRAVENOUS DAILY PRN
Status: DISCONTINUED | OUTPATIENT
Start: 2023-09-14 | End: 2023-09-14 | Stop reason: HOSPADM

## 2023-09-14 RX ORDER — HYDROMORPHONE HYDROCHLORIDE 2 MG/ML
0.2 INJECTION, SOLUTION INTRAMUSCULAR; INTRAVENOUS; SUBCUTANEOUS EVERY 5 MIN PRN
Status: DISCONTINUED | OUTPATIENT
Start: 2023-09-14 | End: 2023-09-14 | Stop reason: HOSPADM

## 2023-09-14 RX ORDER — FENTANYL CITRATE 50 UG/ML
INJECTION, SOLUTION INTRAMUSCULAR; INTRAVENOUS
Status: DISCONTINUED | OUTPATIENT
Start: 2023-09-14 | End: 2023-09-14

## 2023-09-14 RX ORDER — OXYCODONE AND ACETAMINOPHEN 5; 325 MG/1; MG/1
1 TABLET ORAL EVERY 4 HOURS PRN
Qty: 20 TABLET | Refills: 0 | Status: SHIPPED | OUTPATIENT
Start: 2023-09-14

## 2023-09-14 RX ORDER — ACETAMINOPHEN 500 MG
1000 TABLET ORAL
Status: COMPLETED | OUTPATIENT
Start: 2023-09-14 | End: 2023-09-14

## 2023-09-14 RX ORDER — KETOROLAC TROMETHAMINE 10 MG/1
10 TABLET, FILM COATED ORAL EVERY 6 HOURS
Qty: 20 TABLET | Refills: 0 | Status: SHIPPED | OUTPATIENT
Start: 2023-09-14 | End: 2023-09-19

## 2023-09-14 RX ORDER — LIDOCAINE HYDROCHLORIDE 10 MG/ML
1 INJECTION, SOLUTION EPIDURAL; INFILTRATION; INTRACAUDAL; PERINEURAL ONCE
Status: ACTIVE | OUTPATIENT
Start: 2023-09-14

## 2023-09-14 RX ORDER — INDOCYANINE GREEN AND WATER 25 MG
1.25 KIT INJECTION
Status: COMPLETED | OUTPATIENT
Start: 2023-09-14 | End: 2023-09-14

## 2023-09-14 RX ORDER — ROCURONIUM BROMIDE 10 MG/ML
INJECTION, SOLUTION INTRAVENOUS
Status: DISCONTINUED | OUTPATIENT
Start: 2023-09-14 | End: 2023-09-14

## 2023-09-14 RX ORDER — PROPOFOL 10 MG/ML
VIAL (ML) INTRAVENOUS
Status: DISCONTINUED | OUTPATIENT
Start: 2023-09-14 | End: 2023-09-14

## 2023-09-14 RX ADMIN — ROCURONIUM BROMIDE 50 MG: 10 INJECTION, SOLUTION INTRAVENOUS at 12:09

## 2023-09-14 RX ADMIN — INDOCYANINE GREEN AND WATER 1.25 MG: KIT at 12:09

## 2023-09-14 RX ADMIN — FENTANYL CITRATE 37.5 MCG: 50 INJECTION, SOLUTION INTRAMUSCULAR; INTRAVENOUS at 12:09

## 2023-09-14 RX ADMIN — LIDOCAINE HYDROCHLORIDE 80 MG: 20 INJECTION, SOLUTION INTRAVENOUS at 12:09

## 2023-09-14 RX ADMIN — PHENYLEPHRINE HYDROCHLORIDE 100 MCG: 10 INJECTION INTRAVENOUS at 01:09

## 2023-09-14 RX ADMIN — SUGAMMADEX 160 MG: 100 INJECTION, SOLUTION INTRAVENOUS at 01:09

## 2023-09-14 RX ADMIN — PROPOFOL 60 MG: 10 INJECTION, EMULSION INTRAVENOUS at 12:09

## 2023-09-14 RX ADMIN — PROPOFOL 30 MG: 10 INJECTION, EMULSION INTRAVENOUS at 12:09

## 2023-09-14 RX ADMIN — SODIUM CHLORIDE, POTASSIUM CHLORIDE, SODIUM LACTATE AND CALCIUM CHLORIDE: 600; 310; 30; 20 INJECTION, SOLUTION INTRAVENOUS at 10:09

## 2023-09-14 RX ADMIN — HYDRALAZINE HYDROCHLORIDE 10 MG: 20 INJECTION INTRAMUSCULAR; INTRAVENOUS at 02:09

## 2023-09-14 RX ADMIN — DEXAMETHASONE SODIUM PHOSPHATE 4 MG: 4 INJECTION, SOLUTION INTRAMUSCULAR; INTRAVENOUS at 01:09

## 2023-09-14 RX ADMIN — PHENYLEPHRINE HYDROCHLORIDE 100 MCG: 10 INJECTION INTRAVENOUS at 12:09

## 2023-09-14 RX ADMIN — CEFAZOLIN SODIUM 2 G: 2 SOLUTION INTRAVENOUS at 12:09

## 2023-09-14 RX ADMIN — ACETAMINOPHEN 1000 MG: 500 TABLET ORAL at 10:09

## 2023-09-14 RX ADMIN — SODIUM CHLORIDE, POTASSIUM CHLORIDE, SODIUM LACTATE AND CALCIUM CHLORIDE: 600; 310; 30; 20 INJECTION, SOLUTION INTRAVENOUS at 12:09

## 2023-09-14 RX ADMIN — ONDANSETRON 4 MG: 2 INJECTION, SOLUTION INTRAMUSCULAR; INTRAVENOUS at 01:09

## 2023-09-14 RX ADMIN — FENTANYL CITRATE 12.5 MCG: 50 INJECTION, SOLUTION INTRAMUSCULAR; INTRAVENOUS at 12:09

## 2023-09-14 NOTE — DISCHARGE INSTRUCTIONS
[facility]  Office # [phone]    Discharge Instructions for Same Day Surgery     Call the office for and appointment if one has not already been made.     Diet: Drink plenty of fluids the first 48 hours and you may resume your   usual diet.     Activity: No heavy lifting (over 10 pounds), pushing or pulling until your   post op visit. Your doctor's office may have told you to limit your lifting to less weight, or even no weight.  Be sure to follow those instructions.    Note: You may ride in a car and you may drive when comfortable.     Do not drive, drink alcohol, or sign legal documents for 24 hours, or if taking narcotic pain medication.    Dressings: Dermabond / Prineotape    or a material like it was used on your incision.   It is like a liquid glue.   Do not peel or try to remove it it will start to fall off in 7-10 days on its' own.   It is OK to shower, pat dry, do not apply any creams or lotions.    No tub baths, swimming pools, hot tubs or submersion of the incision until your surgeon says it's ok.      Medical: Call the doctor for any of the following problems: fever above 101,   severe pain, bleeding, or abdominal distention (swelling).   If constipated you may take any stool softener you choose.     Occasionally small areas of skin numbness or an unpleasant skin sensation can result. Also, you may find that your incision is swollen and tender for a few days.  Some redness around sutures and staples is a normal reaction, but if the discomfort persists or worsens, call you doctor.        Fall Prevention  Millions of people fall every year and injure themselves. You may have had anesthesia or sedation which may increase your risk of falling. You may have health issues that put you at an increased risk of falling.     Here are ways to reduce your risk of falling.    Make your home safe by keeping walkways clear of objects you may trip over.  Use non-slip pads under rugs. Do not use  area rugs or small throw rugs.  Use non-slip mats in bathtubs and showers.  Install handrails and lights on staircases.  Do not walk in poorly lit areas.  Do not stand on chairs or wobbly ladders.  Use caution when reaching overhead or looking upward. This position can cause a loss of balance.  Be sure your shoes fit properly, have non-slip bottoms and are in good condition.   Wear shoes both inside and out. Avoid going barefoot or wearing slippers.  Be cautious when going up and down stairs, curbs, and when walking on uneven sidewalks.  If your balance is poor, consider using a cane or walker.  If your fall was related to alcohol use, stop or limit alcohol intake.   If your fall was related to use of sleeping medicines, talk to your doctor about this. You may need to reduce your dosage at bedtime if you awaken during the night to go to the bathroom.    To reduce the need for nighttime bathroom trips:  Avoid drinking fluids for several hours before going to bed  Empty your bladder before going to bed  Men can keep a urinal at the bedside  Stay as active as you can. Balance, flexibility, strength, and endurance all come from exercise. They all play a role in preventing falls. Ask your healthcare provider which types of activity are right for you.  Get your vision checked on a regular basis.  If you have pets, know where they are before you stand up or walk so you don't trip over them.  Use night lights.                                             -Exparel information-      To help control your pain after surgery, your surgeon injected Exparel (bupicacaine liposome injectable suspension) into your surgical incision just before the end of the procedure.      Exparel is a local analgesic that contains the local anesthetic bupivacaine..  Local anesthetics provide pain relief by numbing the tissue around the surgical site.    Exparel is specifically designed to release pain medication over time an can control pain for up  to 72 hours.    In addition to Exparel, your surgeon may provide other pain medications to control your pain.    Each patient is different and responds differently to pain medication.  Depending on how you respond to Exparel, you may require less additional pain medication during your recovery.    Side effects can occur with any medication and it is important not to ignore anything you may be experiencing.  Some patients who received Exparel experienced nausea, vomiting, or constipation.  Rarely, patients who receive bupivacaine (the active ingredient in Exparel) have experienced numbness and tingling in their mouth or lips, lightheadedness, or anxiety.  Speak with your doctor right away if you think you may be experiencing any of these sensations, or if you have other questions regarding possible side effects.    Products that contain bupivacaine, like Exparel, may cause a temporary loss of sensation or the ability to move in the area where bupivacaine was injected.    Other formulations of bupivacaine should not be administered within 96 hours following administrations of Exparel.      Do not remove the teal colored bracelet that you have on for 96 hours.  (4 DAYS)    This bracelet will let other health care workers know that you have received Exparel, and not to give you bupivacaine during this 96 hours.

## 2023-09-14 NOTE — OP NOTE
[facility] - Surgery  Operative Note    SUMMARY     Surgery Date: 9/14/2023     Surgeon(s) and Role:     * Isidro Bell MD - Primary    Assisting Surgeon: Elen Iyer MD    Pre-op Diagnosis:  Cholecystitis [K81.9]    Post-op Diagnosis:  Post-Op Diagnosis Codes:     * Cholecystitis [K81.9]    Procedure(s) (LRB):  Diagnostic Laparoscopy Lysis of Adhesisions  Attempted Robotically (N/A)    Anesthesia: General    Past Medical History:   Diagnosis Date    Above knee amputation status 1994    History of DVT of lower extremity 1994    LEFT    Hyperlipidemia     Hypertension     PAD (peripheral artery disease)        Indication for procedure: Abbie Barragan is 88 y.o. female with HTN HLD PAD on blood thinners who had a recent admission for cholecystitis who was treated at the time with a cholecystostomy tube.  She had done well postop recovered well and I had seen her in clinic for discussion elective cholecystectomy to prevent chance of recurrence. After discussion of disease process, we discussed options and have elected for operation to perform robotic versus open cholecystectomy.  Of note the patient recently had had the cholecystostomy tube dislodged this was discovered on the morning of surgery and patient had denied any recurrent symptoms.    Description of Procedure: After consent was obtained, patient was taken to the OR. The abdomen was prepped and draped in a standard sterile fashion after general anesthesia was started. Time out was performed. Antibiotics were started with Ancef. We began the procedure by having anesthesia placed an OG tube and we placed a Veress needle at begum's point ensured that we were intra-abdominal and achieve pneumoperitoneum.  We then inserted robotic trocar with a 5 mm 0 degree scope as the optical access and we entered the peritoneal cavity.  There were adhesions encompassing the entirety of our view.  There were small areas of soft pliable adhesions were able to use the camera  scope to create a space so that we could cross over.  Made our way over to the right upper quadrant and had a very small safe space where could see the abdominal wall injected Marcaine plus Exparel and advanced an additional robotic trocar.  We are able to create a little more space of the right flank to place an additional trocar.  This point our goal was to clear out the space at the liver edge so that we can place additional working trocar to attempt a cholecystectomy as well as the create enough space to see the gallbladder.  The majority of the time the case was lysis of adhesions with a cauterize scissor as well as some blunt dissection to create the space.  The entire midline of the abdominal wall appeared to have fat with small bowel mixed throughout and there was no clear space.  We deemed it unsafe to place an additional trocar and at this point had exhausted all options for clearing these adhesions laparoscopically.  Next consideration was discussion of converting to open cholecystectomy.  Considering the patient's age and comorbidities and the fact that she had done well conservative measures after the initial drainage she may not ultimately require the gallbladder being removed.  I opted to complete the case and say that the gallbladder was not resectable with minimally invasive measures and therefore the consideration was risks benefit of open cholecystectomy and I opted to wait and watch it is likely that she may not have recurrence of symptoms and she will not have to undergo open surgery.  We evacuated our pneumoperitoneum after evaluating all of our surgical sites insuring that there was no injury to intra-abdominal contents no bowel injury and no significant bleeding.  We closed the skin in all sites with subcuticular 4-0 Monocryl covered by Dermabond.    All counts were correct x2. Patient was awakened from anesthesia and taken to the recovery room in a stable condition having suffered no issues  at this time.    Description of the findings of the procedure:  Excessive intra-abdominal adhesions related to prior laparotomy, unable to safely visualize/removed the gallbladder  Open cholecystectomy not performed will discuss that she must monitor for symptoms if symptoms recur at that point in the future we will plan for open cholecystectomy if needed    Estimated Blood Loss: * No values recorded between 9/14/2023 12:41 PM and 9/14/2023  1:51 PM *         Specimens:   Specimen (24h ago, onward)      None          Plan of care:  Excessive intra-abdominal adhesions related to prior laparotomy, unable to safely visualize/removed the gallbladder  Open cholecystectomy not performed will discuss that she must monitor for symptoms if symptoms recur at that point in the future we will plan for open cholecystectomy if needed

## 2023-09-14 NOTE — BRIEF OP NOTE
[facility] - Surgery  Brief Operative Note    Surgery Date: 9/14/2023     Surgeon(s) and Role:     * Isidro Bell MD - Primary    Assisting Surgeon: Elen Iyer MD    Pre-op Diagnosis:  Cholecystitis [K81.9]    Post-op Diagnosis:  Post-Op Diagnosis Codes:     * Cholecystitis [K81.9]    Procedure(s) (LRB):  Diagnostic Laparoscopy Lysis of Adhesisions  Attempted Robotically (N/A)    Anesthesia: General    Operative Findings: attempted robotic cholecystectomy. Lysis of adhesions. Dense adhesions, unable to complete operation. Aborted before docking robot.     Estimated Blood Loss: * No values recorded between 9/14/2023 12:41 PM and 9/14/2023  1:51 PM *         Specimens:   Specimen (24h ago, onward)      None              Discharge Note    OUTCOME: Patient tolerated treatment/procedure well without complication and is now ready for discharge.    DISPOSITION: Home or Self Care    FINAL DIAGNOSIS:  Gallstones    FOLLOWUP: In clinic    DISCHARGE INSTRUCTIONS:    Discharge Procedure Orders   Diet Adult Regular     Lifting restrictions     No driving until:   Order Comments: No driving until off narcotics and able to move arms freely     Notify your health care provider if you experience any of the following:  temperature >100.4     Notify your health care provider if you experience any of the following:  persistent nausea and vomiting or diarrhea     Notify your health care provider if you experience any of the following:  severe uncontrolled pain     Notify your health care provider if you experience any of the following:  redness, tenderness, or signs of infection (pain, swelling, redness, odor or green/yellow discharge around incision site)     Notify your health care provider if you experience any of the following:  difficulty breathing or increased cough     Notify your health care provider if you experience any of the following:  severe persistent headache     Notify your health care provider if you experience  any of the following:  worsening rash     Notify your health care provider if you experience any of the following:  persistent dizziness, light-headedness, or visual disturbances     Notify your health care provider if you experience any of the following:  increased confusion or weakness

## 2023-09-14 NOTE — TRANSFER OF CARE
Anesthesia Transfer of Care Note    Patient: Abbie MEJIA Yung    Procedure(s) Performed: Procedure(s) (LRB):  Diagnostic Laparoscopy Lysis of Adhesisions  Attempted Robotically (N/A)    Patient location: PACU    Anesthesia Type: general    Transport from OR: Transported from OR on room air with adequate spontaneous ventilation    Post pain: adequate analgesia    Post assessment: no apparent anesthetic complications    Post vital signs: stable    Level of consciousness: awake    Nausea/Vomiting: no nausea/vomiting    Complications: none    Transfer of care protocol was followed      Last vitals:   Visit Vitals  BP (!) 168/74   Pulse (!) 59   Temp 36.4 °C (97.6 °F)   Resp (!) 24   Wt 82.8 kg (182 lb 8 oz)   SpO2 99%   Breastfeeding No   BMI 34.48 kg/m²

## 2023-09-14 NOTE — ANESTHESIA POSTPROCEDURE EVALUATION
Anesthesia Post Evaluation    Patient: Abbie Barragan    Procedure(s) Performed: Procedure(s) (LRB):  Diagnostic Laparoscopy Lysis of Adhesisions  Attempted Robotically (N/A)    Final Anesthesia Type: general      Patient location during evaluation: PACU  Patient participation: Yes- Able to Participate  Level of consciousness: awake  Post-procedure vital signs: reviewed and stable  Pain management: adequate  Airway patency: patent    PONV status at discharge: No PONV  Anesthetic complications: no      Cardiovascular status: blood pressure returned to baseline, stable and hypertensive  Respiratory status: unassisted  Hydration status: euvolemic  Follow-up not needed.          Vitals Value Taken Time   /85 09/14/23 1635   Temp 36.7 °C (98 °F) 09/14/23 1635   Pulse 78 09/14/23 1635   Resp 18 09/14/23 1635   SpO2 95 % 09/14/23 1635         Event Time   Out of Recovery 14:57:01         Pain/Angela Score: Pain Rating Prior to Med Admin: 0 (9/14/2023 11:14 AM)  Pain Rating Post Med Admin: 0 (9/14/2023 11:14 AM)  Angela Score: 10 (9/14/2023  3:05 PM)

## 2023-09-20 ENCOUNTER — OFFICE VISIT (OUTPATIENT)
Dept: SURGERY | Facility: CLINIC | Age: 88
End: 2023-09-20
Payer: MEDICARE

## 2023-09-20 VITALS
HEART RATE: 60 BPM | SYSTOLIC BLOOD PRESSURE: 162 MMHG | DIASTOLIC BLOOD PRESSURE: 86 MMHG | OXYGEN SATURATION: 95 % | HEIGHT: 61 IN | BODY MASS INDEX: 34.48 KG/M2

## 2023-09-20 DIAGNOSIS — K81.9 CHOLECYSTITIS: Primary | ICD-10-CM

## 2023-09-20 PROCEDURE — 1159F MED LIST DOCD IN RCRD: CPT | Mod: CPTII,S$GLB,, | Performed by: SURGERY

## 2023-09-20 PROCEDURE — 1101F PT FALLS ASSESS-DOCD LE1/YR: CPT | Mod: CPTII,S$GLB,, | Performed by: SURGERY

## 2023-09-20 PROCEDURE — 3288F FALL RISK ASSESSMENT DOCD: CPT | Mod: CPTII,S$GLB,, | Performed by: SURGERY

## 2023-09-20 PROCEDURE — 99999 PR PBB SHADOW E&M-EST. PATIENT-LVL III: CPT | Mod: PBBFAC,,, | Performed by: SURGERY

## 2023-09-20 PROCEDURE — 3288F PR FALLS RISK ASSESSMENT DOCUMENTED: ICD-10-PCS | Mod: CPTII,S$GLB,, | Performed by: SURGERY

## 2023-09-20 PROCEDURE — 99024 POSTOP FOLLOW-UP VISIT: CPT | Mod: S$GLB,,, | Performed by: SURGERY

## 2023-09-20 PROCEDURE — 1126F AMNT PAIN NOTED NONE PRSNT: CPT | Mod: CPTII,S$GLB,, | Performed by: SURGERY

## 2023-09-20 PROCEDURE — 1159F PR MEDICATION LIST DOCUMENTED IN MEDICAL RECORD: ICD-10-PCS | Mod: CPTII,S$GLB,, | Performed by: SURGERY

## 2023-09-20 PROCEDURE — 1126F PR PAIN SEVERITY QUANTIFIED, NO PAIN PRESENT: ICD-10-PCS | Mod: CPTII,S$GLB,, | Performed by: SURGERY

## 2023-09-20 PROCEDURE — 99999 PR PBB SHADOW E&M-EST. PATIENT-LVL III: ICD-10-PCS | Mod: PBBFAC,,, | Performed by: SURGERY

## 2023-09-20 PROCEDURE — 1101F PR PT FALLS ASSESS DOC 0-1 FALLS W/OUT INJ PAST YR: ICD-10-PCS | Mod: CPTII,S$GLB,, | Performed by: SURGERY

## 2023-09-20 PROCEDURE — 99024 PR POST-OP FOLLOW-UP VISIT: ICD-10-PCS | Mod: S$GLB,,, | Performed by: SURGERY

## 2023-09-20 NOTE — PROGRESS NOTES
Surgery Clinic Note    Abbie Barragan is a 88 y.o. year old female in clinic today for follow up of laparoscopic lysis adhesions, attempted cholecystectomy which was abandoned due to dense adhesions. Today she feels great. No pain. Eating well. No RUQ pain. Understands that she has the potential to have future episodes of cholecystitis or biliary complications, will monitor for symptoms.  No f/c/n/v/sob/cp    ROS:  Negative except above    PE:  Vitals:    09/20/23 0908   BP: (!) 162/86   Pulse: 60       NAD  No belabored breathing  Abd soft nt nd  Incisions c/d/I    A/P:  Abbie Barragan is a 88 y.o. year old female s/p attempted lap lata which was not completed due to dense adhesions    -monitor for signs of recurrence of cholecystitis. If they present, can call clinic or present to ED  -rtc 2 months for re-eval    Isidro Bell  General Surgery - [facility]  9/20/2023

## 2023-10-26 ENCOUNTER — HOSPITAL ENCOUNTER (EMERGENCY)
Facility: HOSPITAL | Age: 88
Discharge: HOME | End: 2023-10-26
Attending: EMERGENCY MEDICINE
Payer: MEDICARE

## 2023-10-26 VITALS
HEIGHT: 61 IN | BODY MASS INDEX: 34.36 KG/M2 | SYSTOLIC BLOOD PRESSURE: 194 MMHG | WEIGHT: 182 LBS | HEART RATE: 77 BPM | OXYGEN SATURATION: 95 % | RESPIRATION RATE: 20 BRPM | DIASTOLIC BLOOD PRESSURE: 84 MMHG | TEMPERATURE: 98 F

## 2023-10-26 DIAGNOSIS — R42 LIGHT HEADEDNESS: ICD-10-CM

## 2023-10-26 DIAGNOSIS — R11.2 NAUSEA & VOMITING: ICD-10-CM

## 2023-10-26 LAB
ALBUMIN SERPL BCP-MCNC: 3.7 G/DL (ref 3.5–5.2)
ALP SERPL-CCNC: 132 U/L (ref 55–135)
ALT SERPL W/O P-5'-P-CCNC: 13 U/L (ref 10–44)
ANION GAP SERPL CALC-SCNC: 14 MMOL/L (ref 8–16)
AST SERPL-CCNC: 19 U/L (ref 10–40)
BASOPHILS # BLD AUTO: 0.02 K/UL (ref 0–0.2)
BASOPHILS NFR BLD: 0.2 % (ref 0–1.9)
BILIRUB SERPL-MCNC: 0.4 MG/DL (ref 0.1–1)
BILIRUB UR QL STRIP: NEGATIVE
BNP SERPL-MCNC: 232 PG/ML (ref 0–99)
BUN SERPL-MCNC: 13 MG/DL (ref 8–23)
CALCIUM SERPL-MCNC: 9.8 MG/DL (ref 8.7–10.5)
CHLORIDE SERPL-SCNC: 102 MMOL/L (ref 95–110)
CLARITY UR: CLEAR
CO2 SERPL-SCNC: 26 MMOL/L (ref 23–29)
COLOR UR: COLORLESS
CREAT SERPL-MCNC: 0.8 MG/DL (ref 0.5–1.4)
CTP QC/QA: YES
CTP QC/QA: YES
DIFFERENTIAL METHOD: ABNORMAL
EOSINOPHIL # BLD AUTO: 0.1 K/UL (ref 0–0.5)
EOSINOPHIL NFR BLD: 0.7 % (ref 0–8)
ERYTHROCYTE [DISTWIDTH] IN BLOOD BY AUTOMATED COUNT: 14.9 % (ref 11.5–14.5)
EST. GFR  (NO RACE VARIABLE): >60 ML/MIN/1.73 M^2
GLUCOSE SERPL-MCNC: 131 MG/DL (ref 70–110)
GLUCOSE UR QL STRIP: NEGATIVE
HCT VFR BLD AUTO: 39.2 % (ref 37–48.5)
HGB BLD-MCNC: 12.2 G/DL (ref 12–16)
HGB UR QL STRIP: NEGATIVE
IMM GRANULOCYTES # BLD AUTO: 0.04 K/UL (ref 0–0.04)
IMM GRANULOCYTES NFR BLD AUTO: 0.5 % (ref 0–0.5)
KETONES UR QL STRIP: NEGATIVE
LEUKOCYTE ESTERASE UR QL STRIP: NEGATIVE
LIPASE SERPL-CCNC: 9 U/L (ref 4–60)
LYMPHOCYTES # BLD AUTO: 1.8 K/UL (ref 1–4.8)
LYMPHOCYTES NFR BLD: 20.7 % (ref 18–48)
MCH RBC QN AUTO: 26.1 PG (ref 27–31)
MCHC RBC AUTO-ENTMCNC: 31.1 G/DL (ref 32–36)
MCV RBC AUTO: 84 FL (ref 82–98)
MONOCYTES # BLD AUTO: 0.3 K/UL (ref 0.3–1)
MONOCYTES NFR BLD: 2.9 % (ref 4–15)
NEUTROPHILS # BLD AUTO: 6.7 K/UL (ref 1.8–7.7)
NEUTROPHILS NFR BLD: 75 % (ref 38–73)
NITRITE UR QL STRIP: NEGATIVE
NRBC BLD-RTO: 0 /100 WBC
PH UR STRIP: 7 [PH] (ref 5–8)
PLATELET # BLD AUTO: 222 K/UL (ref 150–450)
PMV BLD AUTO: 12.9 FL (ref 9.2–12.9)
POC MOLECULAR INFLUENZA A AGN: NEGATIVE
POC MOLECULAR INFLUENZA B AGN: NEGATIVE
POTASSIUM SERPL-SCNC: 4.4 MMOL/L (ref 3.5–5.1)
PROT SERPL-MCNC: 8.5 G/DL (ref 6–8.4)
PROT UR QL STRIP: ABNORMAL
RBC # BLD AUTO: 4.67 M/UL (ref 4–5.4)
SARS-COV-2 RDRP RESP QL NAA+PROBE: NEGATIVE
SODIUM SERPL-SCNC: 142 MMOL/L (ref 136–145)
SP GR UR STRIP: 1.01 (ref 1–1.03)
TROPONIN I SERPL DL<=0.01 NG/ML-MCNC: 0.02 NG/ML (ref 0–0.03)
URN SPEC COLLECT METH UR: ABNORMAL
UROBILINOGEN UR STRIP-ACNC: NEGATIVE EU/DL
WBC # BLD AUTO: 8.88 K/UL (ref 3.9–12.7)

## 2023-10-26 PROCEDURE — 96360 HYDRATION IV INFUSION INIT: CPT

## 2023-10-26 PROCEDURE — 87502 INFLUENZA DNA AMP PROBE: CPT

## 2023-10-26 PROCEDURE — 87635 SARS-COV-2 COVID-19 AMP PRB: CPT

## 2023-10-26 PROCEDURE — 93010 ELECTROCARDIOGRAM REPORT: CPT | Mod: ,,, | Performed by: INTERNAL MEDICINE

## 2023-10-26 PROCEDURE — 25000003 PHARM REV CODE 250

## 2023-10-26 PROCEDURE — 85025 COMPLETE CBC W/AUTO DIFF WBC: CPT

## 2023-10-26 PROCEDURE — 81003 URINALYSIS AUTO W/O SCOPE: CPT

## 2023-10-26 PROCEDURE — 99285 EMERGENCY DEPT VISIT HI MDM: CPT | Mod: 25

## 2023-10-26 PROCEDURE — 93005 ELECTROCARDIOGRAM TRACING: CPT

## 2023-10-26 PROCEDURE — 84484 ASSAY OF TROPONIN QUANT: CPT

## 2023-10-26 PROCEDURE — 80053 COMPREHEN METABOLIC PANEL: CPT

## 2023-10-26 PROCEDURE — 83690 ASSAY OF LIPASE: CPT

## 2023-10-26 PROCEDURE — 93010 EKG 12-LEAD: ICD-10-PCS | Mod: ,,, | Performed by: INTERNAL MEDICINE

## 2023-10-26 PROCEDURE — 83880 ASSAY OF NATRIURETIC PEPTIDE: CPT

## 2023-10-26 RX ORDER — LOSARTAN POTASSIUM 25 MG/1
100 TABLET ORAL ONCE
Status: COMPLETED | OUTPATIENT
Start: 2023-10-26 | End: 2023-10-26

## 2023-10-26 RX ORDER — FAMOTIDINE 20 MG/1
20 TABLET, FILM COATED ORAL DAILY
Qty: 20 TABLET | Refills: 0 | Status: SHIPPED | OUTPATIENT
Start: 2023-10-26 | End: 2023-11-15

## 2023-10-26 RX ORDER — MAG HYDROX/ALUMINUM HYD/SIMETH 200-200-20
30 SUSPENSION, ORAL (FINAL DOSE FORM) ORAL ONCE
Status: COMPLETED | OUTPATIENT
Start: 2023-10-26 | End: 2023-10-26

## 2023-10-26 RX ORDER — HYDROCHLOROTHIAZIDE 25 MG/1
25 TABLET ORAL
Status: COMPLETED | OUTPATIENT
Start: 2023-10-26 | End: 2023-10-26

## 2023-10-26 RX ORDER — AMLODIPINE BESYLATE 5 MG/1
10 TABLET ORAL
Status: COMPLETED | OUTPATIENT
Start: 2023-10-26 | End: 2023-10-26

## 2023-10-26 RX ORDER — ONDANSETRON 4 MG/1
4 TABLET, ORALLY DISINTEGRATING ORAL EVERY 6 HOURS PRN
Qty: 10 TABLET | Refills: 0 | Status: SHIPPED | OUTPATIENT
Start: 2023-10-26

## 2023-10-26 RX ORDER — ATORVASTATIN CALCIUM 10 MG/1
20 TABLET, FILM COATED ORAL DAILY
Status: DISCONTINUED | OUTPATIENT
Start: 2023-10-26 | End: 2023-10-26 | Stop reason: HOSPADM

## 2023-10-26 RX ADMIN — AMLODIPINE BESYLATE 10 MG: 5 TABLET ORAL at 08:10

## 2023-10-26 RX ADMIN — LOSARTAN POTASSIUM 100 MG: 25 TABLET, FILM COATED ORAL at 08:10

## 2023-10-26 RX ADMIN — ALUMINUM HYDROXIDE, MAGNESIUM HYDROXIDE, AND DIMETHICONE 30 ML: 200; 20; 200 SUSPENSION ORAL at 11:10

## 2023-10-26 RX ADMIN — HYDROCHLOROTHIAZIDE 25 MG: 25 TABLET ORAL at 08:10

## 2023-10-26 RX ADMIN — ATORVASTATIN CALCIUM 20 MG: 10 TABLET, FILM COATED ORAL at 09:10

## 2023-10-26 RX ADMIN — SODIUM CHLORIDE 500 ML: 9 INJECTION, SOLUTION INTRAVENOUS at 09:10

## 2023-10-26 NOTE — DISCHARGE INSTRUCTIONS
It is important that you stay hydrated.  You may use antiemetics or antinausea medication as needed.  Follow up with General surgery if abdominal discomfort persist.    Thank you for coming to our Emergency Department today. It is important to remember that some problems or medical conditions are difficult to diagnose and may not be found or addressed during your Emergency Department visit.  These conditions often start with non-specific symptoms and can only be diagnosed on follow up visits with your primary care physician or specialist when the symptoms continue or change. Please remember that all medical conditions can change, and we cannot predict how you will be feeling tomorrow or the next day. Return to the ER with any questions/concerns, new/concerning symptoms, worsening or failure to improve.     Please return to ER if you experience severe dizziness, fever higher then 100.4 that persist after medication administration, uncontrolled nausea/vomiting or diarrhea or any other major concern like increased pain, chest pain, shortness of breath, inability to pass stool or gas, or difficulty breathing or swelling of the throat/mouth/tongue.    Be sure to follow up with your primary care doctor and review all labs/imaging/tests that were performed during your ER visit with them. It is very common for us to identify non-emergent incidental findings which must be followed up with your primary care physician.  Some labs/imaging/tests may be outside of the normal range, and require non-emergent follow-up and/or further investigation/treatment/procedures/testing to help diagnose/exclude/prevent complications or other potentially serious medical conditions. Some abnormalities may not have been discussed or addressed during your ER visit.     An ER visit does not replace a primary care visit, and many screening tests or follow-up tests cannot be ordered by an ER doctor or performed by the ER. Some tests may even require  pre-approval.    If you do not have a primary care doctor, you may contact the one listed on your discharge paperwork or you may also call the [facility] Appointment Desk at [phone] , or [address] [place] [address] at  [phone] to schedule an appointment, or establish care with a primary care doctor or even a specialist and to obtain information about local resources. It is important to your health that you have a primary care doctor.    Please take all medications as directed. We have done our best to select a medication for you that will treat your condition however, all medications may potentially have side-effects and it is impossible to predict which medications may give you side-effects or what those side-effects (if any) those medications may give you.  If you feel that you are having a negative effect or side-effect of any medication you should stop taking those medications immediately and seek medical attention. If you feel that you are having a life-threatening reaction call 911.      Do not drive, swim, climb to height, take a bath, operate heavy machinery, drink alcohol or take potentially sedating medications, sign any legal documents or make any important decisions for 24 hours if you have received any pain medications, sedatives or mood altering drugs during your ER visit or within 24 hours of taking them if they have been prescribed to you.     You can find additional resources for Dentists, hearing aids, durable medical equipment, low cost pharmacies and other resources at [email or web address]

## 2023-10-26 NOTE — ED TRIAGE NOTES
"Pt to the ED c/o weakness and vomiting that began on last night. Denies diarrhea, cough, and/or congestion. Reports was admitted here in July for "gallbladder issues".   " grossly assessed due to/3+/5 for les and ues

## 2023-10-26 NOTE — ED PROVIDER NOTES
Encounter Date: 10/26/2023    SCRIBE #1 NOTE: I, Mallika Mcgee, am scribing for, and in the presence of,  Katharine Morrison PA-C. I have scribed the following portions of the note - Other sections scribed: HPI, ROS, PE.       History     Chief Complaint   Patient presents with    Fatigue     Here via [facility] with c/o fatigue and nausea starting last night     Abbie Barragan is a 88 y.o. female, with a PMHx of DVT (of left lower extremity), above knee amputation (left leg), PAD, HLD, HTN who presents to the ED with complaint of weakness onset 2300 last night. Patient reports she got up to get in her wheelchair this morning when she suddenly felt weak and thought she was going to fall and pass out.  She did not fall and was able to move to wheelchair without difficulty.  She move herself to the couch and sat briefly and then felt she was going to vomit so went to kitchen and had 1 episode of non bloody, nonbilious emesis.  She notes decreased appetite for the last 48 hours and last ate at 1200 yesterday (half a tuna sandwich which was her only meal yesterday).  She states she does normally does not eat breakfast so this was not abnormal for her.  Patient reports she took Tylenol, which did not alleviate her symptoms. Patient states she  no other exacerbating or alleviating factors.  She is drinks plenty of water at home. Denies diarrhea, SOB, chest pain, cough, congestion, sore throat, leg wounds, leg pain, hematemesis, or other associated symptoms.  Patient's son notes that she had a prior gallbladder surgery and notes her gallbladder intermittently leaks bile since this occurred and has not caused her trouble.  Her next follow up is in 2 months. Patient reports she is compliant with her daily medication. Patient denies having any recent falls.     The history is provided by the patient and a relative. No  was used.     Review of patient's allergies indicates:  No Known Allergies  Past Medical  History:   Diagnosis Date    Above knee amputation status 1994    History of DVT of lower extremity 1994    LEFT    Hyperlipidemia     Hypertension     PAD (peripheral artery disease)      Past Surgical History:   Procedure Laterality Date    DIAGNOSTIC LAPAROSCOPY  9/14/2023    Procedure: LAPAROSCOPY, DIAGNOSTIC;  Surgeon: Isidro Bell MD;  Location: [facility] OR;  Service: General;;    LEG AMPUTATION THROUGH KNEE      PARTIAL HYSTERECTOMY      1960's    ROBOT-ASSISTED CHOLECYSTECTOMY N/A 9/14/2023    Procedure: Diagnostic Laparoscopy Lysis of Adhesisions  Attempted Robotically;  Surgeon: Isidro Bell MD;  Location: [facility] OR;  Service: General;  Laterality: N/A;  ATTEMPTED    VASCULAR SURGERY Left 1994    AORTOFEM BYPASS     No family history on file.  Social History     Tobacco Use    Smoking status: Former    Smokeless tobacco: Never   Substance Use Topics    Alcohol use: No    Drug use: Never     Review of Systems   Constitutional:  Positive for appetite change and fatigue. Negative for chills and fever.   HENT:  Negative for congestion, ear pain, nosebleeds, rhinorrhea, sore throat and trouble swallowing.    Eyes:  Negative for redness.   Respiratory:  Negative for cough, shortness of breath and stridor.    Cardiovascular:  Negative for chest pain.   Gastrointestinal:  Positive for nausea and vomiting. Negative for abdominal pain, constipation and diarrhea.   Genitourinary:  Negative for decreased urine volume, dysuria, frequency, hematuria and urgency.   Musculoskeletal:  Negative for back pain and neck pain.   Skin:  Negative for rash and wound.   Neurological:  Positive for dizziness and weakness. Negative for syncope, speech difficulty, light-headedness, numbness and headaches.   Psychiatric/Behavioral:  Negative for confusion.        Physical Exam     Initial Vitals [10/26/23 0635]   BP Pulse Resp Temp SpO2   (!) 160/62 86 18 97.9 °F (36.6 °C) 100 %      MAP       --         Physical Exam    Nursing note  and vitals reviewed.  Constitutional: She appears well-developed and well-nourished. She is not diaphoretic. No distress.   HENT:   Head: Normocephalic and atraumatic.   Right Ear: External ear normal.   Left Ear: External ear normal.   Nose: Nose normal.   Mouth/Throat: Oropharynx is clear and moist. No oropharyngeal exudate.   Eyes: Conjunctivae and EOM are normal. Pupils are equal, round, and reactive to light. No scleral icterus.   Neck: No tracheal deviation present.   Normal range of motion.  Cardiovascular:  Normal rate, regular rhythm, normal heart sounds and intact distal pulses.     Exam reveals no gallop and no friction rub.       No murmur heard.  Pulmonary/Chest: Breath sounds normal. No respiratory distress. She has no wheezes. She has no rhonchi. She has no rales. She exhibits no tenderness.   Exam significant for no positive findings.    Otherwise chest wall is stable, nontender. No overlying skin changes. No masses or crepitus.   Radial and Pedal pulses 2+ b/l. Capillary refill <2sec. Skin is pink/warm/dry without peripheral cyanosis. There is no dependent edema.   Heart has regular rate and rhythm without murmurs/gallops/rubs.    No increased work of breathing. Breath sounds full and equal in all four quadrants. Chest rise and fall equal. No adventitious sounds.       Abdominal: Abdomen is soft. Bowel sounds are normal. She exhibits no distension. There is no abdominal tenderness.   Exam unremarkable    Abdomen is nondistended, soft and nontender in all quadrants. Normoactive bowel sounds. Nonperitoneal, no guarding or rigidity. No palpable masses or hepatosplenomegaly.  No suprapubic pain. No CVAT.  No overlying skin changes.   Distal pulses 2+ b/l. (-)  White's sign. (-)  Rebound Tenderness   There is no guarding and no tenderness at McBurney's point.   Musculoskeletal:         General: No tenderness or edema. Normal range of motion.      Cervical back: Normal range of motion.      Comments:  Above knee amputation of left leg.  No cyanosis or edema.  No notable wounds, sores, discoloration or tenderness to palpation.     Lymphadenopathy:     She has no cervical adenopathy.   Neurological: She is alert and oriented to person, place, and time. She has normal strength. No cranial nerve deficit or sensory deficit.   Skin: Skin is warm and dry.   Psychiatric: She has a normal mood and affect. Thought content normal.         ED Course   Procedures  Labs Reviewed   CBC W/ AUTO DIFFERENTIAL - Abnormal; Notable for the following components:       Result Value    MCH 26.1 (*)     MCHC 31.1 (*)     RDW 14.9 (*)     Gran % 75.0 (*)     Mono % 2.9 (*)     All other components within normal limits   COMPREHENSIVE METABOLIC PANEL - Abnormal; Notable for the following components:    Glucose 131 (*)     Total Protein 8.5 (*)     All other components within normal limits   URINALYSIS, REFLEX TO URINE CULTURE - Abnormal; Notable for the following components:    Color, UA Colorless (*)     Protein, UA Trace (*)     All other components within normal limits    Narrative:     Specimen Source->Urine   B-TYPE NATRIURETIC PEPTIDE - Abnormal; Notable for the following components:     (*)     All other components within normal limits   LIPASE   TROPONIN I   TROPONIN I   TROPONIN I   SARS-COV-2 RDRP GENE   POCT INFLUENZA A/B MOLECULAR        ECG Results              EKG 12-lead (Final result)  Result time 10/27/23 14:49:11      Final result by Interface, Lab [facility] (10/27/23 14:49:11)                   Narrative:    Test Reason : R11.2,    Vent. Rate : 081 BPM     Atrial Rate : 071 BPM     P-R Int : 000 ms          QRS Dur : 150 ms      QT Int : 444 ms       P-R-T Axes : 000 083 -13 degrees     QTc Int : 515 ms    Atrial fibrillation with a competing junctional pacemaker  Right bundle branch block  Abnormal ECG  When compared with ECG of 12-JUL-2023 12:40,  No significant change was found  Confirmed by Bogdan SANCHEZ, Rell  "([id number]) on 10/27/2023 2:49:00 PM    Referred By: ENIO   SELF           Confirmed By:Rell Mcfadden MD                                  Imaging Results              X-Ray Chest 1 View (Final result)  Result time 10/26/23 09:10:45      Final result by Fabien Lea MD (10/26/23 09:10:45)                   Impression:      No detrimental change when compared with 07/16/2023.      Electronically signed by: Fabien Lea MD  Date:    10/26/2023  Time:    09:10               Narrative:    EXAMINATION:  XR CHEST 1 VIEW    CLINICAL HISTORY:  Provided history is "  Dizziness and giddiness".    TECHNIQUE:  One view of the chest.    COMPARISON:  07/16/2023.    FINDINGS:  Cardiac wires overlie the chest.  Cardiomediastinal silhouette is enlarged and similar to the prior study.  Lung volumes are low, accentuating basilar markings.  Left hemidiaphragm is elevated.  Coarsened bibasilar interstitial lung markings without large focal area of consolidation.  Cannot exclude trace bilateral pleural effusions in the setting of low lung volumes.  No large pleural effusion.  No distinct pneumothorax.  Overall findings are similar when compared with the recent prior study.                                       Medications   amLODIPine tablet 10 mg (10 mg Oral Given 10/26/23 0831)   hydroCHLOROthiazide tablet 25 mg (25 mg Oral Given 10/26/23 0832)   losartan tablet 100 mg (100 mg Oral Given 10/26/23 0832)   sodium chloride 0.9% bolus 500 mL 500 mL (0 mLs Intravenous Stopped 10/26/23 1035)   aluminum-magnesium hydroxide-simethicone 200-200-20 mg/5 mL suspension 30 mL (30 mLs Oral Given 10/26/23 1125)     Medical Decision Making  Abbie Barragan is a 88 y.o. female, with a PMHx of DVT (of left lower extremity), above knee amputation (left leg), PAD, HLD, HTN who presents to the ED with complaint of decreased appetite and weakness.  Notes 1 episode of nonbloody, nonbilious emesis.  No recent falls, no head trauma.  Per relative, patient " is acting her normal self.  On physical exam, patient is well-appearing and in no acute distress.  Nontoxic appearing.  Lungs are clear to auscultation bilaterally.  Abdomen is soft and nontender.  No guarding, rigidity, rebound.  2+ radial pulses bilaterally.  Posterior oropharynx is not erythematous. Uvula midline.  Bilateral tympanic membrane is normal. Neuro intact.  Strength and sensation intact bilateral upper and lower extremities.  No notable wounds, contusions or sores noted skin.    Differential Dx includes: gastroenteritis, viral syndrome, less likely UTI or appendicitis, cholelithiasis, GERD, dehydration, or other intraabdominal infection as physical exam do not support these diagnosis.      COVID, flu negative. CBC without leukocytosis.  Hemoglobin and hematocrit stable. CMP unremarkable without significant electrolyte derangement, impaired renal function, or elevated LFTs.  Lipase within normal limits.  UA with no signs of infection or red blood cells.  BNP elevated but decrease in patient's baseline.  No elevations in troponin.  Chest x-ray with trace pleural effusions and coarse bibasilar interstitial lung markings noted in prior imaging.  No consolidation, cardiomegaly, pneumothorax or other notable abnormalities.  EKGs shows AFib that is rate controlled.  Ventricular rate of 81 beats per minute.  No QTC prolongation.  No ST elevation or T-wave abnormalities.    Patient hypertensive on arrival.  States she is not taken her home medications.  Given all accept anticoagulants in ED with some blood pressure control.  Patient's son notes that she is always slightly hypertensive and is currently seeing her PCP for medication adjustment.  No signs of end-organ damage or other causes of emergent blood pressure treatment.  Repeat abdominal exam benign.  No tenderness or distention.  No episodes of abdominal pain or vomiting while in ED.  Patient states she feels her normal self.  Pt re-evaluated, tolerating  PO.  Advised follow up with General surgery if abdominal pain reoccurs or persist for further evaluation and management.  I also emphasized importance of follow up with PCP for blood pressure control.  Patient advised to take her anti coag home medications when she gets home and continue them as prescribed.  I discussed with the patient the diagnosis, treatment plan, indications for return to the emergency department, and for expected follow-up. The patient verbalized an understanding. The patient is asked if there are any questions or concerns. We discuss the case, until all issues are addressed to the patient's satisfaction. Patient understands and is agreeable to the plan. Instructed follow up with primary care provider within 1 week.  Patient is very well appearing, and in no acute distress. Vital signs are reassuring here in the emergency department, patient is afebrile, breathing comfortable, sating 100 % on room air. Patient is stable for discharge at this time.     Amount and/or Complexity of Data Reviewed  Independent Historian: friend     Details: Daughter and son  External Data Reviewed: labs, radiology and notes.     Details: Per chart review, patient has Hx of cholecystitis with IR drain in 8/2023.  Labs: ordered. Decision-making details documented in ED Course.  Radiology: ordered. Decision-making details documented in ED Course.  ECG/medicine tests: ordered. Decision-making details documented in ED Course.  Discussion of management or test interpretation with external provider(s): I have discussed this patient with Dr. Novoa and he agreeds with my diagnostic and treatment plan.     Risk  OTC drugs.  Prescription drug management.  Diagnosis or treatment significantly limited by social determinants of health.            Scribe Attestation:   Scribe #1: I performed the above scribed service and the documentation accurately describes the services I performed. I attest to the accuracy of the note.         ED Course as of 10/28/23 1610   Thu Oct 26, 2023   0929 SARS-CoV-2 RNA, Amplification, Qual: Negative [CC]   0929 POC Molecular Influenza A Ag: Negative [CC]   0929 POC Molecular Influenza B Ag: Negative  CBC without leukocytosis.  Hemoglobin and hematocrit stable. CMP unremarkable without significant electrolyte derangement, impaired renal function, or elevated LFTs   [CC]   0931 Lipase: 9 [CC]   0931 BNP elevated but lower than baseline [CC]   0932 .  Cardiomediastinal silhouette is enlarged and similar to the prior study.  Lung volumes are low, accentuating basilar markings.  Left hemidiaphragm is elevated.  Coarsened bibasilar interstitial lung markings without large focal area of consolidation.  Cannot exclude trace bilateral pleural effusions in the setting of low lung volumes.  No large pleural effusion.  No distinct pneumothorax.  Overall findings are similar when compared with the recent prior study. [CC]   1022 Workup unremarkabkle. Will Po challenge [CC]      ED Course User Index  [CC] Katharine Morrison PA-C                    Clinical Impression:   Final diagnoses:  [R11.2] Nausea & vomiting  [R42] Light headedness        ED Disposition Condition    Discharge Stable          ED Prescriptions       Medication Sig Dispense Start Date End Date Auth. Provider    famotidine (PEPCID) 20 MG tablet Take 1 tablet (20 mg total) by mouth once daily. for 20 days 20 tablet 10/26/2023 11/15/2023 Katharine Morrison PA-C    ondansetron (ZOFRAN-ODT) 4 MG TbDL Take 1 tablet (4 mg total) by mouth every 6 (six) hours as needed (nausea). 10 tablet 10/26/2023 -- Katharine Morrison PA-C          Follow-up Information       Follow up With Specialties Details Why Contact Info    [facility] [place] - Emergency Dept Emergency Medicine Go to  For new or worsening symptoms [address] [facility] [address] [phone]    Laura Shin MD Pediatrics   [address] [facility]  [address]TH FLOOR  [facility] [address] [phone]               I, RAMSEY Morrison, Emergency Medicine ASHLEY , personally performed the services described in this documentation. All medical record entries made by the scribe were at my direction and in my presence. I have reviewed the chart and agree that the record reflects my personal performance and is accurate and complete.      Katharine Morrison PA-C  10/28/23 1606       Katharine Morrison PA-C  10/28/23 1617

## 2023-11-29 ENCOUNTER — OFFICE VISIT (OUTPATIENT)
Dept: SURGERY | Facility: CLINIC | Age: 88
End: 2023-11-29
Payer: MEDICARE

## 2023-11-29 VITALS — SYSTOLIC BLOOD PRESSURE: 145 MMHG | HEART RATE: 70 BPM | DIASTOLIC BLOOD PRESSURE: 84 MMHG

## 2023-11-29 DIAGNOSIS — K81.9 CHOLECYSTITIS: Primary | ICD-10-CM

## 2023-11-29 PROCEDURE — 1101F PT FALLS ASSESS-DOCD LE1/YR: CPT | Mod: CPTII,S$GLB,, | Performed by: SURGERY

## 2023-11-29 PROCEDURE — 99024 PR POST-OP FOLLOW-UP VISIT: ICD-10-PCS | Mod: S$GLB,,, | Performed by: SURGERY

## 2023-11-29 PROCEDURE — 3288F FALL RISK ASSESSMENT DOCD: CPT | Mod: CPTII,S$GLB,, | Performed by: SURGERY

## 2023-11-29 PROCEDURE — 1159F PR MEDICATION LIST DOCUMENTED IN MEDICAL RECORD: ICD-10-PCS | Mod: CPTII,S$GLB,, | Performed by: SURGERY

## 2023-11-29 PROCEDURE — 99999 PR PBB SHADOW E&M-EST. PATIENT-LVL III: CPT | Mod: PBBFAC,,, | Performed by: SURGERY

## 2023-11-29 PROCEDURE — 99024 POSTOP FOLLOW-UP VISIT: CPT | Mod: S$GLB,,, | Performed by: SURGERY

## 2023-11-29 PROCEDURE — 1101F PR PT FALLS ASSESS DOC 0-1 FALLS W/OUT INJ PAST YR: ICD-10-PCS | Mod: CPTII,S$GLB,, | Performed by: SURGERY

## 2023-11-29 PROCEDURE — 3288F PR FALLS RISK ASSESSMENT DOCUMENTED: ICD-10-PCS | Mod: CPTII,S$GLB,, | Performed by: SURGERY

## 2023-11-29 PROCEDURE — 1159F MED LIST DOCD IN RCRD: CPT | Mod: CPTII,S$GLB,, | Performed by: SURGERY

## 2023-11-29 PROCEDURE — 99999 PR PBB SHADOW E&M-EST. PATIENT-LVL III: ICD-10-PCS | Mod: PBBFAC,,, | Performed by: SURGERY

## 2023-11-29 NOTE — PROGRESS NOTES
Surgery Clinic Note    Abbie Barragan is a 88 y.o. year old female in clinic today for follow up of cholecystitis, aborted cholecystectomy. She is feeling great. No episodes of fever or RUQ pain. Happy to continue on, as long as she remains asymptomatic. Discussed plans if she have subsequent RUQ pain or cholecystitis, could plan for cholecystostomy tube versus open cholecystectomy.  No f/c/n/v/sob/cp    ROS:  Negative except above    PE:  Vitals:    11/29/23 1406   BP: (!) 145/84   Pulse: 70     NAD  No belabored breathing  Abd soft nt nd  Incisions c/d/I    A/P:  Abbie Barragan is a 88 y.o. year old female s/p laparoscopy for interval cholecystectomy which was aborted due to excessive adhesions    -rtc prn.  -Discussed plans if she have subsequent RUQ pain or cholecystitis, could plan for cholecystostomy tube versus open cholecystectomy.    Isidro Bell  General Surgery - [facility]  11/29/2023

## 2024-10-01 ENCOUNTER — HOSPITAL ENCOUNTER (INPATIENT)
Facility: HOSPITAL | Age: 89
LOS: 7 days | Discharge: HOME HEALTH | DRG: 354 | End: 2024-10-09
Attending: STUDENT IN AN ORGANIZED HEALTH CARE EDUCATION/TRAINING PROGRAM | Admitting: STUDENT IN AN ORGANIZED HEALTH CARE EDUCATION/TRAINING PROGRAM
Payer: MEDICARE

## 2024-10-01 DIAGNOSIS — R09.02 HYPOXIA: ICD-10-CM

## 2024-10-01 DIAGNOSIS — R07.9 CHEST PAIN: ICD-10-CM

## 2024-10-01 DIAGNOSIS — Z98.890 STATUS POST HERNIA REPAIR: ICD-10-CM

## 2024-10-01 DIAGNOSIS — M79.602 LEFT ARM PAIN: ICD-10-CM

## 2024-10-01 DIAGNOSIS — Z87.19 STATUS POST HERNIA REPAIR: ICD-10-CM

## 2024-10-01 DIAGNOSIS — Z01.810 PREOPERATIVE CARDIOVASCULAR EXAMINATION: ICD-10-CM

## 2024-10-01 DIAGNOSIS — K56.609 SBO (SMALL BOWEL OBSTRUCTION): Primary | ICD-10-CM

## 2024-10-01 LAB
ALBUMIN SERPL BCP-MCNC: 3.7 G/DL (ref 3.5–5.2)
ALLENS TEST: ABNORMAL
ALP SERPL-CCNC: 151 U/L (ref 55–135)
ALT SERPL W/O P-5'-P-CCNC: 11 U/L (ref 10–44)
ANION GAP SERPL CALC-SCNC: 12 MMOL/L (ref 8–16)
ANION GAP SERPL CALC-SCNC: 14 MMOL/L (ref 8–16)
AST SERPL-CCNC: 14 U/L (ref 10–40)
BASOPHILS # BLD AUTO: 0.03 K/UL (ref 0–0.2)
BASOPHILS NFR BLD: 0.2 % (ref 0–1.9)
BILIRUB SERPL-MCNC: 0.7 MG/DL (ref 0.1–1)
BUN SERPL-MCNC: 13 MG/DL (ref 6–30)
BUN SERPL-MCNC: 13 MG/DL (ref 8–23)
CALCIUM SERPL-MCNC: 10.2 MG/DL (ref 8.7–10.5)
CHLORIDE SERPL-SCNC: 100 MMOL/L (ref 95–110)
CHLORIDE SERPL-SCNC: 105 MMOL/L (ref 95–110)
CO2 SERPL-SCNC: 28 MMOL/L (ref 23–29)
CREAT SERPL-MCNC: 0.6 MG/DL (ref 0.5–1.4)
CREAT SERPL-MCNC: 0.9 MG/DL (ref 0.5–1.4)
DIFFERENTIAL METHOD BLD: ABNORMAL
EOSINOPHIL # BLD AUTO: 0.1 K/UL (ref 0–0.5)
EOSINOPHIL NFR BLD: 0.5 % (ref 0–8)
ERYTHROCYTE [DISTWIDTH] IN BLOOD BY AUTOMATED COUNT: 14.8 % (ref 11.5–14.5)
EST. GFR  (NO RACE VARIABLE): >60 ML/MIN/1.73 M^2
GLUCOSE SERPL-MCNC: 164 MG/DL (ref 70–110)
GLUCOSE SERPL-MCNC: 184 MG/DL (ref 70–110)
HCT VFR BLD AUTO: 43.1 % (ref 37–48.5)
HCT VFR BLD CALC: 32 %PCV (ref 36–54)
HGB BLD-MCNC: 13.5 G/DL (ref 12–16)
IMM GRANULOCYTES # BLD AUTO: 0.06 K/UL (ref 0–0.04)
IMM GRANULOCYTES NFR BLD AUTO: 0.4 % (ref 0–0.5)
LACTATE SERPL-SCNC: 1.9 MMOL/L (ref 0.5–2.2)
LIPASE SERPL-CCNC: 15 U/L (ref 4–60)
LYMPHOCYTES # BLD AUTO: 2.8 K/UL (ref 1–4.8)
LYMPHOCYTES NFR BLD: 18.9 % (ref 18–48)
MCH RBC QN AUTO: 28 PG (ref 27–31)
MCHC RBC AUTO-ENTMCNC: 31.3 G/DL (ref 32–36)
MCV RBC AUTO: 89 FL (ref 82–98)
MONOCYTES # BLD AUTO: 0.4 K/UL (ref 0.3–1)
MONOCYTES NFR BLD: 2.4 % (ref 4–15)
NEUTROPHILS # BLD AUTO: 11.6 K/UL (ref 1.8–7.7)
NEUTROPHILS NFR BLD: 77.6 % (ref 38–73)
NRBC BLD-RTO: 0 /100 WBC
PLATELET # BLD AUTO: 226 K/UL (ref 150–450)
PMV BLD AUTO: 13.3 FL (ref 9.2–12.9)
POC IONIZED CALCIUM: 1.05 MMOL/L (ref 1.06–1.42)
POC TCO2 (MEASURED): 27 MMOL/L (ref 23–29)
POTASSIUM BLD-SCNC: 3.7 MMOL/L (ref 3.5–5.1)
POTASSIUM SERPL-SCNC: 3.5 MMOL/L (ref 3.5–5.1)
PROT SERPL-MCNC: 8.3 G/DL (ref 6–8.4)
RBC # BLD AUTO: 4.83 M/UL (ref 4–5.4)
SAMPLE: ABNORMAL
SITE: ABNORMAL
SODIUM BLD-SCNC: 141 MMOL/L (ref 136–145)
SODIUM SERPL-SCNC: 140 MMOL/L (ref 136–145)
WBC # BLD AUTO: 14.93 K/UL (ref 3.9–12.7)

## 2024-10-01 PROCEDURE — 96375 TX/PRO/DX INJ NEW DRUG ADDON: CPT

## 2024-10-01 PROCEDURE — 82330 ASSAY OF CALCIUM: CPT

## 2024-10-01 PROCEDURE — 63600175 PHARM REV CODE 636 W HCPCS: Performed by: STUDENT IN AN ORGANIZED HEALTH CARE EDUCATION/TRAINING PROGRAM

## 2024-10-01 PROCEDURE — 83690 ASSAY OF LIPASE: CPT | Performed by: STUDENT IN AN ORGANIZED HEALTH CARE EDUCATION/TRAINING PROGRAM

## 2024-10-01 PROCEDURE — 85014 HEMATOCRIT: CPT

## 2024-10-01 PROCEDURE — 82565 ASSAY OF CREATININE: CPT

## 2024-10-01 PROCEDURE — 87502 INFLUENZA DNA AMP PROBE: CPT

## 2024-10-01 PROCEDURE — 83735 ASSAY OF MAGNESIUM: CPT | Performed by: STUDENT IN AN ORGANIZED HEALTH CARE EDUCATION/TRAINING PROGRAM

## 2024-10-01 PROCEDURE — 84295 ASSAY OF SERUM SODIUM: CPT

## 2024-10-01 PROCEDURE — 85025 COMPLETE CBC W/AUTO DIFF WBC: CPT | Performed by: STUDENT IN AN ORGANIZED HEALTH CARE EDUCATION/TRAINING PROGRAM

## 2024-10-01 PROCEDURE — 83605 ASSAY OF LACTIC ACID: CPT | Performed by: STUDENT IN AN ORGANIZED HEALTH CARE EDUCATION/TRAINING PROGRAM

## 2024-10-01 PROCEDURE — 99900035 HC TECH TIME PER 15 MIN (STAT)

## 2024-10-01 PROCEDURE — 96365 THER/PROPH/DIAG IV INF INIT: CPT

## 2024-10-01 PROCEDURE — 80053 COMPREHEN METABOLIC PANEL: CPT | Performed by: STUDENT IN AN ORGANIZED HEALTH CARE EDUCATION/TRAINING PROGRAM

## 2024-10-01 PROCEDURE — 84132 ASSAY OF SERUM POTASSIUM: CPT

## 2024-10-01 PROCEDURE — 99285 EMERGENCY DEPT VISIT HI MDM: CPT | Mod: 25

## 2024-10-01 PROCEDURE — 93005 ELECTROCARDIOGRAM TRACING: CPT

## 2024-10-01 PROCEDURE — 93010 ELECTROCARDIOGRAM REPORT: CPT | Mod: ,,, | Performed by: INTERNAL MEDICINE

## 2024-10-01 RX ORDER — ONDANSETRON HYDROCHLORIDE 2 MG/ML
4 INJECTION, SOLUTION INTRAVENOUS
Status: COMPLETED | OUTPATIENT
Start: 2024-10-01 | End: 2024-10-01

## 2024-10-01 RX ORDER — LIDOCAINE HYDROCHLORIDE 10 MG/ML
10 INJECTION, SOLUTION INFILTRATION; PERINEURAL
Status: DISCONTINUED | OUTPATIENT
Start: 2024-10-01 | End: 2024-10-01

## 2024-10-01 RX ORDER — MORPHINE SULFATE 4 MG/ML
2 INJECTION, SOLUTION INTRAMUSCULAR; INTRAVENOUS
Status: COMPLETED | OUTPATIENT
Start: 2024-10-01 | End: 2024-10-01

## 2024-10-01 RX ADMIN — MORPHINE SULFATE 2 MG: 4 INJECTION, SOLUTION INTRAMUSCULAR; INTRAVENOUS at 11:10

## 2024-10-01 RX ADMIN — ONDANSETRON 4 MG: 2 INJECTION INTRAMUSCULAR; INTRAVENOUS at 11:10

## 2024-10-02 ENCOUNTER — ANESTHESIA (OUTPATIENT)
Dept: SURGERY | Facility: HOSPITAL | Age: 89
End: 2024-10-02
Payer: MEDICARE

## 2024-10-02 ENCOUNTER — ANESTHESIA EVENT (OUTPATIENT)
Dept: SURGERY | Facility: HOSPITAL | Age: 89
End: 2024-10-02
Payer: MEDICARE

## 2024-10-02 PROBLEM — K56.609 SBO (SMALL BOWEL OBSTRUCTION): Status: ACTIVE | Noted: 2024-10-02

## 2024-10-02 PROBLEM — K80.00 CHOLELITHIASIS WITH ACUTE CHOLECYSTITIS: Status: RESOLVED | Noted: 2023-07-12 | Resolved: 2024-10-02

## 2024-10-02 PROBLEM — I10 ESSENTIAL HYPERTENSION: Status: ACTIVE | Noted: 2024-10-02

## 2024-10-02 PROBLEM — I73.9 PAD (PERIPHERAL ARTERY DISEASE): Status: ACTIVE | Noted: 2024-10-02

## 2024-10-02 PROBLEM — E03.9 ACQUIRED HYPOTHYROIDISM: Status: ACTIVE | Noted: 2024-10-02

## 2024-10-02 LAB
ABO + RH BLD: NORMAL
ALBUMIN SERPL BCP-MCNC: 3.5 G/DL (ref 3.5–5.2)
ALP SERPL-CCNC: 138 U/L (ref 55–135)
ALT SERPL W/O P-5'-P-CCNC: 12 U/L (ref 10–44)
ANION GAP SERPL CALC-SCNC: 13 MMOL/L (ref 8–16)
APTT PPP: 26.1 SEC (ref 21–32)
AST SERPL-CCNC: 13 U/L (ref 10–40)
BACTERIA #/AREA URNS HPF: NORMAL /HPF
BASOPHILS # BLD AUTO: 0.03 K/UL (ref 0–0.2)
BASOPHILS NFR BLD: 0.2 % (ref 0–1.9)
BILIRUB SERPL-MCNC: 0.4 MG/DL (ref 0.1–1)
BILIRUB UR QL STRIP: NEGATIVE
BLD GP AB SCN CELLS X3 SERPL QL: NORMAL
BNP SERPL-MCNC: 185 PG/ML (ref 0–99)
BUN SERPL-MCNC: 12 MG/DL (ref 8–23)
CALCIUM SERPL-MCNC: 10.1 MG/DL (ref 8.7–10.5)
CHLORIDE SERPL-SCNC: 98 MMOL/L (ref 95–110)
CLARITY UR: ABNORMAL
CO2 SERPL-SCNC: 27 MMOL/L (ref 23–29)
COLOR UR: YELLOW
CREAT SERPL-MCNC: 0.8 MG/DL (ref 0.5–1.4)
CTP QC/QA: YES
CTP QC/QA: YES
DIFFERENTIAL METHOD BLD: ABNORMAL
EOSINOPHIL # BLD AUTO: 0 K/UL (ref 0–0.5)
EOSINOPHIL NFR BLD: 0.1 % (ref 0–8)
ERYTHROCYTE [DISTWIDTH] IN BLOOD BY AUTOMATED COUNT: 14.7 % (ref 11.5–14.5)
EST. GFR  (NO RACE VARIABLE): >60 ML/MIN/1.73 M^2
GLUCOSE SERPL-MCNC: 168 MG/DL (ref 70–110)
GLUCOSE UR QL STRIP: NEGATIVE
HCT VFR BLD AUTO: 43.8 % (ref 37–48.5)
HGB BLD-MCNC: 13.4 G/DL (ref 12–16)
HGB UR QL STRIP: NEGATIVE
HYALINE CASTS #/AREA URNS LPF: 0 /LPF
IMM GRANULOCYTES # BLD AUTO: 0.09 K/UL (ref 0–0.04)
IMM GRANULOCYTES NFR BLD AUTO: 0.6 % (ref 0–0.5)
INR PPP: 1 (ref 0.8–1.2)
KETONES UR QL STRIP: NEGATIVE
LACTATE SERPL-SCNC: 2.6 MMOL/L (ref 0.5–2.2)
LACTATE SERPL-SCNC: 3 MMOL/L (ref 0.5–2.2)
LACTATE SERPL-SCNC: 4.1 MMOL/L (ref 0.5–2.2)
LACTATE SERPL-SCNC: 4.1 MMOL/L (ref 0.5–2.2)
LDH SERPL L TO P-CCNC: 245 U/L (ref 110–260)
LEUKOCYTE ESTERASE UR QL STRIP: NEGATIVE
LYMPHOCYTES # BLD AUTO: 2 K/UL (ref 1–4.8)
LYMPHOCYTES NFR BLD: 13.7 % (ref 18–48)
MAGNESIUM SERPL-MCNC: 2.1 MG/DL (ref 1.6–2.6)
MCH RBC QN AUTO: 28 PG (ref 27–31)
MCHC RBC AUTO-ENTMCNC: 30.6 G/DL (ref 32–36)
MCV RBC AUTO: 92 FL (ref 82–98)
MICROSCOPIC COMMENT: NORMAL
MONOCYTES # BLD AUTO: 0.3 K/UL (ref 0.3–1)
MONOCYTES NFR BLD: 2.2 % (ref 4–15)
NEUTROPHILS # BLD AUTO: 12.1 K/UL (ref 1.8–7.7)
NEUTROPHILS NFR BLD: 83.2 % (ref 38–73)
NITRITE UR QL STRIP: POSITIVE
NRBC BLD-RTO: 0 /100 WBC
OHS QRS DURATION: 160 MS
OHS QTC CALCULATION: 496 MS
PH UR STRIP: 7 [PH] (ref 5–8)
PLATELET # BLD AUTO: 217 K/UL (ref 150–450)
PMV BLD AUTO: 12.7 FL (ref 9.2–12.9)
POC MOLECULAR INFLUENZA A AGN: NEGATIVE
POC MOLECULAR INFLUENZA B AGN: NEGATIVE
POTASSIUM SERPL-SCNC: 3.3 MMOL/L (ref 3.5–5.1)
PROT SERPL-MCNC: 7.7 G/DL (ref 6–8.4)
PROT UR QL STRIP: ABNORMAL
PROTHROMBIN TIME: 11 SEC (ref 9–12.5)
RBC # BLD AUTO: 4.78 M/UL (ref 4–5.4)
RBC #/AREA URNS HPF: 1 /HPF (ref 0–4)
SARS-COV-2 RDRP RESP QL NAA+PROBE: NEGATIVE
SODIUM SERPL-SCNC: 138 MMOL/L (ref 136–145)
SP GR UR STRIP: 1.02 (ref 1–1.03)
URN SPEC COLLECT METH UR: ABNORMAL
UROBILINOGEN UR STRIP-ACNC: NEGATIVE EU/DL
WBC # BLD AUTO: 14.5 K/UL (ref 3.9–12.7)
WBC #/AREA URNS HPF: 3 /HPF (ref 0–5)

## 2024-10-02 PROCEDURE — 83605 ASSAY OF LACTIC ACID: CPT | Mod: 91 | Performed by: STUDENT IN AN ORGANIZED HEALTH CARE EDUCATION/TRAINING PROGRAM

## 2024-10-02 PROCEDURE — 25000003 PHARM REV CODE 250

## 2024-10-02 PROCEDURE — 80053 COMPREHEN METABOLIC PANEL: CPT | Performed by: INTERNAL MEDICINE

## 2024-10-02 PROCEDURE — C1781 MESH (IMPLANTABLE): HCPCS | Performed by: SURGERY

## 2024-10-02 PROCEDURE — 63600175 PHARM REV CODE 636 W HCPCS: Performed by: STUDENT IN AN ORGANIZED HEALTH CARE EDUCATION/TRAINING PROGRAM

## 2024-10-02 PROCEDURE — 0WUF0JZ SUPPLEMENT ABDOMINAL WALL WITH SYNTHETIC SUBSTITUTE, OPEN APPROACH: ICD-10-PCS | Performed by: SURGERY

## 2024-10-02 PROCEDURE — 85610 PROTHROMBIN TIME: CPT | Performed by: INTERNAL MEDICINE

## 2024-10-02 PROCEDURE — 25000003 PHARM REV CODE 250: Performed by: STUDENT IN AN ORGANIZED HEALTH CARE EDUCATION/TRAINING PROGRAM

## 2024-10-02 PROCEDURE — 49594 RPR AA HRN 1ST 3-10 NCR/STRN: CPT | Mod: 80,,, | Performed by: STUDENT IN AN ORGANIZED HEALTH CARE EDUCATION/TRAINING PROGRAM

## 2024-10-02 PROCEDURE — 71000033 HC RECOVERY, INTIAL HOUR: Performed by: SURGERY

## 2024-10-02 PROCEDURE — 83605 ASSAY OF LACTIC ACID: CPT | Mod: 91 | Performed by: PHYSICIAN ASSISTANT

## 2024-10-02 PROCEDURE — 81000 URINALYSIS NONAUTO W/SCOPE: CPT | Performed by: STUDENT IN AN ORGANIZED HEALTH CARE EDUCATION/TRAINING PROGRAM

## 2024-10-02 PROCEDURE — 83880 ASSAY OF NATRIURETIC PEPTIDE: CPT | Performed by: STUDENT IN AN ORGANIZED HEALTH CARE EDUCATION/TRAINING PROGRAM

## 2024-10-02 PROCEDURE — 63600175 PHARM REV CODE 636 W HCPCS: Mod: JZ,JG | Performed by: SURGERY

## 2024-10-02 PROCEDURE — 0DNW0ZZ RELEASE PERITONEUM, OPEN APPROACH: ICD-10-PCS | Performed by: SURGERY

## 2024-10-02 PROCEDURE — P9045 ALBUMIN (HUMAN), 5%, 250 ML: HCPCS | Mod: JZ,JG | Performed by: NURSE ANESTHETIST, CERTIFIED REGISTERED

## 2024-10-02 PROCEDURE — 87635 SARS-COV-2 COVID-19 AMP PRB: CPT | Performed by: STUDENT IN AN ORGANIZED HEALTH CARE EDUCATION/TRAINING PROGRAM

## 2024-10-02 PROCEDURE — 49594 RPR AA HRN 1ST 3-10 NCR/STRN: CPT | Mod: ,,, | Performed by: SURGERY

## 2024-10-02 PROCEDURE — 25000003 PHARM REV CODE 250: Performed by: PHYSICIAN ASSISTANT

## 2024-10-02 PROCEDURE — 25000003 PHARM REV CODE 250: Performed by: INTERNAL MEDICINE

## 2024-10-02 PROCEDURE — 63600175 PHARM REV CODE 636 W HCPCS

## 2024-10-02 PROCEDURE — 36000706: Performed by: SURGERY

## 2024-10-02 PROCEDURE — 25000003 PHARM REV CODE 250: Performed by: NURSE ANESTHETIST, CERTIFIED REGISTERED

## 2024-10-02 PROCEDURE — 36415 COLL VENOUS BLD VENIPUNCTURE: CPT | Performed by: STUDENT IN AN ORGANIZED HEALTH CARE EDUCATION/TRAINING PROGRAM

## 2024-10-02 PROCEDURE — 63600175 PHARM REV CODE 636 W HCPCS: Performed by: NURSE ANESTHETIST, CERTIFIED REGISTERED

## 2024-10-02 PROCEDURE — 99223 1ST HOSP IP/OBS HIGH 75: CPT | Mod: 25,,, | Performed by: STUDENT IN AN ORGANIZED HEALTH CARE EDUCATION/TRAINING PROGRAM

## 2024-10-02 PROCEDURE — 63600175 PHARM REV CODE 636 W HCPCS: Performed by: INTERNAL MEDICINE

## 2024-10-02 PROCEDURE — 25500020 PHARM REV CODE 255: Performed by: STUDENT IN AN ORGANIZED HEALTH CARE EDUCATION/TRAINING PROGRAM

## 2024-10-02 PROCEDURE — 37000009 HC ANESTHESIA EA ADD 15 MINS: Performed by: SURGERY

## 2024-10-02 PROCEDURE — 86901 BLOOD TYPING SEROLOGIC RH(D): CPT | Performed by: STUDENT IN AN ORGANIZED HEALTH CARE EDUCATION/TRAINING PROGRAM

## 2024-10-02 PROCEDURE — 83615 LACTATE (LD) (LDH) ENZYME: CPT | Performed by: INTERNAL MEDICINE

## 2024-10-02 PROCEDURE — 85025 COMPLETE CBC W/AUTO DIFF WBC: CPT | Performed by: INTERNAL MEDICINE

## 2024-10-02 PROCEDURE — 63600175 PHARM REV CODE 636 W HCPCS: Mod: JZ,JG | Performed by: STUDENT IN AN ORGANIZED HEALTH CARE EDUCATION/TRAINING PROGRAM

## 2024-10-02 PROCEDURE — 37000008 HC ANESTHESIA 1ST 15 MINUTES: Performed by: SURGERY

## 2024-10-02 PROCEDURE — 36415 COLL VENOUS BLD VENIPUNCTURE: CPT | Mod: XB | Performed by: PHYSICIAN ASSISTANT

## 2024-10-02 PROCEDURE — C9290 INJ, BUPIVACAINE LIPOSOME: HCPCS

## 2024-10-02 PROCEDURE — 85730 THROMBOPLASTIN TIME PARTIAL: CPT | Performed by: INTERNAL MEDICINE

## 2024-10-02 PROCEDURE — 83605 ASSAY OF LACTIC ACID: CPT | Performed by: INTERNAL MEDICINE

## 2024-10-02 PROCEDURE — 36000707: Performed by: SURGERY

## 2024-10-02 PROCEDURE — 36415 COLL VENOUS BLD VENIPUNCTURE: CPT | Mod: XB | Performed by: INTERNAL MEDICINE

## 2024-10-02 PROCEDURE — 86850 RBC ANTIBODY SCREEN: CPT | Performed by: STUDENT IN AN ORGANIZED HEALTH CARE EDUCATION/TRAINING PROGRAM

## 2024-10-02 PROCEDURE — 63600175 PHARM REV CODE 636 W HCPCS: Performed by: ANESTHESIOLOGY

## 2024-10-02 PROCEDURE — 99223 1ST HOSP IP/OBS HIGH 75: CPT | Mod: ,,, | Performed by: INTERNAL MEDICINE

## 2024-10-02 PROCEDURE — 21400001 HC TELEMETRY ROOM

## 2024-10-02 PROCEDURE — 86900 BLOOD TYPING SEROLOGIC ABO: CPT | Performed by: STUDENT IN AN ORGANIZED HEALTH CARE EDUCATION/TRAINING PROGRAM

## 2024-10-02 DEVICE — VENTRALEX ST HERNIA PATCH, 8.0 CM (3.2"), CIRCLE
Type: IMPLANTABLE DEVICE | Site: ABDOMEN | Status: FUNCTIONAL
Brand: VENTRALEX

## 2024-10-02 RX ORDER — ACETAMINOPHEN 10 MG/ML
1000 INJECTION, SOLUTION INTRAVENOUS ONCE
Status: COMPLETED | OUTPATIENT
Start: 2024-10-02 | End: 2024-10-02

## 2024-10-02 RX ORDER — ROCURONIUM BROMIDE 10 MG/ML
INJECTION, SOLUTION INTRAVENOUS
Status: DISCONTINUED | OUTPATIENT
Start: 2024-10-02 | End: 2024-10-02

## 2024-10-02 RX ORDER — BUPIVACAINE HYDROCHLORIDE 2.5 MG/ML
INJECTION, SOLUTION EPIDURAL; INFILTRATION; INTRACAUDAL
Status: DISCONTINUED | OUTPATIENT
Start: 2024-10-02 | End: 2024-10-02 | Stop reason: HOSPADM

## 2024-10-02 RX ORDER — ONDANSETRON HYDROCHLORIDE 2 MG/ML
INJECTION, SOLUTION INTRAVENOUS
Status: DISCONTINUED | OUTPATIENT
Start: 2024-10-02 | End: 2024-10-02

## 2024-10-02 RX ORDER — PROPOFOL 10 MG/ML
VIAL (ML) INTRAVENOUS
Status: DISCONTINUED | OUTPATIENT
Start: 2024-10-02 | End: 2024-10-02

## 2024-10-02 RX ORDER — HYDRALAZINE HYDROCHLORIDE 20 MG/ML
5 INJECTION INTRAMUSCULAR; INTRAVENOUS EVERY 6 HOURS PRN
Status: DISCONTINUED | OUTPATIENT
Start: 2024-10-02 | End: 2024-10-09 | Stop reason: HOSPADM

## 2024-10-02 RX ORDER — IPRATROPIUM BROMIDE AND ALBUTEROL SULFATE 2.5; .5 MG/3ML; MG/3ML
3 SOLUTION RESPIRATORY (INHALATION) EVERY 4 HOURS PRN
Status: DISCONTINUED | OUTPATIENT
Start: 2024-10-02 | End: 2024-10-09 | Stop reason: HOSPADM

## 2024-10-02 RX ORDER — ALBUMIN HUMAN 50 G/1000ML
SOLUTION INTRAVENOUS
Status: DISCONTINUED | OUTPATIENT
Start: 2024-10-02 | End: 2024-10-02

## 2024-10-02 RX ORDER — SODIUM CHLORIDE 9 MG/ML
INJECTION, SOLUTION INTRAVENOUS CONTINUOUS
Status: ACTIVE | OUTPATIENT
Start: 2024-10-02 | End: 2024-10-03

## 2024-10-02 RX ORDER — ALUMINUM HYDROXIDE, MAGNESIUM HYDROXIDE, AND SIMETHICONE 1200; 120; 1200 MG/30ML; MG/30ML; MG/30ML
30 SUSPENSION ORAL 4 TIMES DAILY PRN
Status: DISCONTINUED | OUTPATIENT
Start: 2024-10-02 | End: 2024-10-09 | Stop reason: HOSPADM

## 2024-10-02 RX ORDER — SODIUM CHLORIDE 9 MG/ML
INJECTION, SOLUTION INTRAVENOUS CONTINUOUS
Status: DISCONTINUED | OUTPATIENT
Start: 2024-10-02 | End: 2024-10-02

## 2024-10-02 RX ORDER — NALOXONE HCL 0.4 MG/ML
0.4 VIAL (ML) INJECTION
Status: DISCONTINUED | OUTPATIENT
Start: 2024-10-02 | End: 2024-10-09 | Stop reason: HOSPADM

## 2024-10-02 RX ORDER — SODIUM CHLORIDE 0.9 % (FLUSH) 0.9 %
10 SYRINGE (ML) INJECTION
Status: DISCONTINUED | OUTPATIENT
Start: 2024-10-02 | End: 2024-10-02 | Stop reason: HOSPADM

## 2024-10-02 RX ORDER — ENOXAPARIN SODIUM 150 MG/ML
1 INJECTION SUBCUTANEOUS EVERY 12 HOURS
Status: DISCONTINUED | OUTPATIENT
Start: 2024-10-02 | End: 2024-10-02

## 2024-10-02 RX ORDER — LIDOCAINE HYDROCHLORIDE 20 MG/ML
INJECTION INTRAVENOUS
Status: DISCONTINUED | OUTPATIENT
Start: 2024-10-02 | End: 2024-10-02

## 2024-10-02 RX ORDER — GLUCAGON 1 MG
1 KIT INJECTION
Status: DISCONTINUED | OUTPATIENT
Start: 2024-10-02 | End: 2024-10-02 | Stop reason: HOSPADM

## 2024-10-02 RX ORDER — POTASSIUM CHLORIDE 7.45 MG/ML
10 INJECTION INTRAVENOUS
Status: DISPENSED | OUTPATIENT
Start: 2024-10-02 | End: 2024-10-02

## 2024-10-02 RX ORDER — SODIUM CHLORIDE 0.9 % (FLUSH) 0.9 %
10 SYRINGE (ML) INJECTION EVERY 12 HOURS PRN
Status: DISCONTINUED | OUTPATIENT
Start: 2024-10-02 | End: 2024-10-09 | Stop reason: HOSPADM

## 2024-10-02 RX ORDER — TALC
6 POWDER (GRAM) TOPICAL NIGHTLY PRN
Status: DISCONTINUED | OUTPATIENT
Start: 2024-10-02 | End: 2024-10-09 | Stop reason: HOSPADM

## 2024-10-02 RX ORDER — SIMETHICONE 80 MG
1 TABLET,CHEWABLE ORAL 4 TIMES DAILY PRN
Status: DISCONTINUED | OUTPATIENT
Start: 2024-10-02 | End: 2024-10-09 | Stop reason: HOSPADM

## 2024-10-02 RX ORDER — LOSARTAN POTASSIUM 25 MG/1
100 TABLET ORAL DAILY
Status: DISCONTINUED | OUTPATIENT
Start: 2024-10-02 | End: 2024-10-09 | Stop reason: HOSPADM

## 2024-10-02 RX ORDER — SUCCINYLCHOLINE CHLORIDE 20 MG/ML
INJECTION INTRAMUSCULAR; INTRAVENOUS
Status: DISCONTINUED | OUTPATIENT
Start: 2024-10-02 | End: 2024-10-02

## 2024-10-02 RX ORDER — ONDANSETRON HYDROCHLORIDE 2 MG/ML
4 INJECTION, SOLUTION INTRAVENOUS
Status: COMPLETED | OUTPATIENT
Start: 2024-10-02 | End: 2024-10-02

## 2024-10-02 RX ORDER — LABETALOL HYDROCHLORIDE 5 MG/ML
20 INJECTION, SOLUTION INTRAVENOUS
Status: COMPLETED | OUTPATIENT
Start: 2024-10-02 | End: 2024-10-02

## 2024-10-02 RX ORDER — ACETAMINOPHEN 325 MG/1
650 TABLET ORAL EVERY 4 HOURS PRN
Status: DISCONTINUED | OUTPATIENT
Start: 2024-10-02 | End: 2024-10-09 | Stop reason: HOSPADM

## 2024-10-02 RX ORDER — MORPHINE SULFATE 4 MG/ML
2 INJECTION, SOLUTION INTRAMUSCULAR; INTRAVENOUS EVERY 6 HOURS PRN
Status: DISCONTINUED | OUTPATIENT
Start: 2024-10-02 | End: 2024-10-03

## 2024-10-02 RX ORDER — ONDANSETRON HYDROCHLORIDE 2 MG/ML
4 INJECTION, SOLUTION INTRAVENOUS EVERY 4 HOURS PRN
Status: DISCONTINUED | OUTPATIENT
Start: 2024-10-02 | End: 2024-10-09 | Stop reason: HOSPADM

## 2024-10-02 RX ORDER — PHENYLEPHRINE HYDROCHLORIDE 10 MG/ML
INJECTION INTRAVENOUS
Status: DISCONTINUED | OUTPATIENT
Start: 2024-10-02 | End: 2024-10-02

## 2024-10-02 RX ORDER — METOPROLOL TARTRATE 25 MG/1
25 TABLET, FILM COATED ORAL 2 TIMES DAILY
Status: DISCONTINUED | OUTPATIENT
Start: 2024-10-02 | End: 2024-10-06

## 2024-10-02 RX ORDER — FENTANYL CITRATE 50 UG/ML
INJECTION, SOLUTION INTRAMUSCULAR; INTRAVENOUS
Status: DISCONTINUED | OUTPATIENT
Start: 2024-10-02 | End: 2024-10-02

## 2024-10-02 RX ORDER — FENTANYL CITRATE 50 UG/ML
25 INJECTION, SOLUTION INTRAMUSCULAR; INTRAVENOUS EVERY 5 MIN PRN
Status: DISCONTINUED | OUTPATIENT
Start: 2024-10-02 | End: 2024-10-02 | Stop reason: HOSPADM

## 2024-10-02 RX ORDER — DEXAMETHASONE SODIUM PHOSPHATE 4 MG/ML
INJECTION, SOLUTION INTRA-ARTICULAR; INTRALESIONAL; INTRAMUSCULAR; INTRAVENOUS; SOFT TISSUE
Status: DISCONTINUED | OUTPATIENT
Start: 2024-10-02 | End: 2024-10-02

## 2024-10-02 RX ORDER — ONDANSETRON HYDROCHLORIDE 2 MG/ML
4 INJECTION, SOLUTION INTRAVENOUS DAILY PRN
Status: DISCONTINUED | OUTPATIENT
Start: 2024-10-02 | End: 2024-10-02 | Stop reason: HOSPADM

## 2024-10-02 RX ORDER — POTASSIUM CHLORIDE/D5-0.2%NACL 10 MEQ/L
INTRAVENOUS SOLUTION INTRAVENOUS CONTINUOUS PRN
Status: DISCONTINUED | OUTPATIENT
Start: 2024-10-02 | End: 2024-10-02

## 2024-10-02 RX ORDER — HYDROMORPHONE HYDROCHLORIDE 2 MG/ML
0.2 INJECTION, SOLUTION INTRAMUSCULAR; INTRAVENOUS; SUBCUTANEOUS EVERY 5 MIN PRN
Status: DISCONTINUED | OUTPATIENT
Start: 2024-10-02 | End: 2024-10-02 | Stop reason: HOSPADM

## 2024-10-02 RX ADMIN — PROPOFOL 90 MG: 10 INJECTION, EMULSION INTRAVENOUS at 10:10

## 2024-10-02 RX ADMIN — LABETALOL HYDROCHLORIDE 20 MG: 5 INJECTION INTRAVENOUS at 01:10

## 2024-10-02 RX ADMIN — HYDRALAZINE HYDROCHLORIDE 5 MG: 20 INJECTION INTRAMUSCULAR; INTRAVENOUS at 04:10

## 2024-10-02 RX ADMIN — CEFTRIAXONE 1 G: 1 INJECTION, POWDER, FOR SOLUTION INTRAMUSCULAR; INTRAVENOUS at 03:10

## 2024-10-02 RX ADMIN — LIDOCAINE HYDROCHLORIDE 50 MG: 20 INJECTION, SOLUTION INTRAVENOUS at 10:10

## 2024-10-02 RX ADMIN — CEFTRIAXONE 2 G: 2 INJECTION, POWDER, FOR SOLUTION INTRAMUSCULAR; INTRAVENOUS at 02:10

## 2024-10-02 RX ADMIN — FENTANYL CITRATE 50 MCG: 50 INJECTION, SOLUTION INTRAMUSCULAR; INTRAVENOUS at 10:10

## 2024-10-02 RX ADMIN — PHENYLEPHRINE HYDROCHLORIDE 200 MCG: 10 INJECTION INTRAVENOUS at 10:10

## 2024-10-02 RX ADMIN — SODIUM CHLORIDE: 9 INJECTION, SOLUTION INTRAVENOUS at 02:10

## 2024-10-02 RX ADMIN — ROCURONIUM BROMIDE 50 MG: 10 INJECTION INTRAVENOUS at 10:10

## 2024-10-02 RX ADMIN — SODIUM CHLORIDE, SODIUM LACTATE, POTASSIUM CHLORIDE, AND CALCIUM CHLORIDE: .6; .31; .03; .02 INJECTION, SOLUTION INTRAVENOUS at 11:10

## 2024-10-02 RX ADMIN — CEFAZOLIN 2 G: 2 INJECTION, POWDER, FOR SOLUTION INTRAMUSCULAR; INTRAVENOUS at 10:10

## 2024-10-02 RX ADMIN — SUCCINYLCHOLINE CHLORIDE 100 MG: 20 INJECTION, SOLUTION INTRAMUSCULAR; INTRAVENOUS at 10:10

## 2024-10-02 RX ADMIN — ONDANSETRON 4 MG: 2 INJECTION INTRAMUSCULAR; INTRAVENOUS at 02:10

## 2024-10-02 RX ADMIN — POTASSIUM CHLORIDE 10 MEQ: 7.46 INJECTION, SOLUTION INTRAVENOUS at 05:10

## 2024-10-02 RX ADMIN — METOPROLOL TARTRATE 25 MG: 25 TABLET, FILM COATED ORAL at 09:10

## 2024-10-02 RX ADMIN — SODIUM CHLORIDE, SODIUM LACTATE, POTASSIUM CHLORIDE, AND CALCIUM CHLORIDE: .6; .31; .03; .02 INJECTION, SOLUTION INTRAVENOUS at 10:10

## 2024-10-02 RX ADMIN — DOXYCYCLINE 100 MG: 100 INJECTION, POWDER, LYOPHILIZED, FOR SOLUTION INTRAVENOUS at 04:10

## 2024-10-02 RX ADMIN — SODIUM CHLORIDE, POTASSIUM CHLORIDE, SODIUM LACTATE AND CALCIUM CHLORIDE: 600; 310; 30; 20 INJECTION, SOLUTION INTRAVENOUS at 09:10

## 2024-10-02 RX ADMIN — FENTANYL CITRATE 50 MCG: 50 INJECTION, SOLUTION INTRAMUSCULAR; INTRAVENOUS at 11:10

## 2024-10-02 RX ADMIN — FENTANYL CITRATE 25 MCG: 50 INJECTION, SOLUTION INTRAMUSCULAR; INTRAVENOUS at 11:10

## 2024-10-02 RX ADMIN — ONDANSETRON 4 MG: 2 INJECTION, SOLUTION INTRAMUSCULAR; INTRAVENOUS at 11:10

## 2024-10-02 RX ADMIN — SODIUM CHLORIDE: 9 INJECTION, SOLUTION INTRAVENOUS at 09:10

## 2024-10-02 RX ADMIN — SODIUM CHLORIDE, POTASSIUM CHLORIDE, SODIUM LACTATE AND CALCIUM CHLORIDE: 600; 310; 30; 20 INJECTION, SOLUTION INTRAVENOUS at 10:10

## 2024-10-02 RX ADMIN — ONDANSETRON 4 MG: 2 INJECTION INTRAMUSCULAR; INTRAVENOUS at 09:10

## 2024-10-02 RX ADMIN — SUGAMMADEX 200 MG: 100 INJECTION, SOLUTION INTRAVENOUS at 12:10

## 2024-10-02 RX ADMIN — HYDRALAZINE HYDROCHLORIDE 5 MG: 20 INJECTION INTRAMUSCULAR; INTRAVENOUS at 02:10

## 2024-10-02 RX ADMIN — Medication: at 11:10

## 2024-10-02 RX ADMIN — POTASSIUM CHLORIDE 10 MEQ: 7.46 INJECTION, SOLUTION INTRAVENOUS at 09:10

## 2024-10-02 RX ADMIN — IOHEXOL 80 ML: 350 INJECTION, SOLUTION INTRAVENOUS at 01:10

## 2024-10-02 RX ADMIN — MORPHINE SULFATE 2 MG: 4 INJECTION, SOLUTION INTRAMUSCULAR; INTRAVENOUS at 04:10

## 2024-10-02 RX ADMIN — DEXAMETHASONE SODIUM PHOSPHATE 4 MG: 4 INJECTION, SOLUTION INTRAMUSCULAR; INTRAVENOUS at 11:10

## 2024-10-02 RX ADMIN — ALBUMIN (HUMAN) 250 ML: 12.5 SOLUTION INTRAVENOUS at 10:10

## 2024-10-02 RX ADMIN — FENTANYL CITRATE 25 MCG: 50 INJECTION, SOLUTION INTRAMUSCULAR; INTRAVENOUS at 12:10

## 2024-10-02 RX ADMIN — ACETAMINOPHEN 1000 MG: 10 INJECTION INTRAVENOUS at 02:10

## 2024-10-02 RX ADMIN — POTASSIUM CHLORIDE 10 MEQ: 7.46 INJECTION, SOLUTION INTRAVENOUS at 06:10

## 2024-10-02 RX ADMIN — SODIUM CHLORIDE: 9 INJECTION, SOLUTION INTRAVENOUS at 04:10

## 2024-10-02 RX ADMIN — FENTANYL CITRATE 50 MCG: 50 INJECTION, SOLUTION INTRAMUSCULAR; INTRAVENOUS at 12:10

## 2024-10-02 NOTE — SUBJECTIVE & OBJECTIVE
Past Medical History:   Diagnosis Date    Above knee amputation status 1994    History of DVT of lower extremity 1994    LEFT    Hyperlipidemia     Hypertension     PAD (peripheral artery disease)        Past Surgical History:   Procedure Laterality Date    DIAGNOSTIC LAPAROSCOPY  9/14/2023    Procedure: LAPAROSCOPY, DIAGNOSTIC;  Surgeon: Isidro Bell MD;  Location: [facility] OR;  Service: General;;    LEG AMPUTATION THROUGH KNEE      PARTIAL HYSTERECTOMY      1960's    ROBOT-ASSISTED CHOLECYSTECTOMY N/A 9/14/2023    Procedure: Diagnostic Laparoscopy Lysis of Adhesisions  Attempted Robotically;  Surgeon: Isidro Bell MD;  Location: [facility] OR;  Service: General;  Laterality: N/A;  ATTEMPTED    VASCULAR SURGERY Left 1994    AORTOFEM BYPASS       Review of patient's allergies indicates:  No Known Allergies    Current Facility-Administered Medications on File Prior to Encounter   Medication    lactated ringers infusion    LIDOcaine (PF) 10 mg/ml (1%) injection 10 mg     Current Outpatient Medications on File Prior to Encounter   Medication Sig    acetaminophen (TYLENOL) 325 MG tablet Take 2 tablets (650 mg total) by mouth every 6 (six) hours as needed for Pain.    alendronate (FOSAMAX) 70 MG tablet Take 70 mg by mouth every 7 days.    amLODIPine (NORVASC) 10 MG tablet Take 10 mg by mouth once daily.    apixaban (ELIQUIS) 5 mg Tab Take 1 tablet (5 mg total) by mouth 2 (two) times daily.    cholecalciferol, vitamin D3, (VITAMIN D3) 25 mcg (1,000 unit) capsule Take 1,000 Units by mouth once daily.    clopidogrel (PLAVIX) 75 mg tablet Take 75 mg by mouth once daily.    famotidine (PEPCID) 20 MG tablet Take 1 tablet (20 mg total) by mouth once daily. for 20 days    hydroCHLOROthiazide (HYDRODIURIL) 25 MG tablet Take 25 mg by mouth once daily.    losartan (COZAAR) 100 MG tablet Take 100 mg by mouth once daily.    metoprolol tartrate (LOPRESSOR) 25 MG tablet Take 1 tablet (25 mg total) by mouth 2 (two) times daily.     ondansetron (ZOFRAN-ODT) 4 MG TbDL Take 1 tablet (4 mg total) by mouth every 6 (six) hours as needed (nausea).    oxyCODONE-acetaminophen (PERCOCET) 5-325 mg per tablet Take 1 tablet by mouth every 4 (four) hours as needed for Pain.    potassium chloride (MICRO-K) 10 MEQ CpSR Take 10 mEq by mouth once.    rosuvastatin (CRESTOR) 40 MG Tab Take 1 tablet by mouth once daily.     Family History    None       Tobacco Use    Smoking status: Former    Smokeless tobacco: Never   Substance and Sexual Activity    Alcohol use: No    Drug use: Never    Sexual activity: Not on file     Review of Systems   Constitutional: Negative for decreased appetite.   HENT:  Negative for ear discharge.    Eyes:  Negative for blurred vision.   Respiratory:  Negative for hemoptysis.    Endocrine: Negative for polyphagia.   Hematologic/Lymphatic: Negative for adenopathy.   Skin:  Negative for color change.   Musculoskeletal:  Negative for joint swelling.   Genitourinary:  Negative for bladder incontinence.   Neurological:  Negative for brief paralysis.   Psychiatric/Behavioral:  Negative for hallucinations.    Allergic/Immunologic: Negative for hives.     Objective:     Vital Signs (Most Recent):  Temp: 97.8 °F (36.6 °C) (10/02/24 0808)  Pulse: 90 (10/02/24 0808)  Resp: 18 (10/02/24 0808)  BP: (!) 170/74 (10/02/24 0808)  SpO2: 98 % (10/02/24 0808) Vital Signs (24h Range):  Temp:  [97.7 °F (36.5 °C)-97.8 °F (36.6 °C)] 97.8 °F (36.6 °C)  Pulse:  [61-95] 90  Resp:  [18-29] 18  SpO2:  [87 %-99 %] 98 %  BP: (136-230)/() 170/74     Weight: 84.4 kg (186 lb 1.1 oz)  Body mass index is 32.96 kg/m².    SpO2: 98 %         Intake/Output Summary (Last 24 hours) at 10/2/2024 0903  Last data filed at 10/2/2024 0412  Gross per 24 hour   Intake --   Output 90 ml   Net -90 ml       Lines/Drains/Airways       Peripheral Intravenous Line  Duration                  Peripheral IV - Single Lumen 10/01/24 2311 20 G Posterior;Right Hand <1 day                      Physical Exam  Constitutional:       Appearance: She is well-developed.   HENT:      Head: Normocephalic and atraumatic.   Eyes:      Conjunctiva/sclera: Conjunctivae normal.      Pupils: Pupils are equal, round, and reactive to light.   Cardiovascular:      Rate and Rhythm: Normal rate. Rhythm irregular.      Pulses: Intact distal pulses.      Heart sounds: Normal heart sounds.   Pulmonary:      Effort: Pulmonary effort is normal.      Breath sounds: Normal breath sounds.   Abdominal:      General: Bowel sounds are normal.      Palpations: Abdomen is soft.   Musculoskeletal:         General: Normal range of motion.      Cervical back: Normal range of motion and neck supple.   Skin:     General: Skin is warm and dry.   Neurological:      Mental Status: She is alert and oriented to person, place, and time.          Significant Labs: All pertinent lab results from the last 24 hours have been reviewed.    Significant Imaging: Echocardiogram: 2D echo with color flow doppler: No results found for this or any previous visit.

## 2024-10-02 NOTE — ANESTHESIA PROCEDURE NOTES
Intubation    Date/Time: 10/2/2024 10:10 AM    Performed by: Frank Pedersen CRNA  Authorized by: Lindsey Horta MD    Intubation:     Induction:  Rapid sequence induction    Intubated:  Postinduction    Mask Ventilation:  Not attempted    Attempts:  1    Attempted By:  CRNA    Method of Intubation:  Video laryngoscopy    Blade:  [place] 3    Laryngeal View Grade: Grade I - full view of cords      Difficult Airway Encountered?: No      Complications:  None    Airway Device:  Oral endotracheal tube    Airway Device Size:  7.0    Style/Cuff Inflation:  Cuffed    Inflation Amount (mL):  5    Tube secured:  22    Secured at:  The teeth    Placement Verified By:  Capnometry    Complicating Factors:  None    Findings Post-Intubation:  BS equal bilateral and atraumatic/condition of teeth unchanged

## 2024-10-02 NOTE — NURSING
Returned to floor via stretcher, transferred to bed ,w/o incident, denies any pain at this time, NGT intact, abdomen with sx site secure with dermabond covered with abd pad & abdominal binder,no other concerns at this time, safety maintained.

## 2024-10-02 NOTE — ED PROVIDER NOTES
Encounter Date: 10/1/2024    SCRIBE #1 NOTE: I, Junaid Cora, am scribing for, and in the presence of,  Satnam Mcdaniel MD.       History     Chief Complaint   Patient presents with    Abdominal Pain     Pt has hx of gallbladder inflammation and experiencing similar symptoms. Vomiting x1 time today. Pain to right shoulder also.      89 y.o. female with PMHx of DVT, HLD, HTN, afib on eliquis, presents to the ED for evaluation of sudden onset of generalized abdominal pain with nausea and vomiting that started 1 hour PTA. Reports that this feel similar to when she had cholecystitis. She also reports of mild L arm pain that radiated to her L shoulder, which resolved on its own. LBM was yesterday and has passed flatulence today. No medications taken for symptoms. Denies CP, SOB, fever, hematochezia, melena, diarrhea, constipation, dysuria, hematuria or any associated symptoms.     The history is provided by the patient. No  was used.     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    Hyperlipidemia     Hypertension     PAD (peripheral artery disease)      Past Surgical History:   Procedure Laterality Date    DIAGNOSTIC LAPAROSCOPY  9/14/2023    Procedure: LAPAROSCOPY, DIAGNOSTIC;  Surgeon: Isidro Bell MD;  Location: [facility] OR;  Service: General;;    LEG AMPUTATION THROUGH KNEE      PARTIAL HYSTERECTOMY      1960's    ROBOT-ASSISTED CHOLECYSTECTOMY N/A 9/14/2023    Procedure: Diagnostic Laparoscopy Lysis of Adhesisions  Attempted Robotically;  Surgeon: Isidro Bell MD;  Location: [facility] OR;  Service: General;  Laterality: N/A;  ATTEMPTED    VASCULAR SURGERY Left 1994    AORTOFEM BYPASS     No family history on file.  Social History     Tobacco Use    Smoking status: Former    Smokeless tobacco: Never   Substance Use Topics    Alcohol use: No    Drug  use: Never     Review of Systems   Constitutional:  Negative for chills and fever.   HENT:  Negative for facial swelling and sore throat.    Eyes:  Negative for visual disturbance.   Respiratory:  Negative for cough and shortness of breath.    Cardiovascular:  Negative for chest pain and palpitations.   Gastrointestinal:  Positive for abdominal pain, nausea and vomiting. Negative for blood in stool, constipation and diarrhea.        -melena     Genitourinary:  Negative for dysuria and hematuria.   Musculoskeletal:  Positive for myalgias. Negative for back pain.   Skin:  Negative for rash.   Neurological:  Negative for weakness and headaches.   Hematological:  Does not bruise/bleed easily.   Psychiatric/Behavioral: Negative.         Physical Exam     Initial Vitals [10/01/24 2154]   BP Pulse Resp Temp SpO2   136/77 92 18 97.7 °F (36.5 °C) 95 %      MAP       --         Physical Exam    Nursing note and vitals reviewed.  Constitutional: She appears well-developed and well-nourished. She is not diaphoretic. No distress.   HENT:   Head: Normocephalic and atraumatic.   Right Ear: External ear normal.   Left Ear: External ear normal.   Nose: Nose normal.   Eyes: Conjunctivae are normal. No scleral icterus.   Neck: Neck supple. No tracheal deviation present.   Normal range of motion.  Cardiovascular:  Normal rate, regular rhythm and normal heart sounds.           Pulmonary/Chest: Breath sounds normal. No respiratory distress.   Abdominal: Abdomen is soft. Bowel sounds are normal. She exhibits distension. There is generalized abdominal tenderness. There is guarding (voluntary). There is no rebound.   Musculoskeletal:      Cervical back: Normal range of motion and neck supple.     Neurological: She is alert and oriented to person, place, and time.   Skin: Skin is warm and dry.   Psychiatric: She has a normal mood and affect. Thought content normal.         ED Course   Critical Care    Date/Time: 10/2/2024 6:31  PM    Performed by: Satnam Mcdaniel MD  Authorized by: Satnam Mcdaniel MD  Direct patient critical care time: 31 minutes  Additional history critical care time: 5 minutes  Ordering / reviewing critical care time: 5 minutes  Documentation critical care time: 10 minutes  Consulting other physicians critical care time: 10 minutes  Total critical care time (exclusive of procedural time) : 61 minutes  Critical care time was exclusive of teaching time and separately billable procedures and treating other patients.  Critical care was time spent personally by me on the following activities: review of old charts, re-evaluation of patient's condition, pulse oximetry, ordering and review of radiographic studies, ordering and review of laboratory studies, ordering and performing treatments and interventions, obtaining history from patient or surrogate, examination of patient, evaluation of patient's response to treatment, discussions with primary provider, discussions with consultants and development of treatment plan with patient or surrogate.        Labs Reviewed   CBC W/ AUTO DIFFERENTIAL - Abnormal       Result Value    WBC 14.93 (*)     RBC 4.83      Hemoglobin 13.5      Hematocrit 43.1      MCV 89      MCH 28.0      MCHC 31.3 (*)     RDW 14.8 (*)     Platelets 226      MPV 13.3 (*)     Immature Granulocytes 0.4      Gran # (ANC) 11.6 (*)     Immature Grans (Abs) 0.06 (*)     Lymph # 2.8      Mono # 0.4      Eos # 0.1      Baso # 0.03      nRBC 0      Gran % 77.6 (*)     Lymph % 18.9      Mono % 2.4 (*)     Eosinophil % 0.5      Basophil % 0.2      Differential Method Automated     COMPREHENSIVE METABOLIC PANEL - Abnormal    Sodium 140      Potassium 3.5      Chloride 100      CO2 28      Glucose 184 (*)     BUN 13      Creatinine 0.9      Calcium 10.2      Total Protein 8.3      Albumin 3.7      Total Bilirubin 0.7      Alkaline Phosphatase 151 (*)     AST 14      ALT 11      eGFR >60      Anion Gap 12      URINALYSIS, REFLEX TO URINE CULTURE - Abnormal    Specimen UA Urine, Catheterized      Color, UA Yellow      Appearance, UA Hazy (*)     pH, UA 7.0      Specific Gravity, UA 1.020      Protein, UA 1+ (*)     Glucose, UA Negative      Ketones, UA Negative      Bilirubin (UA) Negative      Occult Blood UA Negative      Nitrite, UA Positive (*)     Urobilinogen, UA Negative      Leukocytes, UA Negative      Narrative:     Specimen Source->Urine   B-TYPE NATRIURETIC PEPTIDE - Abnormal     (*)    ISTAT PROCEDURE - Abnormal    POC Glucose 164 (*)     POC BUN 13      POC Creatinine 0.6      POC Sodium 141      POC Potassium 3.7      POC Chloride 105      POC TCO2 (MEASURED) 27      POC Anion Gap 14      POC Ionized Calcium 1.05 (*)     POC Hematocrit 32 (*)     Sample VENOUS      Site Other      Allens Test N/A     LIPASE    Lipase 15     LACTIC ACID, PLASMA    Lactate (Lactic Acid) 1.9     URINALYSIS MICROSCOPIC    RBC, UA 1      WBC, UA 3      Bacteria Occasional      Hyaline Casts, UA 0      Microscopic Comment SEE COMMENT      Narrative:     Specimen Source->Urine   B-TYPE NATRIURETIC PEPTIDE   MAGNESIUM   MAGNESIUM    Magnesium 2.1     POCT INFLUENZA A/B MOLECULAR    POC Molecular Influenza A Ag Negative      POC Molecular Influenza B Ag Negative       Acceptable Yes     SARS-COV-2 RDRP GENE    POC Rapid COVID Negative       Acceptable Yes     ISTAT CHEM8        ECG Results              EKG 12-lead (Final result)        Collection Time Result Time QRS Duration OHS QTC Calculation    10/01/24 23:12:02 10/02/24 12:33:17 160 496                     Final result by Interface, Lab In [facility] (10/02/24 12:33:26)                   Narrative:    Test Reason : M79.602,    Vent. Rate : 067 BPM     Atrial Rate : 086 BPM     P-R Int : 000 ms          QRS Dur : 160 ms      QT Int : 470 ms       P-R-T Axes : 000 090 001 degrees     QTc Int : 496 ms    Atrial fibrillation  Right bundle branch  block  Abnormal ECG  When compared with ECG of 26-OCT-2023 07:17,  No significant change was found  Confirmed by Rell Mcfadden MD ([id number]) on 10/2/2024 12:33:16 PM    Referred By: [email or web address]   SELF           Confirmed By:Rell Mcfadden MD                                  Imaging Results              XR NG/OG tube placement check, non-radiologist performed (Final result)  Result time 10/02/24 05:08:52   Procedure changed from XR Gastric tube check, non-radiologist performed     Final result by Oralia Espinoza MD (10/02/24 05:08:52)                   Impression:      Please see above.      Electronically signed by: Oralia Espinoza MD  Date:    10/02/2024  Time:    05:08               Narrative:    EXAMINATION:  XR NG/OG TUBE PLACEMENT CHECK, NON-RADIOLOGIST PERFORMED    CLINICAL HISTORY:  NG tube placement;    TECHNIQUE:  AP View(s) of the abdomen was performed.    COMPARISON:  10/02/2024 and 10/01/2024    FINDINGS:  Interval placement of an enteric tube with tip coursing below the diaphragm and projecting over the region of the stomach in the left upper quadrant.  No interval detrimental change in the radiographic appearance of visualized intrathoracic and upper abdominal structures.                                       X-Ray Chest AP Portable (Final result)  Result time 10/02/24 02:24:54      Final result by Oralia Espinoza MD (10/02/24 02:24:54)                   Impression:      Please see above.      Electronically signed by: Oralia Espinoza MD  Date:    10/02/2024  Time:    02:24               Narrative:    EXAMINATION:  XR CHEST AP PORTABLE    CLINICAL HISTORY:  hypoxia;    TECHNIQUE:  Single frontal view of the chest was performed.    COMPARISON:  Chest radiograph 10/26/2023, CT abdomen pelvis 10/02/2024    FINDINGS:  Cardiac monitoring leads overlie the chest.  There is unchanged enlargement of the cardiomediastinal silhouette.  The lungs are symmetrically expanded with diffuse coarse/increased interstitial  attenuation similar to prior exam with more pronounced bibasilar interstitial prominence, similar prior examination.  No large volume of pleural fluid or pneumothorax identified noting slight increased attenuation of the left lower lung zone likely relates to prominent cardiomediastinal silhouette and overlying soft tissue.  Osseous structures are intact with degenerative change.                                       CT Abdomen Pelvis With IV Contrast NO Oral Contrast (Final result)  Result time 10/02/24 01:53:11      Final result by Lissy Angeles MD (10/02/24 01:53:11)                   Impression:      1. Small-bowel obstruction secondary to bowel containing supraumbilical hernia.  Clinical correlation is advised to determine potential reducibility of this hernia.  2. Postsurgical changes and additional findings as detailed above.      Electronically signed by: Lissy Angeles MD  Date:    10/02/2024  Time:    01:53               Narrative:    EXAMINATION:  CT ABDOMEN PELVIS WITH IV CONTRAST    CLINICAL HISTORY:  Abdominal pain, acute, nonlocalized;    TECHNIQUE:  Low dose axial images, sagittal and coronal reformations were obtained from the lung bases to the pubic symphysis following the IV administration of 80 mL of Omnipaque 350 .  Oral contrast was not given.    COMPARISON:  CT abdomen and pelvis from July 2023.    FINDINGS:  Heart is enlarged.  Mild bibasilar atelectatic changes and/or scarring are seen.  No pleural effusion.    No significant hepatic abnormalities are identified.  There is no intra-or extrahepatic biliary ductal dilatation.  Suspected noncalcified stones are seen within the gallbladder.  The pancreas, spleen, and adrenal glands are unremarkable.    Kidneys enhance normally with no evidence of hydronephrosis.  Left renal cysts are seen.  No abnormalities are seen along the ureteral courses.  Urinary bladder is nondistended.  Uterus has been removed.    There is diffuse dilatation of  small bowel loops measuring upwards of 4-5 cm in caliber with air-fluid levels consistent with small-bowel obstruction.  This is secondary to a bowel containing supraumbilical hernia which contains short loop segment of small bowel.  Distal small bowel loops are decompressed beyond this level.  Stomach is distended with prominent air-fluid level.  No free air or free fluid.    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  There is prominent atherosclerosis.  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    No acute osseous abnormality identified.  Degenerative changes are seen in the lumbar spine.                                       X-Ray Abdomen Flat And Erect (Final result)  Result time 10/01/24 23:01:46      Final result by Lissy Angeles MD (10/01/24 23:01:46)                   Impression:      Nonobstructive bowel gas pattern.      Electronically signed by: Lissy Angeles MD  Date:    10/01/2024  Time:    23:01               Narrative:    EXAMINATION:  XR ABDOMEN FLAT AND ERECT    CLINICAL HISTORY:  Abdominal Pain;    TECHNIQUE:  Flat and erect AP views of the abdomen were performed.    COMPARISON:  None    FINDINGS:  Nonspecific bowel gas pattern.  No evidence to suggest obstruction.  No free air identified.  Scattered stool is seen in the colon.  Cardiac silhouette is enlarged.  Visualized lungs are essentially clear.                                       Medications   cefTRIAXone (Rocephin) 1 g in D5W 100 mL IVPB (MB+) (1 g Intravenous New Bag 10/2/24 1518)   morphine injection 2 mg (2 mg Intravenous Given 10/2/24 0448)   potassium chloride 10 mEq in 100 mL IVPB (10 mEq Intravenous New Bag 10/2/24 0651)   hydrALAZINE injection 5 mg (5 mg Intravenous Given 10/2/24 1400)   sodium chloride 0.9% flush 10 mL (has no administration in time range)   naloxone 0.4 mg/mL injection 0.4 mg (has no administration in time range)   acetaminophen tablet 650 mg (has no  administration in time range)   albuterol-ipratropium 2.5 mg-0.5 mg/3 mL nebulizer solution 3 mL (has no administration in time range)   melatonin tablet 6 mg (has no administration in time range)   simethicone chewable tablet 80 mg (has no administration in time range)   aluminum-magnesium hydroxide-simethicone 200-200-20 mg/5 mL suspension 30 mL (has no administration in time range)   0.9%  NaCl infusion ( Intravenous New Bag 10/2/24 1416)   losartan tablet 100 mg (100 mg Oral Not Given 10/2/24 0900)   metoprolol tartrate (LOPRESSOR) tablet 25 mg (25 mg Oral Not Given 10/2/24 0900)   potassium chloride 10 mEq in 100 mL IVPB (10 mEq Intravenous New Bag 10/2/24 0904)   ondansetron injection 4 mg (4 mg Intravenous Given 10/2/24 0905)   dextrose 10% bolus 125 mL 125 mL (has no administration in time range)   dextrose 10% bolus 250 mL 250 mL (has no administration in time range)   morphine injection 2 mg (2 mg Intravenous Given 10/1/24 2316)   ondansetron injection 4 mg (4 mg Intravenous Given 10/1/24 2315)   iohexoL (OMNIPAQUE 350) injection 80 mL (80 mLs Intravenous Given 10/2/24 0112)   labetaloL injection 20 mg (20 mg Intravenous Given 10/2/24 0127)   cefTRIAXone (ROCEPHIN) 2 g in D5W 100 mL IVPB (MB+) (0 g Intravenous Stopped 10/2/24 0342)   ondansetron injection 4 mg (4 mg Intravenous Given 10/2/24 0254)   ceFAZolin 2 g in D5W 50 mL IVPB (MB+) (2 g Intravenous New Bag 10/2/24 1016)   acetaminophen 1,000 mg/100 mL (10 mg/mL) injection 1,000 mg (0 mg Intravenous Stopped 10/2/24 1415)     Medical Decision Making  Amount and/or Complexity of Data Reviewed  Labs: ordered. Decision-making details documented in ED Course.  Radiology: ordered. Decision-making details documented in ED Course.  ECG/medicine tests: ordered and independent interpretation performed. Decision-making details documented in ED Course.    Risk  Prescription drug management.            Scribe Attestation:   Scribe #1: I performed the above scribed  service and the documentation accurately describes the services I performed. I attest to the accuracy of the note.        ED Course as of 10/02/24 1832   Wed Oct 02, 2024   0119 Lactic Acid Level: 1.9 [CC]   0244 CT Abdomen Pelvis With IV Contrast NO Oral Contrast     Impression:     1. Small-bowel obstruction secondary to bowel containing supraumbilical hernia.  Clinical correlation is advised to determine potential reducibility of this hernia.  2. Postsurgical changes and additional findings as detailed above.      [CC]   0248 X-Ray Chest AP Portable  FINDINGS:  Cardiac monitoring leads overlie the chest.  There is unchanged enlargement of the cardiomediastinal silhouette.  The lungs are symmetrically expanded with diffuse coarse/increased interstitial attenuation similar to prior exam with more pronounced bibasilar interstitial prominence, similar prior examination.  No large volume of pleural fluid or pneumothorax identified noting slight increased attenuation of the left lower lung zone likely relates to prominent cardiomediastinal silhouette and overlying soft tissue.  Osseous structures are intact with degenerative change.     Impression:     Please see above.      [CC]   0256 Patient is an 89-year-old female presenting to the emergency department for evaluation of nausea, vomiting, abdominal pain.  She was had multiple abdominal surgeries.  Her abdomen is distended and diffusely tender.  CT shows evidence of small bowel obstruction with a supraumbilical hernia noted.  I was able to reduce this hernia at bedside but it did return slightly per bedside ultrasound.  Appears to be normal peristalsis under ultrasound.  Patient continues to be nauseated with vomiting.  We will place an NG tube.  UA with nitrite positive urine.  She has a white cell count of 14.9.  I have covered her with the Rocephin with concern for possible urinary tract infection.  Electrolytes within normal limits.  Kidney function  unremarkable, liver function within normal limits.  Chest x-ray with atelectasis likely.  She was requiring 2 L of oxygen likely due to atelectasis with decreased expansion due to pain.  She is trialing off of oxygen now.  Attempted to consult General surgery given small-bowel obstruction likely/potentially related to supraumbilical hernia.  So far we have been unable to contact General surgery.  I am placing a NG tube.  Her vitals are stable.  She did have an episode of significant hypertension which I treated with labetalol as her pain was controlled at that time.  Plan to admit to medicine for continued workup and treatment with General surgery in consultation. [CC]   0324 EKG: Rate 67, irregularly irregular rhythm, right bundle-branch block noted.  No ST elevations or depressions noted.  Wandering baseline.  Similar to previous EKG.  Interpreted by me, reviewed by me. [CC]   0326 Patient did not tolerate off of oxygen.  She was placed back on 2 L.  We will expand antibiotic coverage to cover for community-acquired pneumonia with doxycycline as well as atypical pneumonia. [CC]   0326   After review of the patient's physical exam, ED testing, and history/symptoms, the patient requires additional care in the hospital for the treatment of illness(es) outlined in mdm . Discussed patients case with [facility] who will accept the patient and any pending labs/imaging/interventions.   I discussed the objective findings with the patient including laboratory studies, diagnostic imaging, and any consultant involvement. The patient/ family was educated on their clinical presentation and all questions were answered. They acknowledged and verbally agreed to the treatment plan. The patient will be admitted to the hospital for further management.      [CC]      ED Course User Index  [CC] Satnam Mcdaniel MD                           ISatnam MD, personally performed the services described in this documentation. All  medical record entries made by the scribe were at my direction and in my presence. I have reviewed the chart and agree that the record reflects my personal performance and is accurate and complete.      DISCLAIMER: This note was prepared with [employer] voice recognition transcription software. Garbled syntax, mangled pronouns, and other bizarre constructions may be attributed to that software system.        Clinical Impression:  Final diagnoses:  [M79.602] Left arm pain  [R09.02] Hypoxia  [K56.609] SBO (small bowel obstruction) (Primary)          ED Disposition Condition    Admit                 Satnam Mcdaniel MD  10/02/24 1831       Satnam Mcdaniel MD  10/02/24 1832

## 2024-10-02 NOTE — ASSESSMENT & PLAN NOTE
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

## 2024-10-02 NOTE — ANESTHESIA PREPROCEDURE EVALUATION
10/02/2024  Abbie Barragan is a 89 y.o., female.    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    I have reviewed the NPO Status.   I have reviewed the Medications.     Review of Systems  Anesthesia Hx:  No problems with previous Anesthesia                Social:  Non-Smoker       Hematology/Oncology:    Oncology Normal                Hematology Comments: Hx of DVT on eliquis.  Last dose yesterday                    EENT/Dental:  EENT/Dental Normal           Cardiovascular:     Hypertension    Dysrhythmias atrial fibrillation      hyperlipidemia    Plavix documented, but says she takes different medication                   Hypertension     Atrial Fibrillation     Pulmonary:  Pulmonary Normal                       Musculoskeletal:  Musculoskeletal Normal                Neurological:  Neurology Normal                                      Endocrine:   Hypothyroidism       Hypothyroidism          Dermatological:  Skin Normal    Psych:  Psychiatric Normal                    Physical Exam  General: Well nourished, Cooperative, Alert and Oriented    Airway:  Mallampati: II   Mouth Opening: Normal  TM Distance: Normal  Tongue: Normal  Neck ROM: Normal ROM    Dental:  Intact    Chest/Lungs:  Normal Respiratory Rate    Heart:  Rate: Normal  Rhythm: Regular Rhythm      Lab Results   Component Value Date    WBC 14.50 (H) 10/02/2024    HGB 13.4 10/02/2024    HCT 43.8 10/02/2024    MCV 92 10/02/2024     10/02/2024         Chemistry        Component Value Date/Time     10/02/2024 0636    K 3.3 (L) 10/02/2024 0636    CL 98 10/02/2024 0636    CO2 27 10/02/2024 0636    BUN 12 10/02/2024 0636     CREATININE 0.8 10/02/2024 0636     (H) 10/02/2024 0636        Component Value Date/Time    CALCIUM 10.1 10/02/2024 0636    ALKPHOS 138 (H) 10/02/2024 0636    AST 13 10/02/2024 0636    ALT 12 10/02/2024 0636    BILITOT 0.4 10/02/2024 0636    ESTGFRAFRICA >60 06/19/2019 1005    EGFRNONAA 60 06/19/2019 1005      Results for orders placed during the hospital encounter of 07/12/23    Echo    Interpretation Summary  · The left ventricle is normal in size with concentric hypertrophy and normal systolic function.  · The estimated ejection fraction is 55%.  · Grade III left ventricular diastolic dysfunction.  · Normal right ventricular size with normal right ventricular systolic function.  · Severe left atrial enlargement.  · Mild right atrial enlargement.  · Moderate tricuspid regurgitation.  · Mild pulmonic regurgitation.  · Normal central venous pressure (3 mmHg).  · The estimated PA systolic pressure is 38 mmHg.        Anesthesia Plan  Type of Anesthesia, risks & benefits discussed:    Anesthesia Type: Gen ETT  Intra-op Monitoring Plan: Standard ASA Monitors  Induction:  IV and rapid sequence  Informed Consent: Informed consent signed with the Patient and all parties understand the risks and agree with anesthesia plan.  All questions answered. Patient consented to blood products? Yes  ASA Score: 3 Emergent    Ready For Surgery From Anesthesia Perspective.     .

## 2024-10-02 NOTE — OP NOTE
[facility] - [place] - Surgery  Operative Note    SUMMARY     Surgery Date: 10/2/2024     Surgeons and Role:     * Isidro Bell MD - Primary     * Sofie Handley MD - Assisting     * Juan Mane MD - Resident - Assisting    Pre-op Diagnosis:  SBO (small bowel obstruction) [K56.609]    Post-op Diagnosis:  Post-Op Diagnosis Codes:     * SBO (small bowel obstruction) [K56.609]  Incarcerated ventral hernia    Procedure(s) (LRB):  REPAIR, HERNIA, VENTRAL, INCARCERATED, WITHOUT HISTORY OF PRIOR REPAIR (N/A)    Anesthesia: General    Past Medical History:   Diagnosis Date    Above knee amputation status 1994    History of DVT of lower extremity 1994    LEFT    Hyperlipidemia     Hypertension     PAD (peripheral artery disease)        Indication for procedure: Abbie Barragan is 89 y.o. female with history of hypertension hyperlipidemia DVT peripheral arterial disease, multiple abdominal surgeries who presents with abdominal pain and an incarcerated ventral hernia;  initial plan was NG tube decompression after manual reduction of the hernia however after gentle hydration and reassessment the patient's lactate more than doubled and there was concern for compromised or necrotic bowel therefore decision was made for emergent surgery. After discussion of disease process, we discussed options and have elected for operation to perform open ventral hernia repair and any other indicated procedures including possible bowel resection possible mesh placement.    Description of Procedure: After consent was obtained, patient was taken to the OR. The abdomen was prepped and draped in a standard sterile fashion after general anesthesia was started. Time out was performed. Antibiotics were started with Ancef. We began the procedure by injecting Exparel plus Marcaine in a vertical orientation directly overlying the hernia.  Cut down through the skin and subcutaneous tissue and we immediately noted small bowel the subcutaneous tissues with a  thin hernia sac off of the bowel did appear healthy with no compromise no necrosis.  Despite being on blood thinners patient's tissues was not particularly bloody or oozy.  We used a combination of blunt sharp cautery dissection and dissected the edges of the fascial defect which measured 5 cm wide by 4 cm vertically.  There was exceptional amount of adhesions in the abdominal cavity around the edges of the underside of the fascia and we used mostly Metzenbaum scissors for sharp dissection to free up the space.  There was evidence of 1 small serosal injury used a 3-0 silk suture for Lembert seromuscular repair.  Otherwise the bowel appeared healthy and we evaluated the space around fascial edges and found that they would fit a mesh as an underlay.  Additionally the defect did appear to reapproximate with fascial edges coming together transversely with minimal tension however due to the patient's abdominal history and size of the hernia we opted to place a mesh.  We introduced a coated Ventralex mesh 8 cm in diameter as a circular mesh we placed it under the edges of the fascia.  We used 0 Ethibond sutures to tack the mesh 4 points and reapproximated the fascial edges anterior to the mesh.  We then injected the remainder of the local and closed the skin in layers using 3-0 Vicryl for the subcutaneous tissues and 4-0 Monocryl for the subcuticular layer covered by sterile dressing  .  All counts were correct x2. Patient was awake from anesthesia and taken to the recovery room in a stable condition having suffered no issues at this time.  We placed an abdominal binder postop  NG tube to remain in place and Hayes to remain in place for strict I's and O's as we resuscitate the patient    Description of the findings of the procedure:  5 cm x 4 cm ventral hernia with incarcerated small bowel with surrounding adhesions no evidence of bowel necrosis or compromise  Mesh placement as an underlay    Estimated Blood Loss: * No  values recorded between 10/2/2024 10:42 AM and 10/2/2024 12:14 PM *         Specimens:   Specimen (24h ago, onward)      None          Drains/Implants: 8 cm ventralex mesh

## 2024-10-02 NOTE — ASSESSMENT & PLAN NOTE
Patient with Permanent atrial fibrillation which is controlled with lopressor 25mg PO BID.  Patient is currently in atrial fibrillation.[id number] Score: 4. Anticoagulation indicated. Anticoagulation done with eliquis 5mg PO BID but holding due to possible surgery. Monitor on tele.   Cont. Her lopressor for now.

## 2024-10-02 NOTE — ASSESSMENT & PLAN NOTE
Chronic, controlled. Latest blood pressure and vitals reviewed-     Temp:  [97.7 °F (36.5 °C)-97.8 °F (36.6 °C)]   Pulse:  [61-95]   Resp:  [18-29]   BP: (136-230)/()   SpO2:  [87 %-99 %] .   Home meds for hypertension were reviewed and noted below.   Hypertension Medications               amLODIPine (NORVASC) 10 MG tablet Take 10 mg by mouth once daily.    hydroCHLOROthiazide (HYDRODIURIL) 25 MG tablet Take 25 mg by mouth once daily.    losartan (COZAAR) 100 MG tablet Take 100 mg by mouth once daily.    metoprolol tartrate (LOPRESSOR) 25 MG tablet Take 1 tablet (25 mg total) by mouth 2 (two) times daily.            While in the hospital, will manage blood pressure as follows; Resume home lopressor and losartan with HOLD parameters. HOLD other BP meds for now and diurectics due to dehydration and possible surgery.     Will utilize p.r.n. blood pressure medication only if patient's blood pressure greater than  180/90  and she develops symptoms such as worsening chest pain or shortness of breath.

## 2024-10-02 NOTE — H&P
[facility]  [facility] Medicine  History & Physical    Patient Name: Abbie Barragan  MRN: [id number]  Patient Class: IP- Inpatient  Admission Date: 10/1/2024  Attending Physician: Dr. Aparna Talavera   Primary Care Provider: Laura Shin MD    Patient information was obtained from patient, past medical records, and ER records.     Subjective:     Principal Problem: Vomiting and abdominal pain x 1 day     Chief Complaint:   Chief Complaint   Patient presents with    Abdominal Pain     Pt has hx of gallbladder inflammation and experiencing similar symptoms. Vomiting x1 time today. Pain to right shoulder also.         HPI: 89 y.o. 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 Esther Dang with vascular), Carotid artery stenosis, Essential HTN, HLD, Hypothyroid (goiter), and h/o abdominal adhesions (per surgery eval in 2023 for acute cholecystitis by Dr. Bell->aborted lap lata due to excessive adhesions) presents to the ER with N/V and abdominal pain Since Monday evening. States h/o acute cholecystitis in 2023 but treated medically due to her complex medical history. She was concerned that her symptoms could be her gallbladder again.   Pt. Denies any fevers or chills. No chest pain or SOB/ACKERMAN. Noted constipation yesterday and diarrhea, non-bloody on Monday.     IN the ED labs noted for WBC 14 and Stable H/H 13.5/43.1.  Lytes stable with normal renal function. Lipase negative.Lactic acid normal.   UA concerning for acute UTI and started on Rocephin by the ED.     CXR:  FINDINGS:  Cardiac monitoring leads overlie the chest.  There is unchanged enlargement of the cardiomediastinal silhouette.  The lungs are symmetrically expanded with diffuse coarse/increased interstitial attenuation similar to prior exam with more pronounced bibasilar interstitial  prominence, similar prior examination.  No large volume of pleural fluid or pneumothorax identified noting slight increased attenuation of the left lower lung zone likely relates to prominent cardiomediastinal silhouette and overlying soft tissue.  Osseous structures are intact with degenerative change.    CT abdomen/Pelvis with IV contrast no PO:  FINDINGS:  Heart is enlarged.  Mild bibasilar atelectatic changes and/or scarring are seen.  No pleural effusion.     No significant hepatic abnormalities are identified.  There is no intra-or extrahepatic biliary ductal dilatation.  Suspected noncalcified stones are seen within the gallbladder.  The pancreas, spleen, and adrenal glands are unremarkable.     Kidneys enhance normally with no evidence of hydronephrosis.  Left renal cysts are seen.  No abnormalities are seen along the ureteral courses.  Urinary bladder is nondistended.  Uterus has been removed.     There is diffuse dilatation of small bowel loops measuring upwards of 4-5 cm in caliber with air-fluid levels consistent with small-bowel obstruction.  This is secondary to a bowel containing supraumbilical hernia which contains short loop segment of small bowel.  Distal small bowel loops are decompressed beyond this level.  Stomach is distended with prominent air-fluid level.  No free air or free fluid.     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  There is prominent atherosclerosis.  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     No acute osseous abnormality identified.  Degenerative changes are seen in the lumbar spine.     Impression:     1. Small-bowel obstruction secondary to bowel containing supraumbilical hernia.  Clinical correlation is advised to determine potential reducibility of this hernia.  2. Postsurgical changes and additional findings as detailed above        NGT was placed and General surgery contacted by the ED for further eval of her  SBO. We have been consulted for further management.        Past Medical History:   Diagnosis Date    Above knee amputation status 1994    History of DVT of lower extremity 1994    LEFT    Hyperlipidemia     Hypertension     PAD (peripheral artery disease)        Past Surgical History:   Procedure Laterality Date    DIAGNOSTIC LAPAROSCOPY  9/14/2023    Procedure: LAPAROSCOPY, DIAGNOSTIC;  Surgeon: Isidro Bell MD;  Location: [facility] OR;  Service: General;;    LEG AMPUTATION THROUGH KNEE      PARTIAL HYSTERECTOMY      1960's    ROBOT-ASSISTED CHOLECYSTECTOMY N/A 9/14/2023    Procedure: Diagnostic Laparoscopy Lysis of Adhesisions  Attempted Robotically;  Surgeon: Isidro Bell MD;  Location: [facility] OR;  Service: General;  Laterality: N/A;  ATTEMPTED    VASCULAR SURGERY Left 1994    AORTOFEM BYPASS       Review of patient's allergies indicates:  No Known Allergies    Current Facility-Administered Medications on File Prior to Encounter   Medication    lactated ringers infusion    LIDOcaine (PF) 10 mg/ml (1%) injection 10 mg     Current Outpatient Medications on File Prior to Encounter   Medication Sig    acetaminophen (TYLENOL) 325 MG tablet Take 2 tablets (650 mg total) by mouth every 6 (six) hours as needed for Pain.    alendronate (FOSAMAX) 70 MG tablet Take 70 mg by mouth every 7 days.    amLODIPine (NORVASC) 10 MG tablet Take 10 mg by mouth once daily.    apixaban (ELIQUIS) 5 mg Tab Take 1 tablet (5 mg total) by mouth 2 (two) times daily.    cholecalciferol, vitamin D3, (VITAMIN D3) 25 mcg (1,000 unit) capsule Take 1,000 Units by mouth once daily.    clopidogrel (PLAVIX) 75 mg tablet Take 75 mg by mouth once daily.    famotidine (PEPCID) 20 MG tablet Take 1 tablet (20 mg total) by mouth once daily. for 20 days    hydroCHLOROthiazide (HYDRODIURIL) 25 MG tablet Take 25 mg by mouth once daily.    losartan (COZAAR) 100 MG tablet Take 100 mg by mouth once daily.    metoprolol tartrate (LOPRESSOR) 25 MG tablet Take 1  tablet (25 mg total) by mouth 2 (two) times daily.    ondansetron (ZOFRAN-ODT) 4 MG TbDL Take 1 tablet (4 mg total) by mouth every 6 (six) hours as needed (nausea).    oxyCODONE-acetaminophen (PERCOCET) 5-325 mg per tablet Take 1 tablet by mouth every 4 (four) hours as needed for Pain.    potassium chloride (MICRO-K) 10 MEQ CpSR Take 10 mEq by mouth once.    rosuvastatin (CRESTOR) 40 MG Tab Take 1 tablet by mouth once daily.     Family History    None       Tobacco Use    Smoking status: Former    Smokeless tobacco: Never   Substance and Sexual Activity    Alcohol use: No    Drug use: Never    Sexual activity: Not on file     Review of Systems   Constitutional:  Positive for appetite change. Negative for activity change, chills, diaphoresis, fatigue and fever.   HENT:  Negative for congestion and sore throat.    Eyes:  Negative for visual disturbance.   Respiratory:  Negative for cough, choking, shortness of breath and wheezing.    Cardiovascular:  Negative for chest pain, palpitations and leg swelling.   Gastrointestinal:  Positive for abdominal distention, abdominal pain, constipation (x1 day), diarrhea (x1 day monday no blood), nausea and vomiting. Negative for blood in stool.   Endocrine: Negative for polyuria.   Genitourinary:  Negative for dysuria.   Musculoskeletal:  Negative for arthralgias, gait problem and myalgias.   Neurological:  Negative for dizziness, syncope, light-headedness and headaches.   Psychiatric/Behavioral:  The patient is not nervous/anxious.      Objective:     Vital Signs (Most Recent):  Temp: 97.8 °F (36.6 °C) (10/02/24 0448)  Pulse: 77 (10/02/24 0448)  Resp: 18 (10/02/24 0448)  BP: (!) 141/66 (10/02/24 0456)  SpO2: 99 % (10/02/24 0448) Vital Signs (24h Range):  Temp:  [97.7 °F (36.5 °C)-97.8 °F (36.6 °C)] 97.8 °F (36.6 °C)  Pulse:  [61-95] 77  Resp:  [18-29] 18  SpO2:  [87 %-99 %] 99 %  BP: (136-230)/() 141/66     Weight: 84.4 kg (186 lb 1.1 oz)  Body mass index is 32.96 kg/m².      Physical Exam  Vitals and nursing note reviewed.   Constitutional:       General: She is not in acute distress.     Appearance: Normal appearance. She is obese. She is not ill-appearing, toxic-appearing or diaphoretic.   HENT:      Head: Normocephalic and atraumatic.      Nose: Nose normal. No congestion or rhinorrhea.      Comments: NGT in the right nare with dela cruz gastric secretions noted in the tube.      Mouth/Throat:      Mouth: Mucous membranes are dry.      Pharynx: Oropharynx is clear.   Eyes:      General: No scleral icterus.     Extraocular Movements: Extraocular movements intact.      Conjunctiva/sclera: Conjunctivae normal.      Pupils: Pupils are equal, round, and reactive to light.   Cardiovascular:      Rate and Rhythm: Normal rate and regular rhythm.      Pulses: Normal pulses.      Heart sounds: No murmur heard.     No friction rub. No gallop.   Pulmonary:      Effort: Pulmonary effort is normal.      Breath sounds: Normal breath sounds.   Abdominal:      General: There is distension.      Tenderness: There is abdominal tenderness. There is no guarding or rebound.      Hernia: A hernia (ubilical hernia noted) is present.      Comments: abscent bowl sounds.    Musculoskeletal:         General: No swelling. Normal range of motion.      Cervical back: Normal range of motion and neck supple.      Right lower leg: No edema.      Comments: Left AKA noted with skin intact    Skin:     General: Skin is warm.      Capillary Refill: Capillary refill takes less than 2 seconds.      Coloration: Skin is not pale.   Neurological:      General: No focal deficit present.      Mental Status: She is alert and oriented to person, place, and time.   Psychiatric:         Mood and Affect: Mood normal.         Behavior: Behavior normal.              CRANIAL NERVES     CN III, IV, VI   Pupils are equal, round, and reactive to light.       Recent Results (from the past 24 hours)   CBC W/ AUTO DIFFERENTIAL    Collection  Time: 10/01/24 11:15 PM   Result Value Ref Range    WBC 14.93 (H) 3.90 - 12.70 K/uL    RBC 4.83 4.00 - 5.40 M/uL    Hemoglobin 13.5 12.0 - 16.0 g/dL    Hematocrit 43.1 37.0 - 48.5 %    MCV 89 82 - 98 fL    MCH 28.0 27.0 - 31.0 pg    MCHC 31.3 (L) 32.0 - 36.0 g/dL    RDW 14.8 (H) 11.5 - 14.5 %    Platelets 226 150 - 450 K/uL    MPV 13.3 (H) 9.2 - 12.9 fL    Immature Granulocytes 0.4 0.0 - 0.5 %    Gran # (ANC) 11.6 (H) 1.8 - 7.7 K/uL    Immature Grans (Abs) 0.06 (H) 0.00 - 0.04 K/uL    Lymph # 2.8 1.0 - 4.8 K/uL    Mono # 0.4 0.3 - 1.0 K/uL    Eos # 0.1 0.0 - 0.5 K/uL    Baso # 0.03 0.00 - 0.20 K/uL    nRBC 0 0 /100 WBC    Gran % 77.6 (H) 38.0 - 73.0 %    Lymph % 18.9 18.0 - 48.0 %    Mono % 2.4 (L) 4.0 - 15.0 %    Eosinophil % 0.5 0.0 - 8.0 %    Basophil % 0.2 0.0 - 1.9 %    Differential Method Automated    Comp. Metabolic Panel    Collection Time: 10/01/24 11:15 PM   Result Value Ref Range    Sodium 140 136 - 145 mmol/L    Potassium 3.5 3.5 - 5.1 mmol/L    Chloride 100 95 - 110 mmol/L    CO2 28 23 - 29 mmol/L    Glucose 184 (H) 70 - 110 mg/dL    BUN 13 8 - 23 mg/dL    Creatinine 0.9 0.5 - 1.4 mg/dL    Calcium 10.2 8.7 - 10.5 mg/dL    Total Protein 8.3 6.0 - 8.4 g/dL    Albumin 3.7 3.5 - 5.2 g/dL    Total Bilirubin 0.7 0.1 - 1.0 mg/dL    Alkaline Phosphatase 151 (H) 55 - 135 U/L    AST 14 10 - 40 U/L    ALT 11 10 - 44 U/L    eGFR >60 >60 mL/min/1.73 m^2    Anion Gap 12 8 - 16 mmol/L   Lipase    Collection Time: 10/01/24 11:15 PM   Result Value Ref Range    Lipase 15 4 - 60 U/L   Lactic acid, plasma    Collection Time: 10/01/24 11:15 PM   Result Value Ref Range    Lactate (Lactic Acid) 1.9 0.5 - 2.2 mmol/L   Magnesium    Collection Time: 10/01/24 11:15 PM   Result Value Ref Range    Magnesium 2.1 1.6 - 2.6 mg/dL   ISTAT PROCEDURE    Collection Time: 10/01/24 11:34 PM   Result Value Ref Range    POC Glucose 164 (H) 70 - 110 mg/dL    POC BUN 13 6 - 30 mg/dL    POC Creatinine 0.6 0.5 - 1.4 mg/dL    POC Sodium 141 136 -  145 mmol/L    POC Potassium 3.7 3.5 - 5.1 mmol/L    POC Chloride 105 95 - 110 mmol/L    POC TCO2 (MEASURED) 27 23 - 29 mmol/L    POC Anion Gap 14 8 - 16 mmol/L    POC Ionized Calcium 1.05 (L) 1.06 - 1.42 mmol/L    POC Hematocrit 32 (L) 36 - 54 %PCV    Sample VENOUS     Site Other     Allens Test N/A    Urinalysis, Reflex to Urine Culture Urine, Clean Catch    Collection Time: 10/02/24 12:15 AM    Specimen: Urine   Result Value Ref Range    Specimen UA Urine, Catheterized     Color, UA Yellow Yellow, Straw, Shirley    Appearance, UA Hazy (A) Clear    pH, UA 7.0 5.0 - 8.0    Specific Gravity, UA 1.020 1.005 - 1.030    Protein, UA 1+ (A) Negative    Glucose, UA Negative Negative    Ketones, UA Negative Negative    Bilirubin (UA) Negative Negative    Occult Blood UA Negative Negative    Nitrite, UA Positive (A) Negative    Urobilinogen, UA Negative <2.0 EU/dL    Leukocytes, UA Negative Negative   Urinalysis Microscopic    Collection Time: 10/02/24 12:15 AM   Result Value Ref Range    RBC, UA 1 0 - 4 /hpf    WBC, UA 3 0 - 5 /hpf    Bacteria Occasional None-Occ /hpf    Hyaline Casts, UA 0 0-1/lpf /lpf    Microscopic Comment SEE COMMENT    BNP    Collection Time: 10/02/24  1:29 AM   Result Value Ref Range     (H) 0 - 99 pg/mL   POCT Influenza A/B Molecular    Collection Time: 10/02/24  4:07 AM   Result Value Ref Range    POC Molecular Influenza A Ag Negative Negative    POC Molecular Influenza B Ag Negative Negative     Acceptable Yes    POCT COVID-19 Rapid Screening    Collection Time: 10/02/24  4:07 AM   Result Value Ref Range    POC Rapid COVID Negative Negative     Acceptable Yes        Microbiology Results (last 7 days)       ** No results found for the last 168 hours. **            Imaging Results              XR NG/OG tube placement check, non-radiologist performed (Final result)  Result time 10/02/24 05:08:52   Procedure changed from XR Gastric tube check, non-radiologist  performed     Final result by Oralia Espinoza MD (10/02/24 05:08:52)                   Impression:      Please see above.      Electronically signed by: Oralia Espinoza MD  Date:    10/02/2024  Time:    05:08               Narrative:    EXAMINATION:  XR NG/OG TUBE PLACEMENT CHECK, NON-RADIOLOGIST PERFORMED    CLINICAL HISTORY:  NG tube placement;    TECHNIQUE:  AP View(s) of the abdomen was performed.    COMPARISON:  10/02/2024 and 10/01/2024    FINDINGS:  Interval placement of an enteric tube with tip coursing below the diaphragm and projecting over the region of the stomach in the left upper quadrant.  No interval detrimental change in the radiographic appearance of visualized intrathoracic and upper abdominal structures.                                       X-Ray Chest AP Portable (Final result)  Result time 10/02/24 02:24:54      Final result by Oralia Espinoza MD (10/02/24 02:24:54)                   Impression:      Please see above.      Electronically signed by: Oralia Espinoza MD  Date:    10/02/2024  Time:    02:24               Narrative:    EXAMINATION:  XR CHEST AP PORTABLE    CLINICAL HISTORY:  hypoxia;    TECHNIQUE:  Single frontal view of the chest was performed.    COMPARISON:  Chest radiograph 10/26/2023, CT abdomen pelvis 10/02/2024    FINDINGS:  Cardiac monitoring leads overlie the chest.  There is unchanged enlargement of the cardiomediastinal silhouette.  The lungs are symmetrically expanded with diffuse coarse/increased interstitial attenuation similar to prior exam with more pronounced bibasilar interstitial prominence, similar prior examination.  No large volume of pleural fluid or pneumothorax identified noting slight increased attenuation of the left lower lung zone likely relates to prominent cardiomediastinal silhouette and overlying soft tissue.  Osseous structures are intact with degenerative change.                                       CT Abdomen Pelvis With IV Contrast NO Oral Contrast  (Final result)  Result time 10/02/24 01:53:11      Final result by Lissy Angeles MD (10/02/24 01:53:11)                   Impression:      1. Small-bowel obstruction secondary to bowel containing supraumbilical hernia.  Clinical correlation is advised to determine potential reducibility of this hernia.  2. Postsurgical changes and additional findings as detailed above.      Electronically signed by: Lissy Angeles MD  Date:    10/02/2024  Time:    01:53               Narrative:    EXAMINATION:  CT ABDOMEN PELVIS WITH IV CONTRAST    CLINICAL HISTORY:  Abdominal pain, acute, nonlocalized;    TECHNIQUE:  Low dose axial images, sagittal and coronal reformations were obtained from the lung bases to the pubic symphysis following the IV administration of 80 mL of Omnipaque 350 .  Oral contrast was not given.    COMPARISON:  CT abdomen and pelvis from July 2023.    FINDINGS:  Heart is enlarged.  Mild bibasilar atelectatic changes and/or scarring are seen.  No pleural effusion.    No significant hepatic abnormalities are identified.  There is no intra-or extrahepatic biliary ductal dilatation.  Suspected noncalcified stones are seen within the gallbladder.  The pancreas, spleen, and adrenal glands are unremarkable.    Kidneys enhance normally with no evidence of hydronephrosis.  Left renal cysts are seen.  No abnormalities are seen along the ureteral courses.  Urinary bladder is nondistended.  Uterus has been removed.    There is diffuse dilatation of small bowel loops measuring upwards of 4-5 cm in caliber with air-fluid levels consistent with small-bowel obstruction.  This is secondary to a bowel containing supraumbilical hernia which contains short loop segment of small bowel.  Distal small bowel loops are decompressed beyond this level.  Stomach is distended with prominent air-fluid level.  No free air or free fluid.    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  There is  prominent atherosclerosis.  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    No acute osseous abnormality identified.  Degenerative changes are seen in the lumbar spine.                                       X-Ray Abdomen Flat And Erect (Final result)  Result time 10/01/24 23:01:46      Final result by Lissy Angeles MD (10/01/24 23:01:46)                   Impression:      Nonobstructive bowel gas pattern.      Electronically signed by: Lissy Angeles MD  Date:    10/01/2024  Time:    23:01               Narrative:    EXAMINATION:  XR ABDOMEN FLAT AND ERECT    CLINICAL HISTORY:  Abdominal Pain;    TECHNIQUE:  Flat and erect AP views of the abdomen were performed.    COMPARISON:  None    FINDINGS:  Nonspecific bowel gas pattern.  No evidence to suggest obstruction.  No free air identified.  Scattered stool is seen in the colon.  Cardiac silhouette is enlarged.  Visualized lungs are essentially clear.                                        Assessment/Plan:     SBO (small bowel obstruction)  NPO and NGT placed with improved N/V symptoms. F/u on general surgery consult.IVF started and BP stable. Pain control meds ordered. Consulted cardiology given her afib for cardiac clearance. High risk from what I can see in her EMR with h/o abdominal adhesions.  Last took her Eliquis on Monday night and will hold anticoagulation for now till eval by surgery completed.  Medically cleared for surgery pending cardiac clearance.  Will only give essential Medication for now via NGT.       PAD (peripheral artery disease)  Extensive PAD history with Axbifem Bypass in 1994 and Cartoid stenosis 60-70% reported in vascular note from 8/2024:     8/29/23 PVR right 0.4-0.5  Carotid duplex 60-79% left    8/27/24 PVR right 0.4-0.6 monophasic waveforms  Carotid duplex 40-59% but limited visualization due to heavy shadows/calcium       Holding ASA and eliquis for now given surgery eval for SBO. Of noted, her med list has Plavix  jerzy but this is not mentioned in her [facility] vascular clinic noted med rec. Will not resume this. Will need to clarify prior to D/C if she is indeed taking all three. Suspect she is only on asa and eliquis given afib.     Chronic atrial fibrillation  Patient with Permanent atrial fibrillation which is controlled with lopressor 25mg PO BID.  Patient is currently in atrial fibrillation.[id number] Score: 4. Anticoagulation indicated. Anticoagulation done with eliquis 5mg PO BID but holding due to possible surgery. Monitor on tele.   Cont. Her lopressor for now.     Essential hypertension  Chronic, controlled. Latest blood pressure and vitals reviewed-     Temp:  [97.7 °F (36.5 °C)-97.8 °F (36.6 °C)]   Pulse:  [61-95]   Resp:  [18-29]   BP: (136-230)/()   SpO2:  [87 %-99 %] .   Home meds for hypertension were reviewed and noted below.   Hypertension Medications               amLODIPine (NORVASC) 10 MG tablet Take 10 mg by mouth once daily.    hydroCHLOROthiazide (HYDRODIURIL) 25 MG tablet Take 25 mg by mouth once daily.    losartan (COZAAR) 100 MG tablet Take 100 mg by mouth once daily.    metoprolol tartrate (LOPRESSOR) 25 MG tablet Take 1 tablet (25 mg total) by mouth 2 (two) times daily.            While in the hospital, will manage blood pressure as follows; Resume home lopressor and losartan with HOLD parameters. HOLD other BP meds for now and diurectics due to dehydration and possible surgery.     Will utilize p.r.n. blood pressure medication only if patient's blood pressure greater than  180/90  and she develops symptoms such as worsening chest pain or shortness of breath.    Acquired hypothyroidism  Resume home Levothyroxine in 2 days           VTE Risk Mitigation (From admission, onward)           Ordered     enoxaparin injection 120 mg  Every 12 hours         10/02/24 0321                     CODE STATUS: FULL CODE d/w patient at bedside.            Aparna Talavera MD  Department of Hospital  Medicine  [facility] - Emergency Dept

## 2024-10-02 NOTE — PLAN OF CARE
Surgery note    Due to increasing Lactate despite IVF resuscitation and pain and possibly continued incarcerated hernia, will unfortunately have to perform emergent exploration despite cardiac risks and despite being on blood thinners. Will plan for limited exploration and hopefully repair of hernia with no plan to perform a full laparotomy but pending the status of the incarcerated bowel, we must plan for any indicated procedures.      To OR for emergent hernia repair.

## 2024-10-02 NOTE — CONSULTS
[facility] - Telemetry  Cardiology  Consult Note    Patient Name: Abbie Barragan  MRN: [id number]  Admission Date: 10/1/2024  Hospital Length of Stay: 0 days  Code Status: Full Code   Attending Provider: Alexi Coates DO   Consulting Provider: Rell Mcfadden MD  Primary Care Physician: Laura Shin MD  Principal Problem:SBO (small bowel obstruction)    Patient information was obtained from patient and ER records.     Inpatient consult to Cardiology  Consult performed by: Rell Mcfadden MD  Consult ordered by: Aparna Talavera MD        Subjective:     Chief Complaint:  Pre-op            HPI: 89 y.o. 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 Esther Dang with vascular), Carotid artery stenosis, Essential HTN, HLD, Hypothyroid (goiter), and h/o abdominal adhesions (per surgery eval in 2023 for acute cholecystitis by Dr. Bell->aborted lap lata due to excessive adhesions) presents to the ER with N/V and abdominal pain Since Monday evening. States h/o acute cholecystitis in 2023 but treated medically due to her complex medical history. She was concerned that her symptoms could be her gallbladder again.   Pt. Denies any fevers or chills. No chest pain or SOB/ACKERMAN. Noted constipation yesterday and diarrhea, non-bloody on Monday.      IN the ED labs noted for WBC 14 and Stable H/H 13.5/43.1.  Lytes stable with normal renal function. Lipase negative.Lactic acid normal.   UA concerning for acute UTI and started on Rocephin by the ED.      CXR:  FINDINGS:  Cardiac monitoring leads overlie the chest.  There is unchanged enlargement of the cardiomediastinal silhouette.  The lungs are symmetrically expanded with diffuse coarse/increased interstitial attenuation similar to prior exam with more pronounced bibasilar interstitial prominence, similar prior  examination.  No large volume of pleural fluid or pneumothorax identified noting slight increased attenuation of the left lower lung zone likely relates to prominent cardiomediastinal silhouette and overlying soft tissue.  Osseous structures are intact with degenerative change.     CT abdomen/Pelvis with IV contrast no PO:  FINDINGS:  Heart is enlarged.  Mild bibasilar atelectatic changes and/or scarring are seen.  No pleural effusion.     No significant hepatic abnormalities are identified.  There is no intra-or extrahepatic biliary ductal dilatation.  Suspected noncalcified stones are seen within the gallbladder.  The pancreas, spleen, and adrenal glands are unremarkable.     Kidneys enhance normally with no evidence of hydronephrosis.  Left renal cysts are seen.  No abnormalities are seen along the ureteral courses.  Urinary bladder is nondistended.  Uterus has been removed.     There is diffuse dilatation of small bowel loops measuring upwards of 4-5 cm in caliber with air-fluid levels consistent with small-bowel obstruction.  This is secondary to a bowel containing supraumbilical hernia which contains short loop segment of small bowel.  Distal small bowel loops are decompressed beyond this level.  Stomach is distended with prominent air-fluid level.  No free air or free fluid.     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  There is prominent atherosclerosis.  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     No acute osseous abnormality identified.  Degenerative changes are seen in the lumbar spine.     Impression:     1. Small-bowel obstruction secondary to bowel containing supraumbilical hernia.  Clinical correlation is advised to determine potential reducibility of this hernia.  2. Postsurgical changes and additional findings as detailed above           NGT was placed and General surgery contacted by the ED for further eval of her SBO. We have been  consulted for further management.        Denies CP or SOB  Pre-op evaluation requested by surgery  EKG A-fib RBBB - A-fib chronic - on eliquis    Lactate 4.1    Echo 7/13/23  The left ventricle is normal in size with concentric hypertrophy and normal systolic function.  The estimated ejection fraction is 55%.  Grade III left ventricular diastolic dysfunction.  Normal right ventricular size with normal right ventricular systolic function.  Severe left atrial enlargement.  Mild right atrial enlargement.  Moderate tricuspid regurgitation.  Mild pulmonic regurgitation.  Normal central venous pressure (3 mmHg).  The estimated PA systolic pressure is 38 mmHg.       Followed by vascular surgery [facility]  1) Axbifemoral bypass graft ( [place]) 1994  2) Graft limb occlusion, re do surgery, L AK amputation  3) 60-79% carotid stenosis asymptomatic        Past Medical History:   Diagnosis Date    Above knee amputation status 1994    History of DVT of lower extremity 1994    LEFT    Hyperlipidemia     Hypertension     PAD (peripheral artery disease)        Past Surgical History:   Procedure Laterality Date    DIAGNOSTIC LAPAROSCOPY  9/14/2023    Procedure: LAPAROSCOPY, DIAGNOSTIC;  Surgeon: Isidro Bell MD;  Location: [facility] OR;  Service: General;;    LEG AMPUTATION THROUGH KNEE      PARTIAL HYSTERECTOMY      1960's    ROBOT-ASSISTED CHOLECYSTECTOMY N/A 9/14/2023    Procedure: Diagnostic Laparoscopy Lysis of Adhesisions  Attempted Robotically;  Surgeon: Isidro Bell MD;  Location: [facility] OR;  Service: General;  Laterality: N/A;  ATTEMPTED    VASCULAR SURGERY Left 1994    AORTOFEM BYPASS       Review of patient's allergies indicates:  No Known Allergies    Current Facility-Administered Medications on File Prior to Encounter   Medication    lactated ringers infusion    LIDOcaine (PF) 10 mg/ml (1%) injection 10 mg     Current Outpatient Medications on File Prior to Encounter   Medication Sig    acetaminophen (TYLENOL) 325 MG  tablet Take 2 tablets (650 mg total) by mouth every 6 (six) hours as needed for Pain.    alendronate (FOSAMAX) 70 MG tablet Take 70 mg by mouth every 7 days.    amLODIPine (NORVASC) 10 MG tablet Take 10 mg by mouth once daily.    apixaban (ELIQUIS) 5 mg Tab Take 1 tablet (5 mg total) by mouth 2 (two) times daily.    cholecalciferol, vitamin D3, (VITAMIN D3) 25 mcg (1,000 unit) capsule Take 1,000 Units by mouth once daily.    clopidogrel (PLAVIX) 75 mg tablet Take 75 mg by mouth once daily.    famotidine (PEPCID) 20 MG tablet Take 1 tablet (20 mg total) by mouth once daily. for 20 days    hydroCHLOROthiazide (HYDRODIURIL) 25 MG tablet Take 25 mg by mouth once daily.    losartan (COZAAR) 100 MG tablet Take 100 mg by mouth once daily.    metoprolol tartrate (LOPRESSOR) 25 MG tablet Take 1 tablet (25 mg total) by mouth 2 (two) times daily.    ondansetron (ZOFRAN-ODT) 4 MG TbDL Take 1 tablet (4 mg total) by mouth every 6 (six) hours as needed (nausea).    oxyCODONE-acetaminophen (PERCOCET) 5-325 mg per tablet Take 1 tablet by mouth every 4 (four) hours as needed for Pain.    potassium chloride (MICRO-K) 10 MEQ CpSR Take 10 mEq by mouth once.    rosuvastatin (CRESTOR) 40 MG Tab Take 1 tablet by mouth once daily.     Family History    None       Tobacco Use    Smoking status: Former    Smokeless tobacco: Never   Substance and Sexual Activity    Alcohol use: No    Drug use: Never    Sexual activity: Not on file     Review of Systems   Constitutional: Negative for decreased appetite.   HENT:  Negative for ear discharge.    Eyes:  Negative for blurred vision.   Respiratory:  Negative for hemoptysis.    Endocrine: Negative for polyphagia.   Hematologic/Lymphatic: Negative for adenopathy.   Skin:  Negative for color change.   Musculoskeletal:  Negative for joint swelling.   Genitourinary:  Negative for bladder incontinence.   Neurological:  Negative for brief paralysis.   Psychiatric/Behavioral:  Negative for hallucinations.     Allergic/Immunologic: Negative for hives.     Objective:     Vital Signs (Most Recent):  Temp: 97.8 °F (36.6 °C) (10/02/24 0808)  Pulse: 90 (10/02/24 0808)  Resp: 18 (10/02/24 0808)  BP: (!) 170/74 (10/02/24 0808)  SpO2: 98 % (10/02/24 0808) Vital Signs (24h Range):  Temp:  [97.7 °F (36.5 °C)-97.8 °F (36.6 °C)] 97.8 °F (36.6 °C)  Pulse:  [61-95] 90  Resp:  [18-29] 18  SpO2:  [87 %-99 %] 98 %  BP: (136-230)/() 170/74     Weight: 84.4 kg (186 lb 1.1 oz)  Body mass index is 32.96 kg/m².    SpO2: 98 %         Intake/Output Summary (Last 24 hours) at 10/2/2024 0903  Last data filed at 10/2/2024 0412  Gross per 24 hour   Intake --   Output 90 ml   Net -90 ml       Lines/Drains/Airways       Peripheral Intravenous Line  Duration                  Peripheral IV - Single Lumen 10/01/24 2311 20 G Posterior;Right Hand <1 day                     Physical Exam  Constitutional:       Appearance: She is well-developed.   HENT:      Head: Normocephalic and atraumatic.   Eyes:      Conjunctiva/sclera: Conjunctivae normal.      Pupils: Pupils are equal, round, and reactive to light.   Cardiovascular:      Rate and Rhythm: Normal rate. Rhythm irregular.      Pulses: Intact distal pulses.      Heart sounds: Normal heart sounds.   Pulmonary:      Effort: Pulmonary effort is normal.      Breath sounds: Normal breath sounds.   Abdominal:      General: Bowel sounds are normal.      Palpations: Abdomen is soft.   Musculoskeletal:         General: Normal range of motion.      Cervical back: Normal range of motion and neck supple.   Skin:     General: Skin is warm and dry.   Neurological:      Mental Status: She is alert and oriented to person, place, and time.          Significant Labs: All pertinent lab results from the last 24 hours have been reviewed.    Significant Imaging: Echocardiogram: 2D echo with color flow doppler: No results found for this or any previous visit.  Assessment and Plan:     * SBO (small bowel  obstruction)  Check echo - if stable will clear for surgery at high cardiac risk given age    PAD (peripheral artery disease)  Followed by [facility] vascular surgery    Chronic atrial fibrillation  Rate controlled. Eliquis on hold for possible surgery - resume post-op when cleared by surgery        VTE Risk Mitigation (From admission, onward)      None            Thank you for your consult. I will follow-up with patient. Please contact us if you have any additional questions.    Rell Mcfadden MD  Cardiology   [facility] - Telemetry

## 2024-10-02 NOTE — NURSING
Patient arrived to the unit via stretcher, transported by transport. Patient appears comfortable, no signs of distress noted. Patient is AAOx4. Patient settled into bed, side rails up, call light within reach. Tele monitoring initiated.  Admit assessment initiated. Will continue to monitor.

## 2024-10-02 NOTE — HOSPITAL COURSE
Abbie Barragan 89 y.o. female admitted to the hospital for SBO due to umbilical hernia. CT scan with bowel containing supraumbilical hernia. Lactic acid increased to 4.1. Seen by surgery and plans for inpatient operative repair. Taken to OR 10/2, with no signs of necrotic bowel and associated down trending of lactic acid after OR. NG tube removed and diet advanced to clear liquid awaiting return of bowel function. Developed post operative ileus and NG tube replaced. Gastrograffing challenge attempted with multiple bowel movements.patient now tolerate po and had bowel movement this morning. Follow up outpatient with general surgery. PT/OT recs: SNF placement. Patient refused going home with  PT/OT and family will stay with patient 24 hours for care. Electrolytes replaced as needed.

## 2024-10-02 NOTE — PROGRESS NOTES
[facility] Medicine  Progress Note    Patient Name: Abbie Barragan  MRN: [id number]  Patient Class: IP- Inpatient   Admission Date: 10/1/2024  Length of Stay: 0 days  Attending Physician: Madhuri Coates-Violeta DALY DO  Primary Care Provider: Laura Shin MD        Subjective:     Principal Problem:SBO (small bowel obstruction)        HPI:  89 y.o. 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 Esther Dang with vascular), Carotid artery stenosis, Essential HTN, HLD, Hypothyroid (goiter), and h/o abdominal adhesions (per surgery eval in 2023 for acute cholecystitis by Dr. Bell->aborted lap lata due to excessive adhesions) presents to the ER with N/V and abdominal pain Since Monday evening. States h/o acute cholecystitis in 2023 but treated medically due to her complex medical history. She was concerned that her symptoms could be her gallbladder again.   Pt. Denies any fevers or chills. No chest pain or SOB/ACKERMAN. Noted constipation yesterday and diarrhea, non-bloody on Monday.     IN the ED labs noted for WBC 14 and Stable H/H 13.5/43.1.  Lytes stable with normal renal function. Lipase negative.Lactic acid normal.   UA concerning for acute UTI and started on Rocephin by the ED.     CXR:  FINDINGS:  Cardiac monitoring leads overlie the chest.  There is unchanged enlargement of the cardiomediastinal silhouette.  The lungs are symmetrically expanded with diffuse coarse/increased interstitial attenuation similar to prior exam with more pronounced bibasilar interstitial prominence, similar prior examination.  No large volume of pleural fluid or pneumothorax identified noting slight increased attenuation of the left lower lung zone likely relates to prominent cardiomediastinal silhouette and overlying soft tissue.  Osseous structures are intact with  degenerative change.    CT abdomen/Pelvis with IV contrast no PO:  FINDINGS:  Heart is enlarged.  Mild bibasilar atelectatic changes and/or scarring are seen.  No pleural effusion.     No significant hepatic abnormalities are identified.  There is no intra-or extrahepatic biliary ductal dilatation.  Suspected noncalcified stones are seen within the gallbladder.  The pancreas, spleen, and adrenal glands are unremarkable.     Kidneys enhance normally with no evidence of hydronephrosis.  Left renal cysts are seen.  No abnormalities are seen along the ureteral courses.  Urinary bladder is nondistended.  Uterus has been removed.     There is diffuse dilatation of small bowel loops measuring upwards of 4-5 cm in caliber with air-fluid levels consistent with small-bowel obstruction.  This is secondary to a bowel containing supraumbilical hernia which contains short loop segment of small bowel.  Distal small bowel loops are decompressed beyond this level.  Stomach is distended with prominent air-fluid level.  No free air or free fluid.     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  There is prominent atherosclerosis.  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     No acute osseous abnormality identified.  Degenerative changes are seen in the lumbar spine.     Impression:     1. Small-bowel obstruction secondary to bowel containing supraumbilical hernia.  Clinical correlation is advised to determine potential reducibility of this hernia.  2. Postsurgical changes and additional findings as detailed above        NGT was placed and General surgery contacted by the ED for further eval of her SBO. We have been consulted for further management.        Overview/Hospital Course:  Abbie Barragan 89 y.o. female admitted to the hospital for SBO due to umbilical hernia. CT scan with bowel containing supraumbilical hernia. Lactic acid increased to 4.1. Seen by surgery and plans for  inpatient operative repair as recently took eliquis.     Interval History: Continue nausea, no passage of flatus/bowel movement. Tolerating NG tube.     Review of Systems   Constitutional:  Negative for chills and fever.   Eyes:  Negative for photophobia and visual disturbance.   Respiratory:  Negative for chest tightness and shortness of breath.    Cardiovascular:  Negative for chest pain and palpitations.   Gastrointestinal:  Positive for constipation and nausea. Negative for abdominal pain and vomiting.   Genitourinary:  Negative for dysuria and hematuria.     Objective:     Vital Signs (Most Recent):  Temp: 97.8 °F (36.6 °C) (10/02/24 0808)  Pulse: 90 (10/02/24 0808)  Resp: 18 (10/02/24 0808)  BP: (!) 170/74 (10/02/24 0808)  SpO2: 98 % (10/02/24 0808) Vital Signs (24h Range):  Temp:  [97.7 °F (36.5 °C)-97.8 °F (36.6 °C)] 97.8 °F (36.6 °C)  Pulse:  [61-95] 90  Resp:  [18-29] 18  SpO2:  [87 %-99 %] 98 %  BP: (136-230)/() 170/74     Weight: 84.4 kg (186 lb 1.1 oz)  Body mass index is 32.96 kg/m².    Intake/Output Summary (Last 24 hours) at 10/2/2024 0900  Last data filed at 10/2/2024 0412  Gross per 24 hour   Intake --   Output 90 ml   Net -90 ml         Physical Exam  Vitals and nursing note reviewed.   Constitutional:       General: She is not in acute distress.     Appearance: She is well-developed. She is not diaphoretic.   HENT:      Head: Normocephalic and atraumatic.      Right Ear: External ear normal.      Left Ear: External ear normal.   Eyes:      General:         Right eye: No discharge.         Left eye: No discharge.      Conjunctiva/sclera: Conjunctivae normal.   Neck:      Thyroid: No thyromegaly.   Cardiovascular:      Rate and Rhythm: Normal rate and regular rhythm.      Heart sounds: No murmur heard.  Pulmonary:      Effort: Pulmonary effort is normal. No respiratory distress.      Breath sounds: Normal breath sounds.   Abdominal:      General: Bowel sounds are normal. There is no  distension.      Palpations: Abdomen is soft. There is no mass.      Tenderness: There is no abdominal tenderness.      Hernia: A hernia is present.      Comments: NG tube in place. Umbilical hernia reducible   Musculoskeletal:         General: No deformity.      Cervical back: Normal range of motion and neck supple.   Skin:     General: Skin is warm and dry.   Neurological:      Mental Status: She is alert and oriented to person, place, and time.      Sensory: No sensory deficit.   Psychiatric:         Mood and Affect: Mood normal.         Behavior: Behavior normal.             Significant Labs: CBC:   Recent Labs   Lab 10/01/24  2315 10/01/24  2334 10/02/24  0637   WBC 14.93*  --  14.50*   HGB 13.5  --  13.4   HCT 43.1 32* 43.8     --  217     CMP:   Recent Labs   Lab 10/01/24  2315 10/02/24  0636    138   K 3.5 3.3*    98   CO2 28 27   * 168*   BUN 13 12   CREATININE 0.9 0.8   CALCIUM 10.2 10.1   PROT 8.3 7.7   ALBUMIN 3.7 3.5   BILITOT 0.7 0.4   ALKPHOS 151* 138*   AST 14 13   ALT 11 12   ANIONGAP 12 13     Cardiac Markers:   Recent Labs   Lab 10/02/24  0129   *     Coagulation:   Recent Labs   Lab 10/02/24  0637   INR 1.0   APTT 26.1     Urine Studies:   Recent Labs   Lab 10/02/24  0015   COLORU Yellow   APPEARANCEUA Hazy*   PHUR 7.0   SPECGRAV 1.020   PROTEINUA 1+*   GLUCUA Negative   KETONESU Negative   BILIRUBINUA Negative   OCCULTUA Negative   NITRITE Positive*   UROBILINOGEN Negative   LEUKOCYTESUR Negative   RBCUA 1   WBCUA 3   BACTERIA Occasional   HYALINECASTS 0       Significant Imaging:   Imaging Results              XR NG/OG tube placement check, non-radiologist performed (Final result)  Result time 10/02/24 05:08:52   Procedure changed from XR Gastric tube check, non-radiologist performed     Final result by Oralia Espinoza MD (10/02/24 05:08:52)                   Impression:      Please see above.      Electronically signed by: Oralia Espinoza  MD  Date:    10/02/2024  Time:    05:08               Narrative:    EXAMINATION:  XR NG/OG TUBE PLACEMENT CHECK, NON-RADIOLOGIST PERFORMED    CLINICAL HISTORY:  NG tube placement;    TECHNIQUE:  AP View(s) of the abdomen was performed.    COMPARISON:  10/02/2024 and 10/01/2024    FINDINGS:  Interval placement of an enteric tube with tip coursing below the diaphragm and projecting over the region of the stomach in the left upper quadrant.  No interval detrimental change in the radiographic appearance of visualized intrathoracic and upper abdominal structures.                                       X-Ray Chest AP Portable (Final result)  Result time 10/02/24 02:24:54      Final result by Oralia Espinoza MD (10/02/24 02:24:54)                   Impression:      Please see above.      Electronically signed by: Oralia Espinoza MD  Date:    10/02/2024  Time:    02:24               Narrative:    EXAMINATION:  XR CHEST AP PORTABLE    CLINICAL HISTORY:  hypoxia;    TECHNIQUE:  Single frontal view of the chest was performed.    COMPARISON:  Chest radiograph 10/26/2023, CT abdomen pelvis 10/02/2024    FINDINGS:  Cardiac monitoring leads overlie the chest.  There is unchanged enlargement of the cardiomediastinal silhouette.  The lungs are symmetrically expanded with diffuse coarse/increased interstitial attenuation similar to prior exam with more pronounced bibasilar interstitial prominence, similar prior examination.  No large volume of pleural fluid or pneumothorax identified noting slight increased attenuation of the left lower lung zone likely relates to prominent cardiomediastinal silhouette and overlying soft tissue.  Osseous structures are intact with degenerative change.                                       CT Abdomen Pelvis With IV Contrast NO Oral Contrast (Final result)  Result time 10/02/24 01:53:11      Final result by Lissy Angeles MD (10/02/24 01:53:11)                   Impression:      1. Small-bowel  obstruction secondary to bowel containing supraumbilical hernia.  Clinical correlation is advised to determine potential reducibility of this hernia.  2. Postsurgical changes and additional findings as detailed above.      Electronically signed by: Lissy Angeles MD  Date:    10/02/2024  Time:    01:53               Narrative:    EXAMINATION:  CT ABDOMEN PELVIS WITH IV CONTRAST    CLINICAL HISTORY:  Abdominal pain, acute, nonlocalized;    TECHNIQUE:  Low dose axial images, sagittal and coronal reformations were obtained from the lung bases to the pubic symphysis following the IV administration of 80 mL of Omnipaque 350 .  Oral contrast was not given.    COMPARISON:  CT abdomen and pelvis from July 2023.    FINDINGS:  Heart is enlarged.  Mild bibasilar atelectatic changes and/or scarring are seen.  No pleural effusion.    No significant hepatic abnormalities are identified.  There is no intra-or extrahepatic biliary ductal dilatation.  Suspected noncalcified stones are seen within the gallbladder.  The pancreas, spleen, and adrenal glands are unremarkable.    Kidneys enhance normally with no evidence of hydronephrosis.  Left renal cysts are seen.  No abnormalities are seen along the ureteral courses.  Urinary bladder is nondistended.  Uterus has been removed.    There is diffuse dilatation of small bowel loops measuring upwards of 4-5 cm in caliber with air-fluid levels consistent with small-bowel obstruction.  This is secondary to a bowel containing supraumbilical hernia which contains short loop segment of small bowel.  Distal small bowel loops are decompressed beyond this level.  Stomach is distended with prominent air-fluid level.  No free air or free fluid.    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  There is prominent atherosclerosis.  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    No acute osseous abnormality identified.  Degenerative changes are seen  in the lumbar spine.                                       X-Ray Abdomen Flat And Erect (Final result)  Result time 10/01/24 23:01:46      Final result by Lissy Angeles MD (10/01/24 23:01:46)                   Impression:      Nonobstructive bowel gas pattern.      Electronically signed by: Lissy Angeles MD  Date:    10/01/2024  Time:    23:01               Narrative:    EXAMINATION:  XR ABDOMEN FLAT AND ERECT    CLINICAL HISTORY:  Abdominal Pain;    TECHNIQUE:  Flat and erect AP views of the abdomen were performed.    COMPARISON:  None    FINDINGS:  Nonspecific bowel gas pattern.  No evidence to suggest obstruction.  No free air identified.  Scattered stool is seen in the colon.  Cardiac silhouette is enlarged.  Visualized lungs are essentially clear.                                        Assessment/Plan:      * SBO (small bowel obstruction)  Presented with SBO due to umbilical hernia. Lactic acid increased to 4.1 on second day.   Plans for operative repair with surgery  Continue NPO and NG tube.  Anticoagulation held as above    Discussed with surgery about operative repair. Bowel appeared healthy no necrosis. Repeat lactic acid remains elevated at 4.1. repeat lactic acid ordered for 6hrs, continued IVF with NS for fluid resuscitation.     PAD (peripheral artery disease)  Extensive PAD history with Axbifem Bypass in 1994 and Cartoid stenosis 60-70% reported in vascular note from 8/2024:     8/29/23 PVR right 0.4-0.5  Carotid duplex 60-79% left    8/27/24 PVR right 0.4-0.6 monophasic waveforms  Carotid duplex 40-59% but limited visualization due to heavy shadows/calcium       Eliquis and aspirin held pending surgical intervention.    Acquired hypothyroidism  Resume home Levothyroxine in 2 days         Essential hypertension  Chronic, controlled. Latest blood pressure and vitals reviewed-     Temp:  [97.7 °F (36.5 °C)-97.8 °F (36.6 °C)]   Pulse:  [61-95]   Resp:  [18-29]   BP: (136-230)/()   SpO2:  [87  %-99 %] .   Home meds for hypertension were reviewed and noted below.   Hypertension Medications               amLODIPine (NORVASC) 10 MG tablet Take 10 mg by mouth once daily.    hydroCHLOROthiazide (HYDRODIURIL) 25 MG tablet Take 25 mg by mouth once daily.    losartan (COZAAR) 100 MG tablet Take 100 mg by mouth once daily.    metoprolol tartrate (LOPRESSOR) 25 MG tablet Take 1 tablet (25 mg total) by mouth 2 (two) times daily.            While in the hospital, will manage blood pressure as follows; Resume home lopressor and losartan with HOLD parameters. HOLD other BP meds for now and diurectics due to dehydration and possible surgery.     Will utilize p.r.n. blood pressure medication only if patient's blood pressure greater than  180/90  and she develops symptoms such as worsening chest pain or shortness of breath.    Chronic atrial fibrillation  Patient with Permanent atrial fibrillation which is controlled with lopressor 25mg PO BID.  Patient is currently in atrial fibrillation.[id number] Score: 4. Anticoagulation indicated. Anticoagulation done with eliquis 5mg PO BID but holding due to possible surgery. Monitor on tele.   Cont. Her lopressor for now.       VTE Risk Mitigation (From admission, onward)      None            Discharge Planning   JULIÁN:      Code Status: Full Code   Is the patient medically ready for discharge?:     Reason for patient still in hospital (select all that apply): Patient unstable, Patient trending condition, Treatment, and Consult recommendations             Jaquan Lacy PA-C  Department of Hospital Medicine   [facility] [place] - Telemetry

## 2024-10-02 NOTE — ASSESSMENT & PLAN NOTE
NPO and NGT placed with improved N/V symptoms. F/u on general surgery consult.IVF started and BP stable. Pain control meds ordered. Consulted cardiology given her afib for cardiac clearance. High risk from what I can see in her EMR with h/o abdominal adhesions.  Last took her Eliquis on Monday night and will hold anticoagulation for now till eval by surgery completed.  Medically cleared for surgery pending cardiac clearance.  Will only give essential Medication for now via NGT.

## 2024-10-02 NOTE — SUBJECTIVE & OBJECTIVE
Past Medical History:   Diagnosis Date    Above knee amputation status 1994    History of DVT of lower extremity 1994    LEFT    Hyperlipidemia     Hypertension     PAD (peripheral artery disease)        Past Surgical History:   Procedure Laterality Date    DIAGNOSTIC LAPAROSCOPY  9/14/2023    Procedure: LAPAROSCOPY, DIAGNOSTIC;  Surgeon: Isidro Bell MD;  Location: [facility] OR;  Service: General;;    LEG AMPUTATION THROUGH KNEE      PARTIAL HYSTERECTOMY      1960's    ROBOT-ASSISTED CHOLECYSTECTOMY N/A 9/14/2023    Procedure: Diagnostic Laparoscopy Lysis of Adhesisions  Attempted Robotically;  Surgeon: Isidro Bell MD;  Location: [facility] OR;  Service: General;  Laterality: N/A;  ATTEMPTED    VASCULAR SURGERY Left 1994    AORTOFEM BYPASS       Review of patient's allergies indicates:  No Known Allergies    Current Facility-Administered Medications on File Prior to Encounter   Medication    lactated ringers infusion    LIDOcaine (PF) 10 mg/ml (1%) injection 10 mg     Current Outpatient Medications on File Prior to Encounter   Medication Sig    acetaminophen (TYLENOL) 325 MG tablet Take 2 tablets (650 mg total) by mouth every 6 (six) hours as needed for Pain.    alendronate (FOSAMAX) 70 MG tablet Take 70 mg by mouth every 7 days.    amLODIPine (NORVASC) 10 MG tablet Take 10 mg by mouth once daily.    apixaban (ELIQUIS) 5 mg Tab Take 1 tablet (5 mg total) by mouth 2 (two) times daily.    cholecalciferol, vitamin D3, (VITAMIN D3) 25 mcg (1,000 unit) capsule Take 1,000 Units by mouth once daily.    clopidogrel (PLAVIX) 75 mg tablet Take 75 mg by mouth once daily.    famotidine (PEPCID) 20 MG tablet Take 1 tablet (20 mg total) by mouth once daily. for 20 days    hydroCHLOROthiazide (HYDRODIURIL) 25 MG tablet Take 25 mg by mouth once daily.    losartan (COZAAR) 100 MG tablet Take 100 mg by mouth once daily.    metoprolol tartrate (LOPRESSOR) 25 MG tablet Take 1 tablet (25 mg total) by mouth 2 (two) times daily.     ondansetron (ZOFRAN-ODT) 4 MG TbDL Take 1 tablet (4 mg total) by mouth every 6 (six) hours as needed (nausea).    oxyCODONE-acetaminophen (PERCOCET) 5-325 mg per tablet Take 1 tablet by mouth every 4 (four) hours as needed for Pain.    potassium chloride (MICRO-K) 10 MEQ CpSR Take 10 mEq by mouth once.    rosuvastatin (CRESTOR) 40 MG Tab Take 1 tablet by mouth once daily.     Family History    None       Tobacco Use    Smoking status: Former    Smokeless tobacco: Never   Substance and Sexual Activity    Alcohol use: No    Drug use: Never    Sexual activity: Not on file     Review of Systems   Constitutional:  Positive for appetite change. Negative for activity change, chills, diaphoresis, fatigue and fever.   HENT:  Negative for congestion and sore throat.    Eyes:  Negative for visual disturbance.   Respiratory:  Negative for cough, choking, shortness of breath and wheezing.    Cardiovascular:  Negative for chest pain, palpitations and leg swelling.   Gastrointestinal:  Positive for abdominal distention, abdominal pain, constipation (x1 day), diarrhea (x1 day monday no blood), nausea and vomiting. Negative for blood in stool.   Endocrine: Negative for polyuria.   Genitourinary:  Negative for dysuria.   Musculoskeletal:  Negative for arthralgias, gait problem and myalgias.   Neurological:  Negative for dizziness, syncope, light-headedness and headaches.   Psychiatric/Behavioral:  The patient is not nervous/anxious.      Objective:     Vital Signs (Most Recent):  Temp: 97.8 °F (36.6 °C) (10/02/24 0448)  Pulse: 77 (10/02/24 0448)  Resp: 18 (10/02/24 0448)  BP: (!) 141/66 (10/02/24 0456)  SpO2: 99 % (10/02/24 0448) Vital Signs (24h Range):  Temp:  [97.7 °F (36.5 °C)-97.8 °F (36.6 °C)] 97.8 °F (36.6 °C)  Pulse:  [61-95] 77  Resp:  [18-29] 18  SpO2:  [87 %-99 %] 99 %  BP: (136-230)/() 141/66     Weight: 84.4 kg (186 lb 1.1 oz)  Body mass index is 32.96 kg/m².     Physical Exam  Vitals and nursing note reviewed.    Constitutional:       General: She is not in acute distress.     Appearance: Normal appearance. She is obese. She is not ill-appearing, toxic-appearing or diaphoretic.   HENT:      Head: Normocephalic and atraumatic.      Nose: Nose normal. No congestion or rhinorrhea.      Comments: NGT in the right nare with dela cruz gastric secretions noted in the tube.      Mouth/Throat:      Mouth: Mucous membranes are dry.      Pharynx: Oropharynx is clear.   Eyes:      General: No scleral icterus.     Extraocular Movements: Extraocular movements intact.      Conjunctiva/sclera: Conjunctivae normal.      Pupils: Pupils are equal, round, and reactive to light.   Cardiovascular:      Rate and Rhythm: Normal rate and regular rhythm.      Pulses: Normal pulses.      Heart sounds: No murmur heard.     No friction rub. No gallop.   Pulmonary:      Effort: Pulmonary effort is normal.      Breath sounds: Normal breath sounds.   Abdominal:      General: There is distension.      Tenderness: There is abdominal tenderness. There is no guarding or rebound.      Hernia: A hernia (ubilical hernia noted) is present.      Comments: abscent bowl sounds.    Musculoskeletal:         General: No swelling. Normal range of motion.      Cervical back: Normal range of motion and neck supple.      Right lower leg: No edema.      Comments: Left AKA noted with skin intact    Skin:     General: Skin is warm.      Capillary Refill: Capillary refill takes less than 2 seconds.      Coloration: Skin is not pale.   Neurological:      General: No focal deficit present.      Mental Status: She is alert and oriented to person, place, and time.   Psychiatric:         Mood and Affect: Mood normal.         Behavior: Behavior normal.              CRANIAL NERVES     CN III, IV, VI   Pupils are equal, round, and reactive to light.       Recent Results (from the past 24 hours)   CBC W/ AUTO DIFFERENTIAL    Collection Time: 10/01/24 11:15 PM   Result Value Ref Range     WBC 14.93 (H) 3.90 - 12.70 K/uL    RBC 4.83 4.00 - 5.40 M/uL    Hemoglobin 13.5 12.0 - 16.0 g/dL    Hematocrit 43.1 37.0 - 48.5 %    MCV 89 82 - 98 fL    MCH 28.0 27.0 - 31.0 pg    MCHC 31.3 (L) 32.0 - 36.0 g/dL    RDW 14.8 (H) 11.5 - 14.5 %    Platelets 226 150 - 450 K/uL    MPV 13.3 (H) 9.2 - 12.9 fL    Immature Granulocytes 0.4 0.0 - 0.5 %    Gran # (ANC) 11.6 (H) 1.8 - 7.7 K/uL    Immature Grans (Abs) 0.06 (H) 0.00 - 0.04 K/uL    Lymph # 2.8 1.0 - 4.8 K/uL    Mono # 0.4 0.3 - 1.0 K/uL    Eos # 0.1 0.0 - 0.5 K/uL    Baso # 0.03 0.00 - 0.20 K/uL    nRBC 0 0 /100 WBC    Gran % 77.6 (H) 38.0 - 73.0 %    Lymph % 18.9 18.0 - 48.0 %    Mono % 2.4 (L) 4.0 - 15.0 %    Eosinophil % 0.5 0.0 - 8.0 %    Basophil % 0.2 0.0 - 1.9 %    Differential Method Automated    Comp. Metabolic Panel    Collection Time: 10/01/24 11:15 PM   Result Value Ref Range    Sodium 140 136 - 145 mmol/L    Potassium 3.5 3.5 - 5.1 mmol/L    Chloride 100 95 - 110 mmol/L    CO2 28 23 - 29 mmol/L    Glucose 184 (H) 70 - 110 mg/dL    BUN 13 8 - 23 mg/dL    Creatinine 0.9 0.5 - 1.4 mg/dL    Calcium 10.2 8.7 - 10.5 mg/dL    Total Protein 8.3 6.0 - 8.4 g/dL    Albumin 3.7 3.5 - 5.2 g/dL    Total Bilirubin 0.7 0.1 - 1.0 mg/dL    Alkaline Phosphatase 151 (H) 55 - 135 U/L    AST 14 10 - 40 U/L    ALT 11 10 - 44 U/L    eGFR >60 >60 mL/min/1.73 m^2    Anion Gap 12 8 - 16 mmol/L   Lipase    Collection Time: 10/01/24 11:15 PM   Result Value Ref Range    Lipase 15 4 - 60 U/L   Lactic acid, plasma    Collection Time: 10/01/24 11:15 PM   Result Value Ref Range    Lactate (Lactic Acid) 1.9 0.5 - 2.2 mmol/L   Magnesium    Collection Time: 10/01/24 11:15 PM   Result Value Ref Range    Magnesium 2.1 1.6 - 2.6 mg/dL   ISTAT PROCEDURE    Collection Time: 10/01/24 11:34 PM   Result Value Ref Range    POC Glucose 164 (H) 70 - 110 mg/dL    POC BUN 13 6 - 30 mg/dL    POC Creatinine 0.6 0.5 - 1.4 mg/dL    POC Sodium 141 136 - 145 mmol/L    POC Potassium 3.7 3.5 - 5.1 mmol/L     POC Chloride 105 95 - 110 mmol/L    POC TCO2 (MEASURED) 27 23 - 29 mmol/L    POC Anion Gap 14 8 - 16 mmol/L    POC Ionized Calcium 1.05 (L) 1.06 - 1.42 mmol/L    POC Hematocrit 32 (L) 36 - 54 %PCV    Sample VENOUS     Site Other     Allens Test N/A    Urinalysis, Reflex to Urine Culture Urine, Clean Catch    Collection Time: 10/02/24 12:15 AM    Specimen: Urine   Result Value Ref Range    Specimen UA Urine, Catheterized     Color, UA Yellow Yellow, Straw, Shirley    Appearance, UA Hazy (A) Clear    pH, UA 7.0 5.0 - 8.0    Specific Gravity, UA 1.020 1.005 - 1.030    Protein, UA 1+ (A) Negative    Glucose, UA Negative Negative    Ketones, UA Negative Negative    Bilirubin (UA) Negative Negative    Occult Blood UA Negative Negative    Nitrite, UA Positive (A) Negative    Urobilinogen, UA Negative <2.0 EU/dL    Leukocytes, UA Negative Negative   Urinalysis Microscopic    Collection Time: 10/02/24 12:15 AM   Result Value Ref Range    RBC, UA 1 0 - 4 /hpf    WBC, UA 3 0 - 5 /hpf    Bacteria Occasional None-Occ /hpf    Hyaline Casts, UA 0 0-1/lpf /lpf    Microscopic Comment SEE COMMENT    BNP    Collection Time: 10/02/24  1:29 AM   Result Value Ref Range     (H) 0 - 99 pg/mL   POCT Influenza A/B Molecular    Collection Time: 10/02/24  4:07 AM   Result Value Ref Range    POC Molecular Influenza A Ag Negative Negative    POC Molecular Influenza B Ag Negative Negative     Acceptable Yes    POCT COVID-19 Rapid Screening    Collection Time: 10/02/24  4:07 AM   Result Value Ref Range    POC Rapid COVID Negative Negative     Acceptable Yes        Microbiology Results (last 7 days)       ** No results found for the last 168 hours. **            Imaging Results              XR NG/OG tube placement check, non-radiologist performed (Final result)  Result time 10/02/24 05:08:52   Procedure changed from XR Gastric tube check, non-radiologist performed     Final result by Oralia Espinoza MD (10/02/24  05:08:52)                   Impression:      Please see above.      Electronically signed by: Oralia Espinoza MD  Date:    10/02/2024  Time:    05:08               Narrative:    EXAMINATION:  XR NG/OG TUBE PLACEMENT CHECK, NON-RADIOLOGIST PERFORMED    CLINICAL HISTORY:  NG tube placement;    TECHNIQUE:  AP View(s) of the abdomen was performed.    COMPARISON:  10/02/2024 and 10/01/2024    FINDINGS:  Interval placement of an enteric tube with tip coursing below the diaphragm and projecting over the region of the stomach in the left upper quadrant.  No interval detrimental change in the radiographic appearance of visualized intrathoracic and upper abdominal structures.                                       X-Ray Chest AP Portable (Final result)  Result time 10/02/24 02:24:54      Final result by Oralia Espinoza MD (10/02/24 02:24:54)                   Impression:      Please see above.      Electronically signed by: Oralia Espinoza MD  Date:    10/02/2024  Time:    02:24               Narrative:    EXAMINATION:  XR CHEST AP PORTABLE    CLINICAL HISTORY:  hypoxia;    TECHNIQUE:  Single frontal view of the chest was performed.    COMPARISON:  Chest radiograph 10/26/2023, CT abdomen pelvis 10/02/2024    FINDINGS:  Cardiac monitoring leads overlie the chest.  There is unchanged enlargement of the cardiomediastinal silhouette.  The lungs are symmetrically expanded with diffuse coarse/increased interstitial attenuation similar to prior exam with more pronounced bibasilar interstitial prominence, similar prior examination.  No large volume of pleural fluid or pneumothorax identified noting slight increased attenuation of the left lower lung zone likely relates to prominent cardiomediastinal silhouette and overlying soft tissue.  Osseous structures are intact with degenerative change.                                       CT Abdomen Pelvis With IV Contrast NO Oral Contrast (Final result)  Result time 10/02/24 01:53:11      Final  result by Lissy Angeles MD (10/02/24 01:53:11)                   Impression:      1. Small-bowel obstruction secondary to bowel containing supraumbilical hernia.  Clinical correlation is advised to determine potential reducibility of this hernia.  2. Postsurgical changes and additional findings as detailed above.      Electronically signed by: Lissy Angeles MD  Date:    10/02/2024  Time:    01:53               Narrative:    EXAMINATION:  CT ABDOMEN PELVIS WITH IV CONTRAST    CLINICAL HISTORY:  Abdominal pain, acute, nonlocalized;    TECHNIQUE:  Low dose axial images, sagittal and coronal reformations were obtained from the lung bases to the pubic symphysis following the IV administration of 80 mL of Omnipaque 350 .  Oral contrast was not given.    COMPARISON:  CT abdomen and pelvis from July 2023.    FINDINGS:  Heart is enlarged.  Mild bibasilar atelectatic changes and/or scarring are seen.  No pleural effusion.    No significant hepatic abnormalities are identified.  There is no intra-or extrahepatic biliary ductal dilatation.  Suspected noncalcified stones are seen within the gallbladder.  The pancreas, spleen, and adrenal glands are unremarkable.    Kidneys enhance normally with no evidence of hydronephrosis.  Left renal cysts are seen.  No abnormalities are seen along the ureteral courses.  Urinary bladder is nondistended.  Uterus has been removed.    There is diffuse dilatation of small bowel loops measuring upwards of 4-5 cm in caliber with air-fluid levels consistent with small-bowel obstruction.  This is secondary to a bowel containing supraumbilical hernia which contains short loop segment of small bowel.  Distal small bowel loops are decompressed beyond this level.  Stomach is distended with prominent air-fluid level.  No free air or free fluid.    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  There is prominent atherosclerosis.  Severe plaquing is seen most  pronounced involving the SMA.    No acute osseous abnormality identified.  Degenerative changes are seen in the lumbar spine.                                       X-Ray Abdomen Flat And Erect (Final result)  Result time 10/01/24 23:01:46      Final result by Lissy Angeles MD (10/01/24 23:01:46)                   Impression:      Nonobstructive bowel gas pattern.      Electronically signed by: Lissy Angeles MD  Date:    10/01/2024  Time:    23:01               Narrative:    EXAMINATION:  XR ABDOMEN FLAT AND ERECT    CLINICAL HISTORY:  Abdominal Pain;    TECHNIQUE:  Flat and erect AP views of the abdomen were performed.    COMPARISON:  None    FINDINGS:  Nonspecific bowel gas pattern.  No evidence to suggest obstruction.  No free air identified.  Scattered stool is seen in the colon.  Cardiac silhouette is enlarged.  Visualized lungs are essentially clear.

## 2024-10-02 NOTE — TRANSFER OF CARE
"Anesthesia Transfer of Care Note    Patient: Abbie Barragan    Procedure(s) Performed: Procedure(s) (LRB):  REPAIR, HERNIA, VENTRAL, INCARCERATED, WITHOUT HISTORY OF PRIOR REPAIR (N/A)    Patient location: PACU    Anesthesia Type: general    Transport from OR: Transported from OR on room air with adequate spontaneous ventilation    Post pain: adequate analgesia    Post assessment: no apparent anesthetic complications and tolerated procedure well    Post vital signs: stable    Level of consciousness: awake    Nausea/Vomiting: no nausea/vomiting    Complications: none    Transfer of care protocol was followed      Last vitals: Visit Vitals  BP (!) 166/70   Pulse 88   Temp 36.6 °C (97.8 °F)   Resp 19   Ht 5' 3" (1.6 m)   Wt 84.4 kg (186 lb 1.1 oz)   SpO2 (!) 93%   BMI 32.96 kg/m²     "

## 2024-10-02 NOTE — ED TRIAGE NOTES
Patient presents to ED for the evaluation of sudden onset of generalized abdominal pain associated with nausea and vomitting. Pt states she had similar symptoms when she had cholecystitis. Pt denies taking any meds prior to arrival. Pt is AAO X4, rates pain 10/10

## 2024-10-02 NOTE — ASSESSMENT & PLAN NOTE
Presented with SBO due to umbilical hernia. Lactic acid increased to 4.1 on second day.   Plans for operative repair with surgery  Continue NPO and NG tube.  Anticoagulation held as above

## 2024-10-02 NOTE — HPI
89 y.o. 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 Esther Dang with vascular), Carotid artery stenosis, Essential HTN, HLD, Hypothyroid (goiter), and h/o abdominal adhesions (per surgery eval in 2023 for acute cholecystitis by Dr. Bell->aborted lap lata due to excessive adhesions) presents to the ER with N/V and abdominal pain Since Monday evening. States h/o acute cholecystitis in 2023 but treated medically due to her complex medical history. She was concerned that her symptoms could be her gallbladder again.   Pt. Denies any fevers or chills. No chest pain or SOB/ACKERMAN. Noted constipation yesterday and diarrhea, non-bloody on Monday.     IN the ED labs noted for WBC 14 and Stable H/H 13.5/43.1.  Lytes stable with normal renal function. Lipase negative.Lactic acid normal.   UA concerning for acute UTI and started on Rocephin by the ED.     CXR:  FINDINGS:  Cardiac monitoring leads overlie the chest.  There is unchanged enlargement of the cardiomediastinal silhouette.  The lungs are symmetrically expanded with diffuse coarse/increased interstitial attenuation similar to prior exam with more pronounced bibasilar interstitial prominence, similar prior examination.  No large volume of pleural fluid or pneumothorax identified noting slight increased attenuation of the left lower lung zone likely relates to prominent cardiomediastinal silhouette and overlying soft tissue.  Osseous structures are intact with degenerative change.    CT abdomen/Pelvis with IV contrast no PO:  FINDINGS:  Heart is enlarged.  Mild bibasilar atelectatic changes and/or scarring are seen.  No pleural effusion.     No significant hepatic abnormalities are identified.  There is no intra-or extrahepatic biliary ductal dilatation.  Suspected noncalcified stones are seen within the  gallbladder.  The pancreas, spleen, and adrenal glands are unremarkable.     Kidneys enhance normally with no evidence of hydronephrosis.  Left renal cysts are seen.  No abnormalities are seen along the ureteral courses.  Urinary bladder is nondistended.  Uterus has been removed.     There is diffuse dilatation of small bowel loops measuring upwards of 4-5 cm in caliber with air-fluid levels consistent with small-bowel obstruction.  This is secondary to a bowel containing supraumbilical hernia which contains short loop segment of small bowel.  Distal small bowel loops are decompressed beyond this level.  Stomach is distended with prominent air-fluid level.  No free air or free fluid.     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  There is prominent atherosclerosis.  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     No acute osseous abnormality identified.  Degenerative changes are seen in the lumbar spine.     Impression:     1. Small-bowel obstruction secondary to bowel containing supraumbilical hernia.  Clinical correlation is advised to determine potential reducibility of this hernia.  2. Postsurgical changes and additional findings as detailed above        NGT was placed and General surgery contacted by the ED for further eval of her SBO. We have been consulted for further management.

## 2024-10-02 NOTE — PLAN OF CARE
11:30am: CM unable to complete initial assessment at this time, due to patient being off the floor for surgery.

## 2024-10-02 NOTE — ASSESSMENT & PLAN NOTE
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

## 2024-10-02 NOTE — CONSULTS
Consult Note    SUBJECTIVE:     History of Present Illness:  Patient is a 89 y.o. female presents with a small bowel obstruction secondary to an incarcerated incisional ventral hernia. Onset of symptoms was yesterday. She describes nausea with vomiting and abdominal pain. She describes belching but no flatus since yesterday. She states she has had similar symptoms before with cholecystitis but has not known of a hernia in the past. No fevers or chills. She states she has not had an obstruction before. Of note she had a lap cholecystectomy attempted approximately one year ago that was abandoned due to extensive adhesions. She had a percutaneous cholecystostomy tube that has since been removed.    Of note, she takes Eliqius and her last dose was yesterday. Per chart review, she also takes Plavix. The patient reports that the Plavix was changed to a different medication but she does not recall the name. Internal Medicine note states to stop Plavix.  Chief Complaint   Patient presents with    Abdominal Pain     Pt has hx of gallbladder inflammation and experiencing similar symptoms. Vomiting x1 time today. Pain to right shoulder also.        Review of patient's allergies indicates:  No Known Allergies    Current Facility-Administered Medications   Medication Dose Route Frequency Provider Last Rate Last Admin    0.9%  NaCl infusion   Intravenous Continuous Aparna Talavera  mL/hr at 10/02/24 0437 New Bag at 10/02/24 0437    acetaminophen tablet 650 mg  650 mg Oral Q4H PRN Aparna Talavera MD        albuterol-ipratropium 2.5 mg-0.5 mg/3 mL nebulizer solution 3 mL  3 mL Nebulization Q4H PRN Aparna Talavera MD        aluminum-magnesium hydroxide-simethicone 200-200-20 mg/5 mL suspension 30 mL  30 mL Oral QID PRN Aparna Talavera MD        cefTRIAXone (Rocephin) 1 g in D5W 100 mL IVPB (MB+)  1 g Intravenous Q24H Aparna Talavera MD        hydrALAZINE injection 5 mg  5 mg Intravenous Q6H PRN Ilan  Aparna WATTS MD   5 mg at 10/02/24 0448    melatonin tablet 6 mg  6 mg Oral Nightly PRN Aparna Talavera MD        morphine injection 2 mg  2 mg Intravenous Q6H PRN Aparna Talavera MD   2 mg at 10/02/24 0448    naloxone 0.4 mg/mL injection 0.4 mg  0.4 mg Intravenous PRN Aparna Talavera MD        potassium chloride 10 mEq in 100 mL IVPB  10 mEq Intravenous Q1H Aparna Talavera  mL/hr at 10/02/24 0508 10 mEq at 10/02/24 0508    simethicone chewable tablet 80 mg  1 tablet Oral QID PRN Aparna Talavera MD        sodium chloride 0.9% flush 10 mL  10 mL Intravenous Q12H PRN Aparna Talavera MD         Facility-Administered Medications Ordered in Other Encounters   Medication Dose Route Frequency Provider Last Rate Last Admin    lactated ringers infusion   Intravenous Continuous Jaquan Kim MD   Stopped at 09/14/23 1338    LIDOcaine (PF) 10 mg/ml (1%) injection 10 mg  1 mL Intradermal Once Jaquan Kim MD           Past Medical History:   Diagnosis Date    Above knee amputation status 1994    History of DVT of lower extremity 1994    LEFT    Hyperlipidemia     Hypertension     PAD (peripheral artery disease)      Past Surgical History:   Procedure Laterality Date    DIAGNOSTIC LAPAROSCOPY  9/14/2023    Procedure: LAPAROSCOPY, DIAGNOSTIC;  Surgeon: Isidro Bell MD;  Location: [facility] OR;  Service: General;;    LEG AMPUTATION THROUGH KNEE      PARTIAL HYSTERECTOMY      1960's    ROBOT-ASSISTED CHOLECYSTECTOMY N/A 9/14/2023    Procedure: Diagnostic Laparoscopy Lysis of Adhesisions  Attempted Robotically;  Surgeon: Isidro Bell MD;  Location: [facility] OR;  Service: General;  Laterality: N/A;  ATTEMPTED    VASCULAR SURGERY Left 1994    AORTOFEM BYPASS     No family history on file.  Social History     Tobacco Use    Smoking status: Former    Smokeless tobacco: Never   Substance Use Topics    Alcohol use: No    Drug use: Never        Review of Systems:  Pertinent ROS as above    OBJECTIVE:  "    Vital Signs (Most Recent)  Temp: 97.8 °F (36.6 °C) (10/02/24 0448)  Pulse: 77 (10/02/24 0448)  Resp: 18 (10/02/24 0448)  BP: (!) 141/66 (10/02/24 0456)  SpO2: 99 % (10/02/24 0448)  5' 3" (1.6 m)  84.4 kg (186 lb 1.1 oz)     Physical Exam:  Patient alert and oriented x 4, no acute distress, NGT in place with thick yellow output  Abdomen soft and at this time she has focal tenderness in the periumbilical region with a palpable hernia. The hernia was reduced at bedside with circumferential palpation of the fascial edges.   There are multiple well-healed surgical scars across the abdomen including a midline laparotomy scar directly overlying the palpated hernia  LLE AKA    Laboratory  Component      Latest Ref Rng 10/1/2024   WBC      3.90 - 12.70 K/uL 14.93 (H)    RBC      4.00 - 5.40 M/uL 4.83    Hemoglobin      12.0 - 16.0 g/dL 13.5    Hematocrit      37.0 - 48.5 % 43.1    MCV      82 - 98 fL 89    MCH      27.0 - 31.0 pg 28.0    MCHC      32.0 - 36.0 g/dL 31.3 (L)    RDW      11.5 - 14.5 % 14.8 (H)    Platelet Count      150 - 450 K/uL 226    MPV      9.2 - 12.9 fL 13.3 (H)    Immature Granulocytes      0.0 - 0.5 % 0.4    Gran # (ANC)      1.8 - 7.7 K/uL 11.6 (H)    Immature Grans (Abs)      0.00 - 0.04 K/uL 0.06 (H)    Lymph #      1.0 - 4.8 K/uL 2.8    Mono #      0.3 - 1.0 K/uL 0.4    Eos #      0.0 - 0.5 K/uL 0.1    Baso #      0.00 - 0.20 K/uL 0.03    nRBC      0 /100 WBC 0    Gran %      38.0 - 73.0 % 77.6 (H)    Lymph %      18.0 - 48.0 % 18.9    Mono %      4.0 - 15.0 % 2.4 (L)    Eos %      0.0 - 8.0 % 0.5    Basophil %      0.0 - 1.9 % 0.2    Differential Method Automated       Legend:  (H) High  (L) Low  Diagnostic Results:  CT Scan 10/2/2024  EXAMINATION:  CT ABDOMEN PELVIS WITH IV CONTRAST     CLINICAL HISTORY:  Abdominal pain, acute, nonlocalized;     TECHNIQUE:  Low dose axial images, sagittal and coronal reformations were obtained from the lung bases to the pubic symphysis following the IV " administration of 80 mL of Omnipaque 350 .  Oral contrast was not given.     COMPARISON:  CT abdomen and pelvis from July 2023.     FINDINGS:  Heart is enlarged.  Mild bibasilar atelectatic changes and/or scarring are seen.  No pleural effusion.     No significant hepatic abnormalities are identified.  There is no intra-or extrahepatic biliary ductal dilatation.  Suspected noncalcified stones are seen within the gallbladder.  The pancreas, spleen, and adrenal glands are unremarkable.     Kidneys enhance normally with no evidence of hydronephrosis.  Left renal cysts are seen.  No abnormalities are seen along the ureteral courses.  Urinary bladder is nondistended.  Uterus has been removed.     There is diffuse dilatation of small bowel loops measuring upwards of 4-5 cm in caliber with air-fluid levels consistent with small-bowel obstruction.  This is secondary to a bowel containing supraumbilical hernia which contains short loop segment of small bowel.  Distal small bowel loops are decompressed beyond this level.  Stomach is distended with prominent air-fluid level.  No free air or free fluid.     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  There is prominent atherosclerosis.  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     No acute osseous abnormality identified.  Degenerative changes are seen in the lumbar spine.     Impression:     1. Small-bowel obstruction secondary to bowel containing supraumbilical hernia.  Clinical correlation is advised to determine potential reducibility of this hernia.  2. Postsurgical changes and additional findings as detailed above.      XRAY confirms NGT placement      ASSESSMENT/PLAN:     89 year old woman with extensive abdominal surgical history including aorto bi-iliac bypass graft, currently on Eliquis (last dose yesterday) with an incisional supraumbilical hernia causing an obstruction. This hernia was reduced at  bedside.    PLAN:Plan     Patient was counseled that she will require repair of this hernia with mesh on this admission. Given the acuity of her presentation would ideally opt to repair the hernia today. However given recent anticoagulation and antiplatelet use and the fact that the hernia is reducible, would opt to repair the hernia in a couple of days after cessation of Eliquis to avoid post-operative bleeding complications that could compromise the repair.     Please hold Eliquis at this time.     Sofie Handley MD  General Surgery  [facility]

## 2024-10-02 NOTE — HPI
HPI: 89 y.o. 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 Esther Dang with vascular), Carotid artery stenosis, Essential HTN, HLD, Hypothyroid (goiter), and h/o abdominal adhesions (per surgery eval in 2023 for acute cholecystitis by Dr. Bell->aborted lap lata due to excessive adhesions) presents to the ER with N/V and abdominal pain Since Monday evening. States h/o acute cholecystitis in 2023 but treated medically due to her complex medical history. She was concerned that her symptoms could be her gallbladder again.   Pt. Denies any fevers or chills. No chest pain or SOB/ACKERMAN. Noted constipation yesterday and diarrhea, non-bloody on Monday.      IN the ED labs noted for WBC 14 and Stable H/H 13.5/43.1.  Lytes stable with normal renal function. Lipase negative.Lactic acid normal.   UA concerning for acute UTI and started on Rocephin by the ED.      CXR:  FINDINGS:  Cardiac monitoring leads overlie the chest.  There is unchanged enlargement of the cardiomediastinal silhouette.  The lungs are symmetrically expanded with diffuse coarse/increased interstitial attenuation similar to prior exam with more pronounced bibasilar interstitial prominence, similar prior examination.  No large volume of pleural fluid or pneumothorax identified noting slight increased attenuation of the left lower lung zone likely relates to prominent cardiomediastinal silhouette and overlying soft tissue.  Osseous structures are intact with degenerative change.     CT abdomen/Pelvis with IV contrast no PO:  FINDINGS:  Heart is enlarged.  Mild bibasilar atelectatic changes and/or scarring are seen.  No pleural effusion.     No significant hepatic abnormalities are identified.  There is no intra-or extrahepatic biliary ductal dilatation.  Suspected noncalcified stones are seen  within the gallbladder.  The pancreas, spleen, and adrenal glands are unremarkable.     Kidneys enhance normally with no evidence of hydronephrosis.  Left renal cysts are seen.  No abnormalities are seen along the ureteral courses.  Urinary bladder is nondistended.  Uterus has been removed.     There is diffuse dilatation of small bowel loops measuring upwards of 4-5 cm in caliber with air-fluid levels consistent with small-bowel obstruction.  This is secondary to a bowel containing supraumbilical hernia which contains short loop segment of small bowel.  Distal small bowel loops are decompressed beyond this level.  Stomach is distended with prominent air-fluid level.  No free air or free fluid.     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  There is prominent atherosclerosis.  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     No acute osseous abnormality identified.  Degenerative changes are seen in the lumbar spine.     Impression:     1. Small-bowel obstruction secondary to bowel containing supraumbilical hernia.  Clinical correlation is advised to determine potential reducibility of this hernia.  2. Postsurgical changes and additional findings as detailed above           NGT was placed and General surgery contacted by the ED for further eval of her SBO. We have been consulted for further management.        Denies CP or SOB  Pre-op evaluation requested by surgery  EKG A-fib RBBB - A-fib chronic - on eliquis    Lactate 4.1    Echo 7/13/23  The left ventricle is normal in size with concentric hypertrophy and normal systolic function.  The estimated ejection fraction is 55%.  Grade III left ventricular diastolic dysfunction.  Normal right ventricular size with normal right ventricular systolic function.  Severe left atrial enlargement.  Mild right atrial enlargement.  Moderate tricuspid regurgitation.  Mild pulmonic regurgitation.  Normal central venous  pressure (3 mmHg).  The estimated PA systolic pressure is 38 mmHg.       Followed by vascular surgery [facility]  1) Axbifemoral bypass graft ( [place]) 1994  2) Graft limb occlusion, re do surgery, L AK amputation  3) 60-79% carotid stenosis asymptomatic

## 2024-10-02 NOTE — SUBJECTIVE & OBJECTIVE
Interval History: Continue nausea, no passage of flatus/bowel movement. Tolerating NG tube.     Review of Systems   Constitutional:  Negative for chills and fever.   Eyes:  Negative for photophobia and visual disturbance.   Respiratory:  Negative for chest tightness and shortness of breath.    Cardiovascular:  Negative for chest pain and palpitations.   Gastrointestinal:  Positive for constipation and nausea. Negative for abdominal pain and vomiting.   Genitourinary:  Negative for dysuria and hematuria.     Objective:     Vital Signs (Most Recent):  Temp: 97.8 °F (36.6 °C) (10/02/24 0808)  Pulse: 90 (10/02/24 0808)  Resp: 18 (10/02/24 0808)  BP: (!) 170/74 (10/02/24 0808)  SpO2: 98 % (10/02/24 0808) Vital Signs (24h Range):  Temp:  [97.7 °F (36.5 °C)-97.8 °F (36.6 °C)] 97.8 °F (36.6 °C)  Pulse:  [61-95] 90  Resp:  [18-29] 18  SpO2:  [87 %-99 %] 98 %  BP: (136-230)/() 170/74     Weight: 84.4 kg (186 lb 1.1 oz)  Body mass index is 32.96 kg/m².    Intake/Output Summary (Last 24 hours) at 10/2/2024 0900  Last data filed at 10/2/2024 0412  Gross per 24 hour   Intake --   Output 90 ml   Net -90 ml         Physical Exam  Vitals and nursing note reviewed.   Constitutional:       General: She is not in acute distress.     Appearance: She is well-developed. She is not diaphoretic.   HENT:      Head: Normocephalic and atraumatic.      Right Ear: External ear normal.      Left Ear: External ear normal.   Eyes:      General:         Right eye: No discharge.         Left eye: No discharge.      Conjunctiva/sclera: Conjunctivae normal.   Neck:      Thyroid: No thyromegaly.   Cardiovascular:      Rate and Rhythm: Normal rate and regular rhythm.      Heart sounds: No murmur heard.  Pulmonary:      Effort: Pulmonary effort is normal. No respiratory distress.      Breath sounds: Normal breath sounds.   Abdominal:      General: Bowel sounds are normal. There is no distension.      Palpations: Abdomen is soft. There is no mass.       Tenderness: There is no abdominal tenderness.      Hernia: A hernia is present.      Comments: NG tube in place. Umbilical hernia reducible   Musculoskeletal:         General: No deformity.      Cervical back: Normal range of motion and neck supple.   Skin:     General: Skin is warm and dry.   Neurological:      Mental Status: She is alert and oriented to person, place, and time.      Sensory: No sensory deficit.   Psychiatric:         Mood and Affect: Mood normal.         Behavior: Behavior normal.             Significant Labs: CBC:   Recent Labs   Lab 10/01/24  2315 10/01/24  2334 10/02/24  0637   WBC 14.93*  --  14.50*   HGB 13.5  --  13.4   HCT 43.1 32* 43.8     --  217     CMP:   Recent Labs   Lab 10/01/24  2315 10/02/24  0636    138   K 3.5 3.3*    98   CO2 28 27   * 168*   BUN 13 12   CREATININE 0.9 0.8   CALCIUM 10.2 10.1   PROT 8.3 7.7   ALBUMIN 3.7 3.5   BILITOT 0.7 0.4   ALKPHOS 151* 138*   AST 14 13   ALT 11 12   ANIONGAP 12 13     Cardiac Markers:   Recent Labs   Lab 10/02/24  0129   *     Coagulation:   Recent Labs   Lab 10/02/24  0637   INR 1.0   APTT 26.1     Urine Studies:   Recent Labs   Lab 10/02/24  0015   COLORU Yellow   APPEARANCEUA Hazy*   PHUR 7.0   SPECGRAV 1.020   PROTEINUA 1+*   GLUCUA Negative   KETONESU Negative   BILIRUBINUA Negative   OCCULTUA Negative   NITRITE Positive*   UROBILINOGEN Negative   LEUKOCYTESUR Negative   RBCUA 1   WBCUA 3   BACTERIA Occasional   HYALINECASTS 0       Significant Imaging:   Imaging Results              XR NG/OG tube placement check, non-radiologist performed (Final result)  Result time 10/02/24 05:08:52   Procedure changed from XR Gastric tube check, non-radiologist performed     Final result by Oralia Espinoza MD (10/02/24 05:08:52)                   Impression:      Please see above.      Electronically signed by: Oralia Espinoza MD  Date:    10/02/2024  Time:    05:08               Narrative:    EXAMINATION:  XR NG/OG  TUBE PLACEMENT CHECK, NON-RADIOLOGIST PERFORMED    CLINICAL HISTORY:  NG tube placement;    TECHNIQUE:  AP View(s) of the abdomen was performed.    COMPARISON:  10/02/2024 and 10/01/2024    FINDINGS:  Interval placement of an enteric tube with tip coursing below the diaphragm and projecting over the region of the stomach in the left upper quadrant.  No interval detrimental change in the radiographic appearance of visualized intrathoracic and upper abdominal structures.                                       X-Ray Chest AP Portable (Final result)  Result time 10/02/24 02:24:54      Final result by Oralia Espinoza MD (10/02/24 02:24:54)                   Impression:      Please see above.      Electronically signed by: Oralia Espinoza MD  Date:    10/02/2024  Time:    02:24               Narrative:    EXAMINATION:  XR CHEST AP PORTABLE    CLINICAL HISTORY:  hypoxia;    TECHNIQUE:  Single frontal view of the chest was performed.    COMPARISON:  Chest radiograph 10/26/2023, CT abdomen pelvis 10/02/2024    FINDINGS:  Cardiac monitoring leads overlie the chest.  There is unchanged enlargement of the cardiomediastinal silhouette.  The lungs are symmetrically expanded with diffuse coarse/increased interstitial attenuation similar to prior exam with more pronounced bibasilar interstitial prominence, similar prior examination.  No large volume of pleural fluid or pneumothorax identified noting slight increased attenuation of the left lower lung zone likely relates to prominent cardiomediastinal silhouette and overlying soft tissue.  Osseous structures are intact with degenerative change.                                       CT Abdomen Pelvis With IV Contrast NO Oral Contrast (Final result)  Result time 10/02/24 01:53:11      Final result by Lissy Angeles MD (10/02/24 01:53:11)                   Impression:      1. Small-bowel obstruction secondary to bowel containing supraumbilical hernia.  Clinical correlation is advised  to determine potential reducibility of this hernia.  2. Postsurgical changes and additional findings as detailed above.      Electronically signed by: Lissy Angeles MD  Date:    10/02/2024  Time:    01:53               Narrative:    EXAMINATION:  CT ABDOMEN PELVIS WITH IV CONTRAST    CLINICAL HISTORY:  Abdominal pain, acute, nonlocalized;    TECHNIQUE:  Low dose axial images, sagittal and coronal reformations were obtained from the lung bases to the pubic symphysis following the IV administration of 80 mL of Omnipaque 350 .  Oral contrast was not given.    COMPARISON:  CT abdomen and pelvis from July 2023.    FINDINGS:  Heart is enlarged.  Mild bibasilar atelectatic changes and/or scarring are seen.  No pleural effusion.    No significant hepatic abnormalities are identified.  There is no intra-or extrahepatic biliary ductal dilatation.  Suspected noncalcified stones are seen within the gallbladder.  The pancreas, spleen, and adrenal glands are unremarkable.    Kidneys enhance normally with no evidence of hydronephrosis.  Left renal cysts are seen.  No abnormalities are seen along the ureteral courses.  Urinary bladder is nondistended.  Uterus has been removed.    There is diffuse dilatation of small bowel loops measuring upwards of 4-5 cm in caliber with air-fluid levels consistent with small-bowel obstruction.  This is secondary to a bowel containing supraumbilical hernia which contains short loop segment of small bowel.  Distal small bowel loops are decompressed beyond this level.  Stomach is distended with prominent air-fluid level.  No free air or free fluid.    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  There is prominent atherosclerosis.  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    No acute osseous abnormality identified.  Degenerative changes are seen in the lumbar spine.                                       X-Ray Abdomen Flat And Erect (Final  result)  Result time 10/01/24 23:01:46      Final result by Lissy Angeles MD (10/01/24 23:01:46)                   Impression:      Nonobstructive bowel gas pattern.      Electronically signed by: Lissy Angeles MD  Date:    10/01/2024  Time:    23:01               Narrative:    EXAMINATION:  XR ABDOMEN FLAT AND ERECT    CLINICAL HISTORY:  Abdominal Pain;    TECHNIQUE:  Flat and erect AP views of the abdomen were performed.    COMPARISON:  None    FINDINGS:  Nonspecific bowel gas pattern.  No evidence to suggest obstruction.  No free air identified.  Scattered stool is seen in the colon.  Cardiac silhouette is enlarged.  Visualized lungs are essentially clear.

## 2024-10-02 NOTE — ANESTHESIA POSTPROCEDURE EVALUATION
Anesthesia Post Evaluation    Patient: Abbie Barragan    Procedure(s) Performed: Procedure(s) (LRB):  REPAIR, HERNIA, VENTRAL, INCARCERATED, WITHOUT HISTORY OF PRIOR REPAIR (N/A)    Final Anesthesia Type: general      Patient location during evaluation: PACU  Patient participation: Yes- Able to Participate  Level of consciousness: awake and alert and oriented  Post-procedure vital signs: reviewed and stable  Pain management: adequate  Airway patency: patent    PONV status at discharge: No PONV  Anesthetic complications: no      Cardiovascular status: blood pressure returned to baseline, hemodynamically stable and stable  Respiratory status: spontaneous ventilation and nasal cannula  Hydration status: euvolemic  Follow-up not needed.              Vitals Value Taken Time   /74 10/02/24 1332   Temp 36.7 °C (98 °F) 10/02/24 1330   Pulse 85 10/02/24 1337   Resp 17 10/02/24 1337   SpO2 93 % 10/02/24 1337   Vitals shown include unfiled device data.      No case tracking events are documented in the log.      Pain/Angela Score: Pain Rating Prior to Med Admin: 8 (10/2/2024  4:48 AM)  Pain Rating Post Med Admin: 0 (10/1/2024 11:57 PM)  Angela Score: 10 (10/2/2024  1:30 PM)

## 2024-10-02 NOTE — NURSING
To surgery via bed.[facility], [facility] [place]  Nurses Note -- 4 Eyes      10/2/2024       Skin assessed on: Q Shift      [x] No Pressure Injuries Present    []Prevention Measures Documented    [] Yes LDA  for Pressure Injury Previously documented     [] Yes New Pressure Injury Discovered   [] LDA for New Pressure Injury Added      Attending RN:  Rossy Jean, DANA     Second RN:  JAZ Guzman

## 2024-10-03 LAB
ANION GAP SERPL CALC-SCNC: 9 MMOL/L (ref 8–16)
BASOPHILS # BLD AUTO: 0.01 K/UL (ref 0–0.2)
BASOPHILS NFR BLD: 0.2 % (ref 0–1.9)
BUN SERPL-MCNC: 11 MG/DL (ref 8–23)
CALCIUM SERPL-MCNC: 9.1 MG/DL (ref 8.7–10.5)
CHLORIDE SERPL-SCNC: 104 MMOL/L (ref 95–110)
CO2 SERPL-SCNC: 29 MMOL/L (ref 23–29)
CREAT SERPL-MCNC: 0.8 MG/DL (ref 0.5–1.4)
DIFFERENTIAL METHOD BLD: ABNORMAL
EOSINOPHIL # BLD AUTO: 0 K/UL (ref 0–0.5)
EOSINOPHIL NFR BLD: 0.3 % (ref 0–8)
ERYTHROCYTE [DISTWIDTH] IN BLOOD BY AUTOMATED COUNT: 15.1 % (ref 11.5–14.5)
EST. GFR  (NO RACE VARIABLE): >60 ML/MIN/1.73 M^2
GLUCOSE SERPL-MCNC: 124 MG/DL (ref 70–110)
HCT VFR BLD AUTO: 40.6 % (ref 37–48.5)
HGB BLD-MCNC: 12.4 G/DL (ref 12–16)
IMM GRANULOCYTES # BLD AUTO: 0.01 K/UL (ref 0–0.04)
IMM GRANULOCYTES NFR BLD AUTO: 0.2 % (ref 0–0.5)
LACTATE SERPL-SCNC: 1.9 MMOL/L (ref 0.5–2.2)
LACTATE SERPL-SCNC: 2.5 MMOL/L (ref 0.5–2.2)
LYMPHOCYTES # BLD AUTO: 1.4 K/UL (ref 1–4.8)
LYMPHOCYTES NFR BLD: 20.7 % (ref 18–48)
MAGNESIUM SERPL-MCNC: 2.2 MG/DL (ref 1.6–2.6)
MCH RBC QN AUTO: 28.2 PG (ref 27–31)
MCHC RBC AUTO-ENTMCNC: 30.5 G/DL (ref 32–36)
MCV RBC AUTO: 93 FL (ref 82–98)
MONOCYTES # BLD AUTO: 0.5 K/UL (ref 0.3–1)
MONOCYTES NFR BLD: 7.5 % (ref 4–15)
NEUTROPHILS # BLD AUTO: 4.6 K/UL (ref 1.8–7.7)
NEUTROPHILS NFR BLD: 71.1 % (ref 38–73)
NRBC BLD-RTO: 0 /100 WBC
PHOSPHATE SERPL-MCNC: 3.2 MG/DL (ref 2.7–4.5)
PLATELET # BLD AUTO: 196 K/UL (ref 150–450)
PMV BLD AUTO: 12.8 FL (ref 9.2–12.9)
POTASSIUM SERPL-SCNC: 4.6 MMOL/L (ref 3.5–5.1)
RBC # BLD AUTO: 4.39 M/UL (ref 4–5.4)
SODIUM SERPL-SCNC: 142 MMOL/L (ref 136–145)
WBC # BLD AUTO: 6.51 K/UL (ref 3.9–12.7)

## 2024-10-03 PROCEDURE — 36415 COLL VENOUS BLD VENIPUNCTURE: CPT | Performed by: PHYSICIAN ASSISTANT

## 2024-10-03 PROCEDURE — 21400001 HC TELEMETRY ROOM

## 2024-10-03 PROCEDURE — 25000003 PHARM REV CODE 250

## 2024-10-03 PROCEDURE — 83605 ASSAY OF LACTIC ACID: CPT | Performed by: INTERNAL MEDICINE

## 2024-10-03 PROCEDURE — 25000003 PHARM REV CODE 250: Performed by: STUDENT IN AN ORGANIZED HEALTH CARE EDUCATION/TRAINING PROGRAM

## 2024-10-03 PROCEDURE — 63600175 PHARM REV CODE 636 W HCPCS: Performed by: STUDENT IN AN ORGANIZED HEALTH CARE EDUCATION/TRAINING PROGRAM

## 2024-10-03 PROCEDURE — 99900035 HC TECH TIME PER 15 MIN (STAT)

## 2024-10-03 PROCEDURE — 36415 COLL VENOUS BLD VENIPUNCTURE: CPT | Performed by: INTERNAL MEDICINE

## 2024-10-03 PROCEDURE — 85025 COMPLETE CBC W/AUTO DIFF WBC: CPT | Performed by: STUDENT IN AN ORGANIZED HEALTH CARE EDUCATION/TRAINING PROGRAM

## 2024-10-03 PROCEDURE — 84100 ASSAY OF PHOSPHORUS: CPT | Performed by: STUDENT IN AN ORGANIZED HEALTH CARE EDUCATION/TRAINING PROGRAM

## 2024-10-03 PROCEDURE — 25000003 PHARM REV CODE 250: Performed by: PHYSICIAN ASSISTANT

## 2024-10-03 PROCEDURE — 63600175 PHARM REV CODE 636 W HCPCS

## 2024-10-03 PROCEDURE — 99232 SBSQ HOSP IP/OBS MODERATE 35: CPT | Mod: ,,, | Performed by: SURGERY

## 2024-10-03 PROCEDURE — 83735 ASSAY OF MAGNESIUM: CPT | Performed by: STUDENT IN AN ORGANIZED HEALTH CARE EDUCATION/TRAINING PROGRAM

## 2024-10-03 PROCEDURE — 80048 BASIC METABOLIC PNL TOTAL CA: CPT | Performed by: STUDENT IN AN ORGANIZED HEALTH CARE EDUCATION/TRAINING PROGRAM

## 2024-10-03 PROCEDURE — 83605 ASSAY OF LACTIC ACID: CPT | Mod: 91 | Performed by: PHYSICIAN ASSISTANT

## 2024-10-03 PROCEDURE — 94761 N-INVAS EAR/PLS OXIMETRY MLT: CPT

## 2024-10-03 PROCEDURE — 27000221 HC OXYGEN, UP TO 24 HOURS

## 2024-10-03 RX ORDER — SODIUM CHLORIDE 9 MG/ML
INJECTION, SOLUTION INTRAVENOUS CONTINUOUS
Status: ACTIVE | OUTPATIENT
Start: 2024-10-03 | End: 2024-10-04

## 2024-10-03 RX ORDER — MUPIROCIN 20 MG/G
OINTMENT TOPICAL 2 TIMES DAILY
Status: COMPLETED | OUTPATIENT
Start: 2024-10-03 | End: 2024-10-07

## 2024-10-03 RX ORDER — MORPHINE SULFATE 4 MG/ML
0.5 INJECTION, SOLUTION INTRAMUSCULAR; INTRAVENOUS EVERY 8 HOURS PRN
Status: DISCONTINUED | OUTPATIENT
Start: 2024-10-03 | End: 2024-10-09 | Stop reason: HOSPADM

## 2024-10-03 RX ADMIN — METOPROLOL TARTRATE 25 MG: 25 TABLET, FILM COATED ORAL at 10:10

## 2024-10-03 RX ADMIN — LOSARTAN POTASSIUM 100 MG: 25 TABLET, FILM COATED ORAL at 10:10

## 2024-10-03 RX ADMIN — ONDANSETRON 4 MG: 2 INJECTION INTRAMUSCULAR; INTRAVENOUS at 10:10

## 2024-10-03 RX ADMIN — SODIUM CHLORIDE: 9 INJECTION, SOLUTION INTRAVENOUS at 11:10

## 2024-10-03 RX ADMIN — MUPIROCIN: 20 OINTMENT TOPICAL at 09:10

## 2024-10-03 RX ADMIN — MUPIROCIN: 20 OINTMENT TOPICAL at 12:10

## 2024-10-03 RX ADMIN — METOPROLOL TARTRATE 25 MG: 25 TABLET, FILM COATED ORAL at 09:10

## 2024-10-03 RX ADMIN — SODIUM CHLORIDE: 9 INJECTION, SOLUTION INTRAVENOUS at 03:10

## 2024-10-03 RX ADMIN — MORPHINE SULFATE 2 MG: 4 INJECTION, SOLUTION INTRAMUSCULAR; INTRAVENOUS at 10:10

## 2024-10-03 RX ADMIN — CEFTRIAXONE 1 G: 1 INJECTION, POWDER, FOR SOLUTION INTRAMUSCULAR; INTRAVENOUS at 12:10

## 2024-10-03 RX ADMIN — SODIUM CHLORIDE: 9 INJECTION, SOLUTION INTRAVENOUS at 10:10

## 2024-10-03 NOTE — ASSESSMENT & PLAN NOTE
Presented with SBO due to umbilical hernia. Lactic acid decreasing post operatively.  Taken to OR 10/2, no flatus or BM yet, but resolution of abdominal pain.  Continue NPO and NG tube pending return of bowel function.  Anticoagulation held as above-resume pending surgery  Surgery following

## 2024-10-03 NOTE — NURSING
[facility], [facility]  Nurses Note -- 4 Eyes      10/3/2024       Skin assessed on: Q Shift      [x] No Pressure Injuries Present    [x]Prevention Measures Documented    [] Yes LDA  for Pressure Injury Previously documented     [] Yes New Pressure Injury Discovered   [] LDA for New Pressure Injury Added      Attending RN:  Ramonita Gamboa RN     Second RN:  Caity Taylor MD

## 2024-10-03 NOTE — PLAN OF CARE
Case Management Assessment     PCP: Tanner Gómez MD    Pharmacy:   [facility]/pharmacy #[id number] - [place], [place] - [address]  [address] [address]  Phone: [phone] Fax: [phone]        Patient Arrived From: Home  Existing Help at Home: Vernell Barragan (Son), [phone]; patient also has a private caregivers     Barriers to Discharge: None    Discharge Plan:    A. Home   B. Home with family  Spoke with patient at bedside; she is alert and oriented.  She stated she lives alone but has two private caregivers that come 7 days between the two of them.  She reported the caregivers assist her with dressing, bathing, and transporting her to appointments.  Patient stated she currently uses a wheelchair at home; pt's son or daughter will assist her home upon discharge.  CM to continue to assess for and until discharge.     10/03/24 1405   Discharge Assessment   Assessment Type Discharge Planning Assessment   Confirmed/corrected address, phone number and insurance Yes   Confirmed Demographics Correct on Facesheet   Source of Information patient   Does patient/caregiver understand observation status Yes   Communicated JULIÁN with patient/caregiver Date not available/Unable to determine   Reason For Admission SBO (small bowel obstruction)   People in Home alone   Facility Arrived From: Home   Do you expect to return to your current living situation? Yes   Do you have help at home or someone to help you manage your care at home? Yes   Who are your caregiver(s) and their phone number(s)? Vernell Barragan (Son)  [phone]; patient has private caregivers   Prior to hospitilization cognitive status: Unable to Assess   Current cognitive status: Alert/Oriented   Walking or Climbing Stairs ambulation difficulty, requires equipment   Mobility Management Patient has a wheelchair   Dressing/Bathing bathing difficulty, assistance 1 person;dressing difficulty, assistance 1 person   Equipment  Currently Used at Home wheelchair   Readmission within 30 days? No   Patient currently being followed by outpatient case management? No   Do you currently have service(s) that help you manage your care at home? Yes   Name and Contact number of agency Patient has 2 caregivers   Is the pt/caregiver preference to resume services with current agency Yes   Do you take prescription medications? Yes   Do you have prescription coverage? Yes   Coverage [facility] [facility] [facility] [employer]   Do you have any problems affording any of your prescribed medications? No   Is the patient taking medications as prescribed? yes   Who is going to help you get home at discharge? Vernell Barragan (Son)  [phone]   How do you get to doctors appointments? family or friend will provide;agency   Are you on dialysis? No   Do you take coumadin? No   Discharge Plan A Home   Discharge Plan B Home with family   DME Needed Upon Discharge  other (see comments)  (TBD)   Discharge Plan discussed with: Patient   Transition of Care Barriers None

## 2024-10-03 NOTE — NURSING
Hayes cath and NG tube removed. Purewick placed. Provided patient with ice chips. Instructed patient to only eat/drink ice chips slowly.

## 2024-10-03 NOTE — SUBJECTIVE & OBJECTIVE
Interval History: reports resolution of abdominal pain post operatively, but nausea and belching remains. No flatus or bowel movement yet.     Review of Systems   Constitutional:  Negative for chills and fever.   Eyes:  Negative for photophobia and visual disturbance.   Respiratory:  Negative for chest tightness and shortness of breath.    Cardiovascular:  Negative for chest pain and palpitations.   Gastrointestinal:  Positive for nausea. Negative for abdominal pain and vomiting.   Genitourinary:  Negative for dysuria and hematuria.     Objective:     Vital Signs (Most Recent):  Temp: 97.7 °F (36.5 °C) (10/03/24 0524)  Pulse: 78 (10/03/24 0702)  Resp: 18 (10/03/24 0524)  BP: (!) 178/77 (10/03/24 0524)  SpO2: 97 % (10/03/24 0524) Vital Signs (24h Range):  Temp:  [97.3 °F (36.3 °C)-98 °F (36.7 °C)] 97.7 °F (36.5 °C)  Pulse:  [] 78  Resp:  [18-20] 18  SpO2:  [92 %-97 %] 97 %  BP: (150-178)/(67-85) 178/77     Weight: 84.4 kg (186 lb 1.1 oz)  Body mass index is 32.96 kg/m².    Intake/Output Summary (Last 24 hours) at 10/3/2024 0835  Last data filed at 10/3/2024 0542  Gross per 24 hour   Intake 3559 ml   Output 1737 ml   Net 1822 ml         Physical Exam  Vitals and nursing note reviewed.   Constitutional:       General: She is not in acute distress.     Appearance: She is well-developed. She is not diaphoretic.   HENT:      Head: Normocephalic and atraumatic.      Right Ear: External ear normal.      Left Ear: External ear normal.   Eyes:      General:         Right eye: No discharge.         Left eye: No discharge.      Conjunctiva/sclera: Conjunctivae normal.   Neck:      Thyroid: No thyromegaly.   Cardiovascular:      Rate and Rhythm: Normal rate and regular rhythm.      Heart sounds: No murmur heard.  Pulmonary:      Effort: Pulmonary effort is normal. No respiratory distress.      Breath sounds: Normal breath sounds.   Abdominal:      General: Bowel sounds are normal. There is no distension.      Palpations:  Abdomen is soft. There is no mass.      Tenderness: There is abdominal tenderness.      Comments: Abdominal binder in place with dressing. Visible sutures without erythema or drainage. Appropriate tenderness post operatively, lessened compared to yesterday.    Musculoskeletal:         General: No deformity.      Cervical back: Normal range of motion and neck supple.   Skin:     General: Skin is warm and dry.   Neurological:      Mental Status: She is alert and oriented to person, place, and time.      Sensory: No sensory deficit.   Psychiatric:         Mood and Affect: Mood normal.         Behavior: Behavior normal.             Significant Labs: CBC:   Recent Labs   Lab 10/01/24  2315 10/01/24  2334 10/02/24  0637 10/03/24  0430   WBC 14.93*  --  14.50* 6.51   HGB 13.5  --  13.4 12.4   HCT 43.1 32* 43.8 40.6     --  217 196     CMP:   Recent Labs   Lab 10/01/24  2315 10/02/24  0636 10/03/24  0430    138 142   K 3.5 3.3* 4.6    98 104   CO2 28 27 29   * 168* 124*   BUN 13 12 11   CREATININE 0.9 0.8 0.8   CALCIUM 10.2 10.1 9.1   PROT 8.3 7.7  --    ALBUMIN 3.7 3.5  --    BILITOT 0.7 0.4  --    ALKPHOS 151* 138*  --    AST 14 13  --    ALT 11 12  --    ANIONGAP 12 13 9     Coagulation:   Recent Labs   Lab 10/02/24  0637   INR 1.0   APTT 26.1     Lactic Acid:   Recent Labs   Lab 10/02/24  2105 10/02/24  2250 10/03/24  0430   LACTATE 2.6* 3.0* 2.5*       Significant Imaging:   Imaging Results              XR NG/OG tube placement check, non-radiologist performed (Final result)  Result time 10/02/24 05:08:52   Procedure changed from XR Gastric tube check, non-radiologist performed     Final result by Oralia Espinoza MD (10/02/24 05:08:52)                   Impression:      Please see above.      Electronically signed by: Oralia Espinoza MD  Date:    10/02/2024  Time:    05:08               Narrative:    EXAMINATION:  XR NG/OG TUBE PLACEMENT CHECK, NON-RADIOLOGIST PERFORMED    CLINICAL HISTORY:  NG  tube placement;    TECHNIQUE:  AP View(s) of the abdomen was performed.    COMPARISON:  10/02/2024 and 10/01/2024    FINDINGS:  Interval placement of an enteric tube with tip coursing below the diaphragm and projecting over the region of the stomach in the left upper quadrant.  No interval detrimental change in the radiographic appearance of visualized intrathoracic and upper abdominal structures.                                       X-Ray Chest AP Portable (Final result)  Result time 10/02/24 02:24:54      Final result by Oralia Espinoza MD (10/02/24 02:24:54)                   Impression:      Please see above.      Electronically signed by: Oralia Espinoza MD  Date:    10/02/2024  Time:    02:24               Narrative:    EXAMINATION:  XR CHEST AP PORTABLE    CLINICAL HISTORY:  hypoxia;    TECHNIQUE:  Single frontal view of the chest was performed.    COMPARISON:  Chest radiograph 10/26/2023, CT abdomen pelvis 10/02/2024    FINDINGS:  Cardiac monitoring leads overlie the chest.  There is unchanged enlargement of the cardiomediastinal silhouette.  The lungs are symmetrically expanded with diffuse coarse/increased interstitial attenuation similar to prior exam with more pronounced bibasilar interstitial prominence, similar prior examination.  No large volume of pleural fluid or pneumothorax identified noting slight increased attenuation of the left lower lung zone likely relates to prominent cardiomediastinal silhouette and overlying soft tissue.  Osseous structures are intact with degenerative change.                                       CT Abdomen Pelvis With IV Contrast NO Oral Contrast (Final result)  Result time 10/02/24 01:53:11      Final result by Lissy Angeles MD (10/02/24 01:53:11)                   Impression:      1. Small-bowel obstruction secondary to bowel containing supraumbilical hernia.  Clinical correlation is advised to determine potential reducibility of this hernia.  2. Postsurgical  changes and additional findings as detailed above.      Electronically signed by: Lissy Angeles MD  Date:    10/02/2024  Time:    01:53               Narrative:    EXAMINATION:  CT ABDOMEN PELVIS WITH IV CONTRAST    CLINICAL HISTORY:  Abdominal pain, acute, nonlocalized;    TECHNIQUE:  Low dose axial images, sagittal and coronal reformations were obtained from the lung bases to the pubic symphysis following the IV administration of 80 mL of Omnipaque 350 .  Oral contrast was not given.    COMPARISON:  CT abdomen and pelvis from July 2023.    FINDINGS:  Heart is enlarged.  Mild bibasilar atelectatic changes and/or scarring are seen.  No pleural effusion.    No significant hepatic abnormalities are identified.  There is no intra-or extrahepatic biliary ductal dilatation.  Suspected noncalcified stones are seen within the gallbladder.  The pancreas, spleen, and adrenal glands are unremarkable.    Kidneys enhance normally with no evidence of hydronephrosis.  Left renal cysts are seen.  No abnormalities are seen along the ureteral courses.  Urinary bladder is nondistended.  Uterus has been removed.    There is diffuse dilatation of small bowel loops measuring upwards of 4-5 cm in caliber with air-fluid levels consistent with small-bowel obstruction.  This is secondary to a bowel containing supraumbilical hernia which contains short loop segment of small bowel.  Distal small bowel loops are decompressed beyond this level.  Stomach is distended with prominent air-fluid level.  No free air or free fluid.    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  There is prominent atherosclerosis.  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    No acute osseous abnormality identified.  Degenerative changes are seen in the lumbar spine.                                       X-Ray Abdomen Flat And Erect (Final result)  Result time 10/01/24 23:01:46      Final result by Bridgette  Lissy TORRES MD (10/01/24 23:01:46)                   Impression:      Nonobstructive bowel gas pattern.      Electronically signed by: Lissy Angeles MD  Date:    10/01/2024  Time:    23:01               Narrative:    EXAMINATION:  XR ABDOMEN FLAT AND ERECT    CLINICAL HISTORY:  Abdominal Pain;    TECHNIQUE:  Flat and erect AP views of the abdomen were performed.    COMPARISON:  None    FINDINGS:  Nonspecific bowel gas pattern.  No evidence to suggest obstruction.  No free air identified.  Scattered stool is seen in the colon.  Cardiac silhouette is enlarged.  Visualized lungs are essentially clear.

## 2024-10-03 NOTE — PROGRESS NOTES
[facility] Medicine  Progress Note    Patient Name: Abbie Barragan  MRN: [id number]  Patient Class: IP- Inpatient   Admission Date: 10/1/2024  Length of Stay: 1 days  Attending Physician: Madhuri Coates-Violeta DALY DO  Primary Care Provider: Laura Shin MD        Subjective:     Principal Problem:SBO (small bowel obstruction)        HPI:  89 y.o. 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 Esther Dang with vascular), Carotid artery stenosis, Essential HTN, HLD, Hypothyroid (goiter), and h/o abdominal adhesions (per surgery eval in 2023 for acute cholecystitis by Dr. Bell->aborted lap lata due to excessive adhesions) presents to the ER with N/V and abdominal pain Since Monday evening. States h/o acute cholecystitis in 2023 but treated medically due to her complex medical history. She was concerned that her symptoms could be her gallbladder again.   Pt. Denies any fevers or chills. No chest pain or SOB/ACKERMAN. Noted constipation yesterday and diarrhea, non-bloody on Monday.     IN the ED labs noted for WBC 14 and Stable H/H 13.5/43.1.  Lytes stable with normal renal function. Lipase negative.Lactic acid normal.   UA concerning for acute UTI and started on Rocephin by the ED.     CXR:  FINDINGS:  Cardiac monitoring leads overlie the chest.  There is unchanged enlargement of the cardiomediastinal silhouette.  The lungs are symmetrically expanded with diffuse coarse/increased interstitial attenuation similar to prior exam with more pronounced bibasilar interstitial prominence, similar prior examination.  No large volume of pleural fluid or pneumothorax identified noting slight increased attenuation of the left lower lung zone likely relates to prominent cardiomediastinal silhouette and overlying soft tissue.  Osseous structures are intact with  degenerative change.    CT abdomen/Pelvis with IV contrast no PO:  FINDINGS:  Heart is enlarged.  Mild bibasilar atelectatic changes and/or scarring are seen.  No pleural effusion.     No significant hepatic abnormalities are identified.  There is no intra-or extrahepatic biliary ductal dilatation.  Suspected noncalcified stones are seen within the gallbladder.  The pancreas, spleen, and adrenal glands are unremarkable.     Kidneys enhance normally with no evidence of hydronephrosis.  Left renal cysts are seen.  No abnormalities are seen along the ureteral courses.  Urinary bladder is nondistended.  Uterus has been removed.     There is diffuse dilatation of small bowel loops measuring upwards of 4-5 cm in caliber with air-fluid levels consistent with small-bowel obstruction.  This is secondary to a bowel containing supraumbilical hernia which contains short loop segment of small bowel.  Distal small bowel loops are decompressed beyond this level.  Stomach is distended with prominent air-fluid level.  No free air or free fluid.     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  There is prominent atherosclerosis.  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     No acute osseous abnormality identified.  Degenerative changes are seen in the lumbar spine.     Impression:     1. Small-bowel obstruction secondary to bowel containing supraumbilical hernia.  Clinical correlation is advised to determine potential reducibility of this hernia.  2. Postsurgical changes and additional findings as detailed above        NGT was placed and General surgery contacted by the ED for further eval of her SBO. We have been consulted for further management.        Overview/Hospital Course:  Abbie Barragan 89 y.o. female admitted to the hospital for SBO due to umbilical hernia. CT scan with bowel containing supraumbilical hernia. Lactic acid increased to 4.1. Seen by surgery and plans for  inpatient operative repair. Taken to OR 10/2, with no signs of necrotic bowel and associated down trending of lactic acid after OR. NG tube remained in place pending return of bowel function.    Interval History: reports resolution of abdominal pain post operatively, but nausea and belching remains. No flatus or bowel movement yet.     Review of Systems   Constitutional:  Negative for chills and fever.   Eyes:  Negative for photophobia and visual disturbance.   Respiratory:  Negative for chest tightness and shortness of breath.    Cardiovascular:  Negative for chest pain and palpitations.   Gastrointestinal:  Positive for nausea. Negative for abdominal pain and vomiting.   Genitourinary:  Negative for dysuria and hematuria.     Objective:     Vital Signs (Most Recent):  Temp: 97.7 °F (36.5 °C) (10/03/24 0524)  Pulse: 78 (10/03/24 0702)  Resp: 18 (10/03/24 0524)  BP: (!) 178/77 (10/03/24 0524)  SpO2: 97 % (10/03/24 0524) Vital Signs (24h Range):  Temp:  [97.3 °F (36.3 °C)-98 °F (36.7 °C)] 97.7 °F (36.5 °C)  Pulse:  [] 78  Resp:  [18-20] 18  SpO2:  [92 %-97 %] 97 %  BP: (150-178)/(67-85) 178/77     Weight: 84.4 kg (186 lb 1.1 oz)  Body mass index is 32.96 kg/m².    Intake/Output Summary (Last 24 hours) at 10/3/2024 0835  Last data filed at 10/3/2024 0542  Gross per 24 hour   Intake 3559 ml   Output 1737 ml   Net 1822 ml         Physical Exam  Vitals and nursing note reviewed.   Constitutional:       General: She is not in acute distress.     Appearance: She is well-developed. She is not diaphoretic.   HENT:      Head: Normocephalic and atraumatic.      Right Ear: External ear normal.      Left Ear: External ear normal.   Eyes:      General:         Right eye: No discharge.         Left eye: No discharge.      Conjunctiva/sclera: Conjunctivae normal.   Neck:      Thyroid: No thyromegaly.   Cardiovascular:      Rate and Rhythm: Normal rate and regular rhythm.      Heart sounds: No murmur heard.  Pulmonary:       Effort: Pulmonary effort is normal. No respiratory distress.      Breath sounds: Normal breath sounds.   Abdominal:      General: Bowel sounds are normal. There is no distension.      Palpations: Abdomen is soft. There is no mass.      Tenderness: There is abdominal tenderness.      Comments: Abdominal binder in place with dressing. Visible sutures without erythema or drainage. Appropriate tenderness post operatively, lessened compared to yesterday.    Musculoskeletal:         General: No deformity.      Cervical back: Normal range of motion and neck supple.   Skin:     General: Skin is warm and dry.   Neurological:      Mental Status: She is alert and oriented to person, place, and time.      Sensory: No sensory deficit.   Psychiatric:         Mood and Affect: Mood normal.         Behavior: Behavior normal.             Significant Labs: CBC:   Recent Labs   Lab 10/01/24  2315 10/01/24  2334 10/02/24  0637 10/03/24  0430   WBC 14.93*  --  14.50* 6.51   HGB 13.5  --  13.4 12.4   HCT 43.1 32* 43.8 40.6     --  217 196     CMP:   Recent Labs   Lab 10/01/24  2315 10/02/24  0636 10/03/24  0430    138 142   K 3.5 3.3* 4.6    98 104   CO2 28 27 29   * 168* 124*   BUN 13 12 11   CREATININE 0.9 0.8 0.8   CALCIUM 10.2 10.1 9.1   PROT 8.3 7.7  --    ALBUMIN 3.7 3.5  --    BILITOT 0.7 0.4  --    ALKPHOS 151* 138*  --    AST 14 13  --    ALT 11 12  --    ANIONGAP 12 13 9     Coagulation:   Recent Labs   Lab 10/02/24  0637   INR 1.0   APTT 26.1     Lactic Acid:   Recent Labs   Lab 10/02/24  2105 10/02/24  2250 10/03/24  0430   LACTATE 2.6* 3.0* 2.5*       Significant Imaging:   Imaging Results              XR NG/OG tube placement check, non-radiologist performed (Final result)  Result time 10/02/24 05:08:52   Procedure changed from XR Gastric tube check, non-radiologist performed     Final result by Oralia Espinoza MD (10/02/24 05:08:52)                   Impression:      Please see  above.      Electronically signed by: Oralia Espinoza MD  Date:    10/02/2024  Time:    05:08               Narrative:    EXAMINATION:  XR NG/OG TUBE PLACEMENT CHECK, NON-RADIOLOGIST PERFORMED    CLINICAL HISTORY:  NG tube placement;    TECHNIQUE:  AP View(s) of the abdomen was performed.    COMPARISON:  10/02/2024 and 10/01/2024    FINDINGS:  Interval placement of an enteric tube with tip coursing below the diaphragm and projecting over the region of the stomach in the left upper quadrant.  No interval detrimental change in the radiographic appearance of visualized intrathoracic and upper abdominal structures.                                       X-Ray Chest AP Portable (Final result)  Result time 10/02/24 02:24:54      Final result by Oralia Espinoza MD (10/02/24 02:24:54)                   Impression:      Please see above.      Electronically signed by: Oralia Espinoza MD  Date:    10/02/2024  Time:    02:24               Narrative:    EXAMINATION:  XR CHEST AP PORTABLE    CLINICAL HISTORY:  hypoxia;    TECHNIQUE:  Single frontal view of the chest was performed.    COMPARISON:  Chest radiograph 10/26/2023, CT abdomen pelvis 10/02/2024    FINDINGS:  Cardiac monitoring leads overlie the chest.  There is unchanged enlargement of the cardiomediastinal silhouette.  The lungs are symmetrically expanded with diffuse coarse/increased interstitial attenuation similar to prior exam with more pronounced bibasilar interstitial prominence, similar prior examination.  No large volume of pleural fluid or pneumothorax identified noting slight increased attenuation of the left lower lung zone likely relates to prominent cardiomediastinal silhouette and overlying soft tissue.  Osseous structures are intact with degenerative change.                                       CT Abdomen Pelvis With IV Contrast NO Oral Contrast (Final result)  Result time 10/02/24 01:53:11      Final result by Lissy Angeles MD (10/02/24 01:53:11)                    Impression:      1. Small-bowel obstruction secondary to bowel containing supraumbilical hernia.  Clinical correlation is advised to determine potential reducibility of this hernia.  2. Postsurgical changes and additional findings as detailed above.      Electronically signed by: Lissy Angeles MD  Date:    10/02/2024  Time:    01:53               Narrative:    EXAMINATION:  CT ABDOMEN PELVIS WITH IV CONTRAST    CLINICAL HISTORY:  Abdominal pain, acute, nonlocalized;    TECHNIQUE:  Low dose axial images, sagittal and coronal reformations were obtained from the lung bases to the pubic symphysis following the IV administration of 80 mL of Omnipaque 350 .  Oral contrast was not given.    COMPARISON:  CT abdomen and pelvis from July 2023.    FINDINGS:  Heart is enlarged.  Mild bibasilar atelectatic changes and/or scarring are seen.  No pleural effusion.    No significant hepatic abnormalities are identified.  There is no intra-or extrahepatic biliary ductal dilatation.  Suspected noncalcified stones are seen within the gallbladder.  The pancreas, spleen, and adrenal glands are unremarkable.    Kidneys enhance normally with no evidence of hydronephrosis.  Left renal cysts are seen.  No abnormalities are seen along the ureteral courses.  Urinary bladder is nondistended.  Uterus has been removed.    There is diffuse dilatation of small bowel loops measuring upwards of 4-5 cm in caliber with air-fluid levels consistent with small-bowel obstruction.  This is secondary to a bowel containing supraumbilical hernia which contains short loop segment of small bowel.  Distal small bowel loops are decompressed beyond this level.  Stomach is distended with prominent air-fluid level.  No free air or free fluid.    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  There is prominent atherosclerosis.  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    No acute osseous abnormality  identified.  Degenerative changes are seen in the lumbar spine.                                       X-Ray Abdomen Flat And Erect (Final result)  Result time 10/01/24 23:01:46      Final result by Lissy Angeles MD (10/01/24 23:01:46)                   Impression:      Nonobstructive bowel gas pattern.      Electronically signed by: Lissy Angeles MD  Date:    10/01/2024  Time:    23:01               Narrative:    EXAMINATION:  XR ABDOMEN FLAT AND ERECT    CLINICAL HISTORY:  Abdominal Pain;    TECHNIQUE:  Flat and erect AP views of the abdomen were performed.    COMPARISON:  None    FINDINGS:  Nonspecific bowel gas pattern.  No evidence to suggest obstruction.  No free air identified.  Scattered stool is seen in the colon.  Cardiac silhouette is enlarged.  Visualized lungs are essentially clear.                                        Assessment/Plan:      * SBO (small bowel obstruction)  Presented with SBO due to umbilical hernia. Lactic acid decreasing post operatively.  Taken to OR 10/2, no flatus or BM yet, but resolution of abdominal pain.  Continue NPO and NG tube pending return of bowel function.  Anticoagulation held as above-resume pending surgery  Surgery following    PAD (peripheral artery disease)  Extensive PAD history with Axbifem Bypass in 1994 and Cartoid stenosis 60-70% reported in vascular note from 8/2024:     8/29/23 PVR right 0.4-0.5  Carotid duplex 60-79% left    8/27/24 PVR right 0.4-0.6 monophasic waveforms  Carotid duplex 40-59% but limited visualization due to heavy shadows/calcium       Eliquis and aspirin held pending surgical intervention.    Acquired hypothyroidism  Resume home Levothyroxine in 2 days         Essential hypertension  Chronic, controlled. Latest blood pressure and vitals reviewed-     Temp:  [97.7 °F (36.5 °C)-97.8 °F (36.6 °C)]   Pulse:  [61-95]   Resp:  [18-29]   BP: (136-230)/()   SpO2:  [87 %-99 %] .   Home meds for hypertension were reviewed and noted  below.   Hypertension Medications               amLODIPine (NORVASC) 10 MG tablet Take 10 mg by mouth once daily.    hydroCHLOROthiazide (HYDRODIURIL) 25 MG tablet Take 25 mg by mouth once daily.    losartan (COZAAR) 100 MG tablet Take 100 mg by mouth once daily.    metoprolol tartrate (LOPRESSOR) 25 MG tablet Take 1 tablet (25 mg total) by mouth 2 (two) times daily.            While in the hospital, will manage blood pressure as follows; Resume home lopressor and losartan with HOLD parameters. HOLD other BP meds for now and diurectics due to dehydration and possible surgery.     Will utilize p.r.n. blood pressure medication only if patient's blood pressure greater than  180/90  and she develops symptoms such as worsening chest pain or shortness of breath.    Chronic atrial fibrillation  Patient with Permanent atrial fibrillation which is controlled with lopressor 25mg PO BID.  Patient is currently in atrial fibrillation.[id number] Score: 4. Anticoagulation indicated. Anticoagulation done with eliquis 5mg PO BID but holding due to possible surgery. Monitor on tele.   Cont. Her lopressor for now.       VTE Risk Mitigation (From admission, onward)      None            Discharge Planning   JULIÁN:      Code Status: Full Code   Is the patient medically ready for discharge?:     Reason for patient still in hospital (select all that apply): Laboratory test, Treatment, and Consult recommendations             Jaquan Lacy PA-C  Department of Hospital Medicine   [facility]

## 2024-10-03 NOTE — NURSING
Pt. Voided freely in purewick. Pt. Was able to tolerate 1 can of gelatin no pain and nausea reported.

## 2024-10-03 NOTE — PLAN OF CARE
Problem: Skin Injury Risk Increased  Goal: Skin Health and Integrity  Outcome: Progressing  Intervention: Optimize Skin Protection  Flowsheets (Taken 10/3/2024 1641)  Pressure Reduction Techniques:   weight shift assistance provided   positioned off wounds   pressure points protected  Pressure Reduction Devices:   positioning supports utilized   foam padding utilized  Skin Protection: silicone foam dressing in place  Activity Management:   Arm raise - L1   Rolling - L1   Leg kicks - L2  Head of Bed (HOB) Positioning: HOB elevated     Problem: Adult Inpatient Plan of Care  Goal: Plan of Care Review  Outcome: Progressing  Flowsheets (Taken 10/3/2024 1641)  Plan of Care Reviewed With: patient  Goal: Patient-Specific Goal (Individualized)  Outcome: Progressing  Goal: Absence of Hospital-Acquired Illness or Injury  Outcome: Progressing  Intervention: Identify and Manage Fall Risk  Flowsheets (Taken 10/3/2024 1641)  Safety Promotion/Fall Prevention:   assistive device/personal item within reach   bed alarm set   room near unit station   side rails raised x 3  Intervention: Prevent Skin Injury  Flowsheets (Taken 10/3/2024 1641)  Body Position:   turned   weight shifting  Skin Protection: silicone foam dressing in place  Device Skin Pressure Protection:   absorbent pad utilized/changed   positioning supports utilized   tubing/devices free from skin contact   pressure points protected  Intervention: Prevent and Manage VTE (Venous Thromboembolism) Risk  Flowsheets (Taken 10/3/2024 1641)  VTE Prevention/Management: intravenous hydration  Intervention: Prevent Infection  Flowsheets (Taken 10/3/2024 1641)  Infection Prevention:   personal protective equipment utilized   hand hygiene promoted   single patient room provided  Goal: Optimal Comfort and Wellbeing  Outcome: Progressing  Intervention: Monitor Pain and Promote Comfort  Flowsheets (Taken 10/3/2024 1641)  Pain Management Interventions:   medication offered   pillow  support provided  Intervention: Provide Person-Centered Care  Flowsheets (Taken 10/3/2024 1641)  Trust Relationship/Rapport: care explained  Goal: Readiness for Transition of Care  Outcome: Progressing     Problem: Bariatric Environmental Safety  Goal: Safety Maintained with Care  Outcome: Progressing     Problem: Infection  Goal: Absence of Infection Signs and Symptoms  Outcome: Progressing  Intervention: Prevent or Manage Infection  Flowsheets (Taken 10/3/2024 1641)  Isolation Precautions: precautions maintained     Problem: Wound  Goal: Optimal Coping  Outcome: Progressing  Goal: Optimal Functional Ability  Outcome: Progressing  Goal: Absence of Infection Signs and Symptoms  Outcome: Progressing  Goal: Improved Oral Intake  Outcome: Progressing  Goal: Optimal Pain Control and Function  Outcome: Progressing  Intervention: Prevent or Manage Pain  Flowsheets (Taken 10/3/2024 1641)  Sleep/Rest Enhancement: awakenings minimized  Pain Management Interventions:   medication offered   pillow support provided  Goal: Skin Health and Integrity  Outcome: Progressing  Intervention: Optimize Skin Protection  Flowsheets (Taken 10/3/2024 1641)  Pressure Reduction Techniques:   weight shift assistance provided   positioned off wounds   pressure points protected  Pressure Reduction Devices:   positioning supports utilized   foam padding utilized  Skin Protection: silicone foam dressing in place  Activity Management:   Arm raise - L1   Rolling - L1   Leg kicks - L2  Head of Bed (HOB) Positioning: HOB elevated  Goal: Optimal Wound Healing  Outcome: Progressing     Problem: Fall Injury Risk  Goal: Absence of Fall and Fall-Related Injury  Outcome: Progressing  Intervention: Promote Injury-Free Environment  Flowsheets (Taken 10/3/2024 1641)  Safety Promotion/Fall Prevention:   assistive device/personal item within reach   bed alarm set   room near unit station   side rails raised x 3

## 2024-10-03 NOTE — PLAN OF CARE
10/03/24 1100   Rounds   Attendance Provider;   Discharge Plan A Home   Why the patient remains in the hospital Requires continued medical care   Transition of Care Barriers None     Patient's treatment is ongoing ..  Patient not medicall stable for discharge. Has NG tube in place pending return of bowel function.

## 2024-10-03 NOTE — PROGRESS NOTES
Surgery Progress Note    Abbie Barragan is a 89 y.o. year old female in hospital for small bowel obstruction secondary to an incarcerated incisional ventral hernia, now s/p open ventral hernia repair on 10/2.    POD1  AF  HDS  Reports decreased abdominal pain  600 cc bilious output from NGT  Adequate UOP post-op  No f/c/n/v/sob/cp    ROS:  Negative except above    PE:  Vitals:    10/03/24 1022 10/03/24 1119 10/03/24 1534 10/03/24 1629   BP:  (!) 140/76 (!) 156/70    BP Location:  Left arm Right arm    Patient Position:  Lying Lying    Pulse:  88 75 73   Resp: 18 18 18    Temp:  97.9 °F (36.6 °C) 97.8 °F (36.6 °C)    TempSrc:  Oral Oral    SpO2:  98% 95%    Weight:       Height:           Physical Exam  Constitutional:       General: She is not in acute distress.     Appearance: She is obese.   HENT:      Head: Normocephalic and atraumatic.      Nose:      Comments: NGT in place with bilious output     Mouth/Throat:      Mouth: Mucous membranes are dry.   Eyes:      Extraocular Movements: Extraocular movements intact.   Cardiovascular:      Rate and Rhythm: Normal rate.      Pulses: Normal pulses.   Pulmonary:      Effort: Pulmonary effort is normal.   Abdominal:      General: There is no distension.      Palpations: Abdomen is soft.      Tenderness: There is abdominal tenderness (appropriate post-op tenderness at surgical site). There is no guarding or rebound.      Hernia: No hernia is present.   Skin:     General: Skin is warm and dry.   Neurological:      General: No focal deficit present.      Mental Status: She is alert.       CBC  6.51 12.4 196    40.6     Coag                 BMP  142 104 11 124   4.6 29 0.8     CaMgPhos  9.1   2.2   3.2      Last labs from current encounter as of 10/03/2024-7:01 PM      Significant Diagnostic Studies:   WBC 6 from 14  Lactate cleared    A/P:  Abbie Barragan is a 89 y.o. year old female  with extensive abdominal surgical history including aorto bi-iliac bypass graft, currently  on Eliquis (last dose 10/1) who is s/p open ventral hernia repair with mesh on 10/2. Abdominal pain improved. NGT output decreasing. WBC normalized. Lactate normalized.    - Discontinue NG tube  - Trial CLD, slowly advance as tolerated   - monitor closely given high aspiration risk  - Monitor for return of bowel function  - PT/OT  - Surgery will continue to follow  - Remainder of care per primary team    Juan Mane MD  [facility] General Surgery PGY-II  10/03/2024 7:05 PM

## 2024-10-04 LAB
ANION GAP SERPL CALC-SCNC: 9 MMOL/L (ref 8–16)
BASOPHILS # BLD AUTO: 0.01 K/UL (ref 0–0.2)
BASOPHILS NFR BLD: 0.1 % (ref 0–1.9)
BUN SERPL-MCNC: 13 MG/DL (ref 8–23)
CALCIUM SERPL-MCNC: 8.4 MG/DL (ref 8.7–10.5)
CHLORIDE SERPL-SCNC: 106 MMOL/L (ref 95–110)
CO2 SERPL-SCNC: 26 MMOL/L (ref 23–29)
CREAT SERPL-MCNC: 0.7 MG/DL (ref 0.5–1.4)
DIFFERENTIAL METHOD BLD: ABNORMAL
EOSINOPHIL # BLD AUTO: 0.1 K/UL (ref 0–0.5)
EOSINOPHIL NFR BLD: 0.8 % (ref 0–8)
ERYTHROCYTE [DISTWIDTH] IN BLOOD BY AUTOMATED COUNT: 15.5 % (ref 11.5–14.5)
EST. GFR  (NO RACE VARIABLE): >60 ML/MIN/1.73 M^2
GLUCOSE SERPL-MCNC: 97 MG/DL (ref 70–110)
HCT VFR BLD AUTO: 36.6 % (ref 37–48.5)
HGB BLD-MCNC: 10.8 G/DL (ref 12–16)
IMM GRANULOCYTES # BLD AUTO: 0.07 K/UL (ref 0–0.04)
IMM GRANULOCYTES NFR BLD AUTO: 0.7 % (ref 0–0.5)
LYMPHOCYTES # BLD AUTO: 1.9 K/UL (ref 1–4.8)
LYMPHOCYTES NFR BLD: 18.2 % (ref 18–48)
MAGNESIUM SERPL-MCNC: 2.1 MG/DL (ref 1.6–2.6)
MCH RBC QN AUTO: 27.5 PG (ref 27–31)
MCHC RBC AUTO-ENTMCNC: 29.5 G/DL (ref 32–36)
MCV RBC AUTO: 93 FL (ref 82–98)
MONOCYTES # BLD AUTO: 0.6 K/UL (ref 0.3–1)
MONOCYTES NFR BLD: 5.5 % (ref 4–15)
NEUTROPHILS # BLD AUTO: 7.9 K/UL (ref 1.8–7.7)
NEUTROPHILS NFR BLD: 74.7 % (ref 38–73)
NRBC BLD-RTO: 0 /100 WBC
PHOSPHATE SERPL-MCNC: 2.1 MG/DL (ref 2.7–4.5)
PLATELET # BLD AUTO: 167 K/UL (ref 150–450)
PMV BLD AUTO: 13.8 FL (ref 9.2–12.9)
POTASSIUM SERPL-SCNC: 3.8 MMOL/L (ref 3.5–5.1)
RBC # BLD AUTO: 3.93 M/UL (ref 4–5.4)
SODIUM SERPL-SCNC: 141 MMOL/L (ref 136–145)
WBC # BLD AUTO: 10.52 K/UL (ref 3.9–12.7)

## 2024-10-04 PROCEDURE — 63600175 PHARM REV CODE 636 W HCPCS: Performed by: PHYSICIAN ASSISTANT

## 2024-10-04 PROCEDURE — 27000221 HC OXYGEN, UP TO 24 HOURS

## 2024-10-04 PROCEDURE — 94761 N-INVAS EAR/PLS OXIMETRY MLT: CPT

## 2024-10-04 PROCEDURE — 97530 THERAPEUTIC ACTIVITIES: CPT

## 2024-10-04 PROCEDURE — 85025 COMPLETE CBC W/AUTO DIFF WBC: CPT | Performed by: STUDENT IN AN ORGANIZED HEALTH CARE EDUCATION/TRAINING PROGRAM

## 2024-10-04 PROCEDURE — 99232 SBSQ HOSP IP/OBS MODERATE 35: CPT | Mod: GC,,, | Performed by: STUDENT IN AN ORGANIZED HEALTH CARE EDUCATION/TRAINING PROGRAM

## 2024-10-04 PROCEDURE — 83735 ASSAY OF MAGNESIUM: CPT | Performed by: STUDENT IN AN ORGANIZED HEALTH CARE EDUCATION/TRAINING PROGRAM

## 2024-10-04 PROCEDURE — 97162 PT EVAL MOD COMPLEX 30 MIN: CPT

## 2024-10-04 PROCEDURE — 94799 UNLISTED PULMONARY SVC/PX: CPT

## 2024-10-04 PROCEDURE — 36415 COLL VENOUS BLD VENIPUNCTURE: CPT | Performed by: STUDENT IN AN ORGANIZED HEALTH CARE EDUCATION/TRAINING PROGRAM

## 2024-10-04 PROCEDURE — 99900031 HC PATIENT EDUCATION (STAT)

## 2024-10-04 PROCEDURE — 97165 OT EVAL LOW COMPLEX 30 MIN: CPT

## 2024-10-04 PROCEDURE — 97110 THERAPEUTIC EXERCISES: CPT

## 2024-10-04 PROCEDURE — 25000003 PHARM REV CODE 250: Performed by: PHYSICIAN ASSISTANT

## 2024-10-04 PROCEDURE — 21400001 HC TELEMETRY ROOM

## 2024-10-04 PROCEDURE — 25000003 PHARM REV CODE 250: Performed by: STUDENT IN AN ORGANIZED HEALTH CARE EDUCATION/TRAINING PROGRAM

## 2024-10-04 PROCEDURE — 80048 BASIC METABOLIC PNL TOTAL CA: CPT | Performed by: STUDENT IN AN ORGANIZED HEALTH CARE EDUCATION/TRAINING PROGRAM

## 2024-10-04 PROCEDURE — 84100 ASSAY OF PHOSPHORUS: CPT | Performed by: STUDENT IN AN ORGANIZED HEALTH CARE EDUCATION/TRAINING PROGRAM

## 2024-10-04 RX ORDER — AMLODIPINE BESYLATE 5 MG/1
10 TABLET ORAL DAILY
Status: DISCONTINUED | OUTPATIENT
Start: 2024-10-04 | End: 2024-10-09 | Stop reason: HOSPADM

## 2024-10-04 RX ORDER — SODIUM,POTASSIUM PHOSPHATES 280-250MG
1 POWDER IN PACKET (EA) ORAL ONCE
Status: COMPLETED | OUTPATIENT
Start: 2024-10-04 | End: 2024-10-04

## 2024-10-04 RX ADMIN — METOPROLOL TARTRATE 25 MG: 25 TABLET, FILM COATED ORAL at 09:10

## 2024-10-04 RX ADMIN — METOPROLOL TARTRATE 25 MG: 25 TABLET, FILM COATED ORAL at 08:10

## 2024-10-04 RX ADMIN — APIXABAN 5 MG: 5 TABLET, FILM COATED ORAL at 09:10

## 2024-10-04 RX ADMIN — MUPIROCIN: 20 OINTMENT TOPICAL at 09:10

## 2024-10-04 RX ADMIN — Medication 1 PACKET: at 08:10

## 2024-10-04 RX ADMIN — AMLODIPINE BESYLATE 10 MG: 5 TABLET ORAL at 10:10

## 2024-10-04 RX ADMIN — SODIUM CHLORIDE: 9 INJECTION, SOLUTION INTRAVENOUS at 08:10

## 2024-10-04 RX ADMIN — LOSARTAN POTASSIUM 100 MG: 25 TABLET, FILM COATED ORAL at 08:10

## 2024-10-04 RX ADMIN — MUPIROCIN: 20 OINTMENT TOPICAL at 08:10

## 2024-10-04 RX ADMIN — CEFTRIAXONE 1 G: 1 INJECTION, POWDER, FOR SOLUTION INTRAMUSCULAR; INTRAVENOUS at 12:10

## 2024-10-04 RX ADMIN — MORPHINE SULFATE 0.5 MG: 4 INJECTION, SOLUTION INTRAMUSCULAR; INTRAVENOUS at 08:10

## 2024-10-04 NOTE — ASSESSMENT & PLAN NOTE
Chronic, controlled. Latest blood pressure and vitals reviewed-     Temp:  [97.6 °F (36.4 °C)-97.9 °F (36.6 °C)]   Pulse:  [59-92]   Resp:  [17-18]   BP: (130-175)/(70-78)   SpO2:  [91 %-99 %] .   Home meds for hypertension were reviewed and noted below.   Hypertension Medications               amLODIPine (NORVASC) 10 MG tablet Take 10 mg by mouth once daily.    hydroCHLOROthiazide (HYDRODIURIL) 25 MG tablet Take 25 mg by mouth once daily.    losartan (COZAAR) 100 MG tablet Take 100 mg by mouth once daily.    metoprolol tartrate (LOPRESSOR) 25 MG tablet Take 1 tablet (25 mg total) by mouth 2 (two) times daily.            While in the hospital, will manage blood pressure as follows; Resume home lopressor and losartan with HOLD parameters. HOLD other BP meds for now and diurectics due to dehydration and possible surgery.  Add back amlodipine given BP now remaining more elevated, but hold HCTZ as still not on full diet.    Will utilize p.r.n. blood pressure medication only if patient's blood pressure greater than  180/90  and she develops symptoms such as worsening chest pain or shortness of breath.

## 2024-10-04 NOTE — ASSESSMENT & PLAN NOTE
Patient with Permanent atrial fibrillation which is controlled with lopressor 25mg PO BID.  Patient is currently in atrial fibrillation.[id number] Score: 4. Anticoagulation indicated. Anticoagulation done with eliquis 5mg PO BID will resume after discussion with surgery tonight for 48hrs post op. Monitor on tele.  Cont. Her lopressor for now.

## 2024-10-04 NOTE — PT/OT/SLP EVAL
Physical Therapy Evaluation    Patient Name:  Abbie Barragan   MRN:  [id number]    Recommendations:     Discharge Recommendations: Moderate Intensity Therapy   Discharge Equipment Recommendations: bedside commode   Barriers to discharge:  decreased functional mobility    Assessment:     Abbie Barragan is a 89 y.o. female admitted with a medical diagnosis of SBO (small bowel obstruction).  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    Rehab Prognosis: Good; patient would benefit from acute skilled PT services to address these deficits and reach maximum level of function.    Recent Surgery: Procedure(s) (LRB):  REPAIR, HERNIA, VENTRAL, INCARCERATED, WITHOUT HISTORY OF PRIOR REPAIR (N/A) 2 Days Post-Op    Plan:     During this hospitalization, patient to be seen 6 x/week to address the identified rehab impairments via therapeutic activities, therapeutic exercises, neuromuscular re-education, wheelchair management/training and progress toward the following goals:    Plan of Care Expires:  10/18/24    Subjective     Chief Complaint: weakness and decreased functional mobility  Patient/Family Comments/goals: Pt was agreeable for therapy  Pain/Comfort:  Pain Rating 1: 0/10    Patients cultural, spiritual, [employer] conflicts given the current situation:      Living Environment:  Pt lives alone in a 1 story apartment. Pt has 2 caregivers that cover 7 days a week to help w/ household chores: ~4-6 hours per day. Pt denies caregivers assisting w/ transfers. Pt also has multiple children in the area that are able to help if needed.   Prior to admission, patients level of function was Lucia with w/c and power scooter. Pt says she was able to transfer from bed to w/c and w/c to toilet by self. Pt owns a shower chair and says was able to use by self. Pt does  not utilize O2 tank at home. Pt denies owning a LLE prosthesis. Pt lost the prosthesis in 2005 and has never reacquired one. Equipment used at home: wheelchair, shower chair, grab bars, and power scooter.  DME owned (not currently used): none.  Upon discharge, patient will have assistance from caregivers.    Objective:     Patient found supine with peripheral IV, PureWick, telemetry, and abdominal binder applied upon PT entry to room. Pt was agreeable for therapy. Pt seemed in a pleasant mood. Pt was able to attend to all activities and tasks. Pt cognition WNL.    General Precautions: Standard, fall, LLE AKA  Orthopedic Precautions:N/A   Braces: N/A  Respiratory Status: Nasal cannula, flow 1 L/min    Exams:  Cognitive Exam:  Patient is oriented to Person, Place, Time, and Situation  Postural Exam:  Patient presented with the following abnormalities:    -       Rounded shoulders  -       Forward head  Sensation:    -       Intact  Skin Integrity/Edema:      -       Skin integrity: Visible skin intact and surgical incision assessed. No redness or drainage.  RLE ROM: WFL  RLE Strength: WFL  LLE ROM: AKA, WFL. Pt able to hip flex/ext, abd/add.  LLE Strength: AKA, WFL.     Functional Mobility:  Bed Mobility:     Rolling Left:  moderate assistance  Rolling Right: moderate assistance  Scooting: Pt required ModA for lateral scooting. Pt required Darius for posterior and anterior scooting. Pt scooting ability improved throughout session.   Supine to Sit: moderate assistance for LE management  Sit to Supine: moderate assistance for LE management  Transfers:     Sit to Stand:  maximal assistance with B bed railings. Pt was able to reach a full stand w/ terminal knee extension w/ PT assistance.  Balance: Pt demonstrated fair+ static sitting balance seated EOB for ~20 min. Pt demonstrated poor static standing balance w/ bed rail UE support and MaxA for ~2 min       AM-PAC 6 CLICK MOBILITY  Total Score:10       Treatment &  Education:  Pt completed 2 sit to stand transfers w/ B bed rail support and MaxA x1 to maintain balance. First trial: 1min. Second trial: 40sec. Both trials terminated due to fatigue.    Pt completed following exercises supine in bed to improve LE strength:  1x10 reps B glut sets  1x10 reps R SAQs w/ pillow under knee  1x10 reps R quad sets w/ Min PT verbal and tactile cueing.  1x10 reps R ankle pumps     Pt educated on importance of exercise and activity to prevent muscle wasting 2/2 to prolonged hospital stay.  Pt educated on log roll maneuver for sitting up in bed to prevent abdominal strain and protect surgical incision.  Pt educated on benefits of diaphragmatic breathing to reduce anxiety and improve blood oxygenation.  Pt educated on benefits of skilled acute care PT and PT POC.    Patient left HOB elevated in L sidelying with all lines intact, call button in reach, bed alarm on, and green pressure relief boot on R foot . DANA Real notified of session.    GOALS:   Multidisciplinary Problems       Physical Therapy Goals          Problem: Physical Therapy    Goal Priority Disciplines Outcome Interventions   Physical Therapy Goal     PT, PT/OT Progressing    Description: Goals to be met by: 10/18/24     Patient will increase functional independence with mobility by performing:    Supine to sit with Contact Guard Assistance  Sit to supine with Contact Guard Assistance  Rolling to Left and Right with Contact Guard Assistance.  Lower extremity exercise program 3x10 reps per handout, with independence. Exercises include: R SAQs over pillow, B glut sets, R quad sets, R ankle pumps, R LAQs, R hip flexion seated EOB.     Pt will benefit from skilled acute care physical therapy intervention to improve functional mobility and reduce burden of care.                         History:     Past Medical History:   Diagnosis Date    Above knee amputation status 1994    History of DVT of lower extremity 1994    LEFT     Hyperlipidemia     Hypertension     PAD (peripheral artery disease)        Past Surgical History:   Procedure Laterality Date    DIAGNOSTIC LAPAROSCOPY  9/14/2023    Procedure: LAPAROSCOPY, DIAGNOSTIC;  Surgeon: Isidro Bell MD;  Location: [facility] OR;  Service: General;;    LEG AMPUTATION THROUGH KNEE      PARTIAL HYSTERECTOMY      1960's    REPAIR OF INCARCERATED VENTRAL HERNIA WITHOUT HISTORY OF PRIOR REPAIR N/A 10/2/2024    Procedure: REPAIR, HERNIA, VENTRAL, INCARCERATED, WITHOUT HISTORY OF PRIOR REPAIR;  Surgeon: Isidro Bell MD;  Location: [facility] OR;  Service: General;  Laterality: N/A;  REPAIR WITH MESH    ROBOT-ASSISTED CHOLECYSTECTOMY N/A 9/14/2023    Procedure: Diagnostic Laparoscopy Lysis of Adhesisions  Attempted Robotically;  Surgeon: Isidro Bell MD;  Location: [facility] OR;  Service: General;  Laterality: N/A;  ATTEMPTED    VASCULAR SURGERY Left 1994    AORTOFEM BYPASS       Time Tracking:     PT Received On: 10/04/24  PT Start Time: 0947     PT Stop Time: 1025  PT Total Time (min): 38 min     Billable Minutes: Evaluation 15, Therapeutic Activity 12, and Therapeutic Exercise 11      10/04/2024

## 2024-10-04 NOTE — NURSING
[facility], [facility] - [place], [place]  Nurses Note -- 4 Eyes      10/4/2024       Skin assessed on: Q Shift      [x] No Pressure Injuries Present    [x]Prevention Measures Documented    [] Yes LDA  for Pressure Injury Previously documented     [] Yes New Pressure Injury Discovered   [] LDA for New Pressure Injury Added      Attending RN:  Ramonita Gamboa, DANA     Second RN:  Devin SoniRN

## 2024-10-04 NOTE — NURSING
[facility], [facility] - [place]  Nurses Note -- 4 Eyes      10/3/2024       Skin assessed on: Q Shift      [x] No Pressure Injuries Present    [x]Prevention Measures Documented    [] Yes LDA  for Pressure Injury Previously documented     [] Yes New Pressure Injury Discovered   [] LDA for New Pressure Injury Added      Attending RN:  Devin Soni RN     Second RN:  DANA Shimpan

## 2024-10-04 NOTE — PLAN OF CARE
Problem: Skin Injury Risk Increased  Goal: Skin Health and Integrity  Outcome: Progressing  Intervention: Optimize Skin Protection  Flowsheets (Taken 10/4/2024 1806)  Pressure Reduction Techniques:   weight shift assistance provided   frequent weight shift encouraged   positioned off wounds   pressure points protected  Skin Protection: silicone foam dressing in place  Activity Management:   Sitting at edge of bed - L2   Arm raise - L1   Leg kicks - L2  Head of Bed (HOB) Positioning: HOB elevated     Problem: Adult Inpatient Plan of Care  Goal: Plan of Care Review  Outcome: Progressing  Flowsheets (Taken 10/4/2024 1806)  Plan of Care Reviewed With: patient  Goal: Patient-Specific Goal (Individualized)  Outcome: Progressing  Goal: Absence of Hospital-Acquired Illness or Injury  Outcome: Progressing  Intervention: Identify and Manage Fall Risk  Flowsheets (Taken 10/4/2024 1806)  Safety Promotion/Fall Prevention:   assistive device/personal item within reach   bed alarm set   side rails raised x 3   gait belt with ambulation   room near unit station  Intervention: Prevent Skin Injury  Flowsheets (Taken 10/4/2024 1806)  Skin Protection: silicone foam dressing in place  Device Skin Pressure Protection:   positioning supports utilized   pressure points protected  Intervention: Prevent and Manage VTE (Venous Thromboembolism) Risk  Flowsheets (Taken 10/4/2024 1806)  VTE Prevention/Management:   ROM (passive) performed   ROM (active) performed  Intervention: Prevent Infection  Flowsheets (Taken 10/4/2024 1806)  Infection Prevention: hand hygiene promoted  Goal: Optimal Comfort and Wellbeing  Outcome: Progressing  Intervention: Monitor Pain and Promote Comfort  Flowsheets (Taken 10/4/2024 1806)  Pain Management Interventions:   medication offered   care clustered  Goal: Readiness for Transition of Care  Outcome: Progressing     Problem: Bariatric Environmental Safety  Goal: Safety Maintained with Care  Outcome: Progressing      Problem: Infection  Goal: Absence of Infection Signs and Symptoms  Outcome: Progressing  Intervention: Prevent or Manage Infection  Flowsheets (Taken 10/4/2024 1806)  Infection Management: aseptic technique maintained  Isolation Precautions: precautions maintained     Problem: Wound  Goal: Optimal Coping  Outcome: Progressing  Goal: Optimal Functional Ability  Outcome: Progressing  Goal: Absence of Infection Signs and Symptoms  Outcome: Progressing  Goal: Improved Oral Intake  Outcome: Progressing  Goal: Optimal Pain Control and Function  Outcome: Progressing  Intervention: Prevent or Manage Pain  Flowsheets (Taken 10/4/2024 1806)  Sleep/Rest Enhancement:   awakenings minimized   regular sleep/rest pattern promoted  Pain Management Interventions:   medication offered   care clustered  Goal: Skin Health and Integrity  Outcome: Progressing  Intervention: Optimize Skin Protection  Flowsheets (Taken 10/4/2024 1806)  Pressure Reduction Techniques:   weight shift assistance provided   frequent weight shift encouraged   positioned off wounds   pressure points protected  Skin Protection: silicone foam dressing in place  Activity Management:   Sitting at edge of bed - L2   Arm raise - L1   Leg kicks - L2  Head of Bed (HOB) Positioning: HOB elevated  Goal: Optimal Wound Healing  Outcome: Progressing     Problem: Fall Injury Risk  Goal: Absence of Fall and Fall-Related Injury  Outcome: Progressing  Intervention: Identify and Manage Contributors  Flowsheets (Taken 10/4/2024 1806)  Self-Care Promotion:   BADL personal objects within reach   independence encouraged  Medication Review/Management: medications reviewed  Intervention: Promote Injury-Free Environment  Flowsheets (Taken 10/4/2024 1806)  Safety Promotion/Fall Prevention:   assistive device/personal item within reach   bed alarm set   side rails raised x 3   gait belt with ambulation   room near unit station

## 2024-10-04 NOTE — PROGRESS NOTES
[facility] Medicine  Progress Note    Patient Name: Abbie Barragan  MRN: [id number]  Patient Class: IP- Inpatient   Admission Date: 10/1/2024  Length of Stay: 2 days  Attending Physician: Madhuri Coates-Violeta DALY DO  Primary Care Provider: Tanner Gómez MD        Subjective:     Principal Problem:SBO (small bowel obstruction)        HPI:  89 y.o. 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 Esther Dang with vascular), Carotid artery stenosis, Essential HTN, HLD, Hypothyroid (goiter), and h/o abdominal adhesions (per surgery eval in 2023 for acute cholecystitis by Dr. Bell->aborted lap lata due to excessive adhesions) presents to the ER with N/V and abdominal pain Since Monday evening. States h/o acute cholecystitis in 2023 but treated medically due to her complex medical history. She was concerned that her symptoms could be her gallbladder again.   Pt. Denies any fevers or chills. No chest pain or SOB/ACKERMAN. Noted constipation yesterday and diarrhea, non-bloody on Monday.     IN the ED labs noted for WBC 14 and Stable H/H 13.5/43.1.  Lytes stable with normal renal function. Lipase negative.Lactic acid normal.   UA concerning for acute UTI and started on Rocephin by the ED.     CXR:  FINDINGS:  Cardiac monitoring leads overlie the chest.  There is unchanged enlargement of the cardiomediastinal silhouette.  The lungs are symmetrically expanded with diffuse coarse/increased interstitial attenuation similar to prior exam with more pronounced bibasilar interstitial prominence, similar prior examination.  No large volume of pleural fluid or pneumothorax identified noting slight increased attenuation of the left lower lung zone likely relates to prominent cardiomediastinal silhouette and overlying soft tissue.  Osseous structures are intact with  degenerative change.    CT abdomen/Pelvis with IV contrast no PO:  FINDINGS:  Heart is enlarged.  Mild bibasilar atelectatic changes and/or scarring are seen.  No pleural effusion.     No significant hepatic abnormalities are identified.  There is no intra-or extrahepatic biliary ductal dilatation.  Suspected noncalcified stones are seen within the gallbladder.  The pancreas, spleen, and adrenal glands are unremarkable.     Kidneys enhance normally with no evidence of hydronephrosis.  Left renal cysts are seen.  No abnormalities are seen along the ureteral courses.  Urinary bladder is nondistended.  Uterus has been removed.     There is diffuse dilatation of small bowel loops measuring upwards of 4-5 cm in caliber with air-fluid levels consistent with small-bowel obstruction.  This is secondary to a bowel containing supraumbilical hernia which contains short loop segment of small bowel.  Distal small bowel loops are decompressed beyond this level.  Stomach is distended with prominent air-fluid level.  No free air or free fluid.     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  There is prominent atherosclerosis.  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     No acute osseous abnormality identified.  Degenerative changes are seen in the lumbar spine.     Impression:     1. Small-bowel obstruction secondary to bowel containing supraumbilical hernia.  Clinical correlation is advised to determine potential reducibility of this hernia.  2. Postsurgical changes and additional findings as detailed above        NGT was placed and General surgery contacted by the ED for further eval of her SBO. We have been consulted for further management.        Overview/Hospital Course:  Abbie Barragan 89 y.o. female admitted to the hospital for SBO due to umbilical hernia. CT scan with bowel containing supraumbilical hernia. Lactic acid increased to 4.1. Seen by surgery and plans for  inpatient operative repair. Taken to OR 10/2, with no signs of necrotic bowel and associated down trending of lactic acid after OR. NG tube removed and diet advanced to clear liquid awaiting return of bowel function.    Interval History: reports improvement in abdominal pain, has not yet had bowel movement or passed flatus. Tolerating clear liquid without vomiting.    Review of Systems   Constitutional:  Negative for chills and fever.   Eyes:  Negative for photophobia and visual disturbance.   Respiratory:  Negative for chest tightness and shortness of breath.    Cardiovascular:  Negative for chest pain and palpitations.   Gastrointestinal:  Positive for nausea. Negative for abdominal pain and vomiting.   Genitourinary:  Negative for dysuria and hematuria.     Objective:     Vital Signs (Most Recent):  Temp: 97.8 °F (36.6 °C) (10/04/24 0732)  Pulse: 86 (10/04/24 0732)  Resp: 18 (10/04/24 0811)  BP: (!) 160/74 (10/04/24 0732)  SpO2: 96 % (10/04/24 0740) Vital Signs (24h Range):  Temp:  [97.6 °F (36.4 °C)-97.9 °F (36.6 °C)] 97.8 °F (36.6 °C)  Pulse:  [59-92] 86  Resp:  [17-18] 18  SpO2:  [91 %-99 %] 96 %  BP: (130-175)/(70-78) 160/74     Weight: 84.4 kg (186 lb 1.1 oz)  Body mass index is 32.96 kg/m².    Intake/Output Summary (Last 24 hours) at 10/4/2024 0925  Last data filed at 10/4/2024 0502  Gross per 24 hour   Intake --   Output 1050 ml   Net -1050 ml         Physical Exam  Vitals and nursing note reviewed.   Constitutional:       General: She is not in acute distress.     Appearance: She is well-developed. She is not diaphoretic.   HENT:      Head: Normocephalic and atraumatic.      Right Ear: External ear normal.      Left Ear: External ear normal.   Eyes:      General:         Right eye: No discharge.         Left eye: No discharge.      Conjunctiva/sclera: Conjunctivae normal.   Neck:      Thyroid: No thyromegaly.   Cardiovascular:      Rate and Rhythm: Normal rate and regular rhythm.      Heart sounds: No  murmur heard.  Pulmonary:      Effort: Pulmonary effort is normal. No respiratory distress.      Breath sounds: Normal breath sounds.   Abdominal:      General: There is no distension.      Palpations: Abdomen is soft. There is no mass.      Tenderness: There is abdominal tenderness (mild appropriate abdominal tenderness).      Comments: Decreased bowel sounds, but improved from yesterday   Musculoskeletal:         General: Deformity present.      Cervical back: Normal range of motion and neck supple.      Right lower leg: No edema.      Left lower leg: No edema.   Skin:     General: Skin is warm and dry.   Neurological:      Mental Status: She is alert and oriented to person, place, and time.      Sensory: No sensory deficit.   Psychiatric:         Mood and Affect: Mood normal.         Behavior: Behavior normal.             Significant Labs: CBC:   Recent Labs   Lab 10/03/24  0430 10/04/24  0539   WBC 6.51 10.52   HGB 12.4 10.8*   HCT 40.6 36.6*    167     CMP:   Recent Labs   Lab 10/03/24  0430 10/04/24  0539    141   K 4.6 3.8    106   CO2 29 26   * 97   BUN 11 13   CREATININE 0.8 0.7   CALCIUM 9.1 8.4*   ANIONGAP 9 9       Significant Imaging:   Imaging Results              XR NG/OG tube placement check, non-radiologist performed (Final result)  Result time 10/02/24 05:08:52   Procedure changed from XR Gastric tube check, non-radiologist performed     Final result by Oralia Espinoza MD (10/02/24 05:08:52)                   Impression:      Please see above.      Electronically signed by: Oralia Espinoza MD  Date:    10/02/2024  Time:    05:08               Narrative:    EXAMINATION:  XR NG/OG TUBE PLACEMENT CHECK, NON-RADIOLOGIST PERFORMED    CLINICAL HISTORY:  NG tube placement;    TECHNIQUE:  AP View(s) of the abdomen was performed.    COMPARISON:  10/02/2024 and 10/01/2024    FINDINGS:  Interval placement of an enteric tube with tip coursing below the diaphragm and projecting over the  region of the stomach in the left upper quadrant.  No interval detrimental change in the radiographic appearance of visualized intrathoracic and upper abdominal structures.                                       X-Ray Chest AP Portable (Final result)  Result time 10/02/24 02:24:54      Final result by Oralia Espinoza MD (10/02/24 02:24:54)                   Impression:      Please see above.      Electronically signed by: Oralia Espinoza MD  Date:    10/02/2024  Time:    02:24               Narrative:    EXAMINATION:  XR CHEST AP PORTABLE    CLINICAL HISTORY:  hypoxia;    TECHNIQUE:  Single frontal view of the chest was performed.    COMPARISON:  Chest radiograph 10/26/2023, CT abdomen pelvis 10/02/2024    FINDINGS:  Cardiac monitoring leads overlie the chest.  There is unchanged enlargement of the cardiomediastinal silhouette.  The lungs are symmetrically expanded with diffuse coarse/increased interstitial attenuation similar to prior exam with more pronounced bibasilar interstitial prominence, similar prior examination.  No large volume of pleural fluid or pneumothorax identified noting slight increased attenuation of the left lower lung zone likely relates to prominent cardiomediastinal silhouette and overlying soft tissue.  Osseous structures are intact with degenerative change.                                       CT Abdomen Pelvis With IV Contrast NO Oral Contrast (Final result)  Result time 10/02/24 01:53:11      Final result by Lissy Angeles MD (10/02/24 01:53:11)                   Impression:      1. Small-bowel obstruction secondary to bowel containing supraumbilical hernia.  Clinical correlation is advised to determine potential reducibility of this hernia.  2. Postsurgical changes and additional findings as detailed above.      Electronically signed by: Lissy Angeles MD  Date:    10/02/2024  Time:    01:53               Narrative:    EXAMINATION:  CT ABDOMEN PELVIS WITH IV CONTRAST    CLINICAL  HISTORY:  Abdominal pain, acute, nonlocalized;    TECHNIQUE:  Low dose axial images, sagittal and coronal reformations were obtained from the lung bases to the pubic symphysis following the IV administration of 80 mL of Omnipaque 350 .  Oral contrast was not given.    COMPARISON:  CT abdomen and pelvis from July 2023.    FINDINGS:  Heart is enlarged.  Mild bibasilar atelectatic changes and/or scarring are seen.  No pleural effusion.    No significant hepatic abnormalities are identified.  There is no intra-or extrahepatic biliary ductal dilatation.  Suspected noncalcified stones are seen within the gallbladder.  The pancreas, spleen, and adrenal glands are unremarkable.    Kidneys enhance normally with no evidence of hydronephrosis.  Left renal cysts are seen.  No abnormalities are seen along the ureteral courses.  Urinary bladder is nondistended.  Uterus has been removed.    There is diffuse dilatation of small bowel loops measuring upwards of 4-5 cm in caliber with air-fluid levels consistent with small-bowel obstruction.  This is secondary to a bowel containing supraumbilical hernia which contains short loop segment of small bowel.  Distal small bowel loops are decompressed beyond this level.  Stomach is distended with prominent air-fluid level.  No free air or free fluid.    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  There is prominent atherosclerosis.  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    No acute osseous abnormality identified.  Degenerative changes are seen in the lumbar spine.                                       X-Ray Abdomen Flat And Erect (Final result)  Result time 10/01/24 23:01:46      Final result by Lissy Angeles MD (10/01/24 23:01:46)                   Impression:      Nonobstructive bowel gas pattern.      Electronically signed by: Lissy Angeles MD  Date:    10/01/2024  Time:    23:01               Narrative:     EXAMINATION:  XR ABDOMEN FLAT AND ERECT    CLINICAL HISTORY:  Abdominal Pain;    TECHNIQUE:  Flat and erect AP views of the abdomen were performed.    COMPARISON:  None    FINDINGS:  Nonspecific bowel gas pattern.  No evidence to suggest obstruction.  No free air identified.  Scattered stool is seen in the colon.  Cardiac silhouette is enlarged.  Visualized lungs are essentially clear.                                        Assessment/Plan:      * SBO (small bowel obstruction)  Presented with SBO due to umbilical hernia. Lactic acid decreasing post operatively.  Taken to OR 10/2, no flatus or BM yet, but resolution of abdominal pain.  Now tolerating clear liquid diet, no vomiting. No bowel movement/flatus yet  NG removed  Surgery following  Begin incentive spirometry given O2 sats of 94% documented    PAD (peripheral artery disease)  Extensive PAD history with Axbifem Bypass in 1994 and Cartoid stenosis 60-70% reported in vascular note from 8/2024:     8/29/23 PVR right 0.4-0.5  Carotid duplex 60-79% left    8/27/24 PVR right 0.4-0.6 monophasic waveforms  Carotid duplex 40-59% but limited visualization due to heavy shadows/calcium       Eliquis and aspirin held pending surgical intervention.    Acquired hypothyroidism  Resume home Levothyroxine in 2 days         Essential hypertension  Chronic, controlled. Latest blood pressure and vitals reviewed-     Temp:  [97.6 °F (36.4 °C)-97.9 °F (36.6 °C)]   Pulse:  [59-92]   Resp:  [17-18]   BP: (130-175)/(70-78)   SpO2:  [91 %-99 %] .   Home meds for hypertension were reviewed and noted below.   Hypertension Medications               amLODIPine (NORVASC) 10 MG tablet Take 10 mg by mouth once daily.    hydroCHLOROthiazide (HYDRODIURIL) 25 MG tablet Take 25 mg by mouth once daily.    losartan (COZAAR) 100 MG tablet Take 100 mg by mouth once daily.    metoprolol tartrate (LOPRESSOR) 25 MG tablet Take 1 tablet (25 mg total) by mouth 2 (two) times daily.            While in the  hospital, will manage blood pressure as follows; Resume home lopressor and losartan with HOLD parameters. HOLD other BP meds for now and diurectics due to dehydration and possible surgery.  Add back amlodipine given BP now remaining more elevated, but hold HCTZ as still not on full diet.    Will utilize p.r.n. blood pressure medication only if patient's blood pressure greater than  180/90  and she develops symptoms such as worsening chest pain or shortness of breath.    Chronic atrial fibrillation  Patient with Permanent atrial fibrillation which is controlled with lopressor 25mg PO BID.  Patient is currently in atrial fibrillation.[id number] Score: 4. Anticoagulation indicated. Anticoagulation done with eliquis 5mg PO BID will resume after discussion with surgery tonight for 48hrs post op. Monitor on tele.  Cont. Her lopressor for now.       VTE Risk Mitigation (From admission, onward)      None            Discharge Planning   JULIÁN:      Code Status: Full Code   Is the patient medically ready for discharge?:     Reason for patient still in hospital (select all that apply): Patient trending condition, Treatment, and Consult recommendations  Discharge Plan A: Home        Pending return of bowel function post operatively    Jaquan Lacy PA-C  Department of Hospital Medicine   [facility] [place] [facility]

## 2024-10-04 NOTE — PLAN OF CARE
Problem: Physical Therapy  Goal: Physical Therapy Goal  Description: Goals to be met by: 10/18/24     Patient will increase functional independence with mobility by performing:    Supine to sit with Contact Guard Assistance  Sit to supine with Contact Guard Assistance  Rolling to Left and Right with Contact Guard Assistance.  Lower extremity exercise program 3x10 reps per handout, with independence. Exercises include: R SAQs over pillow, B glut sets, R quad sets, R ankle pumps, R LAQs, R hip flexion seated EOB.     Pt will benefit from skilled acute care physical therapy intervention to improve functional mobility and reduce burden of care.    10/4/2024 1353 by Michi Crawley, SPT  Outcome: Progressing

## 2024-10-04 NOTE — PLAN OF CARE
Problem: Occupational Therapy  Goal: Occupational Therapy Goal  Description: Goals to be met by: 10/18/2024     Patient will increase functional independence with ADLs by performing:    UE Dressing with [place].  LE Dressing with Modified [place].  Grooming while seated with [place].  Toileting from bedside commode with Modified Karnes for hygiene and clothing management.   Toilet transfer to bedside commode with Modified [place]es.    Outcome: Progressing     OT initial eval completed. Pt would benefit from skilled acute OT to maximize function prior to d/c. Recommend MARY[facility]EL once medically stable.

## 2024-10-04 NOTE — PLAN OF CARE
Problem: Skin Injury Risk Increased  Goal: Skin Health and Integrity  Outcome: Progressing     Problem: Adult Inpatient Plan of Care  Goal: Plan of Care Review  Outcome: Progressing  Goal: Patient-Specific Goal (Individualized)  Outcome: Progressing  Goal: Absence of Hospital-Acquired Illness or Injury  Outcome: Progressing  Goal: Optimal Comfort and Wellbeing  Outcome: Progressing  Goal: Readiness for Transition of Care  Outcome: Progressing     Problem: Bariatric Environmental Safety  Goal: Safety Maintained with Care  Outcome: Progressing     Problem: Infection  Goal: Absence of Infection Signs and Symptoms  Outcome: Progressing     Problem: Wound  Goal: Optimal Coping  Outcome: Progressing  Goal: Optimal Functional Ability  Outcome: Progressing  Goal: Absence of Infection Signs and Symptoms  Outcome: Progressing  Goal: Improved Oral Intake  Outcome: Progressing  Goal: Optimal Pain Control and Function  Outcome: Progressing  Goal: Skin Health and Integrity  Outcome: Progressing  Goal: Optimal Wound Healing  Outcome: Progressing     Problem: Fall Injury Risk  Goal: Absence of Fall and Fall-Related Injury  Outcome: Progressing

## 2024-10-04 NOTE — SUBJECTIVE & OBJECTIVE
Interval History: reports improvement in abdominal pain, has not yet had bowel movement or passed flatus. Tolerating clear liquid without vomiting.    Review of Systems   Constitutional:  Negative for chills and fever.   Eyes:  Negative for photophobia and visual disturbance.   Respiratory:  Negative for chest tightness and shortness of breath.    Cardiovascular:  Negative for chest pain and palpitations.   Gastrointestinal:  Positive for nausea. Negative for abdominal pain and vomiting.   Genitourinary:  Negative for dysuria and hematuria.     Objective:     Vital Signs (Most Recent):  Temp: 97.8 °F (36.6 °C) (10/04/24 0732)  Pulse: 86 (10/04/24 0732)  Resp: 18 (10/04/24 0811)  BP: (!) 160/74 (10/04/24 0732)  SpO2: 96 % (10/04/24 0740) Vital Signs (24h Range):  Temp:  [97.6 °F (36.4 °C)-97.9 °F (36.6 °C)] 97.8 °F (36.6 °C)  Pulse:  [59-92] 86  Resp:  [17-18] 18  SpO2:  [91 %-99 %] 96 %  BP: (130-175)/(70-78) 160/74     Weight: 84.4 kg (186 lb 1.1 oz)  Body mass index is 32.96 kg/m².    Intake/Output Summary (Last 24 hours) at 10/4/2024 0925  Last data filed at 10/4/2024 0502  Gross per 24 hour   Intake --   Output 1050 ml   Net -1050 ml         Physical Exam  Vitals and nursing note reviewed.   Constitutional:       General: She is not in acute distress.     Appearance: She is well-developed. She is not diaphoretic.   HENT:      Head: Normocephalic and atraumatic.      Right Ear: External ear normal.      Left Ear: External ear normal.   Eyes:      General:         Right eye: No discharge.         Left eye: No discharge.      Conjunctiva/sclera: Conjunctivae normal.   Neck:      Thyroid: No thyromegaly.   Cardiovascular:      Rate and Rhythm: Normal rate and regular rhythm.      Heart sounds: No murmur heard.  Pulmonary:      Effort: Pulmonary effort is normal. No respiratory distress.      Breath sounds: Normal breath sounds.   Abdominal:      General: There is no distension.      Palpations: Abdomen is soft.  There is no mass.      Tenderness: There is abdominal tenderness (mild appropriate abdominal tenderness).      Comments: Decreased bowel sounds, but improved from yesterday   Musculoskeletal:         General: Deformity present.      Cervical back: Normal range of motion and neck supple.      Right lower leg: No edema.      Left lower leg: No edema.   Skin:     General: Skin is warm and dry.   Neurological:      Mental Status: She is alert and oriented to person, place, and time.      Sensory: No sensory deficit.   Psychiatric:         Mood and Affect: Mood normal.         Behavior: Behavior normal.             Significant Labs: CBC:   Recent Labs   Lab 10/03/24  0430 10/04/24  0539   WBC 6.51 10.52   HGB 12.4 10.8*   HCT 40.6 36.6*    167     CMP:   Recent Labs   Lab 10/03/24  0430 10/04/24  0539    141   K 4.6 3.8    106   CO2 29 26   * 97   BUN 11 13   CREATININE 0.8 0.7   CALCIUM 9.1 8.4*   ANIONGAP 9 9       Significant Imaging:   Imaging Results              XR NG/OG tube placement check, non-radiologist performed (Final result)  Result time 10/02/24 05:08:52   Procedure changed from XR Gastric tube check, non-radiologist performed     Final result by Oralia Espinoza MD (10/02/24 05:08:52)                   Impression:      Please see above.      Electronically signed by: Oralia Espinoza MD  Date:    10/02/2024  Time:    05:08               Narrative:    EXAMINATION:  XR NG/OG TUBE PLACEMENT CHECK, NON-RADIOLOGIST PERFORMED    CLINICAL HISTORY:  NG tube placement;    TECHNIQUE:  AP View(s) of the abdomen was performed.    COMPARISON:  10/02/2024 and 10/01/2024    FINDINGS:  Interval placement of an enteric tube with tip coursing below the diaphragm and projecting over the region of the stomach in the left upper quadrant.  No interval detrimental change in the radiographic appearance of visualized intrathoracic and upper abdominal structures.                                       X-Ray  Chest AP Portable (Final result)  Result time 10/02/24 02:24:54      Final result by Oralia Espinoza MD (10/02/24 02:24:54)                   Impression:      Please see above.      Electronically signed by: Oralia Espinoza MD  Date:    10/02/2024  Time:    02:24               Narrative:    EXAMINATION:  XR CHEST AP PORTABLE    CLINICAL HISTORY:  hypoxia;    TECHNIQUE:  Single frontal view of the chest was performed.    COMPARISON:  Chest radiograph 10/26/2023, CT abdomen pelvis 10/02/2024    FINDINGS:  Cardiac monitoring leads overlie the chest.  There is unchanged enlargement of the cardiomediastinal silhouette.  The lungs are symmetrically expanded with diffuse coarse/increased interstitial attenuation similar to prior exam with more pronounced bibasilar interstitial prominence, similar prior examination.  No large volume of pleural fluid or pneumothorax identified noting slight increased attenuation of the left lower lung zone likely relates to prominent cardiomediastinal silhouette and overlying soft tissue.  Osseous structures are intact with degenerative change.                                       CT Abdomen Pelvis With IV Contrast NO Oral Contrast (Final result)  Result time 10/02/24 01:53:11      Final result by Lissy Angeles MD (10/02/24 01:53:11)                   Impression:      1. Small-bowel obstruction secondary to bowel containing supraumbilical hernia.  Clinical correlation is advised to determine potential reducibility of this hernia.  2. Postsurgical changes and additional findings as detailed above.      Electronically signed by: Lissy Angeles MD  Date:    10/02/2024  Time:    01:53               Narrative:    EXAMINATION:  CT ABDOMEN PELVIS WITH IV CONTRAST    CLINICAL HISTORY:  Abdominal pain, acute, nonlocalized;    TECHNIQUE:  Low dose axial images, sagittal and coronal reformations were obtained from the lung bases to the pubic symphysis following the IV administration of 80 mL of  Omnipaque 350 .  Oral contrast was not given.    COMPARISON:  CT abdomen and pelvis from July 2023.    FINDINGS:  Heart is enlarged.  Mild bibasilar atelectatic changes and/or scarring are seen.  No pleural effusion.    No significant hepatic abnormalities are identified.  There is no intra-or extrahepatic biliary ductal dilatation.  Suspected noncalcified stones are seen within the gallbladder.  The pancreas, spleen, and adrenal glands are unremarkable.    Kidneys enhance normally with no evidence of hydronephrosis.  Left renal cysts are seen.  No abnormalities are seen along the ureteral courses.  Urinary bladder is nondistended.  Uterus has been removed.    There is diffuse dilatation of small bowel loops measuring upwards of 4-5 cm in caliber with air-fluid levels consistent with small-bowel obstruction.  This is secondary to a bowel containing supraumbilical hernia which contains short loop segment of small bowel.  Distal small bowel loops are decompressed beyond this level.  Stomach is distended with prominent air-fluid level.  No free air or free fluid.    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  There is prominent atherosclerosis.  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    No acute osseous abnormality identified.  Degenerative changes are seen in the lumbar spine.                                       X-Ray Abdomen Flat And Erect (Final result)  Result time 10/01/24 23:01:46      Final result by Lissy Angeles MD (10/01/24 23:01:46)                   Impression:      Nonobstructive bowel gas pattern.      Electronically signed by: Lissy Angeles MD  Date:    10/01/2024  Time:    23:01               Narrative:    EXAMINATION:  XR ABDOMEN FLAT AND ERECT    CLINICAL HISTORY:  Abdominal Pain;    TECHNIQUE:  Flat and erect AP views of the abdomen were performed.    COMPARISON:  None    FINDINGS:  Nonspecific bowel gas pattern.  No evidence to  suggest obstruction.  No free air identified.  Scattered stool is seen in the colon.  Cardiac silhouette is enlarged.  Visualized lungs are essentially clear.

## 2024-10-04 NOTE — PLAN OF CARE
Problem: Physical Therapy  Goal: Physical Therapy Goal  Description: Goals to be met by: 10/18/24     Patient will increase functional independence with mobility by performing:    Supine to sit with Contact Guard Assistance  Sit to supine with Contact Guard Assistance  Rolling to Left and Right with Contact Guard Assistance.  Lower extremity exercise program 3x10 reps per handout, with independence. Exercises include: R SAQs over pillow, B glut sets, R quad sets, R ankle pumps, R LAQs, R hip flexion seated EOB.     Pt will benefit from skilled acute care physical therapy intervention to improve functional mobility and reduce burden of care.    Outcome: Progressing

## 2024-10-04 NOTE — ASSESSMENT & PLAN NOTE
Presented with SBO due to umbilical hernia. Lactic acid decreasing post operatively.  Taken to OR 10/2, no flatus or BM yet, but resolution of abdominal pain.  Now tolerating clear liquid diet, no vomiting. No bowel movement/flatus yet  NG removed  Surgery following  Begin incentive spirometry given O2 sats of 94% documented

## 2024-10-04 NOTE — PT/OT/SLP EVAL
"Occupational Therapy   Evaluation    Name: Abbie Barragan  MRN: [id number]  Admitting Diagnosis: SBO (small bowel obstruction)  Recent Surgery: Procedure(s) (LRB):  REPAIR, HERNIA, VENTRAL, INCARCERATED, WITHOUT HISTORY OF PRIOR REPAIR (N/A) 2 Days Post-Op    Recommendations:     Discharge Recommendations: Moderate Intensity Therapy  Discharge Equipment Recommendations:  bedside commode  Barriers to discharge:  None    Assessment:     Abbie Barragan is a 89 y.o. female with a medical diagnosis of SBO (small bowel obstruction).  She presents with The primary encounter diagnosis was SBO (small bowel obstruction). Diagnoses of Left arm pain, Hypoxia, Chest pain, and Preoperative cardiovascular examination were also pertinent to this visit. . Performance deficits affecting function: weakness, impaired endurance, impaired self care skills, impaired functional mobility, impaired balance.      OT initial eval completed. Pt would benefit from skilled acute OT to maximize function prior to d/c. Recommend [facility] once medically stable.    Rehab Prognosis: Good; patient would benefit from acute skilled OT services to address these deficits and reach maximum level of function.       Plan:     Patient to be seen 4 x/week to address the above listed problems via self-care/home management, therapeutic activities, therapeutic exercises  Plan of Care Expires: 10/18/24  Plan of Care Reviewed with: patient    Subjective     Chief Complaint: fatigue  Patient/Family Comments/goals: "I sat up earlier today, and I'm so tired - I don't think I can sit up again right now."    Occupational Profile:  Living Environment: Lives alone in 1st level apt, [place]  Previous level of function: has 2 caregivers who together are able to provide daily assist; pt able to self t/f from bed to w/c and then to elevated toilet or bath bench in tub; pt reports sometimes needs assist with dressing/bathing; 3x/wk self-propels w/c around apt complex  Equipment Used at Home: " wheelchair, bath bench (power scooter)  Assistance upon Discharge: caregivers, family    Pain/Comfort:  Pain Rating 1: 0/10  Pain Addressed 1: Reposition  Pain Rating Post-Intervention 1: 0/10    Objective:     Communicated with: RN prior to session.  Patient found HOB elevated with bed alarm, telemetry, peripheral IV, PureWick, oxygen upon OT entry to room.    General Precautions: Standard, fall  Orthopedic Precautions: N/A  Braces: N/A  Respiratory Status: Nasal cannula, flow 1 L/min    Occupational Performance:    Bed Mobility:  instruction on log roll technique and bedrail use  Patient completed Rolling/Turning to Left with  stand by assistance  Patient completed Rolling/Turning to Right with stand by assistance  Patient completed Scooting/Bridging with stand by assistance    Functional Mobility/Transfers:  Functional Mobility: pt deferred sitting eob this date 2/2 fatigue from being up earlier    Activities of Daily Living:  Feeding:  supervision with setup of cup  Grooming: supervision with setup of warmed facecloth  Lower body dressing: maximal assistance to doff/don pressure relief boot on RLE    Cognitive/Visual Perceptual:  Cognitive/Psychosocial Skills:     -       Oriented to: Person, Place, Time, and Situation   -       Follows Commands/attention:Follows two-step commands  -       Communication: clear/fluent  -       Memory: No Deficits noted  -       Safety awareness/insight to disability: intact   -       Mood/Affect/Coping skills/emotional control: Cooperative  Visual/Perceptual:      -Intact  acuity      Physical Exam:  Dominant hand:    -       R  Upper Extremity Range of Motion:     -       Right Upper Extremity: WFL  -       Left Upper Extremity: WFL  Upper Extremity Strength:    -       Right Upper Extremity: WFL  -       Left Upper Extremity: WFL  Lower Extremity:  -       LLE AKA   Strength:    -       Right Upper Extremity: WFL  -       Left Upper Extremity: WFL  Fine Motor Coordination:     -       Intact  Left hand, manipulation of objects and Right hand, manipulation of objects  Gross motor coordination:   WFL    AMPAC 6 Click ADL:  AMPAC Total Score: 18    Treatment & Education:  Patient educated on role of OT/ POC development. Occupational profile developed via interview. Initiated ADL / functional mobility training as above with instruction on joint protection/log roll. SpO2 monitored/maintained 95% throughout. Educated patient on importance of out of bed mobility, movement/repositioning throughout the day, and call button use. Answered questions within scope, and all needs met.     Patient left HOB elevated with all lines intact, call button in reach, bed alarm on, and RN notified    GOALS:   Multidisciplinary Problems       Occupational Therapy Goals          Problem: Occupational Therapy    Goal Priority Disciplines Outcome Interventions   Occupational Therapy Goal     OT, PT/OT Progressing    Description: Goals to be met by: 10/18/2024     Patient will increase functional independence with ADLs by performing:    UE Dressing with Dolores.  LE Dressing with Modified Dolores.  Grooming while seated with Dolores.  Toileting from bedside commode with Modified Dolores for hygiene and clothing management.   Toilet transfer to bedside commode with Modified Dolores.                         History:     Past Medical History:   Diagnosis Date    Above knee amputation status 1994    History of DVT of lower extremity 1994    LEFT    Hyperlipidemia     Hypertension     PAD (peripheral artery disease)          Past Surgical History:   Procedure Laterality Date    DIAGNOSTIC LAPAROSCOPY  9/14/2023    Procedure: LAPAROSCOPY, DIAGNOSTIC;  Surgeon: Isidro Bell MD;  Location: [facility] OR;  Service: General;;    LEG AMPUTATION THROUGH KNEE      PARTIAL HYSTERECTOMY      1960's    REPAIR OF INCARCERATED VENTRAL HERNIA WITHOUT HISTORY OF PRIOR REPAIR N/A 10/2/2024    Procedure: REPAIR,  HERNIA, VENTRAL, INCARCERATED, WITHOUT HISTORY OF PRIOR REPAIR;  Surgeon: Isidro Bell MD;  Location: [facility] OR;  Service: General;  Laterality: N/A;  REPAIR WITH MESH    ROBOT-ASSISTED CHOLECYSTECTOMY N/A 9/14/2023    Procedure: Diagnostic Laparoscopy Lysis of Adhesisions  Attempted Robotically;  Surgeon: Isidro Bell MD;  Location: [facility] OR;  Service: General;  Laterality: N/A;  ATTEMPTED    VASCULAR SURGERY Left 1994    AORTOFEM BYPASS       Time Tracking:     OT Date of Treatment: 10/04/24  OT Start Time: 1331  OT Stop Time: 1350  OT Total Time (min): 19 min    Billable Minutes:Evaluation 19    10/4/2024

## 2024-10-05 PROBLEM — E83.39 HYPOPHOSPHATEMIA: Status: ACTIVE | Noted: 2024-10-05

## 2024-10-05 PROBLEM — R79.81 LOW OXYGEN SATURATION: Status: ACTIVE | Noted: 2024-10-05

## 2024-10-05 LAB
ANION GAP SERPL CALC-SCNC: 12 MMOL/L (ref 8–16)
BASOPHILS # BLD AUTO: 0.02 K/UL (ref 0–0.2)
BASOPHILS NFR BLD: 0.2 % (ref 0–1.9)
BUN SERPL-MCNC: 11 MG/DL (ref 8–23)
CALCIUM SERPL-MCNC: 8.7 MG/DL (ref 8.7–10.5)
CHLORIDE SERPL-SCNC: 105 MMOL/L (ref 95–110)
CO2 SERPL-SCNC: 22 MMOL/L (ref 23–29)
CREAT SERPL-MCNC: 0.6 MG/DL (ref 0.5–1.4)
DIFFERENTIAL METHOD BLD: ABNORMAL
EOSINOPHIL # BLD AUTO: 0.1 K/UL (ref 0–0.5)
EOSINOPHIL NFR BLD: 0.4 % (ref 0–8)
ERYTHROCYTE [DISTWIDTH] IN BLOOD BY AUTOMATED COUNT: 15.2 % (ref 11.5–14.5)
EST. GFR  (NO RACE VARIABLE): >60 ML/MIN/1.73 M^2
GLUCOSE SERPL-MCNC: 98 MG/DL (ref 70–110)
HCT VFR BLD AUTO: 36.7 % (ref 37–48.5)
HGB BLD-MCNC: 10.9 G/DL (ref 12–16)
IMM GRANULOCYTES # BLD AUTO: 0.09 K/UL (ref 0–0.04)
IMM GRANULOCYTES NFR BLD AUTO: 0.7 % (ref 0–0.5)
LYMPHOCYTES # BLD AUTO: 1.9 K/UL (ref 1–4.8)
LYMPHOCYTES NFR BLD: 14.5 % (ref 18–48)
MAGNESIUM SERPL-MCNC: 2.2 MG/DL (ref 1.6–2.6)
MCH RBC QN AUTO: 27.2 PG (ref 27–31)
MCHC RBC AUTO-ENTMCNC: 29.7 G/DL (ref 32–36)
MCV RBC AUTO: 92 FL (ref 82–98)
MONOCYTES # BLD AUTO: 0.5 K/UL (ref 0.3–1)
MONOCYTES NFR BLD: 3.5 % (ref 4–15)
NEUTROPHILS # BLD AUTO: 10.4 K/UL (ref 1.8–7.7)
NEUTROPHILS NFR BLD: 80.7 % (ref 38–73)
NRBC BLD-RTO: 0 /100 WBC
PHOSPHATE SERPL-MCNC: 1.7 MG/DL (ref 2.7–4.5)
PLATELET # BLD AUTO: 165 K/UL (ref 150–450)
PMV BLD AUTO: 12.7 FL (ref 9.2–12.9)
POTASSIUM SERPL-SCNC: 3.5 MMOL/L (ref 3.5–5.1)
RBC # BLD AUTO: 4.01 M/UL (ref 4–5.4)
SODIUM SERPL-SCNC: 139 MMOL/L (ref 136–145)
WBC # BLD AUTO: 12.9 K/UL (ref 3.9–12.7)

## 2024-10-05 PROCEDURE — 84100 ASSAY OF PHOSPHORUS: CPT | Performed by: STUDENT IN AN ORGANIZED HEALTH CARE EDUCATION/TRAINING PROGRAM

## 2024-10-05 PROCEDURE — 63600175 PHARM REV CODE 636 W HCPCS: Performed by: STUDENT IN AN ORGANIZED HEALTH CARE EDUCATION/TRAINING PROGRAM

## 2024-10-05 PROCEDURE — 27000221 HC OXYGEN, UP TO 24 HOURS

## 2024-10-05 PROCEDURE — 94761 N-INVAS EAR/PLS OXIMETRY MLT: CPT

## 2024-10-05 PROCEDURE — 80048 BASIC METABOLIC PNL TOTAL CA: CPT | Performed by: STUDENT IN AN ORGANIZED HEALTH CARE EDUCATION/TRAINING PROGRAM

## 2024-10-05 PROCEDURE — 99232 SBSQ HOSP IP/OBS MODERATE 35: CPT | Mod: ,,, | Performed by: STUDENT IN AN ORGANIZED HEALTH CARE EDUCATION/TRAINING PROGRAM

## 2024-10-05 PROCEDURE — 25000003 PHARM REV CODE 250: Performed by: PHYSICIAN ASSISTANT

## 2024-10-05 PROCEDURE — 36415 COLL VENOUS BLD VENIPUNCTURE: CPT | Performed by: STUDENT IN AN ORGANIZED HEALTH CARE EDUCATION/TRAINING PROGRAM

## 2024-10-05 PROCEDURE — 99900031 HC PATIENT EDUCATION (STAT)

## 2024-10-05 PROCEDURE — 99900035 HC TECH TIME PER 15 MIN (STAT)

## 2024-10-05 PROCEDURE — 25500020 PHARM REV CODE 255: Performed by: STUDENT IN AN ORGANIZED HEALTH CARE EDUCATION/TRAINING PROGRAM

## 2024-10-05 PROCEDURE — 63600175 PHARM REV CODE 636 W HCPCS: Performed by: PHYSICIAN ASSISTANT

## 2024-10-05 PROCEDURE — 25000003 PHARM REV CODE 250: Performed by: STUDENT IN AN ORGANIZED HEALTH CARE EDUCATION/TRAINING PROGRAM

## 2024-10-05 PROCEDURE — 83735 ASSAY OF MAGNESIUM: CPT | Performed by: STUDENT IN AN ORGANIZED HEALTH CARE EDUCATION/TRAINING PROGRAM

## 2024-10-05 PROCEDURE — 21400001 HC TELEMETRY ROOM

## 2024-10-05 PROCEDURE — 85025 COMPLETE CBC W/AUTO DIFF WBC: CPT | Performed by: STUDENT IN AN ORGANIZED HEALTH CARE EDUCATION/TRAINING PROGRAM

## 2024-10-05 RX ORDER — SODIUM CHLORIDE, SODIUM LACTATE, POTASSIUM CHLORIDE, CALCIUM CHLORIDE 600; 310; 30; 20 MG/100ML; MG/100ML; MG/100ML; MG/100ML
INJECTION, SOLUTION INTRAVENOUS CONTINUOUS
Status: DISCONTINUED | OUTPATIENT
Start: 2024-10-05 | End: 2024-10-07

## 2024-10-05 RX ADMIN — MUPIROCIN: 20 OINTMENT TOPICAL at 08:10

## 2024-10-05 RX ADMIN — DIATRIZOATE MEGLUMINE 120 ML: 300 INJECTION, SOLUTION INTRAVENOUS at 12:10

## 2024-10-05 RX ADMIN — LOSARTAN POTASSIUM 100 MG: 25 TABLET, FILM COATED ORAL at 10:10

## 2024-10-05 RX ADMIN — ONDANSETRON 4 MG: 2 INJECTION INTRAMUSCULAR; INTRAVENOUS at 07:10

## 2024-10-05 RX ADMIN — SODIUM PHOSPHATE, MONOBASIC, MONOHYDRATE AND SODIUM PHOSPHATE, DIBASIC, ANHYDROUS 30 MMOL: 142; 276 INJECTION, SOLUTION INTRAVENOUS at 10:10

## 2024-10-05 RX ADMIN — MUPIROCIN: 20 OINTMENT TOPICAL at 10:10

## 2024-10-05 RX ADMIN — APIXABAN 5 MG: 5 TABLET, FILM COATED ORAL at 08:10

## 2024-10-05 RX ADMIN — METOPROLOL TARTRATE 25 MG: 25 TABLET, FILM COATED ORAL at 10:10

## 2024-10-05 RX ADMIN — AMLODIPINE BESYLATE 10 MG: 5 TABLET ORAL at 10:10

## 2024-10-05 RX ADMIN — SODIUM CHLORIDE, POTASSIUM CHLORIDE, SODIUM LACTATE AND CALCIUM CHLORIDE: 600; 310; 30; 20 INJECTION, SOLUTION INTRAVENOUS at 02:10

## 2024-10-05 RX ADMIN — APIXABAN 5 MG: 5 TABLET, FILM COATED ORAL at 10:10

## 2024-10-05 RX ADMIN — METOPROLOL TARTRATE 25 MG: 25 TABLET, FILM COATED ORAL at 08:10

## 2024-10-05 NOTE — PROGRESS NOTES
Surgery Progress Note    Abbie Barragan is a 89 y.o. year old female in hospital for small bowel obstruction secondary to an incarcerated incisional ventral hernia, now s/p open ventral hernia repair on 10/2.    POD2  AF  HDS  NGT removed yesterday  Reports nausea  Voiding  Awaiting ROBF  No f/c/sob/cp    ROS:  Negative except above    PE:  Vitals:    10/04/24 0811 10/04/24 1135 10/04/24 1600 10/04/24 1927   BP:  (!) 145/83 (!) 150/87    BP Location:  Right arm Left arm    Patient Position:  Sitting Sitting    Pulse:  77 76 91   Resp: 18 18 18    Temp:  97.9 °F (36.6 °C) 98.2 °F (36.8 °C)    TempSrc:  Oral Oral    SpO2:  96% (!) 92%    Weight:       Height:           Physical Exam  Constitutional:       General: She is not in acute distress.     Appearance: She is obese.   HENT:      Head: Normocephalic and atraumatic.      Mouth/Throat:      Mouth: Mucous membranes are dry.   Eyes:      Extraocular Movements: Extraocular movements intact.   Cardiovascular:      Rate and Rhythm: Normal rate.      Pulses: Normal pulses.   Pulmonary:      Effort: Pulmonary effort is normal.   Abdominal:      General: There is distension (mild).      Palpations: Abdomen is soft.      Tenderness: There is abdominal tenderness (appropriate post-op tenderness at surgical site). There is no guarding or rebound.      Hernia: No hernia is present.   Skin:     General: Skin is warm and dry.   Neurological:      General: No focal deficit present.      Mental Status: She is alert.       CBC  10.52 10.8 167    36.6     Coag                 BMP  141 106 13 97   3.8 26 0.7     CaMgPhos  8.4   2.1   2.1      Last labs from current encounter as of 10/04/2024-7:01 PM      Significant Diagnostic Studies:   KUB with large gastric bubble and dilated small bowel with decompressed colon    A/P:  Abbie Barragan is a 89 y.o. year old female  with extensive abdominal surgical history including aorto bi-iliac bypass graft, currently on Eliquis (last dose 10/1)  who is s/p open ventral hernia repair with mesh on 10/2. Abdominal pain improved. WBC normalized. Lactate normalized. NGT removed on 10/3. Concern for postoperative ileus.    - Replace NG tube  - NPO  - Monitor for return of bowel function  - PT/OT  - Surgery will continue to follow  - Remainder of care per primary team    Juan Mane MD  [facility] General Surgery PGY-II  10/04/2024 7:05 PM

## 2024-10-05 NOTE — PLAN OF CARE
Problem: Bariatric Environmental Safety  Goal: Safety Maintained with Care  Outcome: Met  Intervention: Promote Safety and Comfort  Flowsheets (Taken 10/5/2024 1803)  Bariatric Safety: bariatric commode at bedside     Problem: Infection  Goal: Absence of Infection Signs and Symptoms  Outcome: Met     Problem: Wound  Goal: Skin Health and Integrity  Intervention: Optimize Skin Protection  Flowsheets (Taken 10/5/2024 1803)  Pressure Reduction Devices:   elbow protectors utilized   heel offloading device utilized   positioning supports utilized   pressure-redistributing mattress utilized  Skin Protection: incontinence pads utilized  Activity Management: Rolling - L1  Head of Bed (HOB) Positioning: HOB elevated

## 2024-10-05 NOTE — NURSING
Note that R posterior hand IV removed as hand swollen and tingling.     Notified PA Showers.    Will continue to f/u.

## 2024-10-05 NOTE — PROGRESS NOTES
[facility] Medicine  Progress Note    Patient Name: Abbie Barragan  MRN: [id number]  Patient Class: IP- Inpatient   Admission Date: 10/1/2024  Length of Stay: 3 days  Attending Physician: Madhuri Coates-Violeta DALY DO  Primary Care Provider: Tanner Gómez MD        Subjective:     Principal Problem:SBO (small bowel obstruction)        HPI:  89 y.o. 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 Esther Dang with vascular), Carotid artery stenosis, Essential HTN, HLD, Hypothyroid (goiter), and h/o abdominal adhesions (per surgery eval in 2023 for acute cholecystitis by Dr. Bell->aborted lap lata due to excessive adhesions) presents to the ER with N/V and abdominal pain Since Monday evening. States h/o acute cholecystitis in 2023 but treated medically due to her complex medical history. She was concerned that her symptoms could be her gallbladder again.   Pt. Denies any fevers or chills. No chest pain or SOB/ACKERMAN. Noted constipation yesterday and diarrhea, non-bloody on Monday.     IN the ED labs noted for WBC 14 and Stable H/H 13.5/43.1.  Lytes stable with normal renal function. Lipase negative.Lactic acid normal.   UA concerning for acute UTI and started on Rocephin by the ED.     CXR:  FINDINGS:  Cardiac monitoring leads overlie the chest.  There is unchanged enlargement of the cardiomediastinal silhouette.  The lungs are symmetrically expanded with diffuse coarse/increased interstitial attenuation similar to prior exam with more pronounced bibasilar interstitial prominence, similar prior examination.  No large volume of pleural fluid or pneumothorax identified noting slight increased attenuation of the left lower lung zone likely relates to prominent cardiomediastinal silhouette and overlying soft tissue.  Osseous structures are intact with  degenerative change.    CT abdomen/Pelvis with IV contrast no PO:  FINDINGS:  Heart is enlarged.  Mild bibasilar atelectatic changes and/or scarring are seen.  No pleural effusion.     No significant hepatic abnormalities are identified.  There is no intra-or extrahepatic biliary ductal dilatation.  Suspected noncalcified stones are seen within the gallbladder.  The pancreas, spleen, and adrenal glands are unremarkable.     Kidneys enhance normally with no evidence of hydronephrosis.  Left renal cysts are seen.  No abnormalities are seen along the ureteral courses.  Urinary bladder is nondistended.  Uterus has been removed.     There is diffuse dilatation of small bowel loops measuring upwards of 4-5 cm in caliber with air-fluid levels consistent with small-bowel obstruction.  This is secondary to a bowel containing supraumbilical hernia which contains short loop segment of small bowel.  Distal small bowel loops are decompressed beyond this level.  Stomach is distended with prominent air-fluid level.  No free air or free fluid.     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  There is prominent atherosclerosis.  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     No acute osseous abnormality identified.  Degenerative changes are seen in the lumbar spine.     Impression:     1. Small-bowel obstruction secondary to bowel containing supraumbilical hernia.  Clinical correlation is advised to determine potential reducibility of this hernia.  2. Postsurgical changes and additional findings as detailed above        NGT was placed and General surgery contacted by the ED for further eval of her SBO. We have been consulted for further management.        Overview/Hospital Course:  Abbie Barragan 89 y.o. female admitted to the hospital for SBO due to umbilical hernia. CT scan with bowel containing supraumbilical hernia. Lactic acid increased to 4.1. Seen by surgery and plans for  inpatient operative repair. Taken to OR 10/2, with no signs of necrotic bowel and associated down trending of lactic acid after OR. NG tube removed and diet advanced to clear liquid awaiting return of bowel function. Developed post operative ileus and NG tube replaced.    Interval History: developed return of nausea and vomiting yesterday afternoon. NG tube replaced abdominal x-ray with findings of Ileus VS SBO. Patient reports resolution of vomiting with NG tube and 2 episodes of flatus today. Reports epistaxis after NG placement, but denies other bleeding.    Review of Systems   Constitutional:  Negative for chills and fever.   Eyes:  Negative for photophobia and visual disturbance.   Respiratory:  Negative for chest tightness and shortness of breath.    Cardiovascular:  Negative for chest pain and palpitations.   Gastrointestinal:  Positive for nausea and vomiting. Negative for abdominal pain.   Genitourinary:  Negative for dysuria and hematuria.     Objective:     Vital Signs (Most Recent):  Temp: 97.7 °F (36.5 °C) (10/05/24 0418)  Pulse: 85 (10/05/24 0711)  Resp: 19 (10/05/24 0418)  BP: (!) 181/89 (10/05/24 0418)  SpO2: 95 % (10/05/24 0749) Vital Signs (24h Range):  Temp:  [97.3 °F (36.3 °C)-98.5 °F (36.9 °C)] 97.7 °F (36.5 °C)  Pulse:  [] 85  Resp:  [18-20] 19  SpO2:  [83 %-96 %] 95 %  BP: (130-181)/(68-89) 181/89     Weight: 98 kg (216 lb 0.8 oz)  Body mass index is 38.27 kg/m².    Intake/Output Summary (Last 24 hours) at 10/5/2024 0831  Last data filed at 10/4/2024 1723  Gross per 24 hour   Intake 600 ml   Output 350 ml   Net 250 ml         Physical Exam  Vitals and nursing note reviewed.   Constitutional:       General: She is not in acute distress.     Appearance: She is well-developed. She is not diaphoretic.   HENT:      Head: Normocephalic and atraumatic.      Right Ear: External ear normal.      Left Ear: External ear normal.      Nose:      Comments: NG in place without active bleeding. Scant  bleeding on tissues at bedside  Eyes:      General:         Right eye: No discharge.         Left eye: No discharge.      Conjunctiva/sclera: Conjunctivae normal.   Neck:      Thyroid: No thyromegaly.   Cardiovascular:      Rate and Rhythm: Normal rate and regular rhythm.      Heart sounds: No murmur heard.  Pulmonary:      Effort: Pulmonary effort is normal. No respiratory distress.      Breath sounds: Normal breath sounds.   Abdominal:      General: There is no distension.      Palpations: Abdomen is soft. There is no mass.      Tenderness: There is abdominal tenderness (mild appropriate abdominal tenderness).      Comments: Decreased bowel sounds   Musculoskeletal:         General: Deformity (S/P AKA) present.      Cervical back: Normal range of motion and neck supple.      Right lower leg: No edema.      Left lower leg: No edema.   Skin:     General: Skin is warm and dry.   Neurological:      Mental Status: She is alert and oriented to person, place, and time.      Sensory: No sensory deficit.   Psychiatric:         Mood and Affect: Mood normal.         Behavior: Behavior normal.             Significant Labs: CBC:   Recent Labs   Lab 10/04/24  0539 10/05/24  0430   WBC 10.52 12.90*   HGB 10.8* 10.9*   HCT 36.6* 36.7*    165     CMP:   Recent Labs   Lab 10/04/24  0539 10/05/24  0430    139   K 3.8 3.5    105   CO2 26 22*   GLU 97 98   BUN 13 11   CREATININE 0.7 0.6   CALCIUM 8.4* 8.7   ANIONGAP 9 12       Significant Imaging:   Imaging Results              XR NG/OG tube placement check, non-radiologist performed (Final result)  Result time 10/02/24 05:08:52   Procedure changed from XR Gastric tube check, non-radiologist performed     Final result by Oralia Espinoza MD (10/02/24 05:08:52)                   Impression:      Please see above.      Electronically signed by: Oralia Espinoza MD  Date:    10/02/2024  Time:    05:08               Narrative:    EXAMINATION:  XR NG/OG TUBE PLACEMENT CHECK,  NON-RADIOLOGIST PERFORMED    CLINICAL HISTORY:  NG tube placement;    TECHNIQUE:  AP View(s) of the abdomen was performed.    COMPARISON:  10/02/2024 and 10/01/2024    FINDINGS:  Interval placement of an enteric tube with tip coursing below the diaphragm and projecting over the region of the stomach in the left upper quadrant.  No interval detrimental change in the radiographic appearance of visualized intrathoracic and upper abdominal structures.                                       X-Ray Chest AP Portable (Final result)  Result time 10/02/24 02:24:54      Final result by Oralia Espinoza MD (10/02/24 02:24:54)                   Impression:      Please see above.      Electronically signed by: Oralia Espinoza MD  Date:    10/02/2024  Time:    02:24               Narrative:    EXAMINATION:  XR CHEST AP PORTABLE    CLINICAL HISTORY:  hypoxia;    TECHNIQUE:  Single frontal view of the chest was performed.    COMPARISON:  Chest radiograph 10/26/2023, CT abdomen pelvis 10/02/2024    FINDINGS:  Cardiac monitoring leads overlie the chest.  There is unchanged enlargement of the cardiomediastinal silhouette.  The lungs are symmetrically expanded with diffuse coarse/increased interstitial attenuation similar to prior exam with more pronounced bibasilar interstitial prominence, similar prior examination.  No large volume of pleural fluid or pneumothorax identified noting slight increased attenuation of the left lower lung zone likely relates to prominent cardiomediastinal silhouette and overlying soft tissue.  Osseous structures are intact with degenerative change.                                       CT Abdomen Pelvis With IV Contrast NO Oral Contrast (Final result)  Result time 10/02/24 01:53:11      Final result by Lissy Angeles MD (10/02/24 01:53:11)                   Impression:      1. Small-bowel obstruction secondary to bowel containing supraumbilical hernia.  Clinical correlation is advised to determine potential  reducibility of this hernia.  2. Postsurgical changes and additional findings as detailed above.      Electronically signed by: Lissy Angeles MD  Date:    10/02/2024  Time:    01:53               Narrative:    EXAMINATION:  CT ABDOMEN PELVIS WITH IV CONTRAST    CLINICAL HISTORY:  Abdominal pain, acute, nonlocalized;    TECHNIQUE:  Low dose axial images, sagittal and coronal reformations were obtained from the lung bases to the pubic symphysis following the IV administration of 80 mL of Omnipaque 350 .  Oral contrast was not given.    COMPARISON:  CT abdomen and pelvis from July 2023.    FINDINGS:  Heart is enlarged.  Mild bibasilar atelectatic changes and/or scarring are seen.  No pleural effusion.    No significant hepatic abnormalities are identified.  There is no intra-or extrahepatic biliary ductal dilatation.  Suspected noncalcified stones are seen within the gallbladder.  The pancreas, spleen, and adrenal glands are unremarkable.    Kidneys enhance normally with no evidence of hydronephrosis.  Left renal cysts are seen.  No abnormalities are seen along the ureteral courses.  Urinary bladder is nondistended.  Uterus has been removed.    There is diffuse dilatation of small bowel loops measuring upwards of 4-5 cm in caliber with air-fluid levels consistent with small-bowel obstruction.  This is secondary to a bowel containing supraumbilical hernia which contains short loop segment of small bowel.  Distal small bowel loops are decompressed beyond this level.  Stomach is distended with prominent air-fluid level.  No free air or free fluid.    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  There is prominent atherosclerosis.  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    No acute osseous abnormality identified.  Degenerative changes are seen in the lumbar spine.                                       X-Ray Abdomen Flat And Erect (Final result)  Result time  10/01/24 23:01:46      Final result by Lissy Angeles MD (10/01/24 23:01:46)                   Impression:      Nonobstructive bowel gas pattern.      Electronically signed by: Lissy Angeles MD  Date:    10/01/2024  Time:    23:01               Narrative:    EXAMINATION:  XR ABDOMEN FLAT AND ERECT    CLINICAL HISTORY:  Abdominal Pain;    TECHNIQUE:  Flat and erect AP views of the abdomen were performed.    COMPARISON:  None    FINDINGS:  Nonspecific bowel gas pattern.  No evidence to suggest obstruction.  No free air identified.  Scattered stool is seen in the colon.  Cardiac silhouette is enlarged.  Visualized lungs are essentially clear.                                        Assessment/Plan:      * SBO (small bowel obstruction)  Presented with SBO due to umbilical hernia. Lactic acid decreasing post operatively.  Taken to OR 10/2, no flatus or BM yet, but resolution of abdominal pain.  NG replaced given return of N/V and findings concerning for ileus. Reports passage of small flatus today. NG tube removal per surgery  Surgery following  Begin incentive spirometry given O2 sats of 94% documented    Discussed with surgery will attempt gastrograffin challenge via NG tube.     ADDENDUM IV infiltrated to right hand. Swelling to dorsum and palm of hand. Patient able to flex and extend fingers. Denies loss of sensation capillary refill less than 2seconds and raidal pulse palpable. Patient denies pain to hand. Continue extremity elevation.     Hypophosphatemia  Patient has Abnormal Phosphorus: hypophosphatemia. Will continue to monitor electrolytes closely. Will replace the affected electrolytes and repeat labs to be done after interventions completed. The patient's phosphorus results have been reviewed and are listed below.  Recent Labs   Lab 10/05/24  0430   PHOS 1.7*    Contributory to ileus, due to prolonged NPO related to surgery    Low oxygen saturation  Oxygen saturation of 83% documented overnight. On repeat  with rapid nurse improved to 92% without intervention. Currently 95% on 2L will wean oxygen, she denies SOB.  Continue incentive spirometer    PAD (peripheral artery disease)  Extensive PAD history with Axbifem Bypass in 1994 and Cartoid stenosis 60-70% reported in vascular note from 8/2024:     8/29/23 PVR right 0.4-0.5  Carotid duplex 60-79% left    8/27/24 PVR right 0.4-0.6 monophasic waveforms  Carotid duplex 40-59% but limited visualization due to heavy shadows/calcium       Eliquis and aspirin held pending surgical intervention.    Acquired hypothyroidism  Resume home Levothyroxine in 2 days         Essential hypertension  Chronic, controlled. Latest blood pressure and vitals reviewed-     Temp:  [97.6 °F (36.4 °C)-97.9 °F (36.6 °C)]   Pulse:  [59-92]   Resp:  [17-18]   BP: (130-175)/(70-78)   SpO2:  [91 %-99 %] .   Home meds for hypertension were reviewed and noted below.   Hypertension Medications               amLODIPine (NORVASC) 10 MG tablet Take 10 mg by mouth once daily.    hydroCHLOROthiazide (HYDRODIURIL) 25 MG tablet Take 25 mg by mouth once daily.    losartan (COZAAR) 100 MG tablet Take 100 mg by mouth once daily.    metoprolol tartrate (LOPRESSOR) 25 MG tablet Take 1 tablet (25 mg total) by mouth 2 (two) times daily.            While in the hospital, will manage blood pressure as follows; Resume home lopressor and losartan with HOLD parameters. HOLD other BP meds for now and diurectics due to dehydration and possible surgery.  Add back amlodipine given BP now remaining more elevated, but hold HCTZ as still not on full diet.    Will utilize p.r.n. blood pressure medication only if patient's blood pressure greater than  180/90  and she develops symptoms such as worsening chest pain or shortness of breath.    Chronic atrial fibrillation  Patient with Permanent atrial fibrillation which is controlled with lopressor 25mg PO BID.  Patient is currently in atrial fibrillation.[id number] Score: 4.  Anticoagulation indicated. Anticoagulation done with eliquis 5mg PO BID will resume after discussion with surgery tonight for 48hrs post op. Monitor on tele.  Cont. Her lopressor for now.     Tolerating eliquis without bleeding other than mild epistaxis with NG placement. No further bleeding seen      VTE Risk Mitigation (From admission, onward)           Ordered     apixaban tablet 5 mg  2 times daily         10/04/24 0940                    Discharge Planning   JULIÁN:      Code Status: Full Code   Is the patient medically ready for discharge?:     Reason for patient still in hospital (select all that apply): Patient unstable, Treatment, and Consult recommendations  Discharge Plan A: Home          Jaquan Lacy PA-C  Department of Hospital Medicine   [facility]

## 2024-10-05 NOTE — PROGRESS NOTES
Surgery Progress Note    Abbie Barragan is a 89 y.o. year old female in hospital for small bowel obstruction secondary to an incarcerated incisional ventral hernia, now s/p open ventral hernia repair on 10/2.    NGT placed for ongoing belching and absence of GI function indicating possible ileus possible obstruction and bowel dilation seen on AXR  She reports this morning she passed a small amount of gas and her pain and distension have improved     ROS:  Negative except above    PE:  Vitals:    10/05/24 0711 10/05/24 0749 10/05/24 0933 10/05/24 1134   BP:   (!) 163/77 (!) 172/65   BP Location:   Left arm Left arm   Patient Position:   Lying Lying   Pulse: 85  98 98   Resp:   18 18   Temp:   98.4 °F (36.9 °C) 98.3 °F (36.8 °C)   TempSrc:   Oral Oral   SpO2:  95% 96% (!) 94%   Weight:       Height:           Physical Exam  Constitutional:       General: She is not in acute distress.     Appearance: She is obese.   HENT:      Head: Normocephalic and atraumatic.      Mouth/Throat:      Mouth: Mucous membranes are dry.   Eyes:      Extraocular Movements: Extraocular movements intact.   Cardiovascular:      Rate and Rhythm: Normal rate.      Pulses: Normal pulses.   Pulmonary:      Effort: Pulmonary effort is normal.   Abdominal:      General: There is distension (mild).      Palpations: Abdomen is soft.      Tenderness: There is abdominal tenderness (appropriate post-op tenderness at surgical site). There is no guarding or rebound.      Hernia: No hernia is present.   Skin:     General: Skin is warm and dry.   Neurological:      General: No focal deficit present.      Mental Status: She is alert.       CBC  12.90 10.9 165    36.7     Coag                 BMP  139 105 11 98   3.5 22 0.6     CaMgPhos  8.7   2.2   1.7      Last labs from current encounter as of 10/05/2024-7:01 PM      Significant Diagnostic Studies:   KUB with large gastric bubble and dilated small bowel with decompressed colon    A/P:  Abbie Barragan is a  89 y.o. year old female  with extensive abdominal surgical history including aorto bi-iliac bypass graft, currently on Eliquis (last dose 10/1) who is s/p open ventral hernia repair with mesh on 10/2. Abdominal pain improved. WBC normalized. Lactate normalized. NGT removed on 10/3. Concern for postoperative ileus.    - Gastrograffin challenge to evaluate transit of contrast through small bowel  -await further BESSIE Handley MD   General Surgery  [facility]

## 2024-10-05 NOTE — ASSESSMENT & PLAN NOTE
Patient with Permanent atrial fibrillation which is controlled with lopressor 25mg PO BID.  Patient is currently in atrial fibrillation.[id number] Score: 4. Anticoagulation indicated. Anticoagulation done with eliquis 5mg PO BID will resume after discussion with surgery tonight for 48hrs post op. Monitor on tele.  Cont. Her lopressor for now.     Tolerating eliquis without bleeding other than mild epistaxis with NG placement. No further bleeding seen

## 2024-10-05 NOTE — ASSESSMENT & PLAN NOTE
Patient has Abnormal Phosphorus: hypophosphatemia. Will continue to monitor electrolytes closely. Will replace the affected electrolytes and repeat labs to be done after interventions completed. The patient's phosphorus results have been reviewed and are listed below.  Recent Labs   Lab 10/06/24  0449   PHOS 2.3*    Contributory to ileus, due to prolonged NPO related to surgery

## 2024-10-05 NOTE — SUBJECTIVE & OBJECTIVE
Interval History: developed return of nausea and vomiting yesterday afternoon. NG tube replaced abdominal x-ray with findings of Ileus VS SBO. Patient reports resolution of vomiting with NG tube and 2 episodes of flatus today. Reports epistaxis after NG placement, but denies other bleeding.    Review of Systems   Constitutional:  Negative for chills and fever.   Eyes:  Negative for photophobia and visual disturbance.   Respiratory:  Negative for chest tightness and shortness of breath.    Cardiovascular:  Negative for chest pain and palpitations.   Gastrointestinal:  Positive for nausea and vomiting. Negative for abdominal pain.   Genitourinary:  Negative for dysuria and hematuria.     Objective:     Vital Signs (Most Recent):  Temp: 97.7 °F (36.5 °C) (10/05/24 0418)  Pulse: 85 (10/05/24 0711)  Resp: 19 (10/05/24 0418)  BP: (!) 181/89 (10/05/24 0418)  SpO2: 95 % (10/05/24 0749) Vital Signs (24h Range):  Temp:  [97.3 °F (36.3 °C)-98.5 °F (36.9 °C)] 97.7 °F (36.5 °C)  Pulse:  [] 85  Resp:  [18-20] 19  SpO2:  [83 %-96 %] 95 %  BP: (130-181)/(68-89) 181/89     Weight: 98 kg (216 lb 0.8 oz)  Body mass index is 38.27 kg/m².    Intake/Output Summary (Last 24 hours) at 10/5/2024 0831  Last data filed at 10/4/2024 1723  Gross per 24 hour   Intake 600 ml   Output 350 ml   Net 250 ml         Physical Exam  Vitals and nursing note reviewed.   Constitutional:       General: She is not in acute distress.     Appearance: She is well-developed. She is not diaphoretic.   HENT:      Head: Normocephalic and atraumatic.      Right Ear: External ear normal.      Left Ear: External ear normal.      Nose:      Comments: NG in place without active bleeding. Scant bleeding on tissues at bedside  Eyes:      General:         Right eye: No discharge.         Left eye: No discharge.      Conjunctiva/sclera: Conjunctivae normal.   Neck:      Thyroid: No thyromegaly.   Cardiovascular:      Rate and Rhythm: Normal rate and regular rhythm.       Heart sounds: No murmur heard.  Pulmonary:      Effort: Pulmonary effort is normal. No respiratory distress.      Breath sounds: Normal breath sounds.   Abdominal:      General: There is no distension.      Palpations: Abdomen is soft. There is no mass.      Tenderness: There is abdominal tenderness (mild appropriate abdominal tenderness).      Comments: Decreased bowel sounds   Musculoskeletal:         General: Deformity (S/P AKA) present.      Cervical back: Normal range of motion and neck supple.      Right lower leg: No edema.      Left lower leg: No edema.   Skin:     General: Skin is warm and dry.   Neurological:      Mental Status: She is alert and oriented to person, place, and time.      Sensory: No sensory deficit.   Psychiatric:         Mood and Affect: Mood normal.         Behavior: Behavior normal.             Significant Labs: CBC:   Recent Labs   Lab 10/04/24  0539 10/05/24  0430   WBC 10.52 12.90*   HGB 10.8* 10.9*   HCT 36.6* 36.7*    165     CMP:   Recent Labs   Lab 10/04/24  0539 10/05/24  0430    139   K 3.8 3.5    105   CO2 26 22*   GLU 97 98   BUN 13 11   CREATININE 0.7 0.6   CALCIUM 8.4* 8.7   ANIONGAP 9 12       Significant Imaging:   Imaging Results              XR NG/OG tube placement check, non-radiologist performed (Final result)  Result time 10/02/24 05:08:52   Procedure changed from XR Gastric tube check, non-radiologist performed     Final result by Oralia Espinoza MD (10/02/24 05:08:52)                   Impression:      Please see above.      Electronically signed by: Oralia Espinoza MD  Date:    10/02/2024  Time:    05:08               Narrative:    EXAMINATION:  XR NG/OG TUBE PLACEMENT CHECK, NON-RADIOLOGIST PERFORMED    CLINICAL HISTORY:  NG tube placement;    TECHNIQUE:  AP View(s) of the abdomen was performed.    COMPARISON:  10/02/2024 and 10/01/2024    FINDINGS:  Interval placement of an enteric tube with tip coursing below the diaphragm and projecting over  the region of the stomach in the left upper quadrant.  No interval detrimental change in the radiographic appearance of visualized intrathoracic and upper abdominal structures.                                       X-Ray Chest AP Portable (Final result)  Result time 10/02/24 02:24:54      Final result by Oralia Espinoza MD (10/02/24 02:24:54)                   Impression:      Please see above.      Electronically signed by: Oralia Espinoza MD  Date:    10/02/2024  Time:    02:24               Narrative:    EXAMINATION:  XR CHEST AP PORTABLE    CLINICAL HISTORY:  hypoxia;    TECHNIQUE:  Single frontal view of the chest was performed.    COMPARISON:  Chest radiograph 10/26/2023, CT abdomen pelvis 10/02/2024    FINDINGS:  Cardiac monitoring leads overlie the chest.  There is unchanged enlargement of the cardiomediastinal silhouette.  The lungs are symmetrically expanded with diffuse coarse/increased interstitial attenuation similar to prior exam with more pronounced bibasilar interstitial prominence, similar prior examination.  No large volume of pleural fluid or pneumothorax identified noting slight increased attenuation of the left lower lung zone likely relates to prominent cardiomediastinal silhouette and overlying soft tissue.  Osseous structures are intact with degenerative change.                                       CT Abdomen Pelvis With IV Contrast NO Oral Contrast (Final result)  Result time 10/02/24 01:53:11      Final result by Lissy Angeles MD (10/02/24 01:53:11)                   Impression:      1. Small-bowel obstruction secondary to bowel containing supraumbilical hernia.  Clinical correlation is advised to determine potential reducibility of this hernia.  2. Postsurgical changes and additional findings as detailed above.      Electronically signed by: Lsisy Angeles MD  Date:    10/02/2024  Time:    01:53               Narrative:    EXAMINATION:  CT ABDOMEN PELVIS WITH IV CONTRAST    CLINICAL  HISTORY:  Abdominal pain, acute, nonlocalized;    TECHNIQUE:  Low dose axial images, sagittal and coronal reformations were obtained from the lung bases to the pubic symphysis following the IV administration of 80 mL of Omnipaque 350 .  Oral contrast was not given.    COMPARISON:  CT abdomen and pelvis from July 2023.    FINDINGS:  Heart is enlarged.  Mild bibasilar atelectatic changes and/or scarring are seen.  No pleural effusion.    No significant hepatic abnormalities are identified.  There is no intra-or extrahepatic biliary ductal dilatation.  Suspected noncalcified stones are seen within the gallbladder.  The pancreas, spleen, and adrenal glands are unremarkable.    Kidneys enhance normally with no evidence of hydronephrosis.  Left renal cysts are seen.  No abnormalities are seen along the ureteral courses.  Urinary bladder is nondistended.  Uterus has been removed.    There is diffuse dilatation of small bowel loops measuring upwards of 4-5 cm in caliber with air-fluid levels consistent with small-bowel obstruction.  This is secondary to a bowel containing supraumbilical hernia which contains short loop segment of small bowel.  Distal small bowel loops are decompressed beyond this level.  Stomach is distended with prominent air-fluid level.  No free air or free fluid.    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  There is prominent atherosclerosis.  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    No acute osseous abnormality identified.  Degenerative changes are seen in the lumbar spine.                                       X-Ray Abdomen Flat And Erect (Final result)  Result time 10/01/24 23:01:46      Final result by Lissy Angeles MD (10/01/24 23:01:46)                   Impression:      Nonobstructive bowel gas pattern.      Electronically signed by: Lissy Angeles MD  Date:    10/01/2024  Time:    23:01               Narrative:     EXAMINATION:  XR ABDOMEN FLAT AND ERECT    CLINICAL HISTORY:  Abdominal Pain;    TECHNIQUE:  Flat and erect AP views of the abdomen were performed.    COMPARISON:  None    FINDINGS:  Nonspecific bowel gas pattern.  No evidence to suggest obstruction.  No free air identified.  Scattered stool is seen in the colon.  Cardiac silhouette is enlarged.  Visualized lungs are essentially clear.

## 2024-10-05 NOTE — ASSESSMENT & PLAN NOTE
Oxygen saturation of 83% documented overnight. On repeat with rapid nurse improved to 92% without intervention. Currently 95% on 2L will wean oxygen, she denies SOB.  Continue incentive spirometer

## 2024-10-05 NOTE — NURSING
[facility] MEWS TRIGGER FOLLOW UP       MEWS Monitoring, Score is: 3  Indication for review: , SpO2     Bedside Nurse, Devin in room, patient seen at bedside. HR and SpO2 rechecked, HR now 96 and SpO2 92. No issues at this time. Please call [phone] with any concerns.

## 2024-10-05 NOTE — ASSESSMENT & PLAN NOTE
Presented with SBO due to umbilical hernia. Lactic acid decreasing post operatively.  Taken to OR 10/2, no flatus or BM yet, but resolution of abdominal pain.  NG replaced given return of N/V and findings concerning for ileus. Reports passage of small flatus today. NG tube removal per surgery  Surgery following  Begin incentive spirometry given O2 sats of 94% documented

## 2024-10-06 LAB
ANION GAP SERPL CALC-SCNC: 11 MMOL/L (ref 8–16)
BASOPHILS # BLD AUTO: 0.02 K/UL (ref 0–0.2)
BASOPHILS NFR BLD: 0.2 % (ref 0–1.9)
BUN SERPL-MCNC: 11 MG/DL (ref 8–23)
CALCIUM SERPL-MCNC: 8.9 MG/DL (ref 8.7–10.5)
CHLORIDE SERPL-SCNC: 106 MMOL/L (ref 95–110)
CO2 SERPL-SCNC: 26 MMOL/L (ref 23–29)
CREAT SERPL-MCNC: 0.6 MG/DL (ref 0.5–1.4)
DIFFERENTIAL METHOD BLD: ABNORMAL
EOSINOPHIL # BLD AUTO: 0.1 K/UL (ref 0–0.5)
EOSINOPHIL NFR BLD: 0.4 % (ref 0–8)
ERYTHROCYTE [DISTWIDTH] IN BLOOD BY AUTOMATED COUNT: 15.1 % (ref 11.5–14.5)
EST. GFR  (NO RACE VARIABLE): >60 ML/MIN/1.73 M^2
GLUCOSE SERPL-MCNC: 98 MG/DL (ref 70–110)
HCT VFR BLD AUTO: 36.2 % (ref 37–48.5)
HGB BLD-MCNC: 10.7 G/DL (ref 12–16)
IMM GRANULOCYTES # BLD AUTO: 0.08 K/UL (ref 0–0.04)
IMM GRANULOCYTES NFR BLD AUTO: 0.7 % (ref 0–0.5)
LYMPHOCYTES # BLD AUTO: 1.8 K/UL (ref 1–4.8)
LYMPHOCYTES NFR BLD: 15.1 % (ref 18–48)
MAGNESIUM SERPL-MCNC: 2.1 MG/DL (ref 1.6–2.6)
MCH RBC QN AUTO: 27.1 PG (ref 27–31)
MCHC RBC AUTO-ENTMCNC: 29.6 G/DL (ref 32–36)
MCV RBC AUTO: 92 FL (ref 82–98)
MONOCYTES # BLD AUTO: 0.5 K/UL (ref 0.3–1)
MONOCYTES NFR BLD: 4.4 % (ref 4–15)
NEUTROPHILS # BLD AUTO: 9.4 K/UL (ref 1.8–7.7)
NEUTROPHILS NFR BLD: 79.2 % (ref 38–73)
NRBC BLD-RTO: 0 /100 WBC
PHOSPHATE SERPL-MCNC: 2.3 MG/DL (ref 2.7–4.5)
PLATELET # BLD AUTO: 194 K/UL (ref 150–450)
PMV BLD AUTO: 12.8 FL (ref 9.2–12.9)
POTASSIUM SERPL-SCNC: 3.5 MMOL/L (ref 3.5–5.1)
RBC # BLD AUTO: 3.95 M/UL (ref 4–5.4)
SODIUM SERPL-SCNC: 143 MMOL/L (ref 136–145)
WBC # BLD AUTO: 11.89 K/UL (ref 3.9–12.7)

## 2024-10-06 PROCEDURE — 99900035 HC TECH TIME PER 15 MIN (STAT)

## 2024-10-06 PROCEDURE — 84100 ASSAY OF PHOSPHORUS: CPT | Performed by: STUDENT IN AN ORGANIZED HEALTH CARE EDUCATION/TRAINING PROGRAM

## 2024-10-06 PROCEDURE — 25000003 PHARM REV CODE 250: Performed by: PHYSICIAN ASSISTANT

## 2024-10-06 PROCEDURE — 94761 N-INVAS EAR/PLS OXIMETRY MLT: CPT

## 2024-10-06 PROCEDURE — 63600175 PHARM REV CODE 636 W HCPCS: Performed by: STUDENT IN AN ORGANIZED HEALTH CARE EDUCATION/TRAINING PROGRAM

## 2024-10-06 PROCEDURE — 36569 INSJ PICC 5 YR+ W/O IMAGING: CPT

## 2024-10-06 PROCEDURE — 85025 COMPLETE CBC W/AUTO DIFF WBC: CPT | Performed by: STUDENT IN AN ORGANIZED HEALTH CARE EDUCATION/TRAINING PROGRAM

## 2024-10-06 PROCEDURE — 21400001 HC TELEMETRY ROOM

## 2024-10-06 PROCEDURE — 80048 BASIC METABOLIC PNL TOTAL CA: CPT | Performed by: STUDENT IN AN ORGANIZED HEALTH CARE EDUCATION/TRAINING PROGRAM

## 2024-10-06 PROCEDURE — 25000003 PHARM REV CODE 250: Performed by: STUDENT IN AN ORGANIZED HEALTH CARE EDUCATION/TRAINING PROGRAM

## 2024-10-06 PROCEDURE — A4216 STERILE WATER/SALINE, 10 ML: HCPCS | Performed by: PHYSICIAN ASSISTANT

## 2024-10-06 PROCEDURE — 63600175 PHARM REV CODE 636 W HCPCS: Performed by: PHYSICIAN ASSISTANT

## 2024-10-06 PROCEDURE — C1751 CATH, INF, PER/CENT/MIDLINE: HCPCS

## 2024-10-06 PROCEDURE — 36415 COLL VENOUS BLD VENIPUNCTURE: CPT | Performed by: STUDENT IN AN ORGANIZED HEALTH CARE EDUCATION/TRAINING PROGRAM

## 2024-10-06 PROCEDURE — 99900031 HC PATIENT EDUCATION (STAT)

## 2024-10-06 PROCEDURE — 36406 VNPNXR<3YRS PHY/QHP OTHER VN: CPT

## 2024-10-06 PROCEDURE — 83735 ASSAY OF MAGNESIUM: CPT | Performed by: STUDENT IN AN ORGANIZED HEALTH CARE EDUCATION/TRAINING PROGRAM

## 2024-10-06 PROCEDURE — 27000221 HC OXYGEN, UP TO 24 HOURS

## 2024-10-06 RX ORDER — SODIUM CHLORIDE 0.9 % (FLUSH) 0.9 %
10 SYRINGE (ML) INJECTION EVERY 6 HOURS
Status: DISCONTINUED | OUTPATIENT
Start: 2024-10-06 | End: 2024-10-09 | Stop reason: HOSPADM

## 2024-10-06 RX ORDER — SODIUM CHLORIDE 0.9 % (FLUSH) 0.9 %
10 SYRINGE (ML) INJECTION
Status: DISCONTINUED | OUTPATIENT
Start: 2024-10-06 | End: 2024-10-09 | Stop reason: HOSPADM

## 2024-10-06 RX ORDER — METOPROLOL TARTRATE 50 MG/1
50 TABLET ORAL 2 TIMES DAILY
Status: DISCONTINUED | OUTPATIENT
Start: 2024-10-06 | End: 2024-10-09 | Stop reason: HOSPADM

## 2024-10-06 RX ADMIN — METOPROLOL TARTRATE 25 MG: 25 TABLET, FILM COATED ORAL at 10:10

## 2024-10-06 RX ADMIN — MUPIROCIN: 20 OINTMENT TOPICAL at 09:10

## 2024-10-06 RX ADMIN — LOSARTAN POTASSIUM 100 MG: 25 TABLET, FILM COATED ORAL at 10:10

## 2024-10-06 RX ADMIN — SODIUM PHOSPHATE, MONOBASIC, MONOHYDRATE AND SODIUM PHOSPHATE, DIBASIC, ANHYDROUS 15 MMOL: 142; 276 INJECTION, SOLUTION INTRAVENOUS at 10:10

## 2024-10-06 RX ADMIN — HYDRALAZINE HYDROCHLORIDE 5 MG: 20 INJECTION INTRAMUSCULAR; INTRAVENOUS at 05:10

## 2024-10-06 RX ADMIN — ONDANSETRON 4 MG: 2 INJECTION INTRAMUSCULAR; INTRAVENOUS at 09:10

## 2024-10-06 RX ADMIN — APIXABAN 5 MG: 5 TABLET, FILM COATED ORAL at 10:10

## 2024-10-06 RX ADMIN — AMLODIPINE BESYLATE 10 MG: 5 TABLET ORAL at 10:10

## 2024-10-06 RX ADMIN — ONDANSETRON 4 MG: 2 INJECTION INTRAMUSCULAR; INTRAVENOUS at 05:10

## 2024-10-06 RX ADMIN — SODIUM CHLORIDE, POTASSIUM CHLORIDE, SODIUM LACTATE AND CALCIUM CHLORIDE: 600; 310; 30; 20 INJECTION, SOLUTION INTRAVENOUS at 10:10

## 2024-10-06 RX ADMIN — METOPROLOL TARTRATE 50 MG: 50 TABLET, FILM COATED ORAL at 09:10

## 2024-10-06 RX ADMIN — SODIUM CHLORIDE, POTASSIUM CHLORIDE, SODIUM LACTATE AND CALCIUM CHLORIDE: 600; 310; 30; 20 INJECTION, SOLUTION INTRAVENOUS at 04:10

## 2024-10-06 RX ADMIN — Medication 10 ML: at 07:10

## 2024-10-06 RX ADMIN — MUPIROCIN: 20 OINTMENT TOPICAL at 10:10

## 2024-10-06 RX ADMIN — APIXABAN 5 MG: 5 TABLET, FILM COATED ORAL at 09:10

## 2024-10-06 NOTE — ASSESSMENT & PLAN NOTE
Presented with SBO due to umbilical hernia. Lactic acid decreasing post operatively.  Taken to OR 10/2, no flatus or BM yet, but resolution of abdominal pain.  NG replaced given return of N/V and findings concerning for ileus.  NG tube removal per surgery  Underwent gastrograffin challenge with multiple bowel movements  Surgery following  Begin incentive spirometry given O2 sats of 94% documented

## 2024-10-06 NOTE — PROGRESS NOTES
[facility] Medicine  Progress Note    Patient Name: Abbie Barragan  MRN: [id number]  Patient Class: IP- Inpatient   Admission Date: 10/1/2024  Length of Stay: 4 days  Attending Physician: aMdhuri Coates-Violeta DALY DO  Primary Care Provider: Tanner Gómez MD        Subjective:     Principal Problem:SBO (small bowel obstruction)        HPI:  89 y.o. 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 Esther Dang with vascular), Carotid artery stenosis, Essential HTN, HLD, Hypothyroid (goiter), and h/o abdominal adhesions (per surgery eval in 2023 for acute cholecystitis by Dr. Bell->aborted lap lata due to excessive adhesions) presents to the ER with N/V and abdominal pain Since Monday evening. States h/o acute cholecystitis in 2023 but treated medically due to her complex medical history. She was concerned that her symptoms could be her gallbladder again.   Pt. Denies any fevers or chills. No chest pain or SOB/ACKERMAN. Noted constipation yesterday and diarrhea, non-bloody on Monday.     IN the ED labs noted for WBC 14 and Stable H/H 13.5/43.1.  Lytes stable with normal renal function. Lipase negative.Lactic acid normal.   UA concerning for acute UTI and started on Rocephin by the ED.     CXR:  FINDINGS:  Cardiac monitoring leads overlie the chest.  There is unchanged enlargement of the cardiomediastinal silhouette.  The lungs are symmetrically expanded with diffuse coarse/increased interstitial attenuation similar to prior exam with more pronounced bibasilar interstitial prominence, similar prior examination.  No large volume of pleural fluid or pneumothorax identified noting slight increased attenuation of the left lower lung zone likely relates to prominent cardiomediastinal silhouette and overlying soft tissue.  Osseous structures are intact with  degenerative change.    CT abdomen/Pelvis with IV contrast no PO:  FINDINGS:  Heart is enlarged.  Mild bibasilar atelectatic changes and/or scarring are seen.  No pleural effusion.     No significant hepatic abnormalities are identified.  There is no intra-or extrahepatic biliary ductal dilatation.  Suspected noncalcified stones are seen within the gallbladder.  The pancreas, spleen, and adrenal glands are unremarkable.     Kidneys enhance normally with no evidence of hydronephrosis.  Left renal cysts are seen.  No abnormalities are seen along the ureteral courses.  Urinary bladder is nondistended.  Uterus has been removed.     There is diffuse dilatation of small bowel loops measuring upwards of 4-5 cm in caliber with air-fluid levels consistent with small-bowel obstruction.  This is secondary to a bowel containing supraumbilical hernia which contains short loop segment of small bowel.  Distal small bowel loops are decompressed beyond this level.  Stomach is distended with prominent air-fluid level.  No free air or free fluid.     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  There is prominent atherosclerosis.  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     No acute osseous abnormality identified.  Degenerative changes are seen in the lumbar spine.     Impression:     1. Small-bowel obstruction secondary to bowel containing supraumbilical hernia.  Clinical correlation is advised to determine potential reducibility of this hernia.  2. Postsurgical changes and additional findings as detailed above        NGT was placed and General surgery contacted by the ED for further eval of her SBO. We have been consulted for further management.        Overview/Hospital Course:  Abbie Barragan 89 y.o. female admitted to the hospital for SBO due to umbilical hernia. CT scan with bowel containing supraumbilical hernia. Lactic acid increased to 4.1. Seen by surgery and plans for  inpatient operative repair. Taken to OR 10/2, with no signs of necrotic bowel and associated down trending of lactic acid after OR. NG tube removed and diet advanced to clear liquid awaiting return of bowel function. Developed post operative ileus and NG tube replaced. Gastrograffing challenge attempted with multiple bowel movements.    Interval History: no further nausea and vomiting. Occasionally coughing mucous. Reports 3 bowel movements with gastrograffin challenge. No further bleeding    Review of Systems   Constitutional:  Negative for chills and fever.   Eyes:  Negative for photophobia and visual disturbance.   Respiratory:  Negative for chest tightness and shortness of breath.    Cardiovascular:  Negative for chest pain and palpitations.   Gastrointestinal:  Negative for abdominal pain, nausea and vomiting.   Genitourinary:  Negative for dysuria and hematuria.     Objective:     Vital Signs (Most Recent):  Temp: 98.3 °F (36.8 °C) (10/06/24 0756)  Pulse: 96 (10/06/24 0756)  Resp: 18 (10/06/24 0756)  BP: (!) 185/81 (10/06/24 0756)  SpO2: 99 % (10/06/24 0802) Vital Signs (24h Range):  Temp:  [97.4 °F (36.3 °C)-98.6 °F (37 °C)] 98.3 °F (36.8 °C)  Pulse:  [] 96  Resp:  [18] 18  SpO2:  [90 %-99 %] 99 %  BP: (136-185)/(65-87) 185/81     Weight: 98 kg (216 lb 0.8 oz)  Body mass index is 38.27 kg/m².  No intake or output data in the 24 hours ending 10/06/24 0845      Physical Exam  Vitals and nursing note reviewed.   Constitutional:       General: She is not in acute distress.     Appearance: She is well-developed. She is not diaphoretic.   HENT:      Head: Normocephalic and atraumatic.      Right Ear: External ear normal.      Left Ear: External ear normal.      Nose:      Comments: NG in place without active bleeding. Scant bleeding on tissues at bedside  Eyes:      General:         Right eye: No discharge.         Left eye: No discharge.      Conjunctiva/sclera: Conjunctivae normal.   Neck:      Thyroid: No  thyromegaly.   Cardiovascular:      Rate and Rhythm: Normal rate and regular rhythm.      Heart sounds: No murmur heard.  Pulmonary:      Effort: Pulmonary effort is normal. No respiratory distress.      Breath sounds: Normal breath sounds.   Abdominal:      General: Bowel sounds are normal. There is no distension.      Palpations: Abdomen is soft. There is no mass.      Tenderness: There is no abdominal tenderness.      Comments: Bowel sounds increased and now normal   Musculoskeletal:         General: No deformity.      Cervical back: Normal range of motion and neck supple.      Right lower leg: No edema.      Left lower leg: No edema.   Skin:     General: Skin is warm and dry.   Neurological:      Mental Status: She is alert and oriented to person, place, and time.      Sensory: No sensory deficit.   Psychiatric:         Mood and Affect: Mood normal.         Behavior: Behavior normal.             Significant Labs: CBC:   Recent Labs   Lab 10/05/24  0430 10/06/24  0449   WBC 12.90* 11.89   HGB 10.9* 10.7*   HCT 36.7* 36.2*    194     CMP:   Recent Labs   Lab 10/05/24  0430 10/06/24  0449    143   K 3.5 3.5    106   CO2 22* 26   GLU 98 98   BUN 11 11   CREATININE 0.6 0.6   CALCIUM 8.7 8.9   ANIONGAP 12 11       Significant Imaging:   Imaging Results              XR NG/OG tube placement check, non-radiologist performed (Final result)  Result time 10/02/24 05:08:52   Procedure changed from XR Gastric tube check, non-radiologist performed     Final result by Oralia Espinoza MD (10/02/24 05:08:52)                   Impression:      Please see above.      Electronically signed by: Oralia Espinoza MD  Date:    10/02/2024  Time:    05:08               Narrative:    EXAMINATION:  XR NG/OG TUBE PLACEMENT CHECK, NON-RADIOLOGIST PERFORMED    CLINICAL HISTORY:  NG tube placement;    TECHNIQUE:  AP View(s) of the abdomen was performed.    COMPARISON:  10/02/2024 and 10/01/2024    FINDINGS:  Interval placement  of an enteric tube with tip coursing below the diaphragm and projecting over the region of the stomach in the left upper quadrant.  No interval detrimental change in the radiographic appearance of visualized intrathoracic and upper abdominal structures.                                       X-Ray Chest AP Portable (Final result)  Result time 10/02/24 02:24:54      Final result by Oralia Espinoza MD (10/02/24 02:24:54)                   Impression:      Please see above.      Electronically signed by: Oralia Espinoza MD  Date:    10/02/2024  Time:    02:24               Narrative:    EXAMINATION:  XR CHEST AP PORTABLE    CLINICAL HISTORY:  hypoxia;    TECHNIQUE:  Single frontal view of the chest was performed.    COMPARISON:  Chest radiograph 10/26/2023, CT abdomen pelvis 10/02/2024    FINDINGS:  Cardiac monitoring leads overlie the chest.  There is unchanged enlargement of the cardiomediastinal silhouette.  The lungs are symmetrically expanded with diffuse coarse/increased interstitial attenuation similar to prior exam with more pronounced bibasilar interstitial prominence, similar prior examination.  No large volume of pleural fluid or pneumothorax identified noting slight increased attenuation of the left lower lung zone likely relates to prominent cardiomediastinal silhouette and overlying soft tissue.  Osseous structures are intact with degenerative change.                                       CT Abdomen Pelvis With IV Contrast NO Oral Contrast (Final result)  Result time 10/02/24 01:53:11      Final result by Lissy Angeles MD (10/02/24 01:53:11)                   Impression:      1. Small-bowel obstruction secondary to bowel containing supraumbilical hernia.  Clinical correlation is advised to determine potential reducibility of this hernia.  2. Postsurgical changes and additional findings as detailed above.      Electronically signed by: Lissy Angeles MD  Date:    10/02/2024  Time:    01:53                Narrative:    EXAMINATION:  CT ABDOMEN PELVIS WITH IV CONTRAST    CLINICAL HISTORY:  Abdominal pain, acute, nonlocalized;    TECHNIQUE:  Low dose axial images, sagittal and coronal reformations were obtained from the lung bases to the pubic symphysis following the IV administration of 80 mL of Omnipaque 350 .  Oral contrast was not given.    COMPARISON:  CT abdomen and pelvis from July 2023.    FINDINGS:  Heart is enlarged.  Mild bibasilar atelectatic changes and/or scarring are seen.  No pleural effusion.    No significant hepatic abnormalities are identified.  There is no intra-or extrahepatic biliary ductal dilatation.  Suspected noncalcified stones are seen within the gallbladder.  The pancreas, spleen, and adrenal glands are unremarkable.    Kidneys enhance normally with no evidence of hydronephrosis.  Left renal cysts are seen.  No abnormalities are seen along the ureteral courses.  Urinary bladder is nondistended.  Uterus has been removed.    There is diffuse dilatation of small bowel loops measuring upwards of 4-5 cm in caliber with air-fluid levels consistent with small-bowel obstruction.  This is secondary to a bowel containing supraumbilical hernia which contains short loop segment of small bowel.  Distal small bowel loops are decompressed beyond this level.  Stomach is distended with prominent air-fluid level.  No free air or free fluid.    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  There is prominent atherosclerosis.  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    No acute osseous abnormality identified.  Degenerative changes are seen in the lumbar spine.                                       X-Ray Abdomen Flat And Erect (Final result)  Result time 10/01/24 23:01:46      Final result by Lissy Angeles MD (10/01/24 23:01:46)                   Impression:      Nonobstructive bowel gas pattern.      Electronically signed by: Lissy Angeles  MD  Date:    10/01/2024  Time:    23:01               Narrative:    EXAMINATION:  XR ABDOMEN FLAT AND ERECT    CLINICAL HISTORY:  Abdominal Pain;    TECHNIQUE:  Flat and erect AP views of the abdomen were performed.    COMPARISON:  None    FINDINGS:  Nonspecific bowel gas pattern.  No evidence to suggest obstruction.  No free air identified.  Scattered stool is seen in the colon.  Cardiac silhouette is enlarged.  Visualized lungs are essentially clear.                                        Assessment/Plan:      * SBO (small bowel obstruction)  Presented with SBO due to umbilical hernia. Lactic acid decreasing post operatively.  Taken to OR 10/2, no flatus or BM yet, but resolution of abdominal pain.  NG replaced given return of N/V and findings concerning for ileus.  NG tube removal per surgery  Underwent gastrograffin challenge with multiple bowel movements  Surgery following  Begin incentive spirometry given O2 sats of 94% documented    Hypophosphatemia  Patient has Abnormal Phosphorus: hypophosphatemia. Will continue to monitor electrolytes closely. Will replace the affected electrolytes and repeat labs to be done after interventions completed. The patient's phosphorus results have been reviewed and are listed below.  Recent Labs   Lab 10/06/24  0449   PHOS 2.3*    Contributory to ileus, due to prolonged NPO related to surgery    Low oxygen saturation  Oxygen saturation of 83% documented overnight on 10/4. On repeat with rapid nurse improved to 92% without intervention. Currently 99% on 2L will wean oxygen, she denies SOB.  Continue incentive spirometer    PAD (peripheral artery disease)  Extensive PAD history with Axbifem Bypass in 1994 and Cartoid stenosis 60-70% reported in vascular note from 8/2024:     8/29/23 PVR right 0.4-0.5  Carotid duplex 60-79% left    8/27/24 PVR right 0.4-0.6 monophasic waveforms  Carotid duplex 40-59% but limited visualization due to heavy shadows/calcium       Eliquis resumed,  confirmed with patient no taking aspirin or plavix reports stopped by outpt vascular    Acquired hypothyroidism  Resume home Levothyroxine in 2 days         Essential hypertension  Chronic, controlled. Latest blood pressure and vitals reviewed-     Temp:  [97.6 °F (36.4 °C)-97.9 °F (36.6 °C)]   Pulse:  [59-92]   Resp:  [17-18]   BP: (130-175)/(70-78)   SpO2:  [91 %-99 %] .   Home meds for hypertension were reviewed and noted below.   Hypertension Medications               amLODIPine (NORVASC) 10 MG tablet Take 10 mg by mouth once daily.    hydroCHLOROthiazide (HYDRODIURIL) 25 MG tablet Take 25 mg by mouth once daily.    losartan (COZAAR) 100 MG tablet Take 100 mg by mouth once daily.    metoprolol tartrate (LOPRESSOR) 25 MG tablet Take 1 tablet (25 mg total) by mouth 2 (two) times daily.            While in the hospital, will manage blood pressure as follows; Resume home lopressor and losartan with HOLD parameters. HOLD other BP meds for now and diurectics due to dehydration and possible surgery.  Add back amlodipine given BP now remaining more elevated, but hold HCTZ as still not on full diet.    Will utilize p.r.n. blood pressure medication only if patient's blood pressure greater than  180/90  and she develops symptoms such as worsening chest pain or shortness of breath.    Chronic atrial fibrillation  Patient with Permanent atrial fibrillation which is controlled with lopressor 25mg PO BID.  Patient is currently in atrial fibrillation.[id number] Score: 4. Anticoagulation indicated. Anticoagulation done with eliquis 5mg PO BID will resume after discussion with surgery tonight for 48hrs post op. Monitor on tele.  Cont. Her lopressor for now.     Tolerating eliquis without bleeding other than mild epistaxis with NG placement. No further bleeding seen      VTE Risk Mitigation (From admission, onward)           Ordered     apixaban tablet 5 mg  2 times daily         10/04/24 0940                    Discharge Planning    JULIÁN:      Code Status: Full Code   Is the patient medically ready for discharge?:     Reason for patient still in hospital (select all that apply): Patient unstable, Patient trending condition, and Consult recommendations  Discharge Plan A: Home        Pending tolerating PO and NG tube removal    Jaquan Lacy PA-C  Department of [facility] Medicine   [facility]

## 2024-10-06 NOTE — ASSESSMENT & PLAN NOTE
Extensive PAD history with Axbifem Bypass in 1994 and Cartoid stenosis 60-70% reported in vascular note from 8/2024:     8/29/23 PVR right 0.4-0.5  Carotid duplex 60-79% left    8/27/24 PVR right 0.4-0.6 monophasic waveforms  Carotid duplex 40-59% but limited visualization due to heavy shadows/calcium       Eliquis resumed, confirmed with patient no taking aspirin or plavix reports stopped by outpt vascular

## 2024-10-06 NOTE — NURSING
[facility]  Nurses Note -- 4 Eyes      10/6/2024       Skin assessed on: Q Shift      [x] No Pressure Injuries Present    [x]Prevention Measures Documented    [] Yes LDA  for Pressure Injury Previously documented     [] Yes New Pressure Injury Discovered   [] LDA for New Pressure Injury Added      Attending RN:  Devin Soni RN     Second RN:  DANA Mcdonnell

## 2024-10-06 NOTE — PROGRESS NOTES
Surgery Progress Note    Abbie Barragan is a 89 y.o. year old female in hospital for small bowel obstruction secondary to an incarcerated incisional ventral hernia, now s/p open ventral hernia repair on 10/2.    NGT placed for ongoing belching and absence of GI function indicating possible ileus possible obstruction and bowel dilation seen on AXR  She has had 2 bowel movements. Gastrograffin challenge showed contrast in the colon. However patient reports nausea this morning.     ROS:  Negative except above    PE:  Vitals:    10/06/24 0456 10/06/24 0756 10/06/24 0802 10/06/24 1143   BP: (!) 178/81 (!) 185/81  (!) 178/77   BP Location: Left arm Right arm  Left arm   Patient Position: Lying Lying  Lying   Pulse: 95 96  71   Resp: 18 18  18   Temp: 97.7 °F (36.5 °C) 98.3 °F (36.8 °C)  98.2 °F (36.8 °C)   TempSrc: Oral Oral  Oral   SpO2: (!) 90% 95% 99% 95%   Weight:       Height:           Physical Exam  Constitutional:       General: She is not in acute distress.     Appearance: She is obese.   HENT:      Head: Normocephalic and atraumatic.      Mouth/Throat:      Mouth: Mucous membranes are dry.   Eyes:      Extraocular Movements: Extraocular movements intact.   Cardiovascular:      Rate and Rhythm: Normal rate.      Pulses: Normal pulses.   Pulmonary:      Effort: Pulmonary effort is normal.   Abdominal:      General: There is distension (mild).      Palpations: Abdomen is soft.      Tenderness: There is abdominal tenderness (appropriate post-op tenderness at surgical site). There is no guarding or rebound.      Hernia: No hernia is present.   Skin:     General: Skin is warm and dry.   Neurological:      General: No focal deficit present.      Mental Status: She is alert.       CBC  11.89 10.7 194    36.2     Coag                 BMP  143 106 11 98   3.5 26 0.6     CaMgPhos  8.9   2.1   2.3      Last labs from current encounter as of 10/06/2024-7:01 PM      Significant Diagnostic Studies:   KUB with large gastric  bubble and dilated small bowel with decompressed colon    A/P:  Abbie Barragan is a 89 y.o. year old female  with extensive abdominal surgical history including aorto bi-iliac bypass graft, currently on Eliquis (last dose 10/1) who is s/p open ventral hernia repair with mesh on 10/2. Abdominal pain improved. WBC normalized. Lactate normalized. NGT removed on 10/3. Concern for postoperative ileus.    -NGT clamp trial (clamped at 10 AM)  -reassuring gastrograffin challenge, however patient reported new nausea this morning.  -If when NGT is placed back on suction, <100 ccs are aspirated, ok to remove and trial clears    Sofie Handley MD   General Surgery  [facility]

## 2024-10-06 NOTE — ASSESSMENT & PLAN NOTE
Oxygen saturation of 83% documented overnight on 10/4. On repeat with rapid nurse improved to 92% without intervention. Currently 99% on 2L will wean oxygen, she denies SOB.  Continue incentive spirometer

## 2024-10-06 NOTE — NURSING
Note that patient, with poor venous access, has multiple scheduled infusions and one  left neck IV remaining.    PA Showers gave order for Midline evaluation and placement.     Patient agrees to Midline placement.     Contacted House Supervisor, Keven for Dynamic Access to evaluate and place a midline.    Will continue to f/u.

## 2024-10-06 NOTE — NURSING
[facility]  Nurses Note -- 4 Eyes      10/6/2024       Skin assessed on: Q Shift      [x] No Pressure Injuries Present    [x]Prevention Measures Documented    [] Yes LDA  for Pressure Injury Previously documented     [] Yes New Pressure Injury Discovered   [] LDA for New Pressure Injury Added      Attending RN:  Kecia Umanzor RN     Second RN:  DANA Beltran

## 2024-10-06 NOTE — NURSING
14:00 Note that report of continued nausea given to Surgery, Dr. Handley. Returned to [facility] per order.     Patient with elevated BP:201/91. C/o 6/10 throat pain.   Given PRN hydralazine and Zofran.  Provided oral care to moisten oral cavity.     18:35: BP@122/58. Throat pain and nausea improved per report from patient.   Had a large, soft, brown BM.   Cleaned and had linen change with one-staff assist.     Will continue to f/u.

## 2024-10-06 NOTE — SUBJECTIVE & OBJECTIVE
Interval History: no further nausea and vomiting. Occasionally coughing mucous. Reports 3 bowel movements with gastrograffin challenge. No further bleeding    Review of Systems   Constitutional:  Negative for chills and fever.   Eyes:  Negative for photophobia and visual disturbance.   Respiratory:  Negative for chest tightness and shortness of breath.    Cardiovascular:  Negative for chest pain and palpitations.   Gastrointestinal:  Negative for abdominal pain, nausea and vomiting.   Genitourinary:  Negative for dysuria and hematuria.     Objective:     Vital Signs (Most Recent):  Temp: 98.3 °F (36.8 °C) (10/06/24 0756)  Pulse: 96 (10/06/24 0756)  Resp: 18 (10/06/24 0756)  BP: (!) 185/81 (10/06/24 0756)  SpO2: 99 % (10/06/24 0802) Vital Signs (24h Range):  Temp:  [97.4 °F (36.3 °C)-98.6 °F (37 °C)] 98.3 °F (36.8 °C)  Pulse:  [] 96  Resp:  [18] 18  SpO2:  [90 %-99 %] 99 %  BP: (136-185)/(65-87) 185/81     Weight: 98 kg (216 lb 0.8 oz)  Body mass index is 38.27 kg/m².  No intake or output data in the 24 hours ending 10/06/24 0845      Physical Exam  Vitals and nursing note reviewed.   Constitutional:       General: She is not in acute distress.     Appearance: She is well-developed. She is not diaphoretic.   HENT:      Head: Normocephalic and atraumatic.      Right Ear: External ear normal.      Left Ear: External ear normal.      Nose:      Comments: NG in place without active bleeding. Scant bleeding on tissues at bedside  Eyes:      General:         Right eye: No discharge.         Left eye: No discharge.      Conjunctiva/sclera: Conjunctivae normal.   Neck:      Thyroid: No thyromegaly.   Cardiovascular:      Rate and Rhythm: Normal rate and regular rhythm.      Heart sounds: No murmur heard.  Pulmonary:      Effort: Pulmonary effort is normal. No respiratory distress.      Breath sounds: Normal breath sounds.   Abdominal:      General: Bowel sounds are normal. There is no distension.      Palpations:  Abdomen is soft. There is no mass.      Tenderness: There is no abdominal tenderness.      Comments: Bowel sounds increased and now normal   Musculoskeletal:         General: No deformity.      Cervical back: Normal range of motion and neck supple.      Right lower leg: No edema.      Left lower leg: No edema.   Skin:     General: Skin is warm and dry.   Neurological:      Mental Status: She is alert and oriented to person, place, and time.      Sensory: No sensory deficit.   Psychiatric:         Mood and Affect: Mood normal.         Behavior: Behavior normal.             Significant Labs: CBC:   Recent Labs   Lab 10/05/24  0430 10/06/24  0449   WBC 12.90* 11.89   HGB 10.9* 10.7*   HCT 36.7* 36.2*    194     CMP:   Recent Labs   Lab 10/05/24  0430 10/06/24  0449    143   K 3.5 3.5    106   CO2 22* 26   GLU 98 98   BUN 11 11   CREATININE 0.6 0.6   CALCIUM 8.7 8.9   ANIONGAP 12 11       Significant Imaging:   Imaging Results              XR NG/OG tube placement check, non-radiologist performed (Final result)  Result time 10/02/24 05:08:52   Procedure changed from XR Gastric tube check, non-radiologist performed     Final result by Oralia Espinoza MD (10/02/24 05:08:52)                   Impression:      Please see above.      Electronically signed by: Oralia Espinoza MD  Date:    10/02/2024  Time:    05:08               Narrative:    EXAMINATION:  XR NG/OG TUBE PLACEMENT CHECK, NON-RADIOLOGIST PERFORMED    CLINICAL HISTORY:  NG tube placement;    TECHNIQUE:  AP View(s) of the abdomen was performed.    COMPARISON:  10/02/2024 and 10/01/2024    FINDINGS:  Interval placement of an enteric tube with tip coursing below the diaphragm and projecting over the region of the stomach in the left upper quadrant.  No interval detrimental change in the radiographic appearance of visualized intrathoracic and upper abdominal structures.                                       X-Ray Chest AP Portable (Final result)   Result time 10/02/24 02:24:54      Final result by Oralia Espinoza MD (10/02/24 02:24:54)                   Impression:      Please see above.      Electronically signed by: Oralia Espinoza MD  Date:    10/02/2024  Time:    02:24               Narrative:    EXAMINATION:  XR CHEST AP PORTABLE    CLINICAL HISTORY:  hypoxia;    TECHNIQUE:  Single frontal view of the chest was performed.    COMPARISON:  Chest radiograph 10/26/2023, CT abdomen pelvis 10/02/2024    FINDINGS:  Cardiac monitoring leads overlie the chest.  There is unchanged enlargement of the cardiomediastinal silhouette.  The lungs are symmetrically expanded with diffuse coarse/increased interstitial attenuation similar to prior exam with more pronounced bibasilar interstitial prominence, similar prior examination.  No large volume of pleural fluid or pneumothorax identified noting slight increased attenuation of the left lower lung zone likely relates to prominent cardiomediastinal silhouette and overlying soft tissue.  Osseous structures are intact with degenerative change.                                       CT Abdomen Pelvis With IV Contrast NO Oral Contrast (Final result)  Result time 10/02/24 01:53:11      Final result by Lissy Angeles MD (10/02/24 01:53:11)                   Impression:      1. Small-bowel obstruction secondary to bowel containing supraumbilical hernia.  Clinical correlation is advised to determine potential reducibility of this hernia.  2. Postsurgical changes and additional findings as detailed above.      Electronically signed by: Lissy Angeles MD  Date:    10/02/2024  Time:    01:53               Narrative:    EXAMINATION:  CT ABDOMEN PELVIS WITH IV CONTRAST    CLINICAL HISTORY:  Abdominal pain, acute, nonlocalized;    TECHNIQUE:  Low dose axial images, sagittal and coronal reformations were obtained from the lung bases to the pubic symphysis following the IV administration of 80 mL of Omnipaque 350 .  Oral contrast was  not given.    COMPARISON:  CT abdomen and pelvis from July 2023.    FINDINGS:  Heart is enlarged.  Mild bibasilar atelectatic changes and/or scarring are seen.  No pleural effusion.    No significant hepatic abnormalities are identified.  There is no intra-or extrahepatic biliary ductal dilatation.  Suspected noncalcified stones are seen within the gallbladder.  The pancreas, spleen, and adrenal glands are unremarkable.    Kidneys enhance normally with no evidence of hydronephrosis.  Left renal cysts are seen.  No abnormalities are seen along the ureteral courses.  Urinary bladder is nondistended.  Uterus has been removed.    There is diffuse dilatation of small bowel loops measuring upwards of 4-5 cm in caliber with air-fluid levels consistent with small-bowel obstruction.  This is secondary to a bowel containing supraumbilical hernia which contains short loop segment of small bowel.  Distal small bowel loops are decompressed beyond this level.  Stomach is distended with prominent air-fluid level.  No free air or free fluid.    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  There is prominent atherosclerosis.  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    No acute osseous abnormality identified.  Degenerative changes are seen in the lumbar spine.                                       X-Ray Abdomen Flat And Erect (Final result)  Result time 10/01/24 23:01:46      Final result by Lissy Angeles MD (10/01/24 23:01:46)                   Impression:      Nonobstructive bowel gas pattern.      Electronically signed by: Lissy Angeles MD  Date:    10/01/2024  Time:    23:01               Narrative:    EXAMINATION:  XR ABDOMEN FLAT AND ERECT    CLINICAL HISTORY:  Abdominal Pain;    TECHNIQUE:  Flat and erect AP views of the abdomen were performed.    COMPARISON:  None    FINDINGS:  Nonspecific bowel gas pattern.  No evidence to suggest obstruction.  No free air  identified.  Scattered stool is seen in the colon.  Cardiac silhouette is enlarged.  Visualized lungs are essentially clear.

## 2024-10-07 LAB
ANION GAP SERPL CALC-SCNC: 13 MMOL/L (ref 8–16)
BASOPHILS # BLD AUTO: 0.02 K/UL (ref 0–0.2)
BASOPHILS NFR BLD: 0.2 % (ref 0–1.9)
BUN SERPL-MCNC: 17 MG/DL (ref 8–23)
CALCIUM SERPL-MCNC: 8.9 MG/DL (ref 8.7–10.5)
CHLORIDE SERPL-SCNC: 105 MMOL/L (ref 95–110)
CO2 SERPL-SCNC: 25 MMOL/L (ref 23–29)
CREAT SERPL-MCNC: 0.7 MG/DL (ref 0.5–1.4)
DIFFERENTIAL METHOD BLD: ABNORMAL
EOSINOPHIL # BLD AUTO: 0.1 K/UL (ref 0–0.5)
EOSINOPHIL NFR BLD: 0.5 % (ref 0–8)
ERYTHROCYTE [DISTWIDTH] IN BLOOD BY AUTOMATED COUNT: 15.2 % (ref 11.5–14.5)
EST. GFR  (NO RACE VARIABLE): >60 ML/MIN/1.73 M^2
GLUCOSE SERPL-MCNC: 95 MG/DL (ref 70–110)
HCT VFR BLD AUTO: 34.9 % (ref 37–48.5)
HGB BLD-MCNC: 10.6 G/DL (ref 12–16)
IMM GRANULOCYTES # BLD AUTO: 0.09 K/UL (ref 0–0.04)
IMM GRANULOCYTES NFR BLD AUTO: 0.8 % (ref 0–0.5)
LYMPHOCYTES # BLD AUTO: 1.6 K/UL (ref 1–4.8)
LYMPHOCYTES NFR BLD: 14.9 % (ref 18–48)
MAGNESIUM SERPL-MCNC: 2.1 MG/DL (ref 1.6–2.6)
MCH RBC QN AUTO: 27.7 PG (ref 27–31)
MCHC RBC AUTO-ENTMCNC: 30.4 G/DL (ref 32–36)
MCV RBC AUTO: 91 FL (ref 82–98)
MONOCYTES # BLD AUTO: 0.7 K/UL (ref 0.3–1)
MONOCYTES NFR BLD: 6.4 % (ref 4–15)
NEUTROPHILS # BLD AUTO: 8.4 K/UL (ref 1.8–7.7)
NEUTROPHILS NFR BLD: 77.2 % (ref 38–73)
NRBC BLD-RTO: 0 /100 WBC
PHOSPHATE SERPL-MCNC: 3 MG/DL (ref 2.7–4.5)
PLATELET # BLD AUTO: 231 K/UL (ref 150–450)
PMV BLD AUTO: 12.5 FL (ref 9.2–12.9)
POTASSIUM SERPL-SCNC: 3 MMOL/L (ref 3.5–5.1)
RBC # BLD AUTO: 3.83 M/UL (ref 4–5.4)
SODIUM SERPL-SCNC: 143 MMOL/L (ref 136–145)
WBC # BLD AUTO: 10.85 K/UL (ref 3.9–12.7)

## 2024-10-07 PROCEDURE — 83735 ASSAY OF MAGNESIUM: CPT | Performed by: STUDENT IN AN ORGANIZED HEALTH CARE EDUCATION/TRAINING PROGRAM

## 2024-10-07 PROCEDURE — 21400001 HC TELEMETRY ROOM

## 2024-10-07 PROCEDURE — 85025 COMPLETE CBC W/AUTO DIFF WBC: CPT | Performed by: STUDENT IN AN ORGANIZED HEALTH CARE EDUCATION/TRAINING PROGRAM

## 2024-10-07 PROCEDURE — 63600175 PHARM REV CODE 636 W HCPCS: Performed by: STUDENT IN AN ORGANIZED HEALTH CARE EDUCATION/TRAINING PROGRAM

## 2024-10-07 PROCEDURE — 25000003 PHARM REV CODE 250: Performed by: STUDENT IN AN ORGANIZED HEALTH CARE EDUCATION/TRAINING PROGRAM

## 2024-10-07 PROCEDURE — 84100 ASSAY OF PHOSPHORUS: CPT | Performed by: STUDENT IN AN ORGANIZED HEALTH CARE EDUCATION/TRAINING PROGRAM

## 2024-10-07 PROCEDURE — 97530 THERAPEUTIC ACTIVITIES: CPT

## 2024-10-07 PROCEDURE — 80048 BASIC METABOLIC PNL TOTAL CA: CPT | Performed by: STUDENT IN AN ORGANIZED HEALTH CARE EDUCATION/TRAINING PROGRAM

## 2024-10-07 PROCEDURE — 25000003 PHARM REV CODE 250: Performed by: PHYSICIAN ASSISTANT

## 2024-10-07 PROCEDURE — A4216 STERILE WATER/SALINE, 10 ML: HCPCS | Performed by: PHYSICIAN ASSISTANT

## 2024-10-07 PROCEDURE — 36415 COLL VENOUS BLD VENIPUNCTURE: CPT | Performed by: STUDENT IN AN ORGANIZED HEALTH CARE EDUCATION/TRAINING PROGRAM

## 2024-10-07 PROCEDURE — 27000221 HC OXYGEN, UP TO 24 HOURS

## 2024-10-07 PROCEDURE — 97110 THERAPEUTIC EXERCISES: CPT

## 2024-10-07 PROCEDURE — 99232 SBSQ HOSP IP/OBS MODERATE 35: CPT | Mod: ,,, | Performed by: SURGERY

## 2024-10-07 PROCEDURE — 94761 N-INVAS EAR/PLS OXIMETRY MLT: CPT

## 2024-10-07 RX ADMIN — LOSARTAN POTASSIUM 100 MG: 25 TABLET, FILM COATED ORAL at 09:10

## 2024-10-07 RX ADMIN — Medication 10 ML: at 11:10

## 2024-10-07 RX ADMIN — METOPROLOL TARTRATE 50 MG: 50 TABLET, FILM COATED ORAL at 09:10

## 2024-10-07 RX ADMIN — APIXABAN 5 MG: 5 TABLET, FILM COATED ORAL at 10:10

## 2024-10-07 RX ADMIN — METOPROLOL TARTRATE 50 MG: 50 TABLET, FILM COATED ORAL at 10:10

## 2024-10-07 RX ADMIN — MUPIROCIN: 20 OINTMENT TOPICAL at 10:10

## 2024-10-07 RX ADMIN — Medication 10 ML: at 12:10

## 2024-10-07 RX ADMIN — ONDANSETRON 4 MG: 2 INJECTION INTRAMUSCULAR; INTRAVENOUS at 01:10

## 2024-10-07 RX ADMIN — APIXABAN 5 MG: 5 TABLET, FILM COATED ORAL at 09:10

## 2024-10-07 RX ADMIN — MUPIROCIN: 20 OINTMENT TOPICAL at 09:10

## 2024-10-07 RX ADMIN — ONDANSETRON 4 MG: 2 INJECTION INTRAMUSCULAR; INTRAVENOUS at 07:10

## 2024-10-07 RX ADMIN — Medication 10 ML: at 06:10

## 2024-10-07 RX ADMIN — AMLODIPINE BESYLATE 10 MG: 5 TABLET ORAL at 09:10

## 2024-10-07 RX ADMIN — Medication 10 ML: at 05:10

## 2024-10-07 NOTE — PT/OT/SLP PROGRESS
"Physical Therapy Treatment    Patient Name:  Abbie Barragan   MRN:  [id number]    Recommendations:     Discharge Recommendations: Moderate Intensity Therapy  Discharge Equipment Recommendations: bedside commode  Barriers to discharge:  Decreased functional mobility.    Assessment:     Abbie Barragan is a 89 y.o. female admitted with a medical diagnosis of SBO (small bowel obstruction).  She presents with the following impairments/functional limitations: weakness, impaired endurance, impaired self care skills, impaired functional mobility, impaired balance, decreased lower extremity function.    Rehab Prognosis: Good; patient would benefit from acute skilled PT services to address these deficits and reach maximum level of function.    Recent Surgery: Procedure(s) (LRB):  REPAIR, HERNIA, VENTRAL, INCARCERATED, WITHOUT HISTORY OF PRIOR REPAIR (N/A) 5 Days Post-Op    Plan:     During this hospitalization, patient to be seen 5 x/week to address the identified rehab impairments via therapeutic activities, therapeutic exercises, neuromuscular re-education and progress toward the following goals:    Plan of Care Expires:  10/18/24    Subjective     Chief Complaint: nausea and decreased functional mobility  Patient/Family Comments/goals: Pt was agreeable for therapy.  Pain/Comfort:  Pain Rating 1: 0/10      Objective:     Patient found HOB elevated with NG tube, peripheral IV, telemetry, abdominal binder, and pillow under RLE upon PT entry to room. "I feel nauseous. I don't want to get up." Pt agreed to bedside therapy. Pt seemed in a down mood today. Pt was able to attend to all tasks and activities. Pt cognition WNL. Pt complaints of nausea during session prompted notification to DANA Kenny. DANA Mckinney administered nausea medication at pt's request during session. Family was present throughout session.      General Precautions: Standard, fall  Orthopedic Precautions: N/A  Braces: N/A  Respiratory Status: Room air     Functional " Mobility:  Bed Mobility:   Rolling Right: moderate assistance.  Scooting: max assistance x2. Bed positioned in trendelenburg and pt assisted by pushing through RLE.   Supine to Sit: Pt declined 2/2 to nausea        AM-PAC 6 CLICK MOBILITY  Turning over in bed (including adjusting bedclothes, sheets and blankets)?: 2  Sitting down on and standing up from a chair with arms (e.g., wheelchair, bedside commode, etc.): 2  Moving from lying on back to sitting on the side of the bed?: 2  Moving to and from a bed to a chair (including a wheelchair)?: 2  Need to walk in hospital room?: 1  Climbing 3-5 steps with a railing?: 1  Basic Mobility Total Score: 10       Treatment & Education:  Pt completed the following exercises in bed w/ HOB elevated to improve LE strength and prevent contracture formation:  3x10 reps R SAQs over doubled up pillow under knee.  3x10 reps R hip abduction/adduction heel taps  1x10 reps R heel slides. Pt demonstrated difficulty w/ exercise. Planned sets terminated.  3x30 sec hold passive R ankle DF stretch by PT.  3x10 reps R ankle rolls    1x10 reps 3 sec holds B glut sets. Pt instructed to complete throughout day while laying in bed. Pt demonstrated understanding of how to perform exercise.  1x10 reps 3 sec holds R quad sets. Pt instructed to complete throughout day while laying in bed. Pt demonstrated understanding of how to perform exercise.  ~2 minutes PROM L hip flexion/extension, abduction/adduction by PT.    Pt educated on importance of exercise and activity to prevent muscle wasting 2/2 to prolonged hospital stay.     Patient left HOB elevated in right sidelying w/ pillow under RLE (pt declined green pressure relief boot) with all lines intact, call button in reach, bed alarm on, and DANA Kenny notified.    GOALS:   Multidisciplinary Problems       Physical Therapy Goals          Problem: Physical Therapy    Goal Priority Disciplines Outcome Interventions   Physical Therapy Goal     PT,  PT/OT Progressing    Description: Goals to be met by: 10/18/24     Patient will increase functional independence with mobility by performing:    Supine to sit with Contact Guard Assistance  Sit to supine with Contact Guard Assistance  Rolling to Left and Right with Contact Guard Assistance.  Lower extremity exercise program 3x10 reps per handout, with independence. Exercises include: R SAQs over pillow, B glut sets, R quad sets, R ankle pumps, R LAQs, R hip flexion seated EOB.     Pt will benefit from skilled acute care physical therapy intervention to improve functional mobility and reduce burden of care.                         Time Tracking:     PT Received On: 10/07/24  PT Start Time: 1116     PT Stop Time: 1140  PT Total Time (min): 24 min     Billable Minutes: Therapeutic Activity 8 and Therapeutic Exercise 16    Treatment Type: Treatment  PT/PTA: PT     Number of PTA visits since last PT visit: 0     10/07/2024

## 2024-10-07 NOTE — PLAN OF CARE
Problem: Skin Injury Risk Increased  Goal: Skin Health and Integrity  Outcome: Progressing     Problem: Adult Inpatient Plan of Care  Goal: Plan of Care Review  Outcome: Progressing  Goal: Patient-Specific Goal (Individualized)  Outcome: Progressing  Goal: Absence of Hospital-Acquired Illness or Injury  Outcome: Progressing  Goal: Optimal Comfort and Wellbeing  Outcome: Progressing  Goal: Readiness for Transition of Care  Outcome: Progressing     Problem: Wound  Goal: Optimal Coping  Outcome: Progressing  Goal: Optimal Functional Ability  Outcome: Progressing  Goal: Absence of Infection Signs and Symptoms  Outcome: Progressing  Goal: Improved Oral Intake  Outcome: Progressing  Goal: Optimal Pain Control and Function  Outcome: Progressing  Goal: Skin Health and Integrity  Outcome: Progressing  Goal: Optimal Wound Healing  Outcome: Progressing     Problem: Fall Injury Risk  Goal: Absence of Fall and Fall-Related Injury  Outcome: Progressing

## 2024-10-07 NOTE — SUBJECTIVE & OBJECTIVE
Interval History: no further N/V coughing up mucous attributed to NG tube placement. Reports bowel movement overnight    Review of Systems   Constitutional:  Negative for chills and fever.   Eyes:  Negative for photophobia and visual disturbance.   Respiratory:  Positive for cough. Negative for chest tightness and shortness of breath.    Cardiovascular:  Negative for chest pain and palpitations.   Gastrointestinal:  Negative for abdominal pain, nausea and vomiting.   Genitourinary:  Negative for dysuria and hematuria.     Objective:     Vital Signs (Most Recent):  Temp: 98.1 °F (36.7 °C) (10/07/24 0831)  Pulse: 83 (10/07/24 0908)  Resp: 18 (10/07/24 0908)  BP: (!) 144/77 (10/07/24 0831)  SpO2: 96 % (10/07/24 0908) Vital Signs (24h Range):  Temp:  [97.5 °F (36.4 °C)-98.8 °F (37.1 °C)] 98.1 °F (36.7 °C)  Pulse:  [] 83  Resp:  [18] 18  SpO2:  [90 %-97 %] 96 %  BP: (122-201)/(57-91) 144/77     Weight: 98 kg (216 lb 0.8 oz)  Body mass index is 38.27 kg/m².    Intake/Output Summary (Last 24 hours) at 10/7/2024 1006  Last data filed at 10/7/2024 0010  Gross per 24 hour   Intake 10 ml   Output --   Net 10 ml         Physical Exam  Vitals and nursing note reviewed.   Constitutional:       General: She is not in acute distress.     Appearance: She is well-developed. She is not diaphoretic.   HENT:      Head: Normocephalic and atraumatic.      Right Ear: External ear normal.      Left Ear: External ear normal.      Nose:      Comments: NG in place without active bleeding. Scant bleeding on tissues at bedside  Eyes:      General:         Right eye: No discharge.         Left eye: No discharge.      Conjunctiva/sclera: Conjunctivae normal.   Neck:      Thyroid: No thyromegaly.   Cardiovascular:      Rate and Rhythm: Normal rate and regular rhythm.      Heart sounds: No murmur heard.  Pulmonary:      Effort: Pulmonary effort is normal. No respiratory distress.      Breath sounds: Normal breath sounds.   Abdominal:       General: Bowel sounds are normal. There is no distension.      Palpations: Abdomen is soft. There is no mass.      Tenderness: There is no abdominal tenderness.      Comments: Bowel sounds increased and now normal   Musculoskeletal:         General: No deformity.      Cervical back: Normal range of motion and neck supple.      Right lower leg: No edema.      Left lower leg: No edema.   Skin:     General: Skin is warm and dry.   Neurological:      Mental Status: She is alert and oriented to person, place, and time.      Sensory: No sensory deficit.   Psychiatric:         Mood and Affect: Mood normal.         Behavior: Behavior normal.             Significant Labs: CBC:   Recent Labs   Lab 10/06/24  0449 10/07/24  0507   WBC 11.89 10.85   HGB 10.7* 10.6*   HCT 36.2* 34.9*    231     CMP:   Recent Labs   Lab 10/06/24  0449 10/07/24  0507    143   K 3.5 3.0*    105   CO2 26 25   GLU 98 95   BUN 11 17   CREATININE 0.6 0.7   CALCIUM 8.9 8.9   ANIONGAP 11 13       Significant Imaging:   Imaging Results              XR NG/OG tube placement check, non-radiologist performed (Final result)  Result time 10/02/24 05:08:52   Procedure changed from XR Gastric tube check, non-radiologist performed     Final result by Oralia Espinoza MD (10/02/24 05:08:52)                   Impression:      Please see above.      Electronically signed by: Oralia Espinoza MD  Date:    10/02/2024  Time:    05:08               Narrative:    EXAMINATION:  XR NG/OG TUBE PLACEMENT CHECK, NON-RADIOLOGIST PERFORMED    CLINICAL HISTORY:  NG tube placement;    TECHNIQUE:  AP View(s) of the abdomen was performed.    COMPARISON:  10/02/2024 and 10/01/2024    FINDINGS:  Interval placement of an enteric tube with tip coursing below the diaphragm and projecting over the region of the stomach in the left upper quadrant.  No interval detrimental change in the radiographic appearance of visualized intrathoracic and upper abdominal structures.                                        X-Ray Chest AP Portable (Final result)  Result time 10/02/24 02:24:54      Final result by Oralia Espinoza MD (10/02/24 02:24:54)                   Impression:      Please see above.      Electronically signed by: Oralia Espinoza MD  Date:    10/02/2024  Time:    02:24               Narrative:    EXAMINATION:  XR CHEST AP PORTABLE    CLINICAL HISTORY:  hypoxia;    TECHNIQUE:  Single frontal view of the chest was performed.    COMPARISON:  Chest radiograph 10/26/2023, CT abdomen pelvis 10/02/2024    FINDINGS:  Cardiac monitoring leads overlie the chest.  There is unchanged enlargement of the cardiomediastinal silhouette.  The lungs are symmetrically expanded with diffuse coarse/increased interstitial attenuation similar to prior exam with more pronounced bibasilar interstitial prominence, similar prior examination.  No large volume of pleural fluid or pneumothorax identified noting slight increased attenuation of the left lower lung zone likely relates to prominent cardiomediastinal silhouette and overlying soft tissue.  Osseous structures are intact with degenerative change.                                       CT Abdomen Pelvis With IV Contrast NO Oral Contrast (Final result)  Result time 10/02/24 01:53:11      Final result by Lissy Angeles MD (10/02/24 01:53:11)                   Impression:      1. Small-bowel obstruction secondary to bowel containing supraumbilical hernia.  Clinical correlation is advised to determine potential reducibility of this hernia.  2. Postsurgical changes and additional findings as detailed above.      Electronically signed by: Lissy Angeles MD  Date:    10/02/2024  Time:    01:53               Narrative:    EXAMINATION:  CT ABDOMEN PELVIS WITH IV CONTRAST    CLINICAL HISTORY:  Abdominal pain, acute, nonlocalized;    TECHNIQUE:  Low dose axial images, sagittal and coronal reformations were obtained from the lung bases to the pubic symphysis following  the IV administration of 80 mL of Omnipaque 350 .  Oral contrast was not given.    COMPARISON:  CT abdomen and pelvis from July 2023.    FINDINGS:  Heart is enlarged.  Mild bibasilar atelectatic changes and/or scarring are seen.  No pleural effusion.    No significant hepatic abnormalities are identified.  There is no intra-or extrahepatic biliary ductal dilatation.  Suspected noncalcified stones are seen within the gallbladder.  The pancreas, spleen, and adrenal glands are unremarkable.    Kidneys enhance normally with no evidence of hydronephrosis.  Left renal cysts are seen.  No abnormalities are seen along the ureteral courses.  Urinary bladder is nondistended.  Uterus has been removed.    There is diffuse dilatation of small bowel loops measuring upwards of 4-5 cm in caliber with air-fluid levels consistent with small-bowel obstruction.  This is secondary to a bowel containing supraumbilical hernia which contains short loop segment of small bowel.  Distal small bowel loops are decompressed beyond this level.  Stomach is distended with prominent air-fluid level.  No free air or free fluid.    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  There is prominent atherosclerosis.  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    No acute osseous abnormality identified.  Degenerative changes are seen in the lumbar spine.                                       X-Ray Abdomen Flat And Erect (Final result)  Result time 10/01/24 23:01:46      Final result by Lissy Angeles MD (10/01/24 23:01:46)                   Impression:      Nonobstructive bowel gas pattern.      Electronically signed by: Lissy Angeles MD  Date:    10/01/2024  Time:    23:01               Narrative:    EXAMINATION:  XR ABDOMEN FLAT AND ERECT    CLINICAL HISTORY:  Abdominal Pain;    TECHNIQUE:  Flat and erect AP views of the abdomen were performed.    COMPARISON:  None    FINDINGS:  Nonspecific bowel  gas pattern.  No evidence to suggest obstruction.  No free air identified.  Scattered stool is seen in the colon.  Cardiac silhouette is enlarged.  Visualized lungs are essentially clear.

## 2024-10-07 NOTE — PLAN OF CARE
Problem: Skin Injury Risk Increased  Goal: Skin Health and Integrity  Intervention: Optimize Skin Protection  Flowsheets (Taken 10/7/2024 1755)  Pressure Reduction Techniques: frequent weight shift encouraged  Pressure Reduction Devices: alternating pressure pump (TAYA)  Skin Protection: incontinence pads utilized  Activity Management:   Arm raise - L1   Rolling - L1  Head of Bed (HOB) Positioning: HOB elevated     Problem: Adult Inpatient Plan of Care  Goal: Plan of Care Review  Flowsheets (Taken 10/7/2024 1755)  Plan of Care Reviewed With:   patient   child  Goal: Absence of Hospital-Acquired Illness or Injury  Intervention: Identify and Manage Fall Risk  Flowsheets (Taken 10/7/2024 1755)  Safety Promotion/Fall Prevention: assistive device/personal item within reach  Intervention: Prevent Skin Injury  Flowsheets (Taken 10/7/2024 1755)  Body Position: weight shifting  Skin Protection: incontinence pads utilized  Device Skin Pressure Protection: absorbent pad utilized/changed  Intervention: Prevent Infection  Flowsheets (Taken 10/7/2024 1755)  Infection Prevention: environmental surveillance performed     Problem: Wound  Goal: Optimal Functional Ability  Intervention: Optimize Functional Ability  Flowsheets (Taken 10/7/2024 1755)  Activity Management:   Arm raise - L1   Rolling - L1  Goal: Absence of Infection Signs and Symptoms  Intervention: Prevent or Manage Infection  Flowsheets (Taken 10/7/2024 1755)  Infection Management: aseptic technique maintained

## 2024-10-07 NOTE — PLAN OF CARE
Changes in medical condition or discharge plan: Patient's NG tube removed and diet advanced to clear liquid awaiting return of bowel function. Developed post operative ileus and NG tube replaced. Gastrograffing challenge attempted with multiple bowel movements.     Does patient need new DME? No PT/OT recommended SNF. SW spoke to patient's son and daughter in law on the phone informed them of the recommendation. They agreed for patient to go to SNF.    I provided the patient a choice of post acute providers and offered a list of CMS rated SNF providers. Patient has declined to select a preferred provider and elects placement with the first accepting in network provider that is available to provide services as ordered by the physician.     Referral sent via [email or web address].    Pasrr completed.  Locet called in to [facility] @ [phone].  PASSR faxed to [facility] at:  [phone].   Awaiting 142.    Follow up appts needed: PCP    Medically stable: Patient is not medically stable    Estimated Discharge Date: N/A     10/07/24 1533   Discharge Reassessment   Assessment Type Discharge Planning Reassessment   Did the patient's condition or plan change since previous assessment? No   Discharge Plan discussed with:   (Sibr Round)   Communicated JULIÁN with patient/caregiver Date not available/Unable to determine   Discharge Plan A Skilled Nursing Facility   Discharge Plan B Home Health   DME Needed Upon Discharge  none   Transition of Care Barriers None   Why the patient remains in the hospital Requires continued medical care   Post-Acute Status   Coverage PHN   Discharge Delays None known at this time

## 2024-10-07 NOTE — PROGRESS NOTES
88 y/o pod 5 s/p ventral hernia repair now with bowel function returning.  +BM and flatus    She still has nausea despite minimal NG output and BM    ABD: soft, appropriately tender, mild distension    Impression: POD 5 improving slowly    Plan: NG out (patient feels NG is gagging her contributing to emesis    If emesis returns after NG is out patient may need NG replaced

## 2024-10-07 NOTE — PROGRESS NOTES
[facility] Medicine  Progress Note    Patient Name: Abbie Barragan  MRN: [id number]  Patient Class: IP- Inpatient   Admission Date: 10/1/2024  Length of Stay: 5 days  Attending Physician: Madhuri Coates-Violeta DALY DO  Primary Care Provider: Tanner Gómez MD        Subjective:     Principal Problem:SBO (small bowel obstruction)        HPI:  89 y.o. 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 Esther Dang with vascular), Carotid artery stenosis, Essential HTN, HLD, Hypothyroid (goiter), and h/o abdominal adhesions (per surgery eval in 2023 for acute cholecystitis by Dr. Bell->aborted lap lata due to excessive adhesions) presents to the ER with N/V and abdominal pain Since Monday evening. States h/o acute cholecystitis in 2023 but treated medically due to her complex medical history. She was concerned that her symptoms could be her gallbladder again.   Pt. Denies any fevers or chills. No chest pain or SOB/ACKERMAN. Noted constipation yesterday and diarrhea, non-bloody on Monday.     IN the ED labs noted for WBC 14 and Stable H/H 13.5/43.1.  Lytes stable with normal renal function. Lipase negative.Lactic acid normal.   UA concerning for acute UTI and started on Rocephin by the ED.     CXR:  FINDINGS:  Cardiac monitoring leads overlie the chest.  There is unchanged enlargement of the cardiomediastinal silhouette.  The lungs are symmetrically expanded with diffuse coarse/increased interstitial attenuation similar to prior exam with more pronounced bibasilar interstitial prominence, similar prior examination.  No large volume of pleural fluid or pneumothorax identified noting slight increased attenuation of the left lower lung zone likely relates to prominent cardiomediastinal silhouette and overlying soft tissue.  Osseous structures are intact with  degenerative change.    CT abdomen/Pelvis with IV contrast no PO:  FINDINGS:  Heart is enlarged.  Mild bibasilar atelectatic changes and/or scarring are seen.  No pleural effusion.     No significant hepatic abnormalities are identified.  There is no intra-or extrahepatic biliary ductal dilatation.  Suspected noncalcified stones are seen within the gallbladder.  The pancreas, spleen, and adrenal glands are unremarkable.     Kidneys enhance normally with no evidence of hydronephrosis.  Left renal cysts are seen.  No abnormalities are seen along the ureteral courses.  Urinary bladder is nondistended.  Uterus has been removed.     There is diffuse dilatation of small bowel loops measuring upwards of 4-5 cm in caliber with air-fluid levels consistent with small-bowel obstruction.  This is secondary to a bowel containing supraumbilical hernia which contains short loop segment of small bowel.  Distal small bowel loops are decompressed beyond this level.  Stomach is distended with prominent air-fluid level.  No free air or free fluid.     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  There is prominent atherosclerosis.  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     No acute osseous abnormality identified.  Degenerative changes are seen in the lumbar spine.     Impression:     1. Small-bowel obstruction secondary to bowel containing supraumbilical hernia.  Clinical correlation is advised to determine potential reducibility of this hernia.  2. Postsurgical changes and additional findings as detailed above        NGT was placed and General surgery contacted by the ED for further eval of her SBO. We have been consulted for further management.        Overview/Hospital Course:  Abbie Barragan 89 y.o. female admitted to the hospital for SBO due to umbilical hernia. CT scan with bowel containing supraumbilical hernia. Lactic acid increased to 4.1. Seen by surgery and plans for  inpatient operative repair. Taken to OR 10/2, with no signs of necrotic bowel and associated down trending of lactic acid after OR. NG tube removed and diet advanced to clear liquid awaiting return of bowel function. Developed post operative ileus and NG tube replaced. Gastrograffing challenge attempted with multiple bowel movements.    Interval History: no further N/V coughing up mucous attributed to NG tube placement. Reports bowel movement overnight    Review of Systems   Constitutional:  Negative for chills and fever.   Eyes:  Negative for photophobia and visual disturbance.   Respiratory:  Positive for cough. Negative for chest tightness and shortness of breath.    Cardiovascular:  Negative for chest pain and palpitations.   Gastrointestinal:  Negative for abdominal pain, nausea and vomiting.   Genitourinary:  Negative for dysuria and hematuria.     Objective:     Vital Signs (Most Recent):  Temp: 98.1 °F (36.7 °C) (10/07/24 0831)  Pulse: 83 (10/07/24 0908)  Resp: 18 (10/07/24 0908)  BP: (!) 144/77 (10/07/24 0831)  SpO2: 96 % (10/07/24 0908) Vital Signs (24h Range):  Temp:  [97.5 °F (36.4 °C)-98.8 °F (37.1 °C)] 98.1 °F (36.7 °C)  Pulse:  [] 83  Resp:  [18] 18  SpO2:  [90 %-97 %] 96 %  BP: (122-201)/(57-91) 144/77     Weight: 98 kg (216 lb 0.8 oz)  Body mass index is 38.27 kg/m².    Intake/Output Summary (Last 24 hours) at 10/7/2024 1006  Last data filed at 10/7/2024 0010  Gross per 24 hour   Intake 10 ml   Output --   Net 10 ml         Physical Exam  Vitals and nursing note reviewed.   Constitutional:       General: She is not in acute distress.     Appearance: She is well-developed. She is not diaphoretic.   HENT:      Head: Normocephalic and atraumatic.      Right Ear: External ear normal.      Left Ear: External ear normal.      Nose:      Comments: NG in place without active bleeding. Scant bleeding on tissues at bedside  Eyes:      General:         Right eye: No discharge.         Left eye: No  discharge.      Conjunctiva/sclera: Conjunctivae normal.   Neck:      Thyroid: No thyromegaly.   Cardiovascular:      Rate and Rhythm: Normal rate and regular rhythm.      Heart sounds: No murmur heard.  Pulmonary:      Effort: Pulmonary effort is normal. No respiratory distress.      Breath sounds: Normal breath sounds.   Abdominal:      General: Bowel sounds are normal. There is no distension.      Palpations: Abdomen is soft. There is no mass.      Tenderness: There is no abdominal tenderness.      Comments: Bowel sounds increased and now normal   Musculoskeletal:         General: No deformity.      Cervical back: Normal range of motion and neck supple.      Right lower leg: No edema.      Left lower leg: No edema.   Skin:     General: Skin is warm and dry.   Neurological:      Mental Status: She is alert and oriented to person, place, and time.      Sensory: No sensory deficit.   Psychiatric:         Mood and Affect: Mood normal.         Behavior: Behavior normal.             Significant Labs: CBC:   Recent Labs   Lab 10/06/24  0449 10/07/24  0507   WBC 11.89 10.85   HGB 10.7* 10.6*   HCT 36.2* 34.9*    231     CMP:   Recent Labs   Lab 10/06/24  0449 10/07/24  0507    143   K 3.5 3.0*    105   CO2 26 25   GLU 98 95   BUN 11 17   CREATININE 0.6 0.7   CALCIUM 8.9 8.9   ANIONGAP 11 13       Significant Imaging:   Imaging Results              XR NG/OG tube placement check, non-radiologist performed (Final result)  Result time 10/02/24 05:08:52   Procedure changed from XR Gastric tube check, non-radiologist performed     Final result by Oralia Espinoza MD (10/02/24 05:08:52)                   Impression:      Please see above.      Electronically signed by: Oralia Espinoza MD  Date:    10/02/2024  Time:    05:08               Narrative:    EXAMINATION:  XR NG/OG TUBE PLACEMENT CHECK, NON-RADIOLOGIST PERFORMED    CLINICAL HISTORY:  NG tube placement;    TECHNIQUE:  AP View(s) of the abdomen was  performed.    COMPARISON:  10/02/2024 and 10/01/2024    FINDINGS:  Interval placement of an enteric tube with tip coursing below the diaphragm and projecting over the region of the stomach in the left upper quadrant.  No interval detrimental change in the radiographic appearance of visualized intrathoracic and upper abdominal structures.                                       X-Ray Chest AP Portable (Final result)  Result time 10/02/24 02:24:54      Final result by Oralia Espinoza MD (10/02/24 02:24:54)                   Impression:      Please see above.      Electronically signed by: Oralia Espinoza MD  Date:    10/02/2024  Time:    02:24               Narrative:    EXAMINATION:  XR CHEST AP PORTABLE    CLINICAL HISTORY:  hypoxia;    TECHNIQUE:  Single frontal view of the chest was performed.    COMPARISON:  Chest radiograph 10/26/2023, CT abdomen pelvis 10/02/2024    FINDINGS:  Cardiac monitoring leads overlie the chest.  There is unchanged enlargement of the cardiomediastinal silhouette.  The lungs are symmetrically expanded with diffuse coarse/increased interstitial attenuation similar to prior exam with more pronounced bibasilar interstitial prominence, similar prior examination.  No large volume of pleural fluid or pneumothorax identified noting slight increased attenuation of the left lower lung zone likely relates to prominent cardiomediastinal silhouette and overlying soft tissue.  Osseous structures are intact with degenerative change.                                       CT Abdomen Pelvis With IV Contrast NO Oral Contrast (Final result)  Result time 10/02/24 01:53:11      Final result by Lissy Angeles MD (10/02/24 01:53:11)                   Impression:      1. Small-bowel obstruction secondary to bowel containing supraumbilical hernia.  Clinical correlation is advised to determine potential reducibility of this hernia.  2. Postsurgical changes and additional findings as detailed  above.      Electronically signed by: Lissy Angeles MD  Date:    10/02/2024  Time:    01:53               Narrative:    EXAMINATION:  CT ABDOMEN PELVIS WITH IV CONTRAST    CLINICAL HISTORY:  Abdominal pain, acute, nonlocalized;    TECHNIQUE:  Low dose axial images, sagittal and coronal reformations were obtained from the lung bases to the pubic symphysis following the IV administration of 80 mL of Omnipaque 350 .  Oral contrast was not given.    COMPARISON:  CT abdomen and pelvis from July 2023.    FINDINGS:  Heart is enlarged.  Mild bibasilar atelectatic changes and/or scarring are seen.  No pleural effusion.    No significant hepatic abnormalities are identified.  There is no intra-or extrahepatic biliary ductal dilatation.  Suspected noncalcified stones are seen within the gallbladder.  The pancreas, spleen, and adrenal glands are unremarkable.    Kidneys enhance normally with no evidence of hydronephrosis.  Left renal cysts are seen.  No abnormalities are seen along the ureteral courses.  Urinary bladder is nondistended.  Uterus has been removed.    There is diffuse dilatation of small bowel loops measuring upwards of 4-5 cm in caliber with air-fluid levels consistent with small-bowel obstruction.  This is secondary to a bowel containing supraumbilical hernia which contains short loop segment of small bowel.  Distal small bowel loops are decompressed beyond this level.  Stomach is distended with prominent air-fluid level.  No free air or free fluid.    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  There is prominent atherosclerosis.  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    No acute osseous abnormality identified.  Degenerative changes are seen in the lumbar spine.                                       X-Ray Abdomen Flat And Erect (Final result)  Result time 10/01/24 23:01:46      Final result by Lissy Angeles MD (10/01/24 23:01:46)                    Impression:      Nonobstructive bowel gas pattern.      Electronically signed by: Lissy Angeles MD  Date:    10/01/2024  Time:    23:01               Narrative:    EXAMINATION:  XR ABDOMEN FLAT AND ERECT    CLINICAL HISTORY:  Abdominal Pain;    TECHNIQUE:  Flat and erect AP views of the abdomen were performed.    COMPARISON:  None    FINDINGS:  Nonspecific bowel gas pattern.  No evidence to suggest obstruction.  No free air identified.  Scattered stool is seen in the colon.  Cardiac silhouette is enlarged.  Visualized lungs are essentially clear.                                        Assessment/Plan:      * SBO (small bowel obstruction)  Presented with SBO due to umbilical hernia. Lactic acid decreasing post operatively.  Taken to OR 10/2, no flatus or BM yet, but resolution of abdominal pain.  NG replaced given return of N/V and findings concerning for ileus.  NG tube removal per surgery  Underwent gastrograffin challenge with multiple bowel movements  Surgery following  Begin incentive spirometry given O2 sats of 94% documented    Hypophosphatemia  Patient has Abnormal Phosphorus: hypophosphatemia. Will continue to monitor electrolytes closely. Will replace the affected electrolytes and repeat labs to be done after interventions completed. The patient's phosphorus results have been reviewed and are listed below.  Recent Labs   Lab 10/06/24  0449   PHOS 2.3*    Contributory to ileus, due to prolonged NPO related to surgery    Low oxygen saturation  Oxygen saturation of 83% documented overnight on 10/4. On repeat with rapid nurse improved to 92% without intervention. Currently 99% on 2L will wean oxygen, she denies SOB.  Continue incentive spirometer    PAD (peripheral artery disease)  Extensive PAD history with Axbifem Bypass in 1994 and Cartoid stenosis 60-70% reported in vascular note from 8/2024:     8/29/23 PVR right 0.4-0.5  Carotid duplex 60-79% left    8/27/24 PVR right 0.4-0.6 monophasic  waveforms  Carotid duplex 40-59% but limited visualization due to heavy shadows/calcium       Eliquis resumed, confirmed with patient no taking aspirin or plavix reports stopped by outpt vascular    Acquired hypothyroidism  Lab Results   Component Value Date    TSH 0.896 07/13/2023     Repeat TSH not currently on synthroid per chart review and care everywhere        Essential hypertension  Chronic, controlled. Latest blood pressure and vitals reviewed-     Temp:  [97.6 °F (36.4 °C)-97.9 °F (36.6 °C)]   Pulse:  [59-92]   Resp:  [17-18]   BP: (130-175)/(70-78)   SpO2:  [91 %-99 %] .   Home meds for hypertension were reviewed and noted below.   Hypertension Medications               amLODIPine (NORVASC) 10 MG tablet Take 10 mg by mouth once daily.    hydroCHLOROthiazide (HYDRODIURIL) 25 MG tablet Take 25 mg by mouth once daily.    losartan (COZAAR) 100 MG tablet Take 100 mg by mouth once daily.    metoprolol tartrate (LOPRESSOR) 25 MG tablet Take 1 tablet (25 mg total) by mouth 2 (two) times daily.            While in the hospital, will manage blood pressure as follows; Resume home lopressor and losartan with HOLD parameters. HOLD other BP meds for now and diurectics due to dehydration and possible surgery.  Add back amlodipine given BP now remaining more elevated, but hold HCTZ as still not on full diet.    Will utilize p.r.n. blood pressure medication only if patient's blood pressure greater than  180/90  and she develops symptoms such as worsening chest pain or shortness of breath.    Chronic atrial fibrillation  Patient with Permanent atrial fibrillation which is controlled with lopressor 25mg PO BID.  Patient is currently in atrial fibrillation.[id number] Score: 4. Anticoagulation indicated. Anticoagulation done with eliquis 5mg PO BID will resume after discussion with surgery tonight for 48hrs post op. Monitor on tele.  Cont. Her lopressor for now.     Tolerating eliquis without bleeding other than mild epistaxis  with NG placement. No further bleeding seen      VTE Risk Mitigation (From admission, onward)           Ordered     apixaban tablet 5 mg  2 times daily         10/04/24 0940                    Discharge Planning   JULIÁN:      Code Status: Full Code   Is the patient medically ready for discharge?:     Reason for patient still in hospital (select all that apply): Patient unstable, Patient trending condition, and Consult recommendations  Discharge Plan A: Home        Pending ability to remove NG tube and bowel function      Jaquan Lacy PA-C  Department of [facility]   [facility]

## 2024-10-07 NOTE — ASSESSMENT & PLAN NOTE
Lab Results   Component Value Date    TSH 0.896 07/13/2023     Repeat TSH not currently on synthroid per chart review and care everywhere

## 2024-10-07 NOTE — NURSING
[facility]  Nurses Note -- 4 Eyes      10/6/2024       Skin assessed on: Q Shift      [x] No Pressure Injuries Present    [x]Prevention Measures Documented    [] Yes LDA  for Pressure Injury Previously documented     [] Yes New Pressure Injury Discovered   [] LDA for New Pressure Injury Added      Attending RN:  Devin Soni RN     Second RN:  DANA Mcdonnell

## 2024-10-07 NOTE — PT/OT/SLP PROGRESS
Occupational Therapy   Treatment    Name: Abbie Barragan  MRN: [id number]  Admitting Diagnosis:  SBO (small bowel obstruction)  5 Days Post-Op    Recommendations:     Discharge Recommendations: Moderate Intensity Therapy  Discharge Equipment Recommendations:  bedside commode  Barriers to discharge:  None    Assessment:     Abbie Barragan is a 89 y.o. female with a medical diagnosis of SBO (small bowel obstruction).  She presents with The primary encounter diagnosis was SBO (small bowel obstruction). Diagnoses of Left arm pain, Hypoxia, Chest pain, and Preoperative cardiovascular examination were also pertinent to this visit. . Performance deficits affecting function are weakness, impaired endurance, impaired self care skills, impaired functional mobility, impaired balance.     Rehab Prognosis:  Good; patient would benefit from acute skilled OT services to address these deficits and reach maximum level of function.       Plan:     Patient to be seen 4 x/week to address the above listed problems via self-care/home management, therapeutic activities, therapeutic exercises  Plan of Care Expires: 10/18/24  Plan of Care Reviewed with: patient, family    Subjective     Chief Complaint: nausea  Patient/Family Comments/goals: family present  Pain/Comfort:  Pain Rating 1: 0/10  Pain Addressed 1: Reposition, Nurse notified, Cessation of Activity    Objective:     Communicated with: RN prior to session.  Patient found HOB elevated with bed alarm, telemetry, NG tube, PureWick, peripheral IV, oxygen, pressure relief boots upon OT entry to room.    General Precautions: Standard, fall, NPO    Orthopedic Precautions:N/A  Braces: N/A  Respiratory Status: Nasal cannula, flow 2 L/min     Occupational Performance:     Bed Mobility:    Patient completed Rolling/Turning to Right with moderate assistance  Patient completed Scooting/Bridging with contact guard assistance seated at eob to hob, and maximal assistance x2 supine to hob  Patient  completed Supine to Sit with moderate assistance for trunk and L hip  Patient completed Sit to Supine with moderate assistance for BLE    Functional Mobility/Transfers:  Patient completed Sit <> Stand Transfer with moderate assistance  with  B bedrail use   Functional Mobility: Pt tolerated standing with assist for ~30 sec with full glute clearance / minimal flexed trunk posture. R knee blocking initially, progressing to CGA for knee extension with vcs/tc. Pt reported minimal SOB in stand; SpO2 monitored at 98% upon seated rest break. Pt reported nausea upon sitting eob; emesis bag provided and pt with minimal sputum and throat clearing while RN present. Line mgmt by OT throughout    Activities of Daily Living:  Lower Body Dressing: maximal assistance to doff pressure relief boot on RLE; pt assisted with elevating LE      AMPAC 6 Click ADL: 18    Treatment & Education:  ADL/functional mobility training as above.  Pt assisted back to bed with RN and OT with R heel floated and all needs met.    Patient left HOB elevated with all lines intact, call button in reach, bed alarm on, RN notified, and family present    GOALS:   Multidisciplinary Problems       Occupational Therapy Goals          Problem: Occupational Therapy    Goal Priority Disciplines Outcome Interventions   Occupational Therapy Goal     OT, PT/OT Progressing    Description: Goals to be met by: 10/18/2024     Patient will increase functional independence with ADLs by performing:    UE Dressing with [place].  LE Dressing with Modified [place].  Grooming while seated with [place].  Toileting from bedside commode with Modified [place] for hygiene and clothing management.   Toilet transfer to bedside commode with Modified [place].                         Time Tracking:     OT Date of Treatment: 10/07/24  OT Start Time: 1028  OT Stop Time: 1058  OT Total Time (min): 30 min    Billable Minutes:Therapeutic Activity 30    OT/GERARD: OT           10/7/2024

## 2024-10-08 LAB
ALBUMIN SERPL BCP-MCNC: 2.8 G/DL (ref 3.5–5.2)
ALP SERPL-CCNC: 154 U/L (ref 55–135)
ALT SERPL W/O P-5'-P-CCNC: 15 U/L (ref 10–44)
ANION GAP SERPL CALC-SCNC: 16 MMOL/L (ref 8–16)
AST SERPL-CCNC: 21 U/L (ref 10–40)
BASOPHILS # BLD AUTO: 0.03 K/UL (ref 0–0.2)
BASOPHILS NFR BLD: 0.3 % (ref 0–1.9)
BILIRUB SERPL-MCNC: 1 MG/DL (ref 0.1–1)
BUN SERPL-MCNC: 18 MG/DL (ref 8–23)
CALCIUM SERPL-MCNC: 10.1 MG/DL (ref 8.7–10.5)
CHLORIDE SERPL-SCNC: 104 MMOL/L (ref 95–110)
CO2 SERPL-SCNC: 24 MMOL/L (ref 23–29)
CREAT SERPL-MCNC: 0.7 MG/DL (ref 0.5–1.4)
DIFFERENTIAL METHOD BLD: ABNORMAL
EOSINOPHIL # BLD AUTO: 0.1 K/UL (ref 0–0.5)
EOSINOPHIL NFR BLD: 1 % (ref 0–8)
ERYTHROCYTE [DISTWIDTH] IN BLOOD BY AUTOMATED COUNT: 15.4 % (ref 11.5–14.5)
EST. GFR  (NO RACE VARIABLE): >60 ML/MIN/1.73 M^2
GLUCOSE SERPL-MCNC: 94 MG/DL (ref 70–110)
HCT VFR BLD AUTO: 33.7 % (ref 37–48.5)
HGB BLD-MCNC: 10.6 G/DL (ref 12–16)
IMM GRANULOCYTES # BLD AUTO: 0.17 K/UL (ref 0–0.04)
IMM GRANULOCYTES NFR BLD AUTO: 1.5 % (ref 0–0.5)
LYMPHOCYTES # BLD AUTO: 2.4 K/UL (ref 1–4.8)
LYMPHOCYTES NFR BLD: 21.2 % (ref 18–48)
MAGNESIUM SERPL-MCNC: 2.1 MG/DL (ref 1.6–2.6)
MCH RBC QN AUTO: 28.3 PG (ref 27–31)
MCHC RBC AUTO-ENTMCNC: 31.5 G/DL (ref 32–36)
MCV RBC AUTO: 90 FL (ref 82–98)
MONOCYTES # BLD AUTO: 0.6 K/UL (ref 0.3–1)
MONOCYTES NFR BLD: 5.6 % (ref 4–15)
NEUTROPHILS # BLD AUTO: 8.1 K/UL (ref 1.8–7.7)
NEUTROPHILS NFR BLD: 70.4 % (ref 38–73)
NRBC BLD-RTO: 0 /100 WBC
PHOSPHATE SERPL-MCNC: 2.6 MG/DL (ref 2.7–4.5)
PLATELET # BLD AUTO: 221 K/UL (ref 150–450)
PMV BLD AUTO: 12.4 FL (ref 9.2–12.9)
POTASSIUM SERPL-SCNC: 3.6 MMOL/L (ref 3.5–5.1)
PROT SERPL-MCNC: 7.3 G/DL (ref 6–8.4)
RBC # BLD AUTO: 3.75 M/UL (ref 4–5.4)
SODIUM SERPL-SCNC: 144 MMOL/L (ref 136–145)
T4 FREE SERPL-MCNC: 1.2 NG/DL (ref 0.71–1.51)
TSH SERPL DL<=0.005 MIU/L-ACNC: 0.11 UIU/ML (ref 0.4–4)
WBC # BLD AUTO: 11.52 K/UL (ref 3.9–12.7)

## 2024-10-08 PROCEDURE — A4216 STERILE WATER/SALINE, 10 ML: HCPCS | Performed by: PHYSICIAN ASSISTANT

## 2024-10-08 PROCEDURE — 99900035 HC TECH TIME PER 15 MIN (STAT)

## 2024-10-08 PROCEDURE — 27000221 HC OXYGEN, UP TO 24 HOURS

## 2024-10-08 PROCEDURE — 84443 ASSAY THYROID STIM HORMONE: CPT | Performed by: PHYSICIAN ASSISTANT

## 2024-10-08 PROCEDURE — 94761 N-INVAS EAR/PLS OXIMETRY MLT: CPT

## 2024-10-08 PROCEDURE — 97535 SELF CARE MNGMENT TRAINING: CPT

## 2024-10-08 PROCEDURE — 25000003 PHARM REV CODE 250: Performed by: PHYSICIAN ASSISTANT

## 2024-10-08 PROCEDURE — 80053 COMPREHEN METABOLIC PANEL: CPT | Performed by: PHYSICIAN ASSISTANT

## 2024-10-08 PROCEDURE — 84439 ASSAY OF FREE THYROXINE: CPT | Performed by: PHYSICIAN ASSISTANT

## 2024-10-08 PROCEDURE — 84100 ASSAY OF PHOSPHORUS: CPT | Performed by: PHYSICIAN ASSISTANT

## 2024-10-08 PROCEDURE — 97110 THERAPEUTIC EXERCISES: CPT

## 2024-10-08 PROCEDURE — 85025 COMPLETE CBC W/AUTO DIFF WBC: CPT | Performed by: PHYSICIAN ASSISTANT

## 2024-10-08 PROCEDURE — 21400001 HC TELEMETRY ROOM

## 2024-10-08 PROCEDURE — 97530 THERAPEUTIC ACTIVITIES: CPT

## 2024-10-08 PROCEDURE — 99231 SBSQ HOSP IP/OBS SF/LOW 25: CPT | Mod: ,,, | Performed by: STUDENT IN AN ORGANIZED HEALTH CARE EDUCATION/TRAINING PROGRAM

## 2024-10-08 PROCEDURE — 83735 ASSAY OF MAGNESIUM: CPT | Performed by: PHYSICIAN ASSISTANT

## 2024-10-08 PROCEDURE — 25000003 PHARM REV CODE 250: Performed by: STUDENT IN AN ORGANIZED HEALTH CARE EDUCATION/TRAINING PROGRAM

## 2024-10-08 PROCEDURE — 94799 UNLISTED PULMONARY SVC/PX: CPT | Mod: XB

## 2024-10-08 PROCEDURE — 36415 COLL VENOUS BLD VENIPUNCTURE: CPT | Performed by: PHYSICIAN ASSISTANT

## 2024-10-08 PROCEDURE — 25000003 PHARM REV CODE 250: Performed by: NURSE PRACTITIONER

## 2024-10-08 RX ORDER — SODIUM,POTASSIUM PHOSPHATES 280-250MG
2 POWDER IN PACKET (EA) ORAL ONCE
Status: COMPLETED | OUTPATIENT
Start: 2024-10-08 | End: 2024-10-08

## 2024-10-08 RX ADMIN — LOSARTAN POTASSIUM 100 MG: 25 TABLET, FILM COATED ORAL at 08:10

## 2024-10-08 RX ADMIN — APIXABAN 5 MG: 5 TABLET, FILM COATED ORAL at 08:10

## 2024-10-08 RX ADMIN — Medication 10 ML: at 11:10

## 2024-10-08 RX ADMIN — Medication 10 ML: at 12:10

## 2024-10-08 RX ADMIN — METOPROLOL TARTRATE 50 MG: 50 TABLET, FILM COATED ORAL at 10:10

## 2024-10-08 RX ADMIN — Medication 10 ML: at 05:10

## 2024-10-08 RX ADMIN — APIXABAN 5 MG: 5 TABLET, FILM COATED ORAL at 10:10

## 2024-10-08 RX ADMIN — METOPROLOL TARTRATE 50 MG: 50 TABLET, FILM COATED ORAL at 08:10

## 2024-10-08 RX ADMIN — AMLODIPINE BESYLATE 10 MG: 5 TABLET ORAL at 08:10

## 2024-10-08 RX ADMIN — Medication 2 PACKET: at 11:10

## 2024-10-08 NOTE — PLAN OF CARE
Problem: Physical Therapy  Goal: Physical Therapy Goal  Description: Goals to be met by: 10/18/24     Patient will increase functional independence with mobility by performing:    Supine to sit with Contact Guard Assistance  Sit to supine with Contact Guard Assistance  Rolling to Left and Right with Contact Guard Assistance.  Lower extremity exercise program 3x10 reps per handout, with independence. Exercises include: R SAQs over pillow, B glut sets, R quad sets, R ankle pumps, R LAQs, R hip flexion seated EOB.   Bed to Chair transfer w/ CGA.  Propel self in wheelchair 100 feet with Supervision    Pt will benefit from skilled acute care physical therapy intervention to improve functional mobility and reduce burden of care.    10/8/2024 1510 by Michi Crawley, SPT  Outcome: Progressing

## 2024-10-08 NOTE — PROGRESS NOTES
[facility] Medicine  Progress Note    Patient Name: Abbie Barragan  MRN: [id number]  Patient Class: IP- Inpatient   Admission Date: 10/1/2024  Length of Stay: 6 days  Attending Physician: Vitaliy Duong MD  Primary Care Provider: Tanner Gómez MD        Subjective:     Principal Problem:SBO (small bowel obstruction)        HPI:  89 y.o. 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 Esther Dang with vascular), Carotid artery stenosis, Essential HTN, HLD, Hypothyroid (goiter), and h/o abdominal adhesions (per surgery eval in 2023 for acute cholecystitis by Dr. Bell->aborted lap lata due to excessive adhesions) presents to the ER with N/V and abdominal pain Since Monday evening. States h/o acute cholecystitis in 2023 but treated medically due to her complex medical history. She was concerned that her symptoms could be her gallbladder again.   Pt. Denies any fevers or chills. No chest pain or SOB/ACKERMAN. Noted constipation yesterday and diarrhea, non-bloody on Monday.     IN the ED labs noted for WBC 14 and Stable H/H 13.5/43.1.  Lytes stable with normal renal function. Lipase negative.Lactic acid normal.   UA concerning for acute UTI and started on Rocephin by the ED.     CXR:  FINDINGS:  Cardiac monitoring leads overlie the chest.  There is unchanged enlargement of the cardiomediastinal silhouette.  The lungs are symmetrically expanded with diffuse coarse/increased interstitial attenuation similar to prior exam with more pronounced bibasilar interstitial prominence, similar prior examination.  No large volume of pleural fluid or pneumothorax identified noting slight increased attenuation of the left lower lung zone likely relates to prominent cardiomediastinal silhouette and overlying soft tissue.  Osseous structures are intact with  degenerative change.    CT abdomen/Pelvis with IV contrast no PO:  FINDINGS:  Heart is enlarged.  Mild bibasilar atelectatic changes and/or scarring are seen.  No pleural effusion.     No significant hepatic abnormalities are identified.  There is no intra-or extrahepatic biliary ductal dilatation.  Suspected noncalcified stones are seen within the gallbladder.  The pancreas, spleen, and adrenal glands are unremarkable.     Kidneys enhance normally with no evidence of hydronephrosis.  Left renal cysts are seen.  No abnormalities are seen along the ureteral courses.  Urinary bladder is nondistended.  Uterus has been removed.     There is diffuse dilatation of small bowel loops measuring upwards of 4-5 cm in caliber with air-fluid levels consistent with small-bowel obstruction.  This is secondary to a bowel containing supraumbilical hernia which contains short loop segment of small bowel.  Distal small bowel loops are decompressed beyond this level.  Stomach is distended with prominent air-fluid level.  No free air or free fluid.     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  There is prominent atherosclerosis.  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     No acute osseous abnormality identified.  Degenerative changes are seen in the lumbar spine.     Impression:     1. Small-bowel obstruction secondary to bowel containing supraumbilical hernia.  Clinical correlation is advised to determine potential reducibility of this hernia.  2. Postsurgical changes and additional findings as detailed above        NGT was placed and General surgery contacted by the ED for further eval of her SBO. We have been consulted for further management.        Overview/Hospital Course:  Abbie Barragan 89 y.o. female admitted to the hospital for SBO due to umbilical hernia. CT scan with bowel containing supraumbilical hernia. Lactic acid increased to 4.1. Seen by surgery and plans for  inpatient operative repair. Taken to OR 10/2, with no signs of necrotic bowel and associated down trending of lactic acid after OR. NG tube removed and diet advanced to clear liquid awaiting return of bowel function. Developed post operative ileus and NG tube replaced. Gastrograffing challenge attempted with multiple bowel movements.    Interval History: patient seen and examined. Tolerating clear liquids. Patient refuses to go to SNF wants to go home with HH.     Review of Systems   Constitutional:  Negative for chills and fever.   Eyes:  Negative for photophobia and visual disturbance.   Respiratory:  Positive for cough. Negative for chest tightness and shortness of breath.    Cardiovascular:  Negative for chest pain and palpitations.   Gastrointestinal:  Negative for abdominal pain, nausea and vomiting.   Genitourinary:  Negative for dysuria and hematuria.     Objective:     Vital Signs (Most Recent):  Temp: 97.9 °F (36.6 °C) (10/08/24 0749)  Pulse: 82 (10/08/24 0809)  Resp: 18 (10/08/24 0809)  BP: (!) 167/74 (10/08/24 0749)  SpO2: (!) 94 % (10/08/24 0809) Vital Signs (24h Range):  Temp:  [97.7 °F (36.5 °C)-98.7 °F (37.1 °C)] 97.9 °F (36.6 °C)  Pulse:  [65-90] 82  Resp:  [17-20] 18  SpO2:  [93 %-95 %] 94 %  BP: (141-175)/(68-84) 167/74     Weight: 96.2 kg (212 lb 1.3 oz)  Body mass index is 37.57 kg/m².    Intake/Output Summary (Last 24 hours) at 10/8/2024 1055  Last data filed at 10/8/2024 0809  Gross per 24 hour   Intake 440 ml   Output 600 ml   Net -160 ml         Physical Exam  Vitals and nursing note reviewed.   Constitutional:       General: She is not in acute distress.     Appearance: She is well-developed. She is not diaphoretic.   HENT:      Head: Normocephalic and atraumatic.      Right Ear: External ear normal.      Left Ear: External ear normal.   Eyes:      General:         Right eye: No discharge.         Left eye: No discharge.      Conjunctiva/sclera: Conjunctivae normal.   Neck:      Thyroid: No  thyromegaly.   Cardiovascular:      Rate and Rhythm: Normal rate and regular rhythm.      Heart sounds: No murmur heard.  Pulmonary:      Effort: Pulmonary effort is normal. No respiratory distress.      Breath sounds: Normal breath sounds.   Abdominal:      General: Bowel sounds are normal. There is no distension.      Palpations: Abdomen is soft. There is no mass.      Tenderness: There is no abdominal tenderness.      Comments: Bowel sounds increased and now normal.    Musculoskeletal:         General: No deformity.      Cervical back: Normal range of motion and neck supple.      Right lower leg: No edema.      Left lower leg: No edema.   Skin:     General: Skin is warm and dry.      Comments: Abdominal incision with drsg intact and abdomen binder    Neurological:      Mental Status: She is alert and oriented to person, place, and time.      Sensory: No sensory deficit.   Psychiatric:         Mood and Affect: Mood normal.         Behavior: Behavior normal.             Significant Labs: All pertinent labs within the past 24 hours have been reviewed.    Significant Imaging: I have reviewed all pertinent imaging results/findings within the past 24 hours.    Assessment/Plan:      * SBO (small bowel obstruction)  Presented with SBO due to umbilical hernia. Lactic acid decreasing post operatively.  Taken to OR 10/2, no flatus or BM yet, but resolution of abdominal pain.  NG replaced given return of N/V and findings concerning for ileus.  NG tube removal per surgery  Underwent gastrograffin challenge with multiple bowel movements  Surgery following  Using IS   Tolerating clear liquids     Hypophosphatemia  Patient has Abnormal Phosphorus: hypophosphatemia. Will continue to monitor electrolytes closely. Will replace the affected electrolytes and repeat labs to be done after interventions completed. The patient's phosphorus results have been reviewed and are listed below.  Recent Labs   Lab 10/08/24  0911   PHOS 2.6*       Contributory to ileus, due to prolonged NPO related to surgery    Low oxygen saturation  Oxygen saturation of 83% documented overnight on 10/4. On repeat with rapid nurse improved to 92% without intervention. Currently 99% on 2L will wean oxygen, she denies SOB.  Continue incentive spirometer    PAD (peripheral artery disease)  Extensive PAD history with Axbifem Bypass in 1994 and Cartoid stenosis 60-70% reported in vascular note from 8/2024:     8/29/23 PVR right 0.4-0.5  Carotid duplex 60-79% left    8/27/24 PVR right 0.4-0.6 monophasic waveforms  Carotid duplex 40-59% but limited visualization due to heavy shadows/calcium       Eliquis resumed, confirmed with patient no taking aspirin or plavix reports stopped by outpt vascular    Acquired hypothyroidism  Lab Results   Component Value Date    TSH 0.896 07/13/2023     Repeat TSH not currently on synthroid per chart review and care everywhere        Essential hypertension  Chronic, controlled. Latest blood pressure and vitals reviewed-     Temp:  [97.6 °F (36.4 °C)-97.9 °F (36.6 °C)]   Pulse:  [59-92]   Resp:  [17-18]   BP: (130-175)/(70-78)   SpO2:  [91 %-99 %] .   Home meds for hypertension were reviewed and noted below.   Hypertension Medications               amLODIPine (NORVASC) 10 MG tablet Take 10 mg by mouth once daily.    hydroCHLOROthiazide (HYDRODIURIL) 25 MG tablet Take 25 mg by mouth once daily.    losartan (COZAAR) 100 MG tablet Take 100 mg by mouth once daily.    metoprolol tartrate (LOPRESSOR) 25 MG tablet Take 1 tablet (25 mg total) by mouth 2 (two) times daily.            While in the hospital, will manage blood pressure as follows; Resume home lopressor and losartan with HOLD parameters. HOLD other BP meds for now and diurectics due to dehydration and possible surgery.  Add back amlodipine given BP now remaining more elevated, but hold HCTZ as still not on full diet.    Will utilize p.r.n. blood pressure medication only if patient's blood  pressure greater than  180/90  and she develops symptoms such as worsening chest pain or shortness of breath.    Anemia  Anemia is likely due to  status post surgical  hernia repair . Most recent hemoglobin and hematocrit are listed below.  Recent Labs     10/06/24  0449 10/07/24  0507 10/08/24  0722   HGB 10.7* 10.6* 10.6*   HCT 36.2* 34.9* 33.7*     Plan  - Monitor serial CBC: Daily  - Transfuse PRBC if patient becomes hemodynamically unstable, symptomatic or H/H drops below 7/21.  - Patient has not received any PRBC transfusions to date  - Patient's anemia is currently stable      Chronic atrial fibrillation  Patient with Permanent atrial fibrillation which is controlled with lopressor 25mg PO BID.  Patient is currently in atrial fibrillation.[id number] Score: 4. Anticoagulation indicated. Anticoagulation done with eliquis 5mg PO BID will resume after discussion with surgery tonight for 48hrs post op. Monitor on tele.  Cont. Her lopressor for now.     Tolerating eliquis without bleeding other than mild epistaxis with NG placement. No further bleeding seen      VTE Risk Mitigation (From admission, onward)           Ordered     apixaban tablet 5 mg  2 times daily         10/04/24 0940                    Discharge Planning   JULIÁN:      Code Status: Full Code   Is the patient medically ready for discharge?:     Reason for patient still in hospital (select all that apply): Patient trending condition, Consult recommendations, and PT / OT recommendations  Discharge Plan A: Skilled Nursing Facility   Discharge Delays: None known at this time              Malia Yates NP  Department of Hospital Medicine   [facility] [place] [facility]

## 2024-10-08 NOTE — ASSESSMENT & PLAN NOTE
Patient has Abnormal Phosphorus: hypophosphatemia. Will continue to monitor electrolytes closely. Will replace the affected electrolytes and repeat labs to be done after interventions completed. The patient's phosphorus results have been reviewed and are listed below.  Recent Labs   Lab 10/08/24  0911   PHOS 2.6*      Contributory to ileus, due to prolonged NPO related to surgery

## 2024-10-08 NOTE — PROGRESS NOTES
"Patient seen and examined at bedside this AM. She is in no acute distress. She states she has been passing flatus and tolerating the clear liquid diet.      BP (!) 167/77 (BP Location: Left arm, Patient Position: Lying)   Pulse 81   Temp 97.9 °F (36.6 °C) (Oral)   Resp 18   Ht 5' 3" (1.6 m)   Wt 96.2 kg (212 lb 1.3 oz)   SpO2 (!) 86%   BMI 37.57 kg/m²   General: no acute distress  Abdomen: soft, nontender and not distended with surgical incision healing well      Abbie Barragan is a 89 y.o. year old female  with extensive abdominal surgical history including aorto bi-iliac bypass graft, currently on Eliquis (last dose 10/1) who is s/p open ventral hernia repair with mesh on 10/2. Abdominal pain improved. WBC normalized. Lactate normalized.      -advance to regular diet     Sofie Handley MD   General Surgery  [facility]  "

## 2024-10-08 NOTE — PT/OT/SLP PROGRESS
"Physical Therapy Treatment    Patient Name:  Abbie Barragan   MRN:  [id number]    Recommendations:     Discharge Recommendations: Moderate Intensity Therapy  Discharge Equipment Recommendations: bedside commode  Barriers to discharge:  Decreased functional mobility    Assessment:     Abbie Barragan is a 89 y.o. female admitted with a medical diagnosis of SBO (small bowel obstruction).  She presents with the following impairments/functional limitations: weakness, impaired endurance, impaired functional mobility, impaired balance, edema, impaired cardiopulmonary response to activity.    Rehab Prognosis: Good; patient would benefit from acute skilled PT services to address these deficits and reach maximum level of function.    Recent Surgery: Procedure(s) (LRB):  REPAIR, HERNIA, VENTRAL, INCARCERATED, WITHOUT HISTORY OF PRIOR REPAIR (N/A) 6 Days Post-Op    Plan:     During this hospitalization, patient to be seen 5 x/week to address the identified rehab impairments via therapeutic activities, therapeutic exercises, neuromuscular re-education and progress toward the following goals:    Plan of Care Expires:  10/18/24    Subjective     Chief Complaint: decreased functional mobility  Patient/Family Comments/goals: Pt was agreeable for therapy  Pain/Comfort:  Pain Rating 1: 0/10      Objective:     Patient found HOB elevated with peripheral IV, telemetry, abdominal binder, and nasal cannula with no oxygen flow upon PT entry to room. "I'm feeling a little better today." Pt seems to have some memory issues. Pt did not remember that OT had visited this morning when asked. Pt was able to attend to all tasks and activities. Nasal cannula flow continued at 2L by PT after pt was sitting EOB and desatted to 79%. Pt able to reach 88% w/o O2 through deep breathing technique. O2 at 2L: 92%.    General Precautions: Standard, fall  Orthopedic Precautions: N/A  Braces: N/A  Respiratory Status: Nasal cannula, flow 2 L/min     Functional " "Mobility:  Bed Mobility:     Rolling Right: CGA  Scooting: CGA   Supine to Sit: moderate assistance for RLE management  Transfers:     Sit to Stand: moderate assistance with BUE support from bed rails. Pt completed 2 trials of 1 min standing each.  Bed to Chair: minimum assistance with no AD using Squat Pivot  Balance: Pt demonstrated fair+ static sitting balance seated EOB for ~10 min. Pt demonstrated poor static standing balance w/ bed rail UE support and MaxA for ~2 min.       AM-PAC 6 CLICK MOBILITY  Turning over in bed (including adjusting bedclothes, sheets and blankets)?: 3  Sitting down on and standing up from a chair with arms (e.g., wheelchair, bedside commode, etc.): 2  Moving from lying on back to sitting on the side of the bed?: 2  Moving to and from a bed to a chair (including a wheelchair)?: 3  Need to walk in hospital room?: 1  Climbing 3-5 steps with a railing?: 1  Basic Mobility Total Score: 12       Treatment & Education:  Pt completed following exercises to improve LE strength and flexibility:  3x30 sec R knee extension stretch w/ PT overpressure.  1x10 reps BLE hip flexion marches  1x10 reps RLE abduction/adduction against minimal PT resistance on lateral/medial aspects of knee.  1x10 reps RLE extension/flexion against minimal PT resistance on anterior/posterior aspect of ankle.  1x10 reps RLE seated heel raises against minimal PT resistance on distal aspect of thigh.     ~2 minutes of AROM L hip flexion/extension, abduction/adduction.    Pt educated on benefits of diaphragmatic breathing to reduce anxiety and improve blood oxygenation.   Pt educated on "call, don't fall" rule to get back into bed to reduce falls risk.  Pt educated on benefits of exercise and activity to prevent muscle wasting 2/2 prolonged hospital stay.    Patient left up in chair on chair cushion and nasal cannula with all lines intact, call button in reach, and nurse notified.    GOALS:   Multidisciplinary Problems       " Physical Therapy Goals          Problem: Physical Therapy    Goal Priority Disciplines Outcome Interventions   Physical Therapy Goal     PT, PT/OT Progressing    Description: Goals to be met by: 10/18/24     Patient will increase functional independence with mobility by performing:    Supine to sit with Contact Guard Assistance  Sit to supine with Contact Guard Assistance  Rolling to Left and Right with Contact Guard Assistance.  Lower extremity exercise program 3x10 reps per handout, with independence. Exercises include: R SAQs over pillow, B glut sets, R quad sets, R ankle pumps, R LAQs, R hip flexion seated EOB.   Bed to Chair transfer w/ CGA.  Propel self in wheelchair 100 feet with Supervision    Pt will benefit from skilled acute care physical therapy intervention to improve functional mobility and reduce burden of care.                         Time Tracking:     PT Received On: 10/08/24  PT Start Time: 1348     PT Stop Time: 1416  PT Total Time (min): 28 min     Billable Minutes: Therapeutic Activity 12 and Therapeutic Exercise 16    Treatment Type: Treatment  PT/PTA: PT     Number of PTA visits since last PT visit: 0     10/08/2024

## 2024-10-08 NOTE — NURSING
[facility] [place]  Nurses Note -- 4 Eyes      10/8/2024       Skin assessed on: Q Shift      [x] No Pressure Injuries Present    []Prevention Measures Documented    [] Yes LDA  for Pressure Injury Previously documented     [] Yes New Pressure Injury Discovered   [] LDA for New Pressure Injury Added      Attending RN:  Brissa Lin LPN     Second RN:  Chetna CORTEZ

## 2024-10-08 NOTE — ASSESSMENT & PLAN NOTE
Anemia is likely due to  status post surgical  hernia repair . Most recent hemoglobin and hematocrit are listed below.  Recent Labs     10/06/24  0449 10/07/24  0507 10/08/24  0722   HGB 10.7* 10.6* 10.6*   HCT 36.2* 34.9* 33.7*     Plan  - Monitor serial CBC: Daily  - Transfuse PRBC if patient becomes hemodynamically unstable, symptomatic or H/H drops below 7/21.  - Patient has not received any PRBC transfusions to date  - Patient's anemia is currently stable

## 2024-10-08 NOTE — PLAN OF CARE
10:41am: Spoke with patient's son, Vernell Barragan, [phone], and daughter-in-law, Virginia Barragan, [phone].  They stated patient does not want to go to SNF.  CM explained that patient being at the nursing home was only temporary, that she was not being placed there and only going to get more intensive therapy.  Family stated patient understands but she is adament about going home with home health instead; family stated someone will be at home with patient.  CM to send referrals for HH via [facility] and update attending. I provided the patient a choice of post acute providers and offered a list of CMS rated home health agencies.  Patient has declined to select a preferred provider and elects placement with the first accepting in network provider that is available to provide services as ordered by the physician.     Per [facility], [facility] Phone: [phone], is willing to accept patient for home health services.  CM added agency information to AVS.

## 2024-10-08 NOTE — SUBJECTIVE & OBJECTIVE
Interval History: patient seen and examined. Tolerating clear liquids. Patient refuses to go to SNF wants to go home with HH.     Review of Systems   Constitutional:  Negative for chills and fever.   Eyes:  Negative for photophobia and visual disturbance.   Respiratory:  Positive for cough. Negative for chest tightness and shortness of breath.    Cardiovascular:  Negative for chest pain and palpitations.   Gastrointestinal:  Negative for abdominal pain, nausea and vomiting.   Genitourinary:  Negative for dysuria and hematuria.     Objective:     Vital Signs (Most Recent):  Temp: 97.9 °F (36.6 °C) (10/08/24 0749)  Pulse: 82 (10/08/24 0809)  Resp: 18 (10/08/24 0809)  BP: (!) 167/74 (10/08/24 0749)  SpO2: (!) 94 % (10/08/24 0809) Vital Signs (24h Range):  Temp:  [97.7 °F (36.5 °C)-98.7 °F (37.1 °C)] 97.9 °F (36.6 °C)  Pulse:  [65-90] 82  Resp:  [17-20] 18  SpO2:  [93 %-95 %] 94 %  BP: (141-175)/(68-84) 167/74     Weight: 96.2 kg (212 lb 1.3 oz)  Body mass index is 37.57 kg/m².    Intake/Output Summary (Last 24 hours) at 10/8/2024 1055  Last data filed at 10/8/2024 0809  Gross per 24 hour   Intake 440 ml   Output 600 ml   Net -160 ml         Physical Exam  Vitals and nursing note reviewed.   Constitutional:       General: She is not in acute distress.     Appearance: She is well-developed. She is not diaphoretic.   HENT:      Head: Normocephalic and atraumatic.      Right Ear: External ear normal.      Left Ear: External ear normal.   Eyes:      General:         Right eye: No discharge.         Left eye: No discharge.      Conjunctiva/sclera: Conjunctivae normal.   Neck:      Thyroid: No thyromegaly.   Cardiovascular:      Rate and Rhythm: Normal rate and regular rhythm.      Heart sounds: No murmur heard.  Pulmonary:      Effort: Pulmonary effort is normal. No respiratory distress.      Breath sounds: Normal breath sounds.   Abdominal:      General: Bowel sounds are normal. There is no distension.      Palpations:  Abdomen is soft. There is no mass.      Tenderness: There is no abdominal tenderness.      Comments: Bowel sounds increased and now normal.    Musculoskeletal:         General: No deformity.      Cervical back: Normal range of motion and neck supple.      Right lower leg: No edema.      Left lower leg: No edema.   Skin:     General: Skin is warm and dry.      Comments: Abdominal incision with drsg intact and abdomen binder    Neurological:      Mental Status: She is alert and oriented to person, place, and time.      Sensory: No sensory deficit.   Psychiatric:         Mood and Affect: Mood normal.         Behavior: Behavior normal.             Significant Labs: All pertinent labs within the past 24 hours have been reviewed.    Significant Imaging: I have reviewed all pertinent imaging results/findings within the past 24 hours.

## 2024-10-08 NOTE — PT/OT/SLP PROGRESS
Occupational Therapy   Treatment    Name: Abbie Barragan  MRN: [id number]  Admitting Diagnosis:  SBO (small bowel obstruction)  6 Days Post-Op    Recommendations:     Discharge Recommendations: Moderate Intensity Therapy  Discharge Equipment Recommendations:  bedside commode  Barriers to discharge:  None    Assessment:     Abbie Barragan is a 89 y.o. female with a medical diagnosis of SBO (small bowel obstruction).  She presents with The primary encounter diagnosis was SBO (small bowel obstruction). Diagnoses of Left arm pain, Hypoxia, Chest pain, and Preoperative cardiovascular examination were also pertinent to this visit. . Performance deficits affecting function are weakness, impaired endurance, impaired self care skills, impaired functional mobility, impaired balance.     Rehab Prognosis:  Good; patient would benefit from acute skilled OT services to address these deficits and reach maximum level of function.       Plan:     Patient to be seen 4 x/week to address the above listed problems via self-care/home management, therapeutic activities, therapeutic exercises  Plan of Care Expires: 10/18/24  Plan of Care Reviewed with: patient, family    Subjective     Chief Complaint: imminent BM  Patient/Family Comments/goals: pt agreeable to therapy  Pain/Comfort:  Pain Rating 1: 0/10  Pain Addressed 1: Reposition, Nurse notified, Cessation of Activity  Pain Rating Post-Intervention 1: 0/10    Objective:     Communicated with: RN prior to session.  Patient found HOB elevated with bed alarm, telemetry, PureWick, Other (comments) (abdominal binder) upon OT entry to room.    General Precautions: Standard, fall    Orthopedic Precautions:N/A  Braces: N/A  Respiratory Status: Room air     Occupational Performance:     Bed Mobility:    Patient completed 6x Rolling/Turning to Left with CGA-moderate assistance 2/2 fatigue  Patient completed 5x Rolling/Turning to Right with minimal assistance-maximal assistance 2/2 fatigue  Patient  completed Scooting/Bridging with maximal assistance and 2 persons to hob in supine in trendelenburg. Pt assisted with bridging with Mod A and scooting with RLE with instruction on body mechanics/technique    Functional Mobility/Transfers:  Functional Mobility: Pt required assist for bed mobility but able to maintain L sidelying with SBA with bedrail use for toileting regimen. Able to maintain R sidelying with Mod A for L hip with bedrail use. Sidelying for toileting regimen and for pressure relief/offloading hips    Activities of Daily Living:  Lower Body Dressing: maximal assistance to doff brief supine with pt assisting with rolling/bridging with RLE  Toileting: maximal assistance for bedlevel; pt assisting with rolling/bridging/maintaining sidelying post-liquid BM during session      AMPA 6 Click ADL: 18    Treatment & Education:  ADL/functional mobility training as above.  RN notified and came to re-place pt's sacral bandage/assist in bed mobility.   All needs met.    Patient left HOB elevated with all lines intact, call button in reach, bed alarm on, and RN notified    GOALS:   Multidisciplinary Problems       Occupational Therapy Goals          Problem: Occupational Therapy    Goal Priority Disciplines Outcome Interventions   Occupational Therapy Goal     OT, PT/OT Progressing    Description: Goals to be met by: 10/18/2024     Patient will increase functional independence with ADLs by performing:    UE Dressing with [place].  LE Dressing with Modified [place].  Grooming while seated with [place].  Toileting from bedside commode with Modified [place] for hygiene and clothing management.   Toilet transfer to bedside commode with Modified [place].                         Time Tracking:     OT Date of Treatment: 10/08/24  OT Start Time: 1035  OT Stop Time: 1107  OT Total Time (min): 32 min    Billable Minutes:Self Care/Home Management 32    OT/GERARD: OT          10/8/2024

## 2024-10-08 NOTE — PLAN OF CARE
Problem: Wound  Goal: Optimal Coping  Outcome: Progressing  Goal: Optimal Functional Ability  Outcome: Progressing  Goal: Absence of Infection Signs and Symptoms  Outcome: Progressing  Goal: Improved Oral Intake  Outcome: Progressing  Goal: Optimal Pain Control and Function  Outcome: Progressing  Goal: Skin Health and Integrity  Outcome: Progressing  Goal: Optimal Wound Healing  Outcome: Progressing     Problem: Fall Injury Risk  Goal: Absence of Fall and Fall-Related Injury  Outcome: Progressing

## 2024-10-08 NOTE — PROGRESS NOTES
[facility] [place]  Nurses Note -- 4 Eyes      10/8/2024       Skin assessed on: Q Shift      [x] No Pressure Injuries Present    [x]Prevention Measures Documented    [] Yes LDA  for Pressure Injury Previously documented     [] Yes New Pressure Injury Discovered   [] LDA for New Pressure Injury Added      Attending RN:  Chetna Baugh RN     Second RN:  JAZ Kenny

## 2024-10-08 NOTE — ASSESSMENT & PLAN NOTE
Presented with SBO due to umbilical hernia. Lactic acid decreasing post operatively.  Taken to OR 10/2, no flatus or BM yet, but resolution of abdominal pain.  NG replaced given return of N/V and findings concerning for ileus.  NG tube removal per surgery  Underwent gastrograffin challenge with multiple bowel movements  Surgery following  Using IS   Tolerating clear liquids

## 2024-10-09 VITALS
BODY MASS INDEX: 37.57 KG/M2 | SYSTOLIC BLOOD PRESSURE: 146 MMHG | WEIGHT: 212.06 LBS | HEIGHT: 63 IN | RESPIRATION RATE: 18 BRPM | DIASTOLIC BLOOD PRESSURE: 72 MMHG | HEART RATE: 83 BPM | OXYGEN SATURATION: 96 % | TEMPERATURE: 98 F

## 2024-10-09 LAB — PHOSPHATE SERPL-MCNC: 2.7 MG/DL (ref 2.7–4.5)

## 2024-10-09 PROCEDURE — 84100 ASSAY OF PHOSPHORUS: CPT | Performed by: NURSE PRACTITIONER

## 2024-10-09 PROCEDURE — 36415 COLL VENOUS BLD VENIPUNCTURE: CPT | Performed by: NURSE PRACTITIONER

## 2024-10-09 PROCEDURE — 94761 N-INVAS EAR/PLS OXIMETRY MLT: CPT

## 2024-10-09 PROCEDURE — A4216 STERILE WATER/SALINE, 10 ML: HCPCS | Performed by: PHYSICIAN ASSISTANT

## 2024-10-09 PROCEDURE — 25000003 PHARM REV CODE 250: Performed by: PHYSICIAN ASSISTANT

## 2024-10-09 PROCEDURE — 27000221 HC OXYGEN, UP TO 24 HOURS

## 2024-10-09 PROCEDURE — 99900035 HC TECH TIME PER 15 MIN (STAT)

## 2024-10-09 PROCEDURE — 25000003 PHARM REV CODE 250: Performed by: NURSE PRACTITIONER

## 2024-10-09 PROCEDURE — 25000003 PHARM REV CODE 250: Performed by: STUDENT IN AN ORGANIZED HEALTH CARE EDUCATION/TRAINING PROGRAM

## 2024-10-09 RX ORDER — SODIUM,POTASSIUM PHOSPHATES 280-250MG
2 POWDER IN PACKET (EA) ORAL ONCE
Status: COMPLETED | OUTPATIENT
Start: 2024-10-09 | End: 2024-10-09

## 2024-10-09 RX ADMIN — AMLODIPINE BESYLATE 10 MG: 5 TABLET ORAL at 08:10

## 2024-10-09 RX ADMIN — Medication 10 ML: at 05:10

## 2024-10-09 RX ADMIN — LOSARTAN POTASSIUM 100 MG: 25 TABLET, FILM COATED ORAL at 08:10

## 2024-10-09 RX ADMIN — Medication 10 ML: at 01:10

## 2024-10-09 RX ADMIN — APIXABAN 5 MG: 5 TABLET, FILM COATED ORAL at 08:10

## 2024-10-09 RX ADMIN — Medication 10 ML: at 11:10

## 2024-10-09 RX ADMIN — METOPROLOL TARTRATE 50 MG: 50 TABLET, FILM COATED ORAL at 08:10

## 2024-10-09 RX ADMIN — Medication 2 PACKET: at 09:10

## 2024-10-09 NOTE — PLAN OF CARE
[facility] Telemetry      HOME HEALTH ORDERS  FACE TO FACE ENCOUNTER    Patient Name: Abbie Barragan  YOB: 1935    PCP: Tanner Gómez MD   PCP Address: [address] [address]  PCP Phone Number: [phone]  PCP Fax: [phone]    Encounter Date: 10/1/24    Admit to Home Health    Diagnoses:  Active Hospital Problems    Diagnosis  POA    *SBO (small bowel obstruction) [K56.609]  Yes    Low oxygen saturation [R79.81]  Yes    Hypophosphatemia [E83.39]  No    Essential hypertension [I10]  Yes    Acquired hypothyroidism [E03.9]  Yes    PAD (peripheral artery disease) [I73.9]  Yes    Anemia [D64.9]  Yes    Chronic atrial fibrillation [I48.20]  Yes      Resolved Hospital Problems   No resolved problems to display.       Follow Up Appointments:  No future appointments.    Allergies:Review of patient's allergies indicates:  No Known Allergies    Medications: Review discharge medications with patient and family and provide education.    Current Facility-Administered Medications   Medication Dose Route Frequency Provider Last Rate Last Admin    acetaminophen tablet 650 mg  650 mg Oral Q4H PRN Sofie Handely MD        albuterol-ipratropium 2.5 mg-0.5 mg/3 mL nebulizer solution 3 mL  3 mL Nebulization Q4H PRN Sofie Handley MD        aluminum-magnesium hydroxide-simethicone 200-200-20 mg/5 mL suspension 30 mL  30 mL Oral QID PRN Sofie Handley MD        amLODIPine tablet 10 mg  10 mg Oral Daily Jaquan Lacy PA-C   10 mg at 10/09/24 0839    apixaban tablet 5 mg  5 mg Oral BID Jaquan Lacy PA-C   5 mg at 10/09/24 0840    dextrose 10% bolus 125 mL 125 mL  12.5 g Intravenous PRN Anthony Friedman MD        dextrose 10% bolus 250 mL 250 mL  25 g Intravenous PRN Anthony Friedman MD        hydrALAZINE injection 5 mg  5 mg Intravenous Q6H PRN Sofie Handley MD   5 mg at 10/06/24 1700    losartan tablet 100 mg  100 mg Oral Daily Sofie Handley MD   100 mg at 10/09/24  0840    melatonin tablet 6 mg  6 mg Oral Nightly PRN Sofie Handley MD        metoprolol tartrate (LOPRESSOR) tablet 50 mg  50 mg Oral BID Jaquan Lacy PA-C   50 mg at 10/09/24 0840    morphine injection 0.5 mg  0.5 mg Intravenous Q8H PRN Jaquan Lacy PA-C   0.5 mg at 10/04/24 0811    naloxone 0.4 mg/mL injection 0.4 mg  0.4 mg Intravenous PRN Sofie Handley MD        ondansetron injection 4 mg  4 mg Intravenous Q4H PRN Sofie Handley MD   4 mg at 10/07/24 1335    simethicone chewable tablet 80 mg  1 tablet Oral QID PRN Sofie Handley MD        sodium chloride 0.9% flush 10 mL  10 mL Intravenous Q12H PRN Sofie Handley MD        sodium chloride 0.9% flush 10 mL  10 mL Intravenous Q6H Jaquan Lacy PA-C   10 mL at 10/09/24 1126    And    sodium chloride 0.9% flush 10 mL  10 mL Intravenous PRN Jaquan Lacy PA-C         Facility-Administered Medications Ordered in Other Encounters   Medication Dose Route Frequency Provider Last Rate Last Admin    lactated ringers infusion   Intravenous Continuous Jaquan Kim MD 0 mL/hr at 09/14/23 1338 New Bag at 10/02/24 1059    LIDOcaine (PF) 10 mg/ml (1%) injection 10 mg  1 mL Intradermal Once Jaquan Kim MD            Medication List        CONTINUE taking these medications      acetaminophen 325 MG tablet  Commonly known as: TYLENOL  Take 2 tablets (650 mg total) by mouth every 6 (six) hours as needed for Pain.     alendronate 70 MG tablet  Commonly known as: FOSAMAX  Take 70 mg by mouth every 7 days.     amLODIPine 10 MG tablet  Commonly known as: NORVASC  Take 10 mg by mouth once daily.     apixaban 5 mg Tab  Commonly known as: ELIQUIS  Take 1 tablet (5 mg total) by mouth 2 (two) times daily.     cholecalciferol (vitamin D3) 25 mcg (1,000 unit) capsule  Commonly known as: VITAMIN D3  Take 1,000 Units by mouth once daily.     hydroCHLOROthiazide 25 MG tablet  Commonly known as: HYDRODIURIL  Take 25 mg by mouth once daily.     losartan 100 MG  tablet  Commonly known as: COZAAR  Take 100 mg by mouth once daily.     metoprolol tartrate 25 MG tablet  Commonly known as: LOPRESSOR  Take 1 tablet (25 mg total) by mouth 2 (two) times daily.     potassium chloride 10 MEQ Cpsr  Commonly known as: MICRO-K  Take 10 mEq by mouth once.     rosuvastatin 40 MG Tab  Commonly known as: CRESTOR  Take 1 tablet by mouth once daily.            STOP taking these medications      clopidogreL 75 mg tablet  Commonly known as: PLAVIX     famotidine 20 MG tablet  Commonly known as: PEPCID     ondansetron 4 MG Tbdl  Commonly known as: ZOFRAN-ODT     oxyCODONE-acetaminophen 5-325 mg per tablet  Commonly known as: PERCOCET                I have seen and examined this patient within the last 30 days. My clinical findings that support the need for the home health skilled services and home bound status are the following:no   Weakness/numbness causing balance and gait disturbance due to status post hernia repair making it taxing to leave home.     Diet:   cardiac diet        Referrals/ Consults  Physical Therapy to evaluate and treat. Evaluate for home safety and equipment needs; Establish/upgrade home exercise program. Perform / instruct on therapeutic exercises, gait training, transfer training, and Range of Motion.  Occupational Therapy to evaluate and treat. Evaluate home environment for safety and equipment needs. Perform/Instruct on transfers, ADL training, ROM, and therapeutic exercises.  Aide to provide assistance with personal care, ADLs, and vital signs.    Activities:   activity as tolerated    Nursing:   Agency to admit patient within 24 hours of hospital discharge unless specified on physician order or at patient request    SN to complete comprehensive assessment including routine vital signs. Instruct on disease process and s/s of complications to report to MD. Review/verify medication list sent home with the patient at time of discharge  and instruct patient/caregiver as  needed. Frequency may be adjusted depending on start of care date.     Skilled nurse to perform up to 3 visits PRN for symptoms related to diagnosis    Notify MD if SBP > 160 or < 90; DBP > 90 or < 50; HR > 120 or < 50; Temp > 101; O2 < 88%; Other:       Ok to schedule additional visits based on staff availability and patient request on consecutive days within the home health episode.    When multiple disciplines ordered:    Start of Care occurs on Sunday - Wednesday schedule remaining discipline evaluations as ordered on separate consecutive days following the start of care.    Thursday SOC -schedule subsequent evaluations Friday and Monday the following week.     Friday - Saturday SOC - schedule subsequent discipline evaluations on consecutive days starting Monday of the following week.    For all post-discharge communication and subsequent orders please contact patient's primary care physician. If unable to reach primary care physician or do not receive response within 30 minutes, please contact [facility] for clinical staff order clarification        Home Health Aide:  Nursing Monday, Wednesday and Friday, Physical Therapy Monday, Wednesday and Friday, Occupational Therapy Monday, Wednesday and Friday, and Home Health Aide Monday, Wednesday and Friday        I certify that this patient is confined to her home and needs intermittent skilled nursing care, physical therapy, and occupational therapy.

## 2024-10-09 NOTE — DISCHARGE INSTRUCTIONS
Our goal at [facility] is to always give you outstanding care and exceptional service. You may receive a survey by mail, text or e-mail in the next 7-10 days from Lidia Daniels and our leadership team asking about the care you received with us. The survey should only take 5-10 minutes to complete and is very important to us.     Your feedback provides us with a way to recognize our staff who work tirelessly to provide the best care! Also, your responses help us learn how to improve when your experience was below our aspiration of excellence. We WILL use your feedback to continue making improvements to help us provide the highest quality care. We keep your personal information and feedback confidential. We appreciate your time completing this survey and can't wait to hear from you!!!     We want you to leave us today feeling like you can DEFINITELY recommend us to others! We look forward to your continued care with us! Thanks so much for choosing [facility] for your healthcare needs!

## 2024-10-09 NOTE — PT/OT/SLP PROGRESS
"Physical Therapy      Patient Name:  Abbie Barragan   MRN:  [id number]    Patient not seen today secondary to Bowel/bladder accident. "I need to be cleaned up." Pt was waiting for nursing to clean her after a BM. JAZ Brumfield notified. Will follow-up later today.    "

## 2024-10-09 NOTE — NURSING
AVS virtually reviewed with patient, her son and daughter-in -law in its entirety with emphasis on diet, medications, follow-up appointments and reasons to return to the ED or contact the [facility] On Call Nurse Care Line. Patient also encouraged to utilize their patient portal. Ease and convenience of use reiterated. Education complete and patient voiced understanding. All questions answered. Discharge teaching completed.

## 2024-10-09 NOTE — PLAN OF CARE
Problem: Skin Injury Risk Increased  Goal: Skin Health and Integrity  Outcome: Met     Problem: Adult Inpatient Plan of Care  Goal: Plan of Care Review  Outcome: Met  Goal: Patient-Specific Goal (Individualized)  Outcome: Met  Goal: Absence of Hospital-Acquired Illness or Injury  Outcome: Met  Goal: Optimal Comfort and Wellbeing  Outcome: Met  Goal: Readiness for Transition of Care  Outcome: Met     Problem: Wound  Goal: Optimal Coping  Outcome: Met  Goal: Optimal Functional Ability  Outcome: Met  Goal: Absence of Infection Signs and Symptoms  Outcome: Met  Goal: Improved Oral Intake  Outcome: Met  Goal: Optimal Pain Control and Function  Outcome: Met  Goal: Skin Health and Integrity  Outcome: Met  Goal: Optimal Wound Healing  Outcome: Met     Problem: Fall Injury Risk  Goal: Absence of Fall and Fall-Related Injury  Outcome: Met

## 2024-10-09 NOTE — DISCHARGE SUMMARY
[facility] Medicine  Discharge Summary      Patient Name: Abbie Barragan  MRN: [id number]  BETH: [id number]  Patient Class: IP- Inpatient  Admission Date: 10/1/2024  Hospital Length of Stay: 7 days  Discharge Date and Time:  10/09/2024 11:56 AM  Attending Physician: Vitaliy Duong MD   Discharging Provider: Malia Yates NP  Primary Care Provider: Tanner Gómez MD    Primary Care Team:  [facility]    HPI:   89 y.o. 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 Esther Dang with vascular), Carotid artery stenosis, Essential HTN, HLD, Hypothyroid (goiter), and h/o abdominal adhesions (per surgery eval in 2023 for acute cholecystitis by Dr. Bell->aborted lap lata due to excessive adhesions) presents to the ER with N/V and abdominal pain Since Monday evening. States h/o acute cholecystitis in 2023 but treated medically due to her complex medical history. She was concerned that her symptoms could be her gallbladder again.   Pt. Denies any fevers or chills. No chest pain or SOB/ACKERMAN. Noted constipation yesterday and diarrhea, non-bloody on Monday.     IN the ED labs noted for WBC 14 and Stable H/H 13.5/43.1.  Lytes stable with normal renal function. Lipase negative.Lactic acid normal.   UA concerning for acute UTI and started on Rocephin by the ED.     CXR:  FINDINGS:  Cardiac monitoring leads overlie the chest.  There is unchanged enlargement of the cardiomediastinal silhouette.  The lungs are symmetrically expanded with diffuse coarse/increased interstitial attenuation similar to prior exam with more pronounced bibasilar interstitial prominence, similar prior examination.  No large volume of pleural fluid or pneumothorax identified noting slight increased attenuation of the left lower lung zone likely relates to prominent  cardiomediastinal silhouette and overlying soft tissue.  Osseous structures are intact with degenerative change.    CT abdomen/Pelvis with IV contrast no PO:  FINDINGS:  Heart is enlarged.  Mild bibasilar atelectatic changes and/or scarring are seen.  No pleural effusion.     No significant hepatic abnormalities are identified.  There is no intra-or extrahepatic biliary ductal dilatation.  Suspected noncalcified stones are seen within the gallbladder.  The pancreas, spleen, and adrenal glands are unremarkable.     Kidneys enhance normally with no evidence of hydronephrosis.  Left renal cysts are seen.  No abnormalities are seen along the ureteral courses.  Urinary bladder is nondistended.  Uterus has been removed.     There is diffuse dilatation of small bowel loops measuring upwards of 4-5 cm in caliber with air-fluid levels consistent with small-bowel obstruction.  This is secondary to a bowel containing supraumbilical hernia which contains short loop segment of small bowel.  Distal small bowel loops are decompressed beyond this level.  Stomach is distended with prominent air-fluid level.  No free air or free fluid.     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  There is prominent atherosclerosis.  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     No acute osseous abnormality identified.  Degenerative changes are seen in the lumbar spine.     Impression:     1. Small-bowel obstruction secondary to bowel containing supraumbilical hernia.  Clinical correlation is advised to determine potential reducibility of this hernia.  2. Postsurgical changes and additional findings as detailed above        NGT was placed and General surgery contacted by the ED for further eval of her SBO. We have been consulted for further management.        Procedure(s) (LRB):  REPAIR, HERNIA, VENTRAL, INCARCERATED, WITHOUT HISTORY OF PRIOR REPAIR (N/A)      Hospital Course:   Abbie Barragan  89 y.o. female admitted to the hospital for SBO due to umbilical hernia. CT scan with bowel containing supraumbilical hernia. Lactic acid increased to 4.1. Seen by surgery and plans for inpatient operative repair. Taken to OR 10/2, with no signs of necrotic bowel and associated down trending of lactic acid after OR. NG tube removed and diet advanced to clear liquid awaiting return of bowel function. Developed post operative ileus and NG tube replaced. Gastrograffing challenge attempted with multiple bowel movements.patient now tolerate po and had bowel movement this morning. Follow up outpatient with general surgery. PT/OT recs: SNF placement. Patient refused going home with  PT/OT and family will stay with patient 24 hours for care. Electrolytes replaced as needed.      Goals of Care Treatment Preferences:  Code Status: Full Code      SDOH Screening:  The patient declined to be screened for utility difficulties, food insecurity, transport difficulties, housing insecurity, and interpersonal safety, so no concerns could be identified this admission.     Consults:   Consults (From admission, onward)          Status Ordering Provider     Inpatient consult to Midline team  Once        Provider:  (Not yet assigned)    Acknowledged SHOWLILI ABBOTT     Inpatient consult to Social Work  Once        Provider:  (Not yet assigned)    Completed SHOWERS, LILI     Inpatient consult to Social Work  Once        Provider:  (Not yet assigned)    Completed SHOWERS, LILI     Inpatient consult to General surgery  Once        Provider:  Sofie Handley MD    Completed FUNMI MYERS     Inpatient consult to Cardiology  Once        Provider:  Rell Mcfadden MD    Completed FUNMI MYERS.            No new Assessment & Plan notes have been filed under this hospital service since the last note was generated.  Service: Hospital Medicine    Final Active Diagnoses:    Diagnosis Date Noted POA    PRINCIPAL PROBLEM:  SBO (small  bowel obstruction) [K56.609] 10/02/2024 Yes    Low oxygen saturation [R79.81] 10/05/2024 Yes    Hypophosphatemia [E83.39] 10/05/2024 No    Essential hypertension [I10] 10/02/2024 Yes    Acquired hypothyroidism [E03.9] 10/02/2024 Yes    PAD (peripheral artery disease) [I73.9] 10/02/2024 Yes    Anemia [D64.9] 07/14/2023 Yes    Chronic atrial fibrillation [I48.20] 07/12/2023 Yes      Problems Resolved During this Admission:       Discharged Condition: stable    Disposition:     Follow Up:   Follow-up Information       Tanner Gómez MD. Schedule an appointment as soon as possible for a visit in 1 week(s).    Specialty: Internal Medicine  Why: [facility] Follow-up needed in 1 week  Contact information:  [address] [facility] [address]  [phone]               [facility] Follow up.    Specialties: Home Health Services, Home Therapy Services, Home Living Aide Services  Contact information:  [address]  [place] [address]  [phone]             Hayley Bell MD. Schedule an appointment as soon as possible for a visit in 2 week(s).    Specialties: General Surgery, Surgery  Why: If symptoms worsen, As needed  Contact information:  [address]  [address]  [facility] [address]  [phone]                           Patient Instructions:      Ambulatory referral/consult to General Surgery   Standing Status: Future   Referral Priority: Routine Referral Type: Consultation   Referral Reason: Specialty Services Required   Referred to Provider: HAYLEY BELL Requested Specialty: General Surgery   Number of Visits Requested: 1       Significant Diagnostic Studies: Labs: BMP:   Recent Labs   Lab 10/08/24  0911   GLU 94      K 3.6      CO2 24   BUN 18   CREATININE 0.7   CALCIUM 10.1   MG 2.1   , CMP   Recent Labs   Lab 10/08/24  0911      K 3.6      CO2 24   GLU 94   BUN 18   CREATININE 0.7   CALCIUM 10.1   PROT 7.3  "  ALBUMIN 2.8*   BILITOT 1.0   ALKPHOS 154*   AST 21   ALT 15   ANIONGAP 16   , CBC   Recent Labs   Lab 10/08/24  0722   WBC 11.52   HGB 10.6*   HCT 33.7*      , INR   Lab Results   Component Value Date    INR 1.0 10/02/2024    INR 1.1 07/12/2023   , Lipid Panel No results found for: "CHOL", "HDL", "LDLCALC", "TRIG", "CHOLHDL", Troponin No results for input(s): "TROPONINI" in the last 168 hours., A1C: No results for input(s): "HGBA1C" in the last 4320 hours., and All labs within the past 24 hours have been reviewed    Pending Diagnostic Studies:       Procedure Component Value Units Date/Time    EKG 12-lead [[id number]]     Order Status: Sent Lab Status: No result     Phosphorus [[id number]]     Order Status: Sent Lab Status: No result     Specimen: Blood            Medications:  Reconciled Home Medications:      Medication List        CONTINUE taking these medications      acetaminophen 325 MG tablet  Commonly known as: TYLENOL  Take 2 tablets (650 mg total) by mouth every 6 (six) hours as needed for Pain.     alendronate 70 MG tablet  Commonly known as: FOSAMAX  Take 70 mg by mouth every 7 days.     amLODIPine 10 MG tablet  Commonly known as: NORVASC  Take 10 mg by mouth once daily.     apixaban 5 mg Tab  Commonly known as: ELIQUIS  Take 1 tablet (5 mg total) by mouth 2 (two) times daily.     cholecalciferol (vitamin D3) 25 mcg (1,000 unit) capsule  Commonly known as: VITAMIN D3  Take 1,000 Units by mouth once daily.     hydroCHLOROthiazide 25 MG tablet  Commonly known as: HYDRODIURIL  Take 25 mg by mouth once daily.     losartan 100 MG tablet  Commonly known as: COZAAR  Take 100 mg by mouth once daily.     metoprolol tartrate 25 MG tablet  Commonly known as: LOPRESSOR  Take 1 tablet (25 mg total) by mouth 2 (two) times daily.     potassium chloride 10 MEQ Cpsr  Commonly known as: MICRO-K  Take 10 mEq by mouth once.     rosuvastatin 40 MG Tab  Commonly known as: CRESTOR  Take 1 tablet by mouth once daily.   "          STOP taking these medications      clopidogreL 75 mg tablet  Commonly known as: PLAVIX     famotidine 20 MG tablet  Commonly known as: PEPCID     ondansetron 4 MG Tbdl  Commonly known as: ZOFRAN-ODT     oxyCODONE-acetaminophen 5-325 mg per tablet  Commonly known as: PERCOCET              Indwelling Lines/Drains at time of discharge:   Lines/Drains/Airways       Drain  Duration             Female External Urinary Catheter w/ Suction 10/03/24 1551 5 days                    Time spent on the discharge of patient: 30 minutes         Malia Yates NP  Department of [facility] Medicine  [facility] - Telemetry

## 2024-10-09 NOTE — PLAN OF CARE
10/09/24 1157   Medicare Message   Important Message from Medicare regarding Discharge Appeal Rights Given to patient/caregiver;Explained to patient/caregiver;Other (comments)  (Spoke with patient's son and daugther in law. Explained IMM and they verbalized understanding.  Virginia Barragan, [phone])   Date IMM was signed 10/09/24   Time IMM was signed 1158

## 2024-10-09 NOTE — PT/OT/SLP PROGRESS
Physical Therapy      Patient Name:  Abbie Barragan   MRN:  [id number]    Patient not seen today for PT tx secondary to (Pt/family currently speaking with virtual nurse for discharge information.). Family present with pt's w/c for discharge home today.

## 2024-10-09 NOTE — PLAN OF CARE
Case Management Final Discharge Note      Discharge Disposition: Home health with [facility]: [phone]       New DME ordered / company name: No    Relevant SDOH / Transition of Care Barriers:  No    Primary Caretaker and contact information: Vernell Barragan (Son) [phone]; Virginia Barragan (Daughter-in-law) [phone]     Scheduled followup appointment: Follow up appointments with General Surgery and PCP scheduled and added to patient AVS.    Referrals placed: Referral placed to General Surgery    Transportation: Private vehicle/family taking patient home        Patient and family educated on discharge services and updated on DC plan.  Patient to discharge with home health services from [facility]; PT/OT recommended SNF but patient declined.  Bedside RN notified, patient clear to discharge from Case Management Perspective.    10/09/24 1235   Final Note   Assessment Type Final Discharge Note   Anticipated Discharge Disposition Home-Health   Hospital Resources/Appts/Education Provided Provided patient/caregiver with written discharge plan information;Appointments scheduled and added to AVS;Community resources provided   Post-Acute Status   Post-Acute Authorization Home Health   Home Health Status Set-up Complete/Auth obtained   Coverage [facility] SECURE SNP -   Discharge Delays None known at this time

## 2024-10-09 NOTE — NURSING
[facility], [facility]  Nurses Note -- 4 Eyes      10/9/2024       Skin assessed on: Q Shift      [x] No Pressure Injuries Present    []Prevention Measures Documented    [] Yes LDA  for Pressure Injury Previously documented     [] Yes New Pressure Injury Discovered   [] LDA for New Pressure Injury Added      Attending RN:  Brissa Lin LPN     Second RN:  Chetna CORTEZ

## 2024-10-09 NOTE — PT/OT/SLP PROGRESS
Occupational Therapy      Patient Name:  Abbie Barragan   MRN:  [id number]    Patient not seen today secondary to Bowel/bladder accident (1047-Pt reporting needing to be cleaned up and waiting for personnel already made aware to come in with materials. 1559-Pt and family receiving d/c education for d/c home with family support.).     10/9/2024

## 2024-10-09 NOTE — PROGRESS NOTES
[facility], [facility]  Nurses Note -- 4 Eyes      10/9/2024       Skin assessed on: Q Shift      [x] No Pressure Injuries Present    [x]Prevention Measures Documented    [] Yes LDA  for Pressure Injury Previously documented     [] Yes New Pressure Injury Discovered   [] LDA for New Pressure Injury Added      Attending RN:  Chetna Baugh, DANA     Second RN:   Brissa Lin LPN

## 2024-10-10 PROCEDURE — G0180 MD CERTIFICATION HHA PATIENT: HCPCS | Mod: ,,, | Performed by: SURGERY

## 2024-10-13 ENCOUNTER — HOSPITAL ENCOUNTER (INPATIENT)
Facility: HOSPITAL | Age: 89
LOS: 2 days | Discharge: HOME HEALTH | DRG: 280 | End: 2024-10-15
Attending: EMERGENCY MEDICINE | Admitting: INTERNAL MEDICINE
Payer: MEDICARE

## 2024-10-13 ENCOUNTER — NURSE TRIAGE (OUTPATIENT)
Dept: ADMINISTRATIVE | Facility: CLINIC | Age: 89
End: 2024-10-13
Payer: MEDICARE

## 2024-10-13 DIAGNOSIS — R07.9 CHEST PAIN: ICD-10-CM

## 2024-10-13 DIAGNOSIS — I50.33 ACUTE ON CHRONIC DIASTOLIC CONGESTIVE HEART FAILURE: Primary | ICD-10-CM

## 2024-10-13 DIAGNOSIS — R60.9 SWELLING: ICD-10-CM

## 2024-10-13 DIAGNOSIS — R60.9 EDEMA: ICD-10-CM

## 2024-10-13 DIAGNOSIS — I21.4 NSTEMI (NON-ST ELEVATED MYOCARDIAL INFARCTION): ICD-10-CM

## 2024-10-13 PROBLEM — I50.9 ACUTE CONGESTIVE HEART FAILURE: Status: ACTIVE | Noted: 2024-10-13

## 2024-10-13 PROBLEM — J90 PLEURAL EFFUSION: Status: ACTIVE | Noted: 2024-10-13

## 2024-10-13 LAB
ALBUMIN SERPL BCP-MCNC: 3 G/DL (ref 3.5–5.2)
ALP SERPL-CCNC: 132 U/L (ref 55–135)
ALT SERPL W/O P-5'-P-CCNC: 12 U/L (ref 10–44)
ANION GAP SERPL CALC-SCNC: 14 MMOL/L (ref 8–16)
APTT PPP: 29.8 SEC (ref 21–32)
APTT PPP: 43 SEC (ref 21–32)
AST SERPL-CCNC: 17 U/L (ref 10–40)
BASOPHILS # BLD AUTO: 0.01 K/UL (ref 0–0.2)
BASOPHILS # BLD AUTO: 0.02 K/UL (ref 0–0.2)
BASOPHILS NFR BLD: 0.1 % (ref 0–1.9)
BASOPHILS NFR BLD: 0.2 % (ref 0–1.9)
BILIRUB SERPL-MCNC: 1 MG/DL (ref 0.1–1)
BNP SERPL-MCNC: 432 PG/ML (ref 0–99)
BUN SERPL-MCNC: 6 MG/DL (ref 8–23)
CALCIUM SERPL-MCNC: 9.4 MG/DL (ref 8.7–10.5)
CHLORIDE SERPL-SCNC: 96 MMOL/L (ref 95–110)
CO2 SERPL-SCNC: 33 MMOL/L (ref 23–29)
CREAT SERPL-MCNC: 0.7 MG/DL (ref 0.5–1.4)
DIFFERENTIAL METHOD BLD: ABNORMAL
DIFFERENTIAL METHOD BLD: ABNORMAL
EOSINOPHIL # BLD AUTO: 0.1 K/UL (ref 0–0.5)
EOSINOPHIL # BLD AUTO: 0.1 K/UL (ref 0–0.5)
EOSINOPHIL NFR BLD: 1 % (ref 0–8)
EOSINOPHIL NFR BLD: 1 % (ref 0–8)
ERYTHROCYTE [DISTWIDTH] IN BLOOD BY AUTOMATED COUNT: 15.7 % (ref 11.5–14.5)
ERYTHROCYTE [DISTWIDTH] IN BLOOD BY AUTOMATED COUNT: 15.7 % (ref 11.5–14.5)
EST. GFR  (NO RACE VARIABLE): >60 ML/MIN/1.73 M^2
GLUCOSE SERPL-MCNC: 101 MG/DL (ref 70–110)
HCT VFR BLD AUTO: 35.1 % (ref 37–48.5)
HCT VFR BLD AUTO: 35.9 % (ref 37–48.5)
HGB BLD-MCNC: 10.9 G/DL (ref 12–16)
HGB BLD-MCNC: 11 G/DL (ref 12–16)
IMM GRANULOCYTES # BLD AUTO: 0.04 K/UL (ref 0–0.04)
IMM GRANULOCYTES # BLD AUTO: 0.05 K/UL (ref 0–0.04)
IMM GRANULOCYTES NFR BLD AUTO: 0.4 % (ref 0–0.5)
IMM GRANULOCYTES NFR BLD AUTO: 0.4 % (ref 0–0.5)
INR PPP: 1.1 (ref 0.8–1.2)
LYMPHOCYTES # BLD AUTO: 2.6 K/UL (ref 1–4.8)
LYMPHOCYTES # BLD AUTO: 3 K/UL (ref 1–4.8)
LYMPHOCYTES NFR BLD: 22.4 % (ref 18–48)
LYMPHOCYTES NFR BLD: 25.7 % (ref 18–48)
MCH RBC QN AUTO: 27.3 PG (ref 27–31)
MCH RBC QN AUTO: 28.3 PG (ref 27–31)
MCHC RBC AUTO-ENTMCNC: 30.4 G/DL (ref 32–36)
MCHC RBC AUTO-ENTMCNC: 31.3 G/DL (ref 32–36)
MCV RBC AUTO: 90 FL (ref 82–98)
MCV RBC AUTO: 90 FL (ref 82–98)
MONOCYTES # BLD AUTO: 0.5 K/UL (ref 0.3–1)
MONOCYTES # BLD AUTO: 0.5 K/UL (ref 0.3–1)
MONOCYTES NFR BLD: 4.3 % (ref 4–15)
MONOCYTES NFR BLD: 4.5 % (ref 4–15)
NEUTROPHILS # BLD AUTO: 7.9 K/UL (ref 1.8–7.7)
NEUTROPHILS # BLD AUTO: 8.2 K/UL (ref 1.8–7.7)
NEUTROPHILS NFR BLD: 68.3 % (ref 38–73)
NEUTROPHILS NFR BLD: 71.7 % (ref 38–73)
NRBC BLD-RTO: 0 /100 WBC
NRBC BLD-RTO: 0 /100 WBC
PLATELET # BLD AUTO: 313 K/UL (ref 150–450)
PLATELET # BLD AUTO: 319 K/UL (ref 150–450)
PMV BLD AUTO: 11.1 FL (ref 9.2–12.9)
PMV BLD AUTO: 11.1 FL (ref 9.2–12.9)
POTASSIUM SERPL-SCNC: 2.8 MMOL/L (ref 3.5–5.1)
PROT SERPL-MCNC: 6.7 G/DL (ref 6–8.4)
PROTHROMBIN TIME: 11.9 SEC (ref 9–12.5)
RBC # BLD AUTO: 3.89 M/UL (ref 4–5.4)
RBC # BLD AUTO: 4 M/UL (ref 4–5.4)
SODIUM SERPL-SCNC: 143 MMOL/L (ref 136–145)
TROPONIN I SERPL DL<=0.01 NG/ML-MCNC: 0.23 NG/ML (ref 0–0.03)
TROPONIN I SERPL DL<=0.01 NG/ML-MCNC: 0.27 NG/ML (ref 0–0.03)
TROPONIN I SERPL DL<=0.01 NG/ML-MCNC: 0.28 NG/ML (ref 0–0.03)
TROPONIN I SERPL DL<=0.01 NG/ML-MCNC: 0.33 NG/ML (ref 0–0.03)
WBC # BLD AUTO: 11.42 K/UL (ref 3.9–12.7)
WBC # BLD AUTO: 11.63 K/UL (ref 3.9–12.7)

## 2024-10-13 PROCEDURE — 96374 THER/PROPH/DIAG INJ IV PUSH: CPT

## 2024-10-13 PROCEDURE — 21400001 HC TELEMETRY ROOM

## 2024-10-13 PROCEDURE — 85730 THROMBOPLASTIN TIME PARTIAL: CPT | Mod: 91 | Performed by: INTERNAL MEDICINE

## 2024-10-13 PROCEDURE — 63600175 PHARM REV CODE 636 W HCPCS: Performed by: INTERNAL MEDICINE

## 2024-10-13 PROCEDURE — 63600175 PHARM REV CODE 636 W HCPCS: Performed by: EMERGENCY MEDICINE

## 2024-10-13 PROCEDURE — 84484 ASSAY OF TROPONIN QUANT: CPT | Performed by: EMERGENCY MEDICINE

## 2024-10-13 PROCEDURE — 84484 ASSAY OF TROPONIN QUANT: CPT | Mod: 91 | Performed by: INTERNAL MEDICINE

## 2024-10-13 PROCEDURE — 25000003 PHARM REV CODE 250: Performed by: INTERNAL MEDICINE

## 2024-10-13 PROCEDURE — 85610 PROTHROMBIN TIME: CPT | Performed by: EMERGENCY MEDICINE

## 2024-10-13 PROCEDURE — 85730 THROMBOPLASTIN TIME PARTIAL: CPT | Performed by: EMERGENCY MEDICINE

## 2024-10-13 PROCEDURE — 25000003 PHARM REV CODE 250: Performed by: EMERGENCY MEDICINE

## 2024-10-13 PROCEDURE — 36415 COLL VENOUS BLD VENIPUNCTURE: CPT | Performed by: INTERNAL MEDICINE

## 2024-10-13 PROCEDURE — 85025 COMPLETE CBC W/AUTO DIFF WBC: CPT | Mod: 91 | Performed by: EMERGENCY MEDICINE

## 2024-10-13 PROCEDURE — 94761 N-INVAS EAR/PLS OXIMETRY MLT: CPT

## 2024-10-13 PROCEDURE — 93010 ELECTROCARDIOGRAM REPORT: CPT | Mod: ,,, | Performed by: INTERNAL MEDICINE

## 2024-10-13 PROCEDURE — 80053 COMPREHEN METABOLIC PANEL: CPT | Performed by: EMERGENCY MEDICINE

## 2024-10-13 PROCEDURE — 83880 ASSAY OF NATRIURETIC PEPTIDE: CPT | Performed by: EMERGENCY MEDICINE

## 2024-10-13 PROCEDURE — 99291 CRITICAL CARE FIRST HOUR: CPT

## 2024-10-13 PROCEDURE — 93005 ELECTROCARDIOGRAM TRACING: CPT

## 2024-10-13 RX ORDER — HYDROCHLOROTHIAZIDE 25 MG/1
25 TABLET ORAL DAILY
Status: DISCONTINUED | OUTPATIENT
Start: 2024-10-14 | End: 2024-10-15 | Stop reason: HOSPADM

## 2024-10-13 RX ORDER — IBUPROFEN 200 MG
16 TABLET ORAL
Status: DISCONTINUED | OUTPATIENT
Start: 2024-10-13 | End: 2024-10-15 | Stop reason: HOSPADM

## 2024-10-13 RX ORDER — HEPARIN SODIUM,PORCINE/D5W 25000/250
0-40 INTRAVENOUS SOLUTION INTRAVENOUS CONTINUOUS
Status: DISCONTINUED | OUTPATIENT
Start: 2024-10-13 | End: 2024-10-15 | Stop reason: HOSPADM

## 2024-10-13 RX ORDER — HYDROCODONE BITARTRATE AND ACETAMINOPHEN 5; 325 MG/1; MG/1
1 TABLET ORAL EVERY 6 HOURS PRN
Status: DISCONTINUED | OUTPATIENT
Start: 2024-10-13 | End: 2024-10-15 | Stop reason: HOSPADM

## 2024-10-13 RX ORDER — GLUCAGON 1 MG
1 KIT INJECTION
Status: DISCONTINUED | OUTPATIENT
Start: 2024-10-13 | End: 2024-10-15 | Stop reason: HOSPADM

## 2024-10-13 RX ORDER — FUROSEMIDE 10 MG/ML
40 INJECTION INTRAMUSCULAR; INTRAVENOUS EVERY 12 HOURS
Status: DISCONTINUED | OUTPATIENT
Start: 2024-10-13 | End: 2024-10-15 | Stop reason: HOSPADM

## 2024-10-13 RX ORDER — SODIUM,POTASSIUM PHOSPHATES 280-250MG
2 POWDER IN PACKET (EA) ORAL
Status: DISCONTINUED | OUTPATIENT
Start: 2024-10-13 | End: 2024-10-15 | Stop reason: HOSPADM

## 2024-10-13 RX ORDER — ASPIRIN 325 MG
325 TABLET, DELAYED RELEASE (ENTERIC COATED) ORAL
Status: COMPLETED | OUTPATIENT
Start: 2024-10-13 | End: 2024-10-13

## 2024-10-13 RX ORDER — LOSARTAN POTASSIUM 25 MG/1
100 TABLET ORAL DAILY
Status: DISCONTINUED | OUTPATIENT
Start: 2024-10-14 | End: 2024-10-15 | Stop reason: HOSPADM

## 2024-10-13 RX ORDER — FUROSEMIDE 10 MG/ML
20 INJECTION INTRAMUSCULAR; INTRAVENOUS
Status: COMPLETED | OUTPATIENT
Start: 2024-10-13 | End: 2024-10-13

## 2024-10-13 RX ORDER — IBUPROFEN 200 MG
24 TABLET ORAL
Status: DISCONTINUED | OUTPATIENT
Start: 2024-10-13 | End: 2024-10-15 | Stop reason: HOSPADM

## 2024-10-13 RX ORDER — ONDANSETRON HYDROCHLORIDE 2 MG/ML
4 INJECTION, SOLUTION INTRAVENOUS EVERY 6 HOURS PRN
Status: DISCONTINUED | OUTPATIENT
Start: 2024-10-13 | End: 2024-10-15 | Stop reason: HOSPADM

## 2024-10-13 RX ORDER — NALOXONE HCL 0.4 MG/ML
0.02 VIAL (ML) INJECTION
Status: DISCONTINUED | OUTPATIENT
Start: 2024-10-13 | End: 2024-10-15 | Stop reason: HOSPADM

## 2024-10-13 RX ORDER — ACETAMINOPHEN 325 MG/1
650 TABLET ORAL EVERY 8 HOURS PRN
Status: DISCONTINUED | OUTPATIENT
Start: 2024-10-13 | End: 2024-10-15 | Stop reason: HOSPADM

## 2024-10-13 RX ORDER — TALC
6 POWDER (GRAM) TOPICAL NIGHTLY PRN
Status: DISCONTINUED | OUTPATIENT
Start: 2024-10-13 | End: 2024-10-15 | Stop reason: HOSPADM

## 2024-10-13 RX ORDER — LANOLIN ALCOHOL/MO/W.PET/CERES
800 CREAM (GRAM) TOPICAL
Status: DISCONTINUED | OUTPATIENT
Start: 2024-10-13 | End: 2024-10-15 | Stop reason: HOSPADM

## 2024-10-13 RX ORDER — METOPROLOL TARTRATE 25 MG/1
25 TABLET, FILM COATED ORAL 2 TIMES DAILY
Status: DISCONTINUED | OUTPATIENT
Start: 2024-10-13 | End: 2024-10-15 | Stop reason: HOSPADM

## 2024-10-13 RX ORDER — AMOXICILLIN 250 MG
1 CAPSULE ORAL DAILY PRN
Status: DISCONTINUED | OUTPATIENT
Start: 2024-10-13 | End: 2024-10-15 | Stop reason: HOSPADM

## 2024-10-13 RX ORDER — POTASSIUM CHLORIDE 20 MEQ/1
40 TABLET, EXTENDED RELEASE ORAL ONCE
Status: COMPLETED | OUTPATIENT
Start: 2024-10-13 | End: 2024-10-13

## 2024-10-13 RX ORDER — ATORVASTATIN CALCIUM 40 MG/1
40 TABLET, FILM COATED ORAL NIGHTLY
Status: DISCONTINUED | OUTPATIENT
Start: 2024-10-13 | End: 2024-10-15 | Stop reason: HOSPADM

## 2024-10-13 RX ORDER — ACETAMINOPHEN 325 MG/1
650 TABLET ORAL EVERY 4 HOURS PRN
Status: DISCONTINUED | OUTPATIENT
Start: 2024-10-13 | End: 2024-10-15 | Stop reason: HOSPADM

## 2024-10-13 RX ORDER — ALUMINUM HYDROXIDE, MAGNESIUM HYDROXIDE, AND SIMETHICONE 1200; 120; 1200 MG/30ML; MG/30ML; MG/30ML
30 SUSPENSION ORAL 4 TIMES DAILY PRN
Status: DISCONTINUED | OUTPATIENT
Start: 2024-10-13 | End: 2024-10-15 | Stop reason: HOSPADM

## 2024-10-13 RX ORDER — SODIUM CHLORIDE 0.9 % (FLUSH) 0.9 %
2 SYRINGE (ML) INJECTION EVERY 12 HOURS PRN
Status: DISCONTINUED | OUTPATIENT
Start: 2024-10-13 | End: 2024-10-15 | Stop reason: HOSPADM

## 2024-10-13 RX ADMIN — ASPIRIN 325 MG: 325 TABLET, COATED ORAL at 04:10

## 2024-10-13 RX ADMIN — MELATONIN TAB 3 MG 6 MG: 3 TAB at 09:10

## 2024-10-13 RX ADMIN — HEPARIN SODIUM 12 UNITS/KG/HR: 10000 INJECTION, SOLUTION INTRAVENOUS at 04:10

## 2024-10-13 RX ADMIN — FUROSEMIDE 20 MG: 10 INJECTION, SOLUTION INTRAVENOUS at 01:10

## 2024-10-13 RX ADMIN — POTASSIUM BICARBONATE 40 MEQ: 391 TABLET, EFFERVESCENT ORAL at 02:10

## 2024-10-13 RX ADMIN — METOPROLOL TARTRATE 25 MG: 25 TABLET, FILM COATED ORAL at 09:10

## 2024-10-13 RX ADMIN — POTASSIUM CHLORIDE 40 MEQ: 1500 TABLET, EXTENDED RELEASE ORAL at 09:10

## 2024-10-13 RX ADMIN — ATORVASTATIN CALCIUM 40 MG: 40 TABLET, FILM COATED ORAL at 09:10

## 2024-10-13 RX ADMIN — FUROSEMIDE 40 MG: 10 INJECTION, SOLUTION INTRAVENOUS at 09:10

## 2024-10-13 NOTE — SUBJECTIVE & OBJECTIVE
Past Medical History:   Diagnosis Date    Above knee amputation status 1994    History of DVT of lower extremity 1994    LEFT    Hyperlipidemia     Hypertension     PAD (peripheral artery disease)        Past Surgical History:   Procedure Laterality Date    DIAGNOSTIC LAPAROSCOPY  9/14/2023    Procedure: LAPAROSCOPY, DIAGNOSTIC;  Surgeon: Isidro Bell MD;  Location: [facility] OR;  Service: General;;    LEG AMPUTATION THROUGH KNEE      PARTIAL HYSTERECTOMY      1960's    REPAIR OF INCARCERATED VENTRAL HERNIA WITHOUT HISTORY OF PRIOR REPAIR N/A 10/2/2024    Procedure: REPAIR, HERNIA, VENTRAL, INCARCERATED, WITHOUT HISTORY OF PRIOR REPAIR;  Surgeon: Isidro Bell MD;  Location: [facility] OR;  Service: General;  Laterality: N/A;  REPAIR WITH MESH    ROBOT-ASSISTED CHOLECYSTECTOMY N/A 9/14/2023    Procedure: Diagnostic Laparoscopy Lysis of Adhesisions  Attempted Robotically;  Surgeon: Isidro Bell MD;  Location: [facility] OR;  Service: General;  Laterality: N/A;  ATTEMPTED    VASCULAR SURGERY Left 1994    AORTOFEM BYPASS       Review of patient's allergies indicates:  No Known Allergies    Current Facility-Administered Medications on File Prior to Encounter   Medication    lactated ringers infusion    LIDOcaine (PF) 10 mg/ml (1%) injection 10 mg     Current Outpatient Medications on File Prior to Encounter   Medication Sig    acetaminophen (TYLENOL) 325 MG tablet Take 2 tablets (650 mg total) by mouth every 6 (six) hours as needed for Pain.    alendronate (FOSAMAX) 70 MG tablet Take 70 mg by mouth every 7 days.    amLODIPine (NORVASC) 10 MG tablet Take 10 mg by mouth once daily.    apixaban (ELIQUIS) 5 mg Tab Take 1 tablet (5 mg total) by mouth 2 (two) times daily.    cholecalciferol, vitamin D3, (VITAMIN D3) 25 mcg (1,000 unit) capsule Take 1,000 Units by mouth once daily.    hydroCHLOROthiazide (HYDRODIURIL) 25 MG tablet Take 25 mg by mouth once daily.    losartan (COZAAR) 100 MG tablet Take 100 mg by mouth once daily.     metoprolol tartrate (LOPRESSOR) 25 MG tablet Take 1 tablet (25 mg total) by mouth 2 (two) times daily.    potassium chloride (MICRO-K) 10 MEQ CpSR Take 10 mEq by mouth once.    rosuvastatin (CRESTOR) 40 MG Tab Take 1 tablet by mouth once daily.     Family History    None       Tobacco Use    Smoking status: Former    Smokeless tobacco: Never   Substance and Sexual Activity    Alcohol use: No    Drug use: Never    Sexual activity: Not on file     Review of Systems   Constitutional:  Negative for activity change and appetite change.   HENT:  Negative for congestion, dental problem, ear discharge and ear pain.    Respiratory:  Negative for apnea and chest tightness.    Cardiovascular:  Positive for leg swelling. Negative for chest pain and palpitations.   Gastrointestinal:  Negative for abdominal distention, abdominal pain, diarrhea, nausea and rectal pain.   Endocrine: Negative for cold intolerance and heat intolerance.   Genitourinary:  Negative for difficulty urinating, dyspareunia, dysuria and enuresis.   Musculoskeletal:  Negative for arthralgias and back pain.   Skin:  Negative for color change, pallor and rash.   Allergic/Immunologic: Negative for environmental allergies and food allergies.   Neurological:  Negative for dizziness and facial asymmetry.   Hematological:  Negative for adenopathy. Does not bruise/bleed easily.   Psychiatric/Behavioral:  Negative for agitation, behavioral problems and confusion.      Objective:     Vital Signs (Most Recent):  Temp: 98.2 °F (36.8 °C) (10/13/24 1053)  Pulse: 66 (10/13/24 1503)  Resp: 18 (10/13/24 1053)  BP: (!) 159/70 (10/13/24 1503)  SpO2: 95 % (10/13/24 1053) Vital Signs (24h Range):  Temp:  [98.2 °F (36.8 °C)] 98.2 °F (36.8 °C)  Pulse:  [63-80] 66  Resp:  [18] 18  SpO2:  [95 %] 95 %  BP: (133-159)/(61-70) 159/70     Weight: 96.2 kg (212 lb)  Body mass index is 33.2 kg/m².     Physical Exam  Constitutional:       General: She is not in acute distress.     Appearance:  "She is obese. She is not ill-appearing.   HENT:      Head: Normocephalic and atraumatic.      Right Ear: There is no impacted cerumen.      Left Ear: There is no impacted cerumen.      Nose: Nose normal. No congestion or rhinorrhea.      Mouth/Throat:      Mouth: Mucous membranes are dry.      Pharynx: No oropharyngeal exudate or posterior oropharyngeal erythema.   Cardiovascular:      Rate and Rhythm: Normal rate. Rhythm irregular.   Pulmonary:      Effort: No respiratory distress.      Breath sounds: Normal breath sounds. No stridor.   Abdominal:      General: Bowel sounds are normal. There is no distension.      Palpations: Abdomen is soft. There is no mass.      Tenderness: There is no abdominal tenderness.      Hernia: No hernia is present.   Musculoskeletal:         General: Swelling present. No tenderness.      Cervical back: No rigidity or tenderness.      Comments: Left Above-knee amputation   Skin:     Coloration: Skin is not jaundiced or pale.   Neurological:      Mental Status: She is oriented to person, place, and time. Mental status is at baseline.                Significant Labs: All pertinent labs within the past 24 hours have been reviewed.  Blood Culture: No results for input(s): "LABBLOO" in the last 48 hours.  BMP:   Recent Labs   Lab 10/13/24  1344         K 2.8*   CL 96   CO2 33*   BUN 6*   CREATININE 0.7   CALCIUM 9.4     CBC:   Recent Labs   Lab 10/13/24  1344 10/13/24  1521   WBC 11.42 11.63   HGB 10.9* 11.0*   HCT 35.9* 35.1*    313     CMP:   Recent Labs   Lab 10/13/24  1344      K 2.8*   CL 96   CO2 33*      BUN 6*   CREATININE 0.7   CALCIUM 9.4   PROT 6.7   ALBUMIN 3.0*   BILITOT 1.0   ALKPHOS 132   AST 17   ALT 12   ANIONGAP 14     Imaging Results              US Lower Extremity Veins Right (Final result)  Result time 10/13/24 13:33:11      Final result by Anthony Carroll MD (10/13/24 13:33:11)                   Impression:      No evidence of deep " venous thrombosis in the right lower extremity.      Electronically signed by: Anthony Carroll MD  Date:    10/13/2024  Time:    13:33               Narrative:    EXAMINATION:  US LOWER EXTREMITY VEINS RIGHT    CLINICAL HISTORY:  Edema, unspecified    TECHNIQUE:  Duplex and color flow Doppler evaluation and graded compression of the right lower extremity veins was performed.    COMPARISON:  None    FINDINGS:  Right thigh veins: The common femoral, femoral, popliteal, upper greater saphenous, and deep femoral veins are patent and free of thrombus. The veins are normally compressible and have normal phasic flow and augmentation response.    Right calf veins: The visualized calf veins are patent.    Contralateral CFV: The contralateral (left) common femoral vein is patent and free of thrombus.    Miscellaneous: None                                       X-Ray Chest AP Portable (Final result)  Result time 10/13/24 12:32:18      Final result by Raleigh Lopez MD (10/13/24 12:32:18)                   Impression:      1. Interstitial findings may reflect edema noting possible right pleural effusion.  Correlation is advised.      Electronically signed by: Raleigh Lopez MD  Date:    10/13/2024  Time:    12:32               Narrative:    EXAMINATION:  XR CHEST AP PORTABLE    CLINICAL HISTORY:  edema;    TECHNIQUE:  Single frontal view of the chest was performed.    COMPARISON:  10/02/2024    FINDINGS:  The cardiomediastinal silhouette is prominent, similar to the previous exam noting calcification of the aorta..  There is slight obscuration of the right costophrenic angle, may reflect trace effusion or atelectasis..  The trachea is deviated rightward by a tortuous aorta..  The lungs are symmetrically expanded bilaterally with coarse interstitial attenuation bilaterally.  There is bilateral basilar subsegmental atelectasis..  No large focal consolidation seen.  There is no pneumothorax.  The osseous structures are  remarkable for degenerative change.  Prominent gas-filled bowel loops project over the left upper quadrant..                                     Significant Imaging: I have reviewed all pertinent imaging results/findings within the past 24 hours.

## 2024-10-13 NOTE — ED PROVIDER NOTES
Encounter Date: 10/13/2024    SCRIBE #1 NOTE: I, Gabriela Robles, am scribing for, and in the presence of,  Dr. Marques Dotson. I have scribed the following portions of the note - Other sections scribed: HPI/ROS/PE.       History     Chief Complaint   Patient presents with    Leg Swelling     Patient reports right leg swelling that stated on Thursday, was discharged from inpatient on Wednesday for SBO that required SX      Abbie Barragan is a 89 y.o. female, with PMHx Bowel obstruction with PMHx of DVT, HLD, HTN, afib on eliquis, above knee amputation Left leg (1994) presents to the ED for right leg swelling onset 3 days. Never experienced this before. Pt notes being discharged from hospital on Wednesday s/p small bowel obstruction with surgery. Thursday she noticed leg swelling. Pt continued on eliquis through surgery. Pt denies right leg pain. She notes her abdomen feels much better after surgery. Pt also takes HCTZ. Pt denies history of kidney disease. Denies shortness of breath while recumbent, no right leg pain.        The history is provided by the patient and a relative.     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    Hyperlipidemia     Hypertension     PAD (peripheral artery disease)      Past Surgical History:   Procedure Laterality Date    DIAGNOSTIC LAPAROSCOPY  9/14/2023    Procedure: LAPAROSCOPY, DIAGNOSTIC;  Surgeon: Isidro Bell MD;  Location: [facility] OR;  Service: General;;    LEG AMPUTATION THROUGH KNEE      PARTIAL HYSTERECTOMY      1960's    REPAIR OF INCARCERATED VENTRAL HERNIA WITHOUT HISTORY OF PRIOR REPAIR N/A 10/2/2024    Procedure: REPAIR, HERNIA, VENTRAL, INCARCERATED, WITHOUT HISTORY OF PRIOR REPAIR;  Surgeon: Isidro Bell MD;  Location: [facility] OR;  Service: General;  Laterality: N/A;  REPAIR WITH MESH    ROBOT-ASSISTED CHOLECYSTECTOMY N/A  9/14/2023    Procedure: Diagnostic Laparoscopy Lysis of Adhesisions  Attempted Robotically;  Surgeon: Isidro Bell MD;  Location: [facility] OR;  Service: General;  Laterality: N/A;  ATTEMPTED    VASCULAR SURGERY Left 1994    AORTOFEM BYPASS     No family history on file.  Social History     Tobacco Use    Smoking status: Former    Smokeless tobacco: Never   Substance Use Topics    Alcohol use: No    Drug use: Never     Review of Systems   Constitutional:  Negative for chills and fever.   HENT:  Negative for congestion, rhinorrhea and sore throat.    Eyes:  Negative for visual disturbance.   Respiratory:  Negative for cough and shortness of breath.    Cardiovascular:  Positive for leg swelling (right leg). Negative for chest pain.   Gastrointestinal:  Negative for abdominal pain, diarrhea, nausea and vomiting.   Genitourinary:  Negative for dysuria, frequency and hematuria.   Musculoskeletal:  Negative for back pain.        Negative for right leg pain.   Skin:  Negative for rash.   Neurological:  Negative for dizziness, weakness and headaches.       Physical Exam     Initial Vitals [10/13/24 1053]   BP Pulse Resp Temp SpO2   133/61 80 18 98.2 °F (36.8 °C) 95 %      MAP       --         Physical Exam    Nursing note and vitals reviewed.  Constitutional: She appears well-developed and well-nourished.   HENT:   Head: Normocephalic and atraumatic.   Right Ear: External ear normal.   Left Ear: External ear normal.   Neck: Carotid bruit is not present.   Normal range of motion.  Cardiovascular:  Normal rate, regular rhythm, normal heart sounds and intact distal pulses.     Exam reveals no gallop and no friction rub.       No murmur heard.  Pulmonary/Chest: Breath sounds normal. No respiratory distress. She has no wheezes. She has no rhonchi. She has no rales.   Abdominal: Abdomen is soft. Bowel sounds are normal. She exhibits no distension. There is no abdominal tenderness.   Midline surgical scar appears well.  There is no  rebound and no guarding.   Musculoskeletal:         General: Normal range of motion.      Cervical back: Normal range of motion.      Comments: 2+ edema to right leg. No erythema. Good capillary refill. Left above knee amputation.     Neurological: She is alert and oriented to person, place, and time. GCS score is 15. GCS eye subscore is 4. GCS verbal subscore is 5. GCS motor subscore is 6.   Psychiatric: She has a normal mood and affect.         ED Course   Critical Care    Date/Time: 10/13/2024 3:53 PM    Performed by: Marques Dotson MD  Authorized by: Marques Dotson MD  Direct patient critical care time: 20 minutes  Additional history critical care time: 5 minutes  Ordering / reviewing critical care time: 10 minutes  Documentation critical care time: 10 minutes  Consulting other physicians critical care time: 10 minutes  Total critical care time (exclusive of procedural time) : 55 minutes  Critical care time was exclusive of teaching time and separately billable procedures and treating other patients.  Critical care was necessary to treat or prevent imminent or life-threatening deterioration of the following conditions: cardiac failure.  Critical care was time spent personally by me on the following activities: development of treatment plan with patient or surrogate, discussions with consultants, evaluation of patient's response to treatment, examination of patient, obtaining history from patient or surrogate, ordering and performing treatments and interventions, ordering and review of laboratory studies, ordering and review of radiographic studies, pulse oximetry, re-evaluation of patient's condition, review of old charts and interpretation of cardiac output measurements.        Labs Reviewed   CBC W/ AUTO DIFFERENTIAL - Abnormal       Result Value    WBC 11.42      RBC 4.00      Hemoglobin 10.9 (*)     Hematocrit 35.9 (*)     MCV 90      MCH 27.3      MCHC 30.4 (*)     RDW 15.7 (*)     Platelets  319      MPV 11.1      Immature Granulocytes 0.4      Gran # (ANC) 8.2 (*)     Immature Grans (Abs) 0.04      Lymph # 2.6      Mono # 0.5      Eos # 0.1      Baso # 0.02      nRBC 0      Gran % 71.7      Lymph % 22.4      Mono % 4.3      Eosinophil % 1.0      Basophil % 0.2      Differential Method Automated     COMPREHENSIVE METABOLIC PANEL - Abnormal    Sodium 143      Potassium 2.8 (*)     Chloride 96      CO2 33 (*)     Glucose 101      BUN 6 (*)     Creatinine 0.7      Calcium 9.4      Total Protein 6.7      Albumin 3.0 (*)     Total Bilirubin 1.0      Alkaline Phosphatase 132      AST 17      ALT 12      eGFR >60      Anion Gap 14     TROPONIN I - Abnormal    Troponin I 0.325 (*)    B-TYPE NATRIURETIC PEPTIDE - Abnormal     (*)    TROPONIN I - Abnormal    Troponin I 0.284 (*)    CBC W/ AUTO DIFFERENTIAL - Abnormal    WBC 11.63      RBC 3.89 (*)     Hemoglobin 11.0 (*)     Hematocrit 35.1 (*)     MCV 90      MCH 28.3      MCHC 31.3 (*)     RDW 15.7 (*)     Platelets 313      MPV 11.1      Immature Granulocytes 0.4      Gran # (ANC) 7.9 (*)     Immature Grans (Abs) 0.05 (*)     Lymph # 3.0      Mono # 0.5      Eos # 0.1      Baso # 0.01      nRBC 0      Gran % 68.3      Lymph % 25.7      Mono % 4.5      Eosinophil % 1.0      Basophil % 0.1      Differential Method Automated      Narrative:     Draw baseline aPTT prior to starting the heparin bolus or  infusion  (if patient is on warfarin prior to heparin therapy)   APTT    aPTT 29.8      Narrative:     Draw baseline aPTT prior to starting the heparin bolus or  infusion  (if patient is on warfarin prior to heparin therapy)   PROTIME-INR    Prothrombin Time 11.9      INR 1.1      Narrative:     Draw baseline aPTT prior to starting the heparin bolus or  infusion  (if patient is on warfarin prior to heparin therapy)     EKG Readings: (Independently Interpreted)   Atrial fibrillation rate of 67, right bundle-branch block.  Leads 3 and AVF T-wave inversions.   No significant changes from prior EKG.       Imaging Results              US Lower Extremity Veins Right (Final result)  Result time 10/13/24 13:33:11      Final result by Anthony Carroll MD (10/13/24 13:33:11)                   Impression:      No evidence of deep venous thrombosis in the right lower extremity.      Electronically signed by: Anthony Carroll MD  Date:    10/13/2024  Time:    13:33               Narrative:    EXAMINATION:  US LOWER EXTREMITY VEINS RIGHT    CLINICAL HISTORY:  Edema, unspecified    TECHNIQUE:  Duplex and color flow Doppler evaluation and graded compression of the right lower extremity veins was performed.    COMPARISON:  None    FINDINGS:  Right thigh veins: The common femoral, femoral, popliteal, upper greater saphenous, and deep femoral veins are patent and free of thrombus. The veins are normally compressible and have normal phasic flow and augmentation response.    Right calf veins: The visualized calf veins are patent.    Contralateral CFV: The contralateral (left) common femoral vein is patent and free of thrombus.    Miscellaneous: None                                       X-Ray Chest AP Portable (Final result)  Result time 10/13/24 12:32:18      Final result by Raleigh Lopez MD (10/13/24 12:32:18)                   Impression:      1. Interstitial findings may reflect edema noting possible right pleural effusion.  Correlation is advised.      Electronically signed by: Raleigh Lopez MD  Date:    10/13/2024  Time:    12:32               Narrative:    EXAMINATION:  XR CHEST AP PORTABLE    CLINICAL HISTORY:  edema;    TECHNIQUE:  Single frontal view of the chest was performed.    COMPARISON:  10/02/2024    FINDINGS:  The cardiomediastinal silhouette is prominent, similar to the previous exam noting calcification of the aorta..  There is slight obscuration of the right costophrenic angle, may reflect trace effusion or atelectasis..  The trachea is deviated rightward by a  tortuous aorta..  The lungs are symmetrically expanded bilaterally with coarse interstitial attenuation bilaterally.  There is bilateral basilar subsegmental atelectasis..  No large focal consolidation seen.  There is no pneumothorax.  The osseous structures are remarkable for degenerative change.  Prominent gas-filled bowel loops project over the left upper quadrant..                                       Medications   heparin 25,000 units in dextrose 5% (100 units/ml) IV bolus from bag LOW INTENSITY nomogram - [facility] (has no administration in time range)   heparin 25,000 units in dextrose 5% 250 mL (100 units/mL) infusion LOW INTENSITY nomogram - [facility] (has no administration in time range)   heparin 25,000 units in dextrose 5% (100 units/ml) IV bolus from bag LOW INTENSITY nomogram - [facility] (has no administration in time range)   heparin 25,000 units in dextrose 5% (100 units/ml) IV bolus from bag LOW INTENSITY nomogram - [facility] (has no administration in time range)   aspirin EC tablet 325 mg (has no administration in time range)   furosemide injection 20 mg (20 mg Intravenous Given 10/13/24 1354)   potassium bicarbonate disintegrating tablet 40 mEq (40 mEq Oral Given 10/13/24 1451)     Medical Decision Making  This is an emergent evaluation of a 89 y.o. female who presents with right leg swelling onset 3 days. The patient was seen and examined. The history and physical exam was obtained. The nursing notes and vital signs were reviewed. Secondary to symptoms and examination findings, I ordered US( right lower extremity), CXR, EKG, CBC/CMP/Troponin/BNP/ bladder scan.    Amount and/or Complexity of Data Reviewed  Independent Historian: caregiver  Labs: ordered.  Radiology: ordered.  ECG/medicine tests: ordered.    Risk  Prescription drug management.    Patient came in for swelling of the right leg.  Previous left AKA.  States started Thursday.  She was discharged Wednesday from this hospital status post surgery for umbilical  hernia causing small bowel obstruction.  Denies any leg pain.  Denies any chest pain.  Denies any increased shortness of breath.  Chart review shows patient has AFib.  She was on Eliquis.  She was grade 3 diastolic heart failure.  Denies any fever or new GI symptoms or worsening abdominal pain since discharge.  Chest x-ray suggests some pulmonary edema.  EKG shows no significant changes.  AFib rate controlled with right bundle-branch block..  BNP in the 400s.  This is up from 180 on recent labs.  Patient's troponin is also 0.325.  Possible related to CHF.  Last troponin in October 2023 however was normal.  Patient was given IV Lasix in the emergency room.  She put out a half a L.  repeat troponin is trending downward.  Spoke with Cardiology Dr. Mills who feels that a troponin elevation of the significance she would not be ignored or rationalized as simple CHF exacerbation and recommends overnight placement for treatment and evaluation of non STEMI.  Recommends stopping Eliquis and starting heparin infusion in case a procedure needs to be performed.  Recommends echo in the morning and consulted him and he will see the patient in the morning.  Discuss with Hospital Medicine who accepted the patient        Scribe Attestation:   Scribe #1: I performed the above scribed service and the documentation accurately describes the services I performed. I attest to the accuracy of the note.                         I, Marques Dotson, personally performed the services described in this documentation. All medical record entries made by the scribe were at my direction and in my presence. I have reviewed the chart and agree that the record reflects my personal performance and is accurate and complete.      DISCLAIMER: This note was prepared with [employer] voice recognition transcription software. Garbled syntax, mangled pronouns, and other bizarre constructions may be attributed to that software system.        Clinical  Impression:  Final diagnoses:  [R60.9] Swelling  [R60.9] Edema  [I21.4] NSTEMI (non-ST elevated myocardial infarction)          ED Disposition Condition    Admit Stable                Marques Dotson MD  10/13/24 6684

## 2024-10-13 NOTE — NURSING
[facility] [place]  Nurses Note -- 4 Eyes      10/13/2024       Skin assessed on: Admit      [x] No Pressure Injuries Present    [x]Prevention Measures Documented    [] Yes LDA  for Pressure Injury Previously documented     [] Yes New Pressure Injury Discovered   [] LDA for New Pressure Injury Added      Attending RN:  Aditya Bejarano RN     Second :  Zoya ALCANTAR LPN

## 2024-10-13 NOTE — PLAN OF CARE
[facility] Plan of Care Note    Referral source:  [facility]    Discussed patient with [facility] on-call nursing.        Disposition recommended:  The patient has worsening symptomatology of leg swelling and firmness.  The patient could have cellulitis and or DVT and feel the patient warrants further evaluation with ultrasonography as well as laboratory data.  The patient and family were informed to go to the emergency department for further diagnostic evaluation.

## 2024-10-13 NOTE — NURSING
Pt arrive to the unit by transport, accompanied by family.  Assisted pt to bed. Tele monitoring initiated.  Admit assessment initiated.  Pt is AAOx4.  NO distress noted.

## 2024-10-13 NOTE — ASSESSMENT & PLAN NOTE
Anemia is likely due to Iron deficiency. Most recent hemoglobin and hematocrit are listed below.  Recent Labs     10/13/24  1344 10/13/24  1521   HGB 10.9* 11.0*   HCT 35.9* 35.1*     Plan  - Monitor serial CBC: Daily  - Transfuse PRBC if patient becomes hemodynamically unstable, symptomatic or H/H drops below 7/21.  - Patient has not received any PRBC transfusions to date  - Patient's anemia is currently stable

## 2024-10-13 NOTE — ASSESSMENT & PLAN NOTE
Patients blood pressure range in the last 24 hours was: BP  Min: 133/61  Max: 159/70.The patient's inpatient anti-hypertensive regimen is listed below:  Current Antihypertensives  hydroCHLOROthiazide tablet 25 mg, Daily, Oral  losartan tablet 100 mg, Daily, Oral  metoprolol tartrate (LOPRESSOR) tablet 25 mg, 2 times daily, Oral  furosemide injection 40 mg, Every 12 hours, Intravenous    Plan  - BP is uncontrolled, will adjust as follows:

## 2024-10-13 NOTE — ED NOTES
Telebox #[id number] placed on patient for remote continuous cardiac monitoring. Tele monitor tech contacted to confirm observation.

## 2024-10-13 NOTE — ASSESSMENT & PLAN NOTE
We will trend troponin  Cardiologist consulted  Obtain echocardiogram  Started on Lasix  Patient already on metoprolol, hydrochlorothiazide  Replete potassium as needed  Hold amlodipine for now, until we get the results of echocardiogram

## 2024-10-13 NOTE — ASSESSMENT & PLAN NOTE
Known peripheral vascular disease.  Patient has left above-knee amputation in the past.  Continue to monitor.  She was on Eliquis, hold Eliquis because the patient now is on heparin drip.

## 2024-10-13 NOTE — TELEPHONE ENCOUNTER
"Pt with right leg swelling and "hard" was noted on Friday and worsened today.  Pt was discharged from the hospital on 10/9/24 with a small bowel obstruction.   Family states swelling is above the knee and pt denies pain, states "it's tight".  Care advice state to see a provider within 4 hours/pcp triage.    Secure chat sent to [facility] to advise.  [facility] provider Dr. Morales responded "I have reviewed the patient's medical record looking for imaging to rule out the possibility of a DVT.  There is also the consideration of cellulitis.  I understand that the patient's symptoms are continuously worsening and I feel it would be appropriate to send the patient to the emergency department for further management to include imaging such as an ultrasound as well as lab work".    Family made aware of [facility] provider advice and agrees to go to ED now, will go to [facility] as she was recently discharged from that facility.    Family/pt's sone and daughter in law verbally understood, all questions answered, advised to call back for any worsening symptoms or further needs.      Reason for Disposition   SEVERE leg swelling (e.g., swelling extends above knee, entire leg is swollen, weeping fluid)    Additional Information   Negative: SEVERE difficulty breathing (e.g., struggling for each breath, speaks in single words)   Negative: Looks like a broken bone or dislocated joint (e.g., crooked or deformed)   Negative: Sounds like a life-threatening emergency to the triager   Negative: Difficulty breathing at rest   Negative: Entire foot is cool or blue in comparison to other side   Negative: [1] Can't walk or can barely walk AND [2] new-onset   Negative: [1] Difficulty breathing with exertion (e.g., walking) AND [2] new-onset or getting worse   Negative: [1] Red area or streak AND [2] fever   Negative: [1] Swelling is painful to touch AND [2] fever   Negative: [1] Cast on leg or ankle AND [2] now increased pain   Negative: Patient " sounds very sick or weak to the triager    Protocols used: Leg Swelling and Edema-A-AH

## 2024-10-13 NOTE — ASSESSMENT & PLAN NOTE
Patient has persistent (7 days or more) atrial fibrillation. Patient is currently in atrial fibrillation. [id number] Score: 4. The patients heart rate in the last 24 hours is as follows:  Pulse  Min: 63  Max: 80     Antiarrhythmics  metoprolol tartrate (LOPRESSOR) tablet 25 mg, 2 times daily, Oral    Anticoagulants  heparin 25,000 units in dextrose 5% 250 mL (100 units/mL) infusion LOW INTENSITY nomogram - [facility], Continuous, Intravenous  heparin 25,000 units in dextrose 5% (100 units/ml) IV bolus from bag LOW INTENSITY nomogram - [facility], As needed (PRN), Intravenous  heparin 25,000 units in dextrose 5% (100 units/ml) IV bolus from bag LOW INTENSITY nomogram - [facility], As needed (PRN), Intravenous    Plan  - Replete lytes with a goal of K>4, Mg >2  - Patient is anticoagulated, see medications listed above.  - Patient's afib is currently controlled  - on heparin drip

## 2024-10-13 NOTE — HPI
This is a case of 89-year-old female with a past medical history of peripheral vascular disease status post amputation unilaterally, atrial fibrillation on Eliquis, history of DVT, history of anemia, history of hypertension.  Patient presented to the emergency department's with right lower extremity swelling.  Ultrasound venous of the right lower extremity was negative for DVT.  Patient was found to have elevated troponin was NSTEMI criteria.  Cardiology service was consulted by ED provider.  Patient was started on heparin drip.  Patient will be admitted for further workup for her NSTEMI.  She was also found to have right pleural effusion on chest x-ray with interstitial edema.

## 2024-10-13 NOTE — ASSESSMENT & PLAN NOTE
Right pleural effusion a chest x-ray.  Patient was started on Lasix.  Follow-up chest x-ray is needed

## 2024-10-13 NOTE — ASSESSMENT & PLAN NOTE
Presentation is highly suspicious for acute systolic congestive heart failure?  Echocardiogram will be obtained to rule out congestive heart failure.  We will start patient on Lasix 40 mg q.12 hours for 3 days.  BNP was elevated.  Patient was in atrial fibrillation, was started on heparin drip  Recent Labs     10/13/24  1344   *     Latest ECHO  Results for orders placed during the hospital encounter of 07/12/23    Echo    Interpretation Summary  · The left ventricle is normal in size with concentric hypertrophy and normal systolic function.  · The estimated ejection fraction is 55%.  · Grade III left ventricular diastolic dysfunction.  · Normal right ventricular size with normal right ventricular systolic function.  · Severe left atrial enlargement.  · Mild right atrial enlargement.  · Moderate tricuspid regurgitation.  · Mild pulmonic regurgitation.  · Normal central venous pressure (3 mmHg).  · The estimated PA systolic pressure is 38 mmHg.    Current Heart Failure Medications  hydroCHLOROthiazide tablet 25 mg, Daily, Oral  losartan tablet 100 mg, Daily, Oral  furosemide injection 40 mg, Every 12 hours, Intravenous    Cardiology Services consulted

## 2024-10-13 NOTE — H&P
[facility] Emergency [facility] Medicine  History & Physical    Patient Name: Abbie Barragan  MRN: [id number]  Patient Class: IP- Inpatient  Admission Date: 10/13/2024  Attending Physician: Luisa Theodore DO   Primary Care Provider: Tanner Gómez MD         Patient information was obtained from patient and ER records.     Subjective:     Principal Problem:NSTEMI (non-ST elevated myocardial infarction)    Chief Complaint:   Chief Complaint   Patient presents with    Leg Swelling     Patient reports right leg swelling that stated on Thursday, was discharged from inpatient on Wednesday for SBO that required SX         HPI: This is a case of 89-year-old female with a past medical history of peripheral vascular disease status post amputation unilaterally, atrial fibrillation on Eliquis, history of DVT, history of anemia, history of hypertension.  Patient presented to the emergency department's with right lower extremity swelling.  Ultrasound venous of the right lower extremity was negative for DVT.  Patient was found to have elevated troponin was NSTEMI criteria.  Cardiology service was consulted by ED provider.  Patient was started on heparin drip.  Patient will be admitted for further workup for her NSTEMI.  She was also found to have right pleural effusion on chest x-ray with interstitial edema.    Past Medical History:   Diagnosis Date    Above knee amputation status 1994    History of DVT of lower extremity 1994    LEFT    Hyperlipidemia     Hypertension     PAD (peripheral artery disease)        Past Surgical History:   Procedure Laterality Date    DIAGNOSTIC LAPAROSCOPY  9/14/2023    Procedure: LAPAROSCOPY, DIAGNOSTIC;  Surgeon: Isidro Bell MD;  Location: [facility] OR;  Service: General;;    LEG AMPUTATION THROUGH KNEE      PARTIAL HYSTERECTOMY      1960's    REPAIR OF INCARCERATED VENTRAL HERNIA WITHOUT HISTORY OF PRIOR REPAIR N/A 10/2/2024    Procedure: REPAIR, HERNIA, VENTRAL, INCARCERATED, WITHOUT  HISTORY OF PRIOR REPAIR;  Surgeon: Isidro Bell MD;  Location: [facility] OR;  Service: General;  Laterality: N/A;  REPAIR WITH MESH    ROBOT-ASSISTED CHOLECYSTECTOMY N/A 9/14/2023    Procedure: Diagnostic Laparoscopy Lysis of Adhesisions  Attempted Robotically;  Surgeon: Isidro Bell MD;  Location: [facility] OR;  Service: General;  Laterality: N/A;  ATTEMPTED    VASCULAR SURGERY Left 1994    AORTOFEM BYPASS       Review of patient's allergies indicates:  No Known Allergies    Current Facility-Administered Medications on File Prior to Encounter   Medication    lactated ringers infusion    LIDOcaine (PF) 10 mg/ml (1%) injection 10 mg     Current Outpatient Medications on File Prior to Encounter   Medication Sig    acetaminophen (TYLENOL) 325 MG tablet Take 2 tablets (650 mg total) by mouth every 6 (six) hours as needed for Pain.    alendronate (FOSAMAX) 70 MG tablet Take 70 mg by mouth every 7 days.    amLODIPine (NORVASC) 10 MG tablet Take 10 mg by mouth once daily.    apixaban (ELIQUIS) 5 mg Tab Take 1 tablet (5 mg total) by mouth 2 (two) times daily.    cholecalciferol, vitamin D3, (VITAMIN D3) 25 mcg (1,000 unit) capsule Take 1,000 Units by mouth once daily.    hydroCHLOROthiazide (HYDRODIURIL) 25 MG tablet Take 25 mg by mouth once daily.    losartan (COZAAR) 100 MG tablet Take 100 mg by mouth once daily.    metoprolol tartrate (LOPRESSOR) 25 MG tablet Take 1 tablet (25 mg total) by mouth 2 (two) times daily.    potassium chloride (MICRO-K) 10 MEQ CpSR Take 10 mEq by mouth once.    rosuvastatin (CRESTOR) 40 MG Tab Take 1 tablet by mouth once daily.     Family History    None       Tobacco Use    Smoking status: Former    Smokeless tobacco: Never   Substance and Sexual Activity    Alcohol use: No    Drug use: Never    Sexual activity: Not on file     Review of Systems   Constitutional:  Negative for activity change and appetite change.   HENT:  Negative for congestion, dental problem, ear discharge and ear pain.     Respiratory:  Negative for apnea and chest tightness.    Cardiovascular:  Positive for leg swelling. Negative for chest pain and palpitations.   Gastrointestinal:  Negative for abdominal distention, abdominal pain, diarrhea, nausea and rectal pain.   Endocrine: Negative for cold intolerance and heat intolerance.   Genitourinary:  Negative for difficulty urinating, dyspareunia, dysuria and enuresis.   Musculoskeletal:  Negative for arthralgias and back pain.   Skin:  Negative for color change, pallor and rash.   Allergic/Immunologic: Negative for environmental allergies and food allergies.   Neurological:  Negative for dizziness and facial asymmetry.   Hematological:  Negative for adenopathy. Does not bruise/bleed easily.   Psychiatric/Behavioral:  Negative for agitation, behavioral problems and confusion.      Objective:     Vital Signs (Most Recent):  Temp: 98.2 °F (36.8 °C) (10/13/24 1053)  Pulse: 66 (10/13/24 1503)  Resp: 18 (10/13/24 1053)  BP: (!) 159/70 (10/13/24 1503)  SpO2: 95 % (10/13/24 1053) Vital Signs (24h Range):  Temp:  [98.2 °F (36.8 °C)] 98.2 °F (36.8 °C)  Pulse:  [63-80] 66  Resp:  [18] 18  SpO2:  [95 %] 95 %  BP: (133-159)/(61-70) 159/70     Weight: 96.2 kg (212 lb)  Body mass index is 33.2 kg/m².     Physical Exam  Constitutional:       General: She is not in acute distress.     Appearance: She is obese. She is not ill-appearing.   HENT:      Head: Normocephalic and atraumatic.      Right Ear: There is no impacted cerumen.      Left Ear: There is no impacted cerumen.      Nose: Nose normal. No congestion or rhinorrhea.      Mouth/Throat:      Mouth: Mucous membranes are dry.      Pharynx: No oropharyngeal exudate or posterior oropharyngeal erythema.   Cardiovascular:      Rate and Rhythm: Normal rate. Rhythm irregular.   Pulmonary:      Effort: No respiratory distress.      Breath sounds: Normal breath sounds. No stridor.   Abdominal:      General: Bowel sounds are normal. There is no  "distension.      Palpations: Abdomen is soft. There is no mass.      Tenderness: There is no abdominal tenderness.      Hernia: No hernia is present.   Musculoskeletal:         General: Swelling present. No tenderness.      Cervical back: No rigidity or tenderness.      Comments: Left Above-knee amputation   Skin:     Coloration: Skin is not jaundiced or pale.   Neurological:      Mental Status: She is oriented to person, place, and time. Mental status is at baseline.                Significant Labs: All pertinent labs within the past 24 hours have been reviewed.  Blood Culture: No results for input(s): "LABBLOO" in the last 48 hours.  BMP:   Recent Labs   Lab 10/13/24  1344         K 2.8*   CL 96   CO2 33*   BUN 6*   CREATININE 0.7   CALCIUM 9.4     CBC:   Recent Labs   Lab 10/13/24  1344 10/13/24  1521   WBC 11.42 11.63   HGB 10.9* 11.0*   HCT 35.9* 35.1*    313     CMP:   Recent Labs   Lab 10/13/24  1344      K 2.8*   CL 96   CO2 33*      BUN 6*   CREATININE 0.7   CALCIUM 9.4   PROT 6.7   ALBUMIN 3.0*   BILITOT 1.0   ALKPHOS 132   AST 17   ALT 12   ANIONGAP 14     Imaging Results              US Lower Extremity Veins Right (Final result)  Result time 10/13/24 13:33:11      Final result by Anthony Carroll MD (10/13/24 13:33:11)                   Impression:      No evidence of deep venous thrombosis in the right lower extremity.      Electronically signed by: Anthony Carroll MD  Date:    10/13/2024  Time:    13:33               Narrative:    EXAMINATION:  US LOWER EXTREMITY VEINS RIGHT    CLINICAL HISTORY:  Edema, unspecified    TECHNIQUE:  Duplex and color flow Doppler evaluation and graded compression of the right lower extremity veins was performed.    COMPARISON:  None    FINDINGS:  Right thigh veins: The common femoral, femoral, popliteal, upper greater saphenous, and deep femoral veins are patent and free of thrombus. The veins are normally compressible and have normal " phasic flow and augmentation response.    Right calf veins: The visualized calf veins are patent.    Contralateral CFV: The contralateral (left) common femoral vein is patent and free of thrombus.    Miscellaneous: None                                       X-Ray Chest AP Portable (Final result)  Result time 10/13/24 12:32:18      Final result by Raleigh Lopez MD (10/13/24 12:32:18)                   Impression:      1. Interstitial findings may reflect edema noting possible right pleural effusion.  Correlation is advised.      Electronically signed by: Raleigh Lopez MD  Date:    10/13/2024  Time:    12:32               Narrative:    EXAMINATION:  XR CHEST AP PORTABLE    CLINICAL HISTORY:  edema;    TECHNIQUE:  Single frontal view of the chest was performed.    COMPARISON:  10/02/2024    FINDINGS:  The cardiomediastinal silhouette is prominent, similar to the previous exam noting calcification of the aorta..  There is slight obscuration of the right costophrenic angle, may reflect trace effusion or atelectasis..  The trachea is deviated rightward by a tortuous aorta..  The lungs are symmetrically expanded bilaterally with coarse interstitial attenuation bilaterally.  There is bilateral basilar subsegmental atelectasis..  No large focal consolidation seen.  There is no pneumothorax.  The osseous structures are remarkable for degenerative change.  Prominent gas-filled bowel loops project over the left upper quadrant..                                     Significant Imaging: I have reviewed all pertinent imaging results/findings within the past 24 hours.  Assessment/Plan:     * NSTEMI (non-ST elevated myocardial infarction)  We will trend troponin  Cardiologist consulted  Obtain echocardiogram  Started on Lasix  Patient already on metoprolol, hydrochlorothiazide  Replete potassium as needed  Hold amlodipine for now, until we get the results of echocardiogram        Acute congestive heart failure  Presentation  is highly suspicious for acute systolic congestive heart failure?  Echocardiogram will be obtained to rule out congestive heart failure.  We will start patient on Lasix 40 mg q.12 hours for 3 days.  BNP was elevated.  Patient was in atrial fibrillation, was started on heparin drip  Recent Labs     10/13/24  1344   *     Latest ECHO  Results for orders placed during the hospital encounter of 07/12/23    Echo    Interpretation Summary  · The left ventricle is normal in size with concentric hypertrophy and normal systolic function.  · The estimated ejection fraction is 55%.  · Grade III left ventricular diastolic dysfunction.  · Normal right ventricular size with normal right ventricular systolic function.  · Severe left atrial enlargement.  · Mild right atrial enlargement.  · Moderate tricuspid regurgitation.  · Mild pulmonic regurgitation.  · Normal central venous pressure (3 mmHg).  · The estimated PA systolic pressure is 38 mmHg.    Current Heart Failure Medications  hydroCHLOROthiazide tablet 25 mg, Daily, Oral  losartan tablet 100 mg, Daily, Oral  furosemide injection 40 mg, Every 12 hours, Intravenous    Cardiology Services consulted    Pleural effusion  Right pleural effusion a chest x-ray.  Patient was started on Lasix.  Follow-up chest x-ray is needed    Essential hypertension  Patients blood pressure range in the last 24 hours was: BP  Min: 133/61  Max: 159/70.The patient's inpatient anti-hypertensive regimen is listed below:  Current Antihypertensives  hydroCHLOROthiazide tablet 25 mg, Daily, Oral  losartan tablet 100 mg, Daily, Oral  metoprolol tartrate (LOPRESSOR) tablet 25 mg, 2 times daily, Oral  furosemide injection 40 mg, Every 12 hours, Intravenous    Plan  - BP is uncontrolled, will adjust as follows:        Anemia  Anemia is likely due to Iron deficiency. Most recent hemoglobin and hematocrit are listed below.  Recent Labs     10/13/24  1344 10/13/24  1521   HGB 10.9* 11.0*   HCT 35.9* 35.1*      Plan  - Monitor serial CBC: Daily  - Transfuse PRBC if patient becomes hemodynamically unstable, symptomatic or H/H drops below 7/21.  - Patient has not received any PRBC transfusions to date  - Patient's anemia is currently stable      PVD (peripheral vascular disease)    Known peripheral vascular disease.  Patient has left above-knee amputation in the past.  Continue to monitor.  She was on Eliquis, hold Eliquis because the patient now is on heparin drip.    Chronic atrial fibrillation  Patient has persistent (7 days or more) atrial fibrillation. Patient is currently in atrial fibrillation. [id number] Score: 4. The patients heart rate in the last 24 hours is as follows:  Pulse  Min: 63  Max: 80     Antiarrhythmics  metoprolol tartrate (LOPRESSOR) tablet 25 mg, 2 times daily, Oral    Anticoagulants  heparin 25,000 units in dextrose 5% 250 mL (100 units/mL) infusion LOW INTENSITY nomogram - [facility], Continuous, Intravenous  heparin 25,000 units in dextrose 5% (100 units/ml) IV bolus from bag LOW INTENSITY nomogram - [facility], As needed (PRN), Intravenous  heparin 25,000 units in dextrose 5% (100 units/ml) IV bolus from bag LOW INTENSITY nomogram - [facility], As needed (PRN), Intravenous    Plan  - Replete lytes with a goal of K>4, Mg >2  - Patient is anticoagulated, see medications listed above.  - Patient's afib is currently controlled  - on heparin drip          VTE Risk Mitigation (From admission, onward)           Ordered     IP VTE HIGH RISK PATIENT  Once         10/13/24 1640     Place sequential compression device  Until discontinued         10/13/24 1640     Reason for No Pharmacological VTE Prophylaxis  Once        Question:  Reasons:  Answer:  Already adequately anticoagulated on oral Anticoagulants    10/13/24 1640     heparin 25,000 units in dextrose 5% (100 units/ml) IV bolus from bag LOW INTENSITY nomogram - [facility]  As needed (PRN)        Question:  Heparin Infusion Adjustment (DO NOT MODIFY ANSWER)  Answer:   [email or web address]\Images\Pharmacy\HeparinInfusions\heparin LOW INTENSITY nomogram for [facility] [id number]    10/13/24 1506     heparin 25,000 units in dextrose 5% (100 units/ml) IV bolus from bag LOW INTENSITY nomogram - [facility]  As needed (PRN)        Question:  Heparin Infusion Adjustment (DO NOT MODIFY ANSWER)  Answer:  [email or web address]\Images\Pharmacy\HeparinInfusions\heparin LOW INTENSITY nomogram for [facility] [id number]    10/13/24 1506     heparin 25,000 units in dextrose 5% 250 mL (100 units/mL) infusion LOW INTENSITY nomogram - [facility]  Continuous        Question:  Begin at (units/kg/hr)  Answer:  12    10/13/24 1506                                    Luisa Theodore DO  Department of Hospital Medicine  [facility] - Emergency Dept

## 2024-10-14 LAB
ANION GAP SERPL CALC-SCNC: 10 MMOL/L (ref 8–16)
APTT PPP: 46.1 SEC (ref 21–32)
ASCENDING AORTA: 3.23 CM
AV INDEX (PROSTH): 0.7
AV MEAN GRADIENT: 5.7 MMHG
AV PEAK GRADIENT: 11.6 MMHG
AV VALVE AREA BY VELOCITY RATIO: 2.7 CM²
AV VALVE AREA: 2.9 CM²
AV VELOCITY RATIO: 0.65
BASOPHILS # BLD AUTO: 0.01 K/UL (ref 0–0.2)
BASOPHILS # BLD AUTO: 0.01 K/UL (ref 0–0.2)
BASOPHILS NFR BLD: 0.1 % (ref 0–1.9)
BASOPHILS NFR BLD: 0.1 % (ref 0–1.9)
BSA FOR ECHO PROCEDURE: 2 M2
BUN SERPL-MCNC: 7 MG/DL (ref 8–23)
CALCIUM SERPL-MCNC: 8.7 MG/DL (ref 8.7–10.5)
CHLORIDE SERPL-SCNC: 96 MMOL/L (ref 95–110)
CO2 SERPL-SCNC: 36 MMOL/L (ref 23–29)
CREAT SERPL-MCNC: 0.7 MG/DL (ref 0.5–1.4)
CV ECHO LV RWT: 0.67 CM
DIFFERENTIAL METHOD BLD: ABNORMAL
DIFFERENTIAL METHOD BLD: ABNORMAL
DOP CALC AO PEAK VEL: 1.7 M/S
DOP CALC AO VTI: 31.8 CM
DOP CALC LVOT AREA: 4.2 CM2
DOP CALC LVOT DIAMETER: 2.3 CM
DOP CALC LVOT PEAK VEL: 1.1 M/S
DOP CALC LVOT STROKE VOLUME: 92.6 CM3
DOP CALCLVOT PEAK VEL VTI: 22.3 CM
E/E' RATIO: 21.85 M/S
ECHO LV POSTERIOR WALL: 1.3 CM (ref 0.6–1.1)
EOSINOPHIL # BLD AUTO: 0.1 K/UL (ref 0–0.5)
EOSINOPHIL # BLD AUTO: 0.1 K/UL (ref 0–0.5)
EOSINOPHIL NFR BLD: 1.6 % (ref 0–8)
EOSINOPHIL NFR BLD: 1.6 % (ref 0–8)
ERYTHROCYTE [DISTWIDTH] IN BLOOD BY AUTOMATED COUNT: 15.7 % (ref 11.5–14.5)
ERYTHROCYTE [DISTWIDTH] IN BLOOD BY AUTOMATED COUNT: 15.7 % (ref 11.5–14.5)
EST. GFR  (NO RACE VARIABLE): >60 ML/MIN/1.73 M^2
FRACTIONAL SHORTENING: 20.5 % (ref 28–44)
GLUCOSE SERPL-MCNC: 97 MG/DL (ref 70–110)
HCT VFR BLD AUTO: 32.7 % (ref 37–48.5)
HCT VFR BLD AUTO: 32.7 % (ref 37–48.5)
HGB BLD-MCNC: 9.8 G/DL (ref 12–16)
HGB BLD-MCNC: 9.8 G/DL (ref 12–16)
IMM GRANULOCYTES # BLD AUTO: 0.03 K/UL (ref 0–0.04)
IMM GRANULOCYTES # BLD AUTO: 0.03 K/UL (ref 0–0.04)
IMM GRANULOCYTES NFR BLD AUTO: 0.4 % (ref 0–0.5)
IMM GRANULOCYTES NFR BLD AUTO: 0.4 % (ref 0–0.5)
INTERVENTRICULAR SEPTUM: 1.3 CM (ref 0.6–1.1)
IVC DIAMETER: 2.4 CM
IVRT: 74.22 MSEC
LA MAJOR: 7.47 CM
LA MINOR: 7.1 CM
LA WIDTH: 5 CM
LEFT ATRIUM SIZE: 5.85 CM
LEFT ATRIUM VOLUME INDEX: 94.3 ML/M2
LEFT ATRIUM VOLUME: 181.01 CM3
LEFT INTERNAL DIMENSION IN SYSTOLE: 3.1 CM (ref 2.1–4)
LEFT VENTRICLE DIASTOLIC VOLUME INDEX: 35.11 ML/M2
LEFT VENTRICLE DIASTOLIC VOLUME: 67.41 ML
LEFT VENTRICLE MASS INDEX: 93.6 G/M2
LEFT VENTRICLE SYSTOLIC VOLUME INDEX: 19.4 ML/M2
LEFT VENTRICLE SYSTOLIC VOLUME: 37.24 ML
LEFT VENTRICULAR INTERNAL DIMENSION IN DIASTOLE: 3.9 CM (ref 3.5–6)
LEFT VENTRICULAR MASS: 179.7 G
LV LATERAL E/E' RATIO: 15.78 M/S
LV SEPTAL E/E' RATIO: 35.5 M/S
LVED V (TEICH): 67.41 ML
LVES V (TEICH): 37.24 ML
LVOT MG: 2.28 MMHG
LVOT MV: 0.72 CM/S
LYMPHOCYTES # BLD AUTO: 2.4 K/UL (ref 1–4.8)
LYMPHOCYTES # BLD AUTO: 2.4 K/UL (ref 1–4.8)
LYMPHOCYTES NFR BLD: 29.6 % (ref 18–48)
LYMPHOCYTES NFR BLD: 29.6 % (ref 18–48)
MAGNESIUM SERPL-MCNC: 1.5 MG/DL (ref 1.6–2.6)
MCH RBC QN AUTO: 27.1 PG (ref 27–31)
MCH RBC QN AUTO: 27.1 PG (ref 27–31)
MCHC RBC AUTO-ENTMCNC: 30 G/DL (ref 32–36)
MCHC RBC AUTO-ENTMCNC: 30 G/DL (ref 32–36)
MCV RBC AUTO: 90 FL (ref 82–98)
MCV RBC AUTO: 90 FL (ref 82–98)
MONOCYTES # BLD AUTO: 0.5 K/UL (ref 0.3–1)
MONOCYTES # BLD AUTO: 0.5 K/UL (ref 0.3–1)
MONOCYTES NFR BLD: 5.8 % (ref 4–15)
MONOCYTES NFR BLD: 5.8 % (ref 4–15)
MV PEAK E VEL: 1.42 M/S
NEUTROPHILS # BLD AUTO: 5 K/UL (ref 1.8–7.7)
NEUTROPHILS # BLD AUTO: 5 K/UL (ref 1.8–7.7)
NEUTROPHILS NFR BLD: 62.5 % (ref 38–73)
NEUTROPHILS NFR BLD: 62.5 % (ref 38–73)
NRBC BLD-RTO: 0 /100 WBC
NRBC BLD-RTO: 0 /100 WBC
OHS CV RV/LV RATIO: 1.15 CM
OHS QRS DURATION: 150 MS
OHS QTC CALCULATION: 494 MS
PISA TR MAX VEL: 3.45 M/S
PLATELET # BLD AUTO: 271 K/UL (ref 150–450)
PLATELET # BLD AUTO: 271 K/UL (ref 150–450)
PMV BLD AUTO: 11 FL (ref 9.2–12.9)
PMV BLD AUTO: 11 FL (ref 9.2–12.9)
POCT GLUCOSE: 134 MG/DL (ref 70–110)
POCT GLUCOSE: <20 MG/DL (ref 70–110)
POTASSIUM SERPL-SCNC: 2.7 MMOL/L (ref 3.5–5.1)
PV PEAK GRADIENT: 3 MMHG
PV PEAK VELOCITY: 0.9 M/S
RA MAJOR: 6 CM
RA PRESSURE ESTIMATED: 15 MMHG
RA WIDTH: 4.8 CM
RBC # BLD AUTO: 3.62 M/UL (ref 4–5.4)
RBC # BLD AUTO: 3.62 M/UL (ref 4–5.4)
RIGHT VENTRICLE DIASTOLIC BASEL DIMENSION: 4.5 CM
RIGHT VENTRICULAR END-DIASTOLIC DIMENSION: 4.54 CM
RV TB RVSP: 18 MMHG
RV TISSUE DOPPLER FREE WALL SYSTOLIC VELOCITY 1 (APICAL 4 CHAMBER VIEW): 6.02 CM/S
SINUS: 3.22 CM
SODIUM SERPL-SCNC: 142 MMOL/L (ref 136–145)
STJ: 2.84 CM
TDI LATERAL: 0.09 M/S
TDI SEPTAL: 0.04 M/S
TDI: 0.07 M/S
TR MAX PG: 48 MMHG
TRICUSPID ANNULAR PLANE SYSTOLIC EXCURSION: 1.62 CM
TV PEAK GRADIENT: 1 MMHG
TV REST PULMONARY ARTERY PRESSURE: 63 MMHG
WBC # BLD AUTO: 8.07 K/UL (ref 3.9–12.7)
WBC # BLD AUTO: 8.07 K/UL (ref 3.9–12.7)
Z-SCORE OF LEFT VENTRICULAR DIMENSION IN END DIASTOLE: -3.29
Z-SCORE OF LEFT VENTRICULAR DIMENSION IN END SYSTOLE: -0.58

## 2024-10-14 PROCEDURE — 80048 BASIC METABOLIC PNL TOTAL CA: CPT | Performed by: INTERNAL MEDICINE

## 2024-10-14 PROCEDURE — 83735 ASSAY OF MAGNESIUM: CPT | Performed by: INTERNAL MEDICINE

## 2024-10-14 PROCEDURE — 25000003 PHARM REV CODE 250: Performed by: INTERNAL MEDICINE

## 2024-10-14 PROCEDURE — 85025 COMPLETE CBC W/AUTO DIFF WBC: CPT | Performed by: INTERNAL MEDICINE

## 2024-10-14 PROCEDURE — 85730 THROMBOPLASTIN TIME PARTIAL: CPT | Performed by: INTERNAL MEDICINE

## 2024-10-14 PROCEDURE — 99223 1ST HOSP IP/OBS HIGH 75: CPT | Mod: ,,, | Performed by: INTERNAL MEDICINE

## 2024-10-14 PROCEDURE — 21400001 HC TELEMETRY ROOM

## 2024-10-14 PROCEDURE — 63600175 PHARM REV CODE 636 W HCPCS: Performed by: INTERNAL MEDICINE

## 2024-10-14 PROCEDURE — 36415 COLL VENOUS BLD VENIPUNCTURE: CPT | Performed by: INTERNAL MEDICINE

## 2024-10-14 RX ORDER — POTASSIUM CHLORIDE 20 MEQ/1
40 TABLET, EXTENDED RELEASE ORAL EVERY 4 HOURS
Status: COMPLETED | OUTPATIENT
Start: 2024-10-14 | End: 2024-10-14

## 2024-10-14 RX ORDER — MAGNESIUM SULFATE HEPTAHYDRATE 40 MG/ML
2 INJECTION, SOLUTION INTRAVENOUS ONCE
Status: COMPLETED | OUTPATIENT
Start: 2024-10-14 | End: 2024-10-14

## 2024-10-14 RX ADMIN — MAGNESIUM SULFATE HEPTAHYDRATE 2 G: 40 INJECTION, SOLUTION INTRAVENOUS at 02:10

## 2024-10-14 RX ADMIN — HEPARIN SODIUM 12 UNITS/KG/HR: 10000 INJECTION, SOLUTION INTRAVENOUS at 07:10

## 2024-10-14 RX ADMIN — POTASSIUM BICARBONATE 60 MEQ: 391 TABLET, EFFERVESCENT ORAL at 06:10

## 2024-10-14 RX ADMIN — POTASSIUM CHLORIDE 40 MEQ: 1500 TABLET, EXTENDED RELEASE ORAL at 02:10

## 2024-10-14 RX ADMIN — POTASSIUM CHLORIDE 40 MEQ: 1500 TABLET, EXTENDED RELEASE ORAL at 05:10

## 2024-10-14 RX ADMIN — METOPROLOL TARTRATE 25 MG: 25 TABLET, FILM COATED ORAL at 10:10

## 2024-10-14 RX ADMIN — ATORVASTATIN CALCIUM 40 MG: 40 TABLET, FILM COATED ORAL at 08:10

## 2024-10-14 RX ADMIN — HYDROCHLOROTHIAZIDE 25 MG: 25 TABLET ORAL at 10:10

## 2024-10-14 RX ADMIN — MELATONIN TAB 3 MG 6 MG: 3 TAB at 08:10

## 2024-10-14 RX ADMIN — LOSARTAN POTASSIUM 100 MG: 25 TABLET, FILM COATED ORAL at 10:10

## 2024-10-14 RX ADMIN — FUROSEMIDE 40 MG: 10 INJECTION, SOLUTION INTRAVENOUS at 10:10

## 2024-10-14 RX ADMIN — METOPROLOL TARTRATE 25 MG: 25 TABLET, FILM COATED ORAL at 08:10

## 2024-10-14 RX ADMIN — FUROSEMIDE 40 MG: 10 INJECTION, SOLUTION INTRAVENOUS at 08:10

## 2024-10-14 NOTE — CONSULTS
[place] - Telemetry  Cardiology  Consult Note    Patient Name: Abbie Barragan  MRN: [id number]  Admission Date: 10/13/2024  Hospital Length of Stay: 1 days  Code Status: Full Code   Attending Provider: Luisa Theodore DO   Consulting Provider: Bob Mills MD  Primary Care Physician: Tanner Gómez MD  Principal Problem:NSTEMI (non-ST elevated myocardial infarction)    Patient information was obtained from patient and ER records.     Inpatient consult to Cardiology  Consult performed by: Bob Mills MD  Consult ordered by: Luisa Theodore DO        Subjective:     Chief Complaint:  trop elevation     HPI:   SUBJECTIVE  CHIEF COMPLAINT:  Yung presents today with right lower extremity swelling.  She denies any chest pains or shortness of breath.    RIGHT LOWER EXTREMITY SWELLING:  She reports right lower extremity swelling, noting that it is improving and decreasing.    CARDIOVASCULAR HISTORY:  She denies any history of heart attack, stroke, heart surgery, or cardiac stent placement.    HEMATOLOGIC:  She denies any bleeding issues.       Leg Swelling       Patient reports right leg swelling that stated on Thursday, was discharged from inpatient on Wednesday for SBO that required SX          HPI: This is a case of 89-year-old female with a past medical history of peripheral vascular disease status post amputation unilaterally, atrial fibrillation on Eliquis, history of DVT, history of anemia, history of hypertension.  Patient presented to the emergency department's with right lower extremity swelling.  Ultrasound venous of the right lower extremity was negative for DVT.  Patient was found to have elevated troponin was NSTEMI criteria.  Cardiology service was consulted by ED provider.  Patient was started on heparin drip.  Patient will be admitted for further workup for her NSTEMI.  She was also found to have right pleural effusion on chest x-ray with interstitial edema.       Past Medical History:   Diagnosis  Date    Above knee amputation status 1994    History of DVT of lower extremity 1994    LEFT    Hyperlipidemia     Hypertension     PAD (peripheral artery disease)        Past Surgical History:   Procedure Laterality Date    DIAGNOSTIC LAPAROSCOPY  9/14/2023    Procedure: LAPAROSCOPY, DIAGNOSTIC;  Surgeon: Isidro Bell MD;  Location: [facility] OR;  Service: General;;    LEG AMPUTATION THROUGH KNEE      PARTIAL HYSTERECTOMY      1960's    REPAIR OF INCARCERATED VENTRAL HERNIA WITHOUT HISTORY OF PRIOR REPAIR N/A 10/2/2024    Procedure: REPAIR, HERNIA, VENTRAL, INCARCERATED, WITHOUT HISTORY OF PRIOR REPAIR;  Surgeon: Isidro Bell MD;  Location: [facility] OR;  Service: General;  Laterality: N/A;  REPAIR WITH MESH    ROBOT-ASSISTED CHOLECYSTECTOMY N/A 9/14/2023    Procedure: Diagnostic Laparoscopy Lysis of Adhesisions  Attempted Robotically;  Surgeon: Isidro Bell MD;  Location: [facility] OR;  Service: General;  Laterality: N/A;  ATTEMPTED    VASCULAR SURGERY Left 1994    AORTOFEM BYPASS       Review of patient's allergies indicates:  No Known Allergies    Current Facility-Administered Medications on File Prior to Encounter   Medication    lactated ringers infusion    LIDOcaine (PF) 10 mg/ml (1%) injection 10 mg     Current Outpatient Medications on File Prior to Encounter   Medication Sig    acetaminophen (TYLENOL) 325 MG tablet Take 2 tablets (650 mg total) by mouth every 6 (six) hours as needed for Pain.    alendronate (FOSAMAX) 70 MG tablet Take 70 mg by mouth every 7 days.    amLODIPine (NORVASC) 10 MG tablet Take 10 mg by mouth once daily.    apixaban (ELIQUIS) 5 mg Tab Take 1 tablet (5 mg total) by mouth 2 (two) times daily.    cholecalciferol, vitamin D3, (VITAMIN D3) 25 mcg (1,000 unit) capsule Take 1,000 Units by mouth once daily.    hydroCHLOROthiazide (HYDRODIURIL) 25 MG tablet Take 25 mg by mouth once daily.    losartan (COZAAR) 100 MG tablet Take 100 mg by mouth once daily.    metoprolol tartrate (LOPRESSOR) 25  MG tablet Take 1 tablet (25 mg total) by mouth 2 (two) times daily.    potassium chloride (MICRO-K) 10 MEQ CpSR Take 10 mEq by mouth once.    rosuvastatin (CRESTOR) 40 MG Tab Take 1 tablet by mouth once daily.     Family History    None       Tobacco Use    Smoking status: Former    Smokeless tobacco: Never   Substance and Sexual Activity    Alcohol use: No    Drug use: Never    Sexual activity: Not on file     Review of Systems   Constitutional:  Negative for activity change and appetite change.   HENT:  Negative for congestion, dental problem, ear discharge and ear pain.    Respiratory:  Negative for apnea and chest tightness.    Cardiovascular:  Positive for leg swelling. Negative for chest pain and palpitations.   Gastrointestinal:  Negative for abdominal distention, abdominal pain, diarrhea, nausea and rectal pain.   Endocrine: Negative for cold intolerance and heat intolerance.   Genitourinary:  Negative for difficulty urinating, dyspareunia, dysuria and enuresis.   Musculoskeletal:  Negative for arthralgias and back pain.   Skin:  Negative for color change, pallor and rash.   Allergic/Immunologic: Negative for environmental allergies and food allergies.   Neurological:  Negative for dizziness and facial asymmetry.   Hematological:  Negative for adenopathy. Does not bruise/bleed easily.   Psychiatric/Behavioral:  Negative for agitation, behavioral problems and confusion.      Objective:     Vital Signs (Most Recent):  Temp: 98.2 °F (36.8 °C) (10/14/24 0507)  Pulse: 61 (10/14/24 0507)  Resp: 18 (10/14/24 0507)  BP: 139/64 (10/14/24 0507)  SpO2: (!) 85 % (10/14/24 0507) Vital Signs (24h Range):  Temp:  [97.6 °F (36.4 °C)-99 °F (37.2 °C)] 98.2 °F (36.8 °C)  Pulse:  [58-81] 61  Resp:  [18] 18  SpO2:  [85 %-95 %] 85 %  BP: (118-159)/(61-74) 139/64     Weight: 91.5 kg (201 lb 11.5 oz)  Body mass index is 36.9 kg/m².     Physical Exam  Constitutional:       General: She is not in acute distress.     Appearance: She  "is obese. She is not ill-appearing.   HENT:      Head: Normocephalic and atraumatic.      Right Ear: There is no impacted cerumen.      Left Ear: There is no impacted cerumen.      Nose: Nose normal. No congestion or rhinorrhea.      Mouth/Throat:      Mouth: Mucous membranes are dry.      Pharynx: No oropharyngeal exudate or posterior oropharyngeal erythema.   Cardiovascular:      Rate and Rhythm: Normal rate. Rhythm irregular.   Pulmonary:      Effort: No respiratory distress.      Breath sounds: Normal breath sounds. No stridor.   Abdominal:      General: Bowel sounds are normal. There is no distension.      Palpations: Abdomen is soft. There is no mass.      Tenderness: There is no abdominal tenderness.      Hernia: No hernia is present.   Musculoskeletal:         General: Swelling present. No tenderness.      Cervical back: No rigidity or tenderness.      Comments: Left Above-knee amputation   Skin:     Coloration: Skin is not jaundiced or pale.   Neurological:      Mental Status: She is oriented to person, place, and time. Mental status is at baseline.                Significant Labs: All pertinent labs within the past 24 hours have been reviewed.  Blood Culture: No results for input(s): "LABBLOO" in the last 48 hours.  BMP:   Recent Labs   Lab 10/14/24  0459   GLU 97      K 2.7*   CL 96   CO2 36*   BUN 7*   CREATININE 0.7   CALCIUM 8.7   MG 1.5*     CBC:   Recent Labs   Lab 10/13/24  1344 10/13/24  1521 10/14/24  0546   WBC 11.42 11.63 8.07  8.07   HGB 10.9* 11.0* 9.8*  9.8*   HCT 35.9* 35.1* 32.7*  32.7*    313 271  271     CMP:   Recent Labs   Lab 10/13/24  1344 10/14/24  0459    142   K 2.8* 2.7*   CL 96 96   CO2 33* 36*    97   BUN 6* 7*   CREATININE 0.7 0.7   CALCIUM 9.4 8.7   PROT 6.7  --    ALBUMIN 3.0*  --    BILITOT 1.0  --    ALKPHOS 132  --    AST 17  --    ALT 12  --    ANIONGAP 14 10     Imaging Results              US Lower Extremity Veins Right (Final result)  " Result time 10/13/24 13:33:11      Final result by Anthony Carroll MD (10/13/24 13:33:11)                   Impression:      No evidence of deep venous thrombosis in the right lower extremity.      Electronically signed by: Anthony Carroll MD  Date:    10/13/2024  Time:    13:33               Narrative:    EXAMINATION:  US LOWER EXTREMITY VEINS RIGHT    CLINICAL HISTORY:  Edema, unspecified    TECHNIQUE:  Duplex and color flow Doppler evaluation and graded compression of the right lower extremity veins was performed.    COMPARISON:  None    FINDINGS:  Right thigh veins: The common femoral, femoral, popliteal, upper greater saphenous, and deep femoral veins are patent and free of thrombus. The veins are normally compressible and have normal phasic flow and augmentation response.    Right calf veins: The visualized calf veins are patent.    Contralateral CFV: The contralateral (left) common femoral vein is patent and free of thrombus.    Miscellaneous: None                                       X-Ray Chest AP Portable (Final result)  Result time 10/13/24 12:32:18      Final result by Raleigh Lopez MD (10/13/24 12:32:18)                   Impression:      1. Interstitial findings may reflect edema noting possible right pleural effusion.  Correlation is advised.      Electronically signed by: Raleigh Lopez MD  Date:    10/13/2024  Time:    12:32               Narrative:    EXAMINATION:  XR CHEST AP PORTABLE    CLINICAL HISTORY:  edema;    TECHNIQUE:  Single frontal view of the chest was performed.    COMPARISON:  10/02/2024    FINDINGS:  The cardiomediastinal silhouette is prominent, similar to the previous exam noting calcification of the aorta..  There is slight obscuration of the right costophrenic angle, may reflect trace effusion or atelectasis..  The trachea is deviated rightward by a tortuous aorta..  The lungs are symmetrically expanded bilaterally with coarse interstitial attenuation bilaterally.   "There is bilateral basilar subsegmental atelectasis..  No large focal consolidation seen.  There is no pneumothorax.  The osseous structures are remarkable for degenerative change.  Prominent gas-filled bowel loops project over the left upper quadrant..                                     Significant Imaging: I have reviewed all pertinent imaging results/findings within the past 24 hours.          DATA:     Laboratory:  CBC:  Recent Labs   Lab 10/13/24  1344 10/13/24  1521 10/14/24  0546   WBC 11.42 11.63 8.07  8.07   Hemoglobin 10.9 L 11.0 L 9.8 L  9.8 L   Hematocrit 35.9 L 35.1 L 32.7 L  32.7 L   Platelets 319 313 271  271       CHEMISTRIES:  Recent Labs   Lab 10/07/24  0507 10/08/24  0911 10/13/24  1344 10/14/24  0459   Glucose 95 94 101 97   Sodium 143 144 143 142   Potassium 3.0 L 3.6 2.8 L 2.7 LL   BUN 17 18 6 L 7 L   Creatinine 0.7 0.7 0.7 0.7   Calcium 8.9 10.1 9.4 8.7   Magnesium 2.1 2.1  --  1.5 L       CARDIAC BIOMARKERS:  Recent Labs   Lab 10/13/24  1451 10/13/24  1842 10/13/24  2238   Troponin I 0.284 H 0.271 H 0.226 H       COAGS:  Recent Labs   Lab 07/12/23  1311 10/02/24  0637 10/13/24  1521   INR 1.1 1.0 1.1       LIPIDS/LFTS:  Recent Labs   Lab 10/02/24  0636 10/08/24  0911 10/13/24  1344   AST 13 21 17   ALT 12 15 12       No results found for: "HGBA1C"    TSH  Recent Labs   Lab 07/13/23  0817 10/08/24  0722   TSH 0.896 0.106 L       The ASCVD Risk score (Pop DK, et al., 2019) failed to calculate for the following reasons:    The 2019 ASCVD risk score is only valid for ages 40 to 79    Risk score cannot be calculated because patient has a medical history suggesting prior/existing ASCVD       BNP    Lab Results   Component Value Date/Time     (H) 10/13/2024 01:44 PM     (H) 10/02/2024 01:29 AM     (H) 10/26/2023 07:31 AM     (H) 07/12/2023 09:40 AM     (H) 06/19/2019 10:05 AM            ECHO    Results for orders placed during the hospital encounter of " 07/12/23    Echo    Interpretation Summary  · The left ventricle is normal in size with concentric hypertrophy and normal systolic function.  · The estimated ejection fraction is 55%.  · Grade III left ventricular diastolic dysfunction.  · Normal right ventricular size with normal right ventricular systolic function.  · Severe left atrial enlargement.  · Mild right atrial enlargement.  · Moderate tricuspid regurgitation.  · Mild pulmonic regurgitation.  · Normal central venous pressure (3 mmHg).  · The estimated PA systolic pressure is 38 mmHg.        Assessment and Plan:     * NSTEMI (non-ST elevated myocardial infarction)  Type 1 versus type 2.  Check 2D echo.  Continue heparin drip.  Aspirin Plavix.  Further evaluation to be determined by clinical course.  Has stayed chest pain-free and does not have any shortness of breath    Essential hypertension  Patients blood pressure range in the last 24 hours was: BP  Min: 118/66  Max: 159/70.The patient's inpatient anti-hypertensive regimen is listed below:  Current Antihypertensives  hydroCHLOROthiazide tablet 25 mg, Daily, Oral  losartan tablet 100 mg, Daily, Oral  metoprolol tartrate (LOPRESSOR) tablet 25 mg, 2 times daily, Oral  furosemide injection 40 mg, Every 12 hours, Intravenous    Plan  - BP is controlled, no changes needed to their regimen  -     PVD (peripheral vascular disease)        Chronic atrial fibrillation  On Eliquis at home.  Currently on heparin drip.        VTE Risk Mitigation (From admission, onward)           Ordered     IP VTE HIGH RISK PATIENT  Once         10/13/24 1640     Place sequential compression device  Until discontinued         10/13/24 1640     Reason for No Pharmacological VTE Prophylaxis  Once        Question:  Reasons:  Answer:  Already adequately anticoagulated on oral Anticoagulants    10/13/24 1640     heparin 25,000 units in dextrose 5% (100 units/ml) IV bolus from bag LOW INTENSITY nomogram - [facility]  As needed (PRN)         Question:  Heparin Infusion Adjustment (DO NOT MODIFY ANSWER)  Answer:  [email or web address]\epic\Images\Pharmacy\HeparinInfusions\heparin LOW INTENSITY nomogram for [facility] [id number]    10/13/24 1506     heparin 25,000 units in dextrose 5% (100 units/ml) IV bolus from bag LOW INTENSITY nomogram - [facility]  As needed (PRN)        Question:  Heparin Infusion Adjustment (DO NOT MODIFY ANSWER)  Answer:  [email or web address]\epic\Images\Pharmacy\HeparinInfusions\heparin LOW INTENSITY nomogram for [facility] [id number]    10/13/24 1506     heparin 25,000 units in dextrose 5% 250 mL (100 units/mL) infusion LOW INTENSITY nomogram - [facility]  Continuous        Question:  Begin at (units/kg/hr)  Answer:  12    10/13/24 1506                    Thank you for your consult. I will follow-up with patient. Please contact us if you have any additional questions.    Bob Mills MD  Cardiology   [facility] - [place] - Telemetry

## 2024-10-14 NOTE — SUBJECTIVE & OBJECTIVE
Interval History:  No new complaints or overnight events.  Patient is currently off nasal cannula oxygen.  She said that she improved in her symptoms.  Plan of care discussed with her and her son at bedside.  Both of them agreed.  We need to get an echocardiogram    Review of Systems   Constitutional:  Negative for activity change and appetite change.   HENT:  Negative for congestion and dental problem.    Respiratory:  Negative for apnea, cough, choking, chest tightness and shortness of breath.    Cardiovascular:  Negative for chest pain, palpitations and leg swelling.   Gastrointestinal:  Negative for abdominal distention, abdominal pain, constipation, diarrhea, nausea and rectal pain.   Genitourinary:  Negative for difficulty urinating and dyspareunia.   Musculoskeletal:  Positive for joint swelling. Negative for arthralgias and back pain.   Skin:  Negative for color change and pallor.   Psychiatric/Behavioral:  Negative for agitation and behavioral problems.      Objective:     Vital Signs (Most Recent):  Temp: 97.9 °F (36.6 °C) (10/14/24 1139)  Pulse: (!) 59 (10/14/24 1139)  Resp: 17 (10/14/24 1139)  BP: (!) 114/49 (10/14/24 1139)  SpO2: (!) 90 % (10/14/24 1139) Vital Signs (24h Range):  Temp:  [97.6 °F (36.4 °C)-99 °F (37.2 °C)] 97.9 °F (36.6 °C)  Pulse:  [58-81] 59  Resp:  [17-18] 17  SpO2:  [85 %-95 %] 90 %  BP: (114-159)/(49-74) 114/49     Weight: 91.5 kg (201 lb 11.5 oz)  Body mass index is 36.9 kg/m².    Intake/Output Summary (Last 24 hours) at 10/14/2024 1314  Last data filed at 10/14/2024 1139  Gross per 24 hour   Intake 120 ml   Output 3300 ml   Net -3180 ml         Physical Exam  Constitutional:       General: She is not in acute distress.     Appearance: Normal appearance. She is obese. She is not ill-appearing.   HENT:      Head: Normocephalic and atraumatic.      Right Ear: There is no impacted cerumen.      Left Ear: There is no impacted cerumen.      Nose: No congestion or rhinorrhea.       Mouth/Throat:      Pharynx: No oropharyngeal exudate or posterior oropharyngeal erythema.   Cardiovascular:      Rate and Rhythm: Bradycardia present.   Pulmonary:      Breath sounds: Normal breath sounds.   Abdominal:      General: Bowel sounds are normal. There is no distension.      Palpations: Abdomen is soft. There is no mass.      Tenderness: There is no abdominal tenderness.      Hernia: No hernia is present.   Musculoskeletal:         General: Swelling present.      Cervical back: No rigidity or tenderness.   Skin:     Coloration: Skin is not jaundiced or pale.      Findings: No bruising or erythema.   Neurological:      Mental Status: She is alert and oriented to person, place, and time. Mental status is at baseline.      Cranial Nerves: No cranial nerve deficit.      Sensory: No sensory deficit.      Motor: No weakness.      Coordination: Coordination normal.             Significant Labs: All pertinent labs within the past 24 hours have been reviewed.  BMP:   Recent Labs   Lab 10/14/24  0459   GLU 97      K 2.7*   CL 96   CO2 36*   BUN 7*   CREATININE 0.7   CALCIUM 8.7   MG 1.5*     CBC:   Recent Labs   Lab 10/13/24  1344 10/13/24  1521 10/14/24  0546   WBC 11.42 11.63 8.07  8.07   HGB 10.9* 11.0* 9.8*  9.8*   HCT 35.9* 35.1* 32.7*  32.7*    313 271  271     CMP:   Recent Labs   Lab 10/13/24  1344 10/14/24  0459    142   K 2.8* 2.7*   CL 96 96   CO2 33* 36*    97   BUN 6* 7*   CREATININE 0.7 0.7   CALCIUM 9.4 8.7   PROT 6.7  --    ALBUMIN 3.0*  --    BILITOT 1.0  --    ALKPHOS 132  --    AST 17  --    ALT 12  --    ANIONGAP 14 10     Cardiac Markers:   Recent Labs   Lab 10/13/24  1344   *       Magnesium:   Recent Labs   Lab 10/14/24  0459   MG 1.5*       Significant Imaging: I have reviewed all pertinent imaging results/findings within the past 24 hours.

## 2024-10-14 NOTE — PLAN OF CARE
Problem: Wound  Goal: Optimal Coping  Outcome: Progressing  Goal: Optimal Functional Ability  Outcome: Progressing  Goal: Absence of Infection Signs and Symptoms  Outcome: Progressing  Goal: Improved Oral Intake  Outcome: Progressing  Goal: Optimal Pain Control and Function  Outcome: Progressing  Goal: Skin Health and Integrity  Outcome: Progressing  Goal: Optimal Wound Healing  Outcome: Progressing     Problem: Skin Injury Risk Increased  Goal: Skin Health and Integrity  Outcome: Progressing

## 2024-10-14 NOTE — ASSESSMENT & PLAN NOTE
Type 1 versus type 2.  Check 2D echo.  Continue heparin drip.  Aspirin Plavix.  Further evaluation to be determined by clinical course.  Has stayed chest pain-free and does not have any shortness of breath

## 2024-10-14 NOTE — HPI
SUBJECTIVE  CHIEF COMPLAINT:  Yung presents today with right lower extremity swelling.  She denies any chest pains or shortness of breath.    RIGHT LOWER EXTREMITY SWELLING:  She reports right lower extremity swelling, noting that it is improving and decreasing.    CARDIOVASCULAR HISTORY:  She denies any history of heart attack, stroke, heart surgery, or cardiac stent placement.    HEMATOLOGIC:  She denies any bleeding issues.       Leg Swelling       Patient reports right leg swelling that stated on Thursday, was discharged from inpatient on Wednesday for SBO that required SX          HPI: This is a case of 89-year-old female with a past medical history of peripheral vascular disease status post amputation unilaterally, atrial fibrillation on Eliquis, history of DVT, history of anemia, history of hypertension.  Patient presented to the emergency department's with right lower extremity swelling.  Ultrasound venous of the right lower extremity was negative for DVT.  Patient was found to have elevated troponin was NSTEMI criteria.  Cardiology service was consulted by ED provider.  Patient was started on heparin drip.  Patient will be admitted for further workup for her NSTEMI.  She was also found to have right pleural effusion on chest x-ray with interstitial edema.

## 2024-10-14 NOTE — NURSING
[facility] - [place]  Nurses Note -- 4 Eyes      10/14/2024       Skin assessed on: Q Shift      [x] No Pressure Injuries Present    []Prevention Measures Documented    [] Yes LDA  for Pressure Injury Previously documented     [] Yes New Pressure Injury Discovered   [] LDA for New Pressure Injury Added      Attending RN:  Brissa Lin LPN     Second RN:  Caity CORTEZ

## 2024-10-14 NOTE — ASSESSMENT & PLAN NOTE
Patients blood pressure range in the last 24 hours was: BP  Min: 118/66  Max: 159/70.The patient's inpatient anti-hypertensive regimen is listed below:  Current Antihypertensives  hydroCHLOROthiazide tablet 25 mg, Daily, Oral  losartan tablet 100 mg, Daily, Oral  metoprolol tartrate (LOPRESSOR) tablet 25 mg, 2 times daily, Oral  furosemide injection 40 mg, Every 12 hours, Intravenous    Plan  - BP is controlled, no changes needed to their regimen  -

## 2024-10-14 NOTE — SUBJECTIVE & OBJECTIVE
Past Medical History:   Diagnosis Date    Above knee amputation status 1994    History of DVT of lower extremity 1994    LEFT    Hyperlipidemia     Hypertension     PAD (peripheral artery disease)        Past Surgical History:   Procedure Laterality Date    DIAGNOSTIC LAPAROSCOPY  9/14/2023    Procedure: LAPAROSCOPY, DIAGNOSTIC;  Surgeon: Isidro Bell MD;  Location: [facility] OR;  Service: General;;    LEG AMPUTATION THROUGH KNEE      PARTIAL HYSTERECTOMY      1960's    REPAIR OF INCARCERATED VENTRAL HERNIA WITHOUT HISTORY OF PRIOR REPAIR N/A 10/2/2024    Procedure: REPAIR, HERNIA, VENTRAL, INCARCERATED, WITHOUT HISTORY OF PRIOR REPAIR;  Surgeon: Isidro Bell MD;  Location: [facility] OR;  Service: General;  Laterality: N/A;  REPAIR WITH MESH    ROBOT-ASSISTED CHOLECYSTECTOMY N/A 9/14/2023    Procedure: Diagnostic Laparoscopy Lysis of Adhesisions  Attempted Robotically;  Surgeon: Isidro Bell MD;  Location: [facility] OR;  Service: General;  Laterality: N/A;  ATTEMPTED    VASCULAR SURGERY Left 1994    AORTOFEM BYPASS       Review of patient's allergies indicates:  No Known Allergies    Current Facility-Administered Medications on File Prior to Encounter   Medication    lactated ringers infusion    LIDOcaine (PF) 10 mg/ml (1%) injection 10 mg     Current Outpatient Medications on File Prior to Encounter   Medication Sig    acetaminophen (TYLENOL) 325 MG tablet Take 2 tablets (650 mg total) by mouth every 6 (six) hours as needed for Pain.    alendronate (FOSAMAX) 70 MG tablet Take 70 mg by mouth every 7 days.    amLODIPine (NORVASC) 10 MG tablet Take 10 mg by mouth once daily.    apixaban (ELIQUIS) 5 mg Tab Take 1 tablet (5 mg total) by mouth 2 (two) times daily.    cholecalciferol, vitamin D3, (VITAMIN D3) 25 mcg (1,000 unit) capsule Take 1,000 Units by mouth once daily.    hydroCHLOROthiazide (HYDRODIURIL) 25 MG tablet Take 25 mg by mouth once daily.    losartan (COZAAR) 100 MG tablet Take 100 mg by mouth once daily.     metoprolol tartrate (LOPRESSOR) 25 MG tablet Take 1 tablet (25 mg total) by mouth 2 (two) times daily.    potassium chloride (MICRO-K) 10 MEQ CpSR Take 10 mEq by mouth once.    rosuvastatin (CRESTOR) 40 MG Tab Take 1 tablet by mouth once daily.     Family History    None       Tobacco Use    Smoking status: Former    Smokeless tobacco: Never   Substance and Sexual Activity    Alcohol use: No    Drug use: Never    Sexual activity: Not on file     Review of Systems   Constitutional:  Negative for activity change and appetite change.   HENT:  Negative for congestion, dental problem, ear discharge and ear pain.    Respiratory:  Negative for apnea and chest tightness.    Cardiovascular:  Positive for leg swelling. Negative for chest pain and palpitations.   Gastrointestinal:  Negative for abdominal distention, abdominal pain, diarrhea, nausea and rectal pain.   Endocrine: Negative for cold intolerance and heat intolerance.   Genitourinary:  Negative for difficulty urinating, dyspareunia, dysuria and enuresis.   Musculoskeletal:  Negative for arthralgias and back pain.   Skin:  Negative for color change, pallor and rash.   Allergic/Immunologic: Negative for environmental allergies and food allergies.   Neurological:  Negative for dizziness and facial asymmetry.   Hematological:  Negative for adenopathy. Does not bruise/bleed easily.   Psychiatric/Behavioral:  Negative for agitation, behavioral problems and confusion.      Objective:     Vital Signs (Most Recent):  Temp: 98.2 °F (36.8 °C) (10/14/24 0507)  Pulse: 61 (10/14/24 0507)  Resp: 18 (10/14/24 0507)  BP: 139/64 (10/14/24 0507)  SpO2: (!) 85 % (10/14/24 0507) Vital Signs (24h Range):  Temp:  [97.6 °F (36.4 °C)-99 °F (37.2 °C)] 98.2 °F (36.8 °C)  Pulse:  [58-81] 61  Resp:  [18] 18  SpO2:  [85 %-95 %] 85 %  BP: (118-159)/(61-74) 139/64     Weight: 91.5 kg (201 lb 11.5 oz)  Body mass index is 36.9 kg/m².     Physical Exam  Constitutional:       General: She is not in  "acute distress.     Appearance: She is obese. She is not ill-appearing.   HENT:      Head: Normocephalic and atraumatic.      Right Ear: There is no impacted cerumen.      Left Ear: There is no impacted cerumen.      Nose: Nose normal. No congestion or rhinorrhea.      Mouth/Throat:      Mouth: Mucous membranes are dry.      Pharynx: No oropharyngeal exudate or posterior oropharyngeal erythema.   Cardiovascular:      Rate and Rhythm: Normal rate. Rhythm irregular.   Pulmonary:      Effort: No respiratory distress.      Breath sounds: Normal breath sounds. No stridor.   Abdominal:      General: Bowel sounds are normal. There is no distension.      Palpations: Abdomen is soft. There is no mass.      Tenderness: There is no abdominal tenderness.      Hernia: No hernia is present.   Musculoskeletal:         General: Swelling present. No tenderness.      Cervical back: No rigidity or tenderness.      Comments: Left Above-knee amputation   Skin:     Coloration: Skin is not jaundiced or pale.   Neurological:      Mental Status: She is oriented to person, place, and time. Mental status is at baseline.                Significant Labs: All pertinent labs within the past 24 hours have been reviewed.  Blood Culture: No results for input(s): "LABBLOO" in the last 48 hours.  BMP:   Recent Labs   Lab 10/14/24  0459   GLU 97      K 2.7*   CL 96   CO2 36*   BUN 7*   CREATININE 0.7   CALCIUM 8.7   MG 1.5*     CBC:   Recent Labs   Lab 10/13/24  1344 10/13/24  1521 10/14/24  0546   WBC 11.42 11.63 8.07  8.07   HGB 10.9* 11.0* 9.8*  9.8*   HCT 35.9* 35.1* 32.7*  32.7*    313 271  271     CMP:   Recent Labs   Lab 10/13/24  1344 10/14/24  0459    142   K 2.8* 2.7*   CL 96 96   CO2 33* 36*    97   BUN 6* 7*   CREATININE 0.7 0.7   CALCIUM 9.4 8.7   PROT 6.7  --    ALBUMIN 3.0*  --    BILITOT 1.0  --    ALKPHOS 132  --    AST 17  --    ALT 12  --    ANIONGAP 14 10     Imaging Results              US Lower " Extremity Veins Right (Final result)  Result time 10/13/24 13:33:11      Final result by Anthony Carroll MD (10/13/24 13:33:11)                   Impression:      No evidence of deep venous thrombosis in the right lower extremity.      Electronically signed by: Anthony Carroll MD  Date:    10/13/2024  Time:    13:33               Narrative:    EXAMINATION:  US LOWER EXTREMITY VEINS RIGHT    CLINICAL HISTORY:  Edema, unspecified    TECHNIQUE:  Duplex and color flow Doppler evaluation and graded compression of the right lower extremity veins was performed.    COMPARISON:  None    FINDINGS:  Right thigh veins: The common femoral, femoral, popliteal, upper greater saphenous, and deep femoral veins are patent and free of thrombus. The veins are normally compressible and have normal phasic flow and augmentation response.    Right calf veins: The visualized calf veins are patent.    Contralateral CFV: The contralateral (left) common femoral vein is patent and free of thrombus.    Miscellaneous: None                                       X-Ray Chest AP Portable (Final result)  Result time 10/13/24 12:32:18      Final result by Raleigh Lopez MD (10/13/24 12:32:18)                   Impression:      1. Interstitial findings may reflect edema noting possible right pleural effusion.  Correlation is advised.      Electronically signed by: Raleigh Lopez MD  Date:    10/13/2024  Time:    12:32               Narrative:    EXAMINATION:  XR CHEST AP PORTABLE    CLINICAL HISTORY:  edema;    TECHNIQUE:  Single frontal view of the chest was performed.    COMPARISON:  10/02/2024    FINDINGS:  The cardiomediastinal silhouette is prominent, similar to the previous exam noting calcification of the aorta..  There is slight obscuration of the right costophrenic angle, may reflect trace effusion or atelectasis..  The trachea is deviated rightward by a tortuous aorta..  The lungs are symmetrically expanded bilaterally with coarse  "interstitial attenuation bilaterally.  There is bilateral basilar subsegmental atelectasis..  No large focal consolidation seen.  There is no pneumothorax.  The osseous structures are remarkable for degenerative change.  Prominent gas-filled bowel loops project over the left upper quadrant..                                     Significant Imaging: I have reviewed all pertinent imaging results/findings within the past 24 hours.          DATA:     Laboratory:  CBC:  Recent Labs   Lab 10/13/24  1344 10/13/24  1521 10/14/24  0546   WBC 11.42 11.63 8.07  8.07   Hemoglobin 10.9 L 11.0 L 9.8 L  9.8 L   Hematocrit 35.9 L 35.1 L 32.7 L  32.7 L   Platelets 319 313 271  271       CHEMISTRIES:  Recent Labs   Lab 10/07/24  0507 10/08/24  0911 10/13/24  1344 10/14/24  0459   Glucose 95 94 101 97   Sodium 143 144 143 142   Potassium 3.0 L 3.6 2.8 L 2.7 LL   BUN 17 18 6 L 7 L   Creatinine 0.7 0.7 0.7 0.7   Calcium 8.9 10.1 9.4 8.7   Magnesium 2.1 2.1  --  1.5 L       CARDIAC BIOMARKERS:  Recent Labs   Lab 10/13/24  1451 10/13/24  1842 10/13/24  2238   Troponin I 0.284 H 0.271 H 0.226 H       COAGS:  Recent Labs   Lab 07/12/23  1311 10/02/24  0637 10/13/24  1521   INR 1.1 1.0 1.1       LIPIDS/LFTS:  Recent Labs   Lab 10/02/24  0636 10/08/24  0911 10/13/24  1344   AST 13 21 17   ALT 12 15 12       No results found for: "HGBA1C"    TSH  Recent Labs   Lab 07/13/23  0817 10/08/24  0722   TSH 0.896 0.106 L       The ASCVD Risk score (Pop DK, et al., 2019) failed to calculate for the following reasons:    The 2019 ASCVD risk score is only valid for ages 40 to 79    Risk score cannot be calculated because patient has a medical history suggesting prior/existing ASCVD       BNP    Lab Results   Component Value Date/Time     (H) 10/13/2024 01:44 PM     (H) 10/02/2024 01:29 AM     (H) 10/26/2023 07:31 AM     (H) 07/12/2023 09:40 AM     (H) 06/19/2019 10:05 AM            ECHO    Results for orders " placed during the hospital encounter of 07/12/23    Echo    Interpretation Summary  · The left ventricle is normal in size with concentric hypertrophy and normal systolic function.  · The estimated ejection fraction is 55%.  · Grade III left ventricular diastolic dysfunction.  · Normal right ventricular size with normal right ventricular systolic function.  · Severe left atrial enlargement.  · Mild right atrial enlargement.  · Moderate tricuspid regurgitation.  · Mild pulmonic regurgitation.  · Normal central venous pressure (3 mmHg).  · The estimated PA systolic pressure is 38 mmHg.

## 2024-10-14 NOTE — PROGRESS NOTES
[facility] Medicine  Progress Note    Patient Name: Abbie Barragan  MRN: [id number]  Patient Class: IP- Inpatient   Admission Date: 10/13/2024  Length of Stay: 1 days  Attending Physician: Luisa Theodore DO  Primary Care Provider: Tanner Gómez MD        Subjective:     Principal Problem:NSTEMI (non-ST elevated myocardial infarction)        HPI:  This is a case of 89-year-old female with a past medical history of peripheral vascular disease status post amputation unilaterally, atrial fibrillation on Eliquis, history of DVT, history of anemia, history of hypertension.  Patient presented to the emergency department's with right lower extremity swelling.  Ultrasound venous of the right lower extremity was negative for DVT.  Patient was found to have elevated troponin was NSTEMI criteria.  Cardiology service was consulted by ED provider.  Patient was started on heparin drip.  Patient will be admitted for further workup for her NSTEMI.  She was also found to have right pleural effusion on chest x-ray with interstitial edema.    Overview/Hospital Course:  No notes on file    Interval History:  No new complaints or overnight events.  Patient is currently off nasal cannula oxygen.  She said that she improved in her symptoms.  Plan of care discussed with her and her son at bedside.  Both of them agreed.  We need to get an echocardiogram    Review of Systems   Constitutional:  Negative for activity change and appetite change.   HENT:  Negative for congestion and dental problem.    Respiratory:  Negative for apnea, cough, choking, chest tightness and shortness of breath.    Cardiovascular:  Negative for chest pain, palpitations and leg swelling.   Gastrointestinal:  Negative for abdominal distention, abdominal pain, constipation, diarrhea, nausea and rectal pain.   Genitourinary:  Negative for difficulty urinating and dyspareunia.   Musculoskeletal:  Positive for joint swelling. Negative for arthralgias  and back pain.   Skin:  Negative for color change and pallor.   Psychiatric/Behavioral:  Negative for agitation and behavioral problems.      Objective:     Vital Signs (Most Recent):  Temp: 97.9 °F (36.6 °C) (10/14/24 1139)  Pulse: (!) 59 (10/14/24 1139)  Resp: 17 (10/14/24 1139)  BP: (!) 114/49 (10/14/24 1139)  SpO2: (!) 90 % (10/14/24 1139) Vital Signs (24h Range):  Temp:  [97.6 °F (36.4 °C)-99 °F (37.2 °C)] 97.9 °F (36.6 °C)  Pulse:  [58-81] 59  Resp:  [17-18] 17  SpO2:  [85 %-95 %] 90 %  BP: (114-159)/(49-74) 114/49     Weight: 91.5 kg (201 lb 11.5 oz)  Body mass index is 36.9 kg/m².    Intake/Output Summary (Last 24 hours) at 10/14/2024 1314  Last data filed at 10/14/2024 1139  Gross per 24 hour   Intake 120 ml   Output 3300 ml   Net -3180 ml         Physical Exam  Constitutional:       General: She is not in acute distress.     Appearance: Normal appearance. She is obese. She is not ill-appearing.   HENT:      Head: Normocephalic and atraumatic.      Right Ear: There is no impacted cerumen.      Left Ear: There is no impacted cerumen.      Nose: No congestion or rhinorrhea.      Mouth/Throat:      Pharynx: No oropharyngeal exudate or posterior oropharyngeal erythema.   Cardiovascular:      Rate and Rhythm: Bradycardia present.   Pulmonary:      Breath sounds: Normal breath sounds.   Abdominal:      General: Bowel sounds are normal. There is no distension.      Palpations: Abdomen is soft. There is no mass.      Tenderness: There is no abdominal tenderness.      Hernia: No hernia is present.   Musculoskeletal:         General: Swelling present.      Cervical back: No rigidity or tenderness.   Skin:     Coloration: Skin is not jaundiced or pale.      Findings: No bruising or erythema.   Neurological:      Mental Status: She is alert and oriented to person, place, and time. Mental status is at baseline.      Cranial Nerves: No cranial nerve deficit.      Sensory: No sensory deficit.      Motor: No weakness.       Coordination: Coordination normal.             Significant Labs: All pertinent labs within the past 24 hours have been reviewed.  BMP:   Recent Labs   Lab 10/14/24  0459   GLU 97      K 2.7*   CL 96   CO2 36*   BUN 7*   CREATININE 0.7   CALCIUM 8.7   MG 1.5*     CBC:   Recent Labs   Lab 10/13/24  1344 10/13/24  1521 10/14/24  0546   WBC 11.42 11.63 8.07  8.07   HGB 10.9* 11.0* 9.8*  9.8*   HCT 35.9* 35.1* 32.7*  32.7*    313 271  271     CMP:   Recent Labs   Lab 10/13/24  1344 10/14/24  0459    142   K 2.8* 2.7*   CL 96 96   CO2 33* 36*    97   BUN 6* 7*   CREATININE 0.7 0.7   CALCIUM 9.4 8.7   PROT 6.7  --    ALBUMIN 3.0*  --    BILITOT 1.0  --    ALKPHOS 132  --    AST 17  --    ALT 12  --    ANIONGAP 14 10     Cardiac Markers:   Recent Labs   Lab 10/13/24  1344   *       Magnesium:   Recent Labs   Lab 10/14/24  0459   MG 1.5*       Significant Imaging: I have reviewed all pertinent imaging results/findings within the past 24 hours.    Assessment/Plan:    Today's assessment and plan  Patient was admitted with NSTEMI, troponins are trending down  Highly suspicious with acute congestive systolic heart failure, await echo  Hypomagnesemia  Hypokalemia  Known peripheral vascular disease  Chronic atrial fibrillation  Essential hypertension      ++10/14/2024:  Is still waiting for echocardiogram.  Continue with the heparin drip for now.  We will give the patient 2 g of magnesium sulfate.  We will give the patient 2 doses of potassium chloride p.o. 40 mEq each.  Monitor morning labs of BMP and magnesium.  Replete electrolytes as needed.  Cardiology Services consulted and following the patient. .  Patient used to be on Eliquis at home prior to admission.  Patient may resume Eliquis when she gets off Heparin drip.     * NSTEMI (non-ST elevated myocardial infarction)  We will trend troponin  Cardiologist consulted  Obtain echocardiogram  Started on Lasix  Patient already on metoprolol,  hydrochlorothiazide  Replete potassium as needed  Hold amlodipine for now, until we get the results of echocardiogram        Acute congestive heart failure  Presentation is highly suspicious for acute systolic congestive heart failure?  Echocardiogram will be obtained to rule out congestive heart failure.  We will start patient on Lasix 40 mg q.12 hours for 3 days.  BNP was elevated.  Patient was in atrial fibrillation, was started on heparin drip  Recent Labs     10/13/24  1344   *     Latest ECHO  Results for orders placed during the hospital encounter of 07/12/23    Echo    Interpretation Summary  · The left ventricle is normal in size with concentric hypertrophy and normal systolic function.  · The estimated ejection fraction is 55%.  · Grade III left ventricular diastolic dysfunction.  · Normal right ventricular size with normal right ventricular systolic function.  · Severe left atrial enlargement.  · Mild right atrial enlargement.  · Moderate tricuspid regurgitation.  · Mild pulmonic regurgitation.  · Normal central venous pressure (3 mmHg).  · The estimated PA systolic pressure is 38 mmHg.    Current Heart Failure Medications  hydroCHLOROthiazide tablet 25 mg, Daily, Oral  losartan tablet 100 mg, Daily, Oral  furosemide injection 40 mg, Every 12 hours, Intravenous    Cardiology Services consulted    Pleural effusion  Right pleural effusion a chest x-ray.  Patient was started on Lasix.  Follow-up chest x-ray is needed    Essential hypertension  Patients blood pressure range in the last 24 hours was: BP  Min: 133/61  Max: 159/70.The patient's inpatient anti-hypertensive regimen is listed below:  Current Antihypertensives  hydroCHLOROthiazide tablet 25 mg, Daily, Oral  losartan tablet 100 mg, Daily, Oral  metoprolol tartrate (LOPRESSOR) tablet 25 mg, 2 times daily, Oral  furosemide injection 40 mg, Every 12 hours, Intravenous    Plan  - BP is uncontrolled, will adjust as follows:        Anemia  Anemia is  likely due to Iron deficiency. Most recent hemoglobin and hematocrit are listed below.  Recent Labs     10/13/24  1344 10/13/24  1521   HGB 10.9* 11.0*   HCT 35.9* 35.1*     Plan  - Monitor serial CBC: Daily  - Transfuse PRBC if patient becomes hemodynamically unstable, symptomatic or H/H drops below 7/21.  - Patient has not received any PRBC transfusions to date  - Patient's anemia is currently stable      PVD (peripheral vascular disease)    Known peripheral vascular disease.  Patient has left above-knee amputation in the past.  Continue to monitor.  She was on Eliquis, hold Eliquis because the patient now is on heparin drip.    Chronic atrial fibrillation  Patient has persistent (7 days or more) atrial fibrillation. Patient is currently in atrial fibrillation. [id number] Score: 4. The patients heart rate in the last 24 hours is as follows:  Pulse  Min: 63  Max: 80     Antiarrhythmics  metoprolol tartrate (LOPRESSOR) tablet 25 mg, 2 times daily, Oral    Anticoagulants  heparin 25,000 units in dextrose 5% 250 mL (100 units/mL) infusion LOW INTENSITY nomogram - [facility], Continuous, Intravenous  heparin 25,000 units in dextrose 5% (100 units/ml) IV bolus from bag LOW INTENSITY nomogram - [facility], As needed (PRN), Intravenous  heparin 25,000 units in dextrose 5% (100 units/ml) IV bolus from bag LOW INTENSITY nomogram - [facility], As needed (PRN), Intravenous    Plan  - Replete lytes with a goal of K>4, Mg >2  - Patient is anticoagulated, see medications listed above.  - Patient's afib is currently controlled  - on heparin drip          VTE Risk Mitigation (From admission, onward)           Ordered     IP VTE HIGH RISK PATIENT  Once         10/13/24 1640     Place sequential compression device  Until discontinued         10/13/24 1640     Reason for No Pharmacological VTE Prophylaxis  Once        Question:  Reasons:  Answer:  Already adequately anticoagulated on oral Anticoagulants    10/13/24 1640     heparin 25,000 units in  dextrose 5% (100 units/ml) IV bolus from bag LOW INTENSITY nomogram - [facility]  As needed (PRN)        Question:  Heparin Infusion Adjustment (DO NOT MODIFY ANSWER)  Answer:  [email or web address]\Images\Pharmacy\HeparinInfusions\heparin LOW INTENSITY nomogram for [facility] [id number]    10/13/24 1506     heparin 25,000 units in dextrose 5% (100 units/ml) IV bolus from bag LOW INTENSITY nomogram - [facility]  As needed (PRN)        Question:  Heparin Infusion Adjustment (DO NOT MODIFY ANSWER)  Answer:  [email or web address]\Images\Pharmacy\HeparinInfusions\heparin LOW INTENSITY nomogram for [facility] [id number]    10/13/24 1506     heparin 25,000 units in dextrose 5% 250 mL (100 units/mL) infusion LOW INTENSITY nomogram - [facility]  Continuous        Question:  Begin at (units/kg/hr)  Answer:  12    10/13/24 1506                    Discharge Planning   JULIÁN:      Code Status: Full Code   Is the patient medically ready for discharge?:     Reason for patient still in hospital (select all that apply): Treatment  Discharge Plan A: Home Health                  Luisa Theodore DO  Department of Hospital Medicine   [facility]

## 2024-10-14 NOTE — PLAN OF CARE
Case Management Assessment     PCP: Tanner Gómez MD    Pharmacy:   [facility]/pharmacy #[id number] - [place], [place] - [address]  [address] [address]  Phone: [phone] Fax: [phone]        Patient Arrived From: Home with [facility]  Existing Help at Home: Caregiver service and children    Barriers to Discharge: None identified at this time    Discharge Plan:    A. Home with Home Health & caregivers   B. Home    CM met with pt at bedside for discharge planning & readmission assessments. Pt readmitted for unrelated condition. Her right leg began swelling Thursday 10/10. She attempted to manage it at home on her own, though saw no improvement and came back to the hospital Thursday. She was arranged with [facility] after discharge, but didn't mention being admitted to their services yet, but she would like to have home health on discharge from this admission. She lives alone and has caregiver services 7 days per week and her adult children assist when needed. She uses a wheelchair for ambulation and requires assistance for most ADLs. Family will provide transportation home on discharge.     10/14/24 6412   Discharge Assessment   Assessment Type Discharge Planning Assessment   Confirmed/corrected address, phone number and insurance Yes   Confirmed Demographics Correct on Facesheet   Source of Information patient   Communicated JULIÁN with patient/caregiver Date not available/Unable to determine   People in Home alone   Do you expect to return to your current living situation? Yes   Do you have help at home or someone to help you manage your care at home? Yes   Who are your caregiver(s) and their phone number(s)? son, Yung & daughter, [place]   Prior to hospitilization cognitive status: Alert/Oriented   Current cognitive status: Alert/Oriented   Walking or Climbing Stairs Difficulty yes   Walking or Climbing Stairs ambulation difficulty, requires  equipment   Mobility Management wheelchair   Dressing/Bathing Difficulty yes   Dressing/Bathing bathing difficulty, requires equipment;bathing difficulty, assistance 1 person   Dressing/Bathing Management caregivers assist   Equipment Currently Used at Home wheelchair;shower chair   Readmission within 30 days? Yes   Patient currently being followed by outpatient case management? No   Do you currently have service(s) that help you manage your care at home? Yes   Name and Contact number of agency [employer] 7 days per week   Is the pt/caregiver preference to resume services with current agency Yes   Do you take prescription medications? Yes   Do you have prescription coverage? Yes   Coverage PHN   Do you have any problems affording any of your prescribed medications? No   Is the patient taking medications as prescribed? yes   Who is going to help you get home at discharge? son & daughter   How do you get to doctors appointments? family or friend will provide   Are you on dialysis? No   Do you take coumadin? No   Discharge Plan A Home Health   Discharge Plan B Home   DME Needed Upon Discharge  other (see comments)  (TBD)   Discharge Plan discussed with: Patient   Transition of Care Barriers None        10/14/24 0933   Readmission   Was this a planned readmission? No   Why were you hospitalized in the last 30 days? SBO   Why were you readmitted? Alarmed about signs/symptoms   When you left the hospital how did you feel? Good   When you left the hospital where did you go? Home with Home Health   Did patient/caregiver refused recommended DC plan? No   Tell me about what happened between when you left the hospital and the day you returned. R leg started swelling Thursday; Friday leg got hard; son rec going to ER Sunday   When did you start not feeling well? Thursday   Did you try to manage your symptoms your self? Yes   What did you do? dtr massaged leg; tried using ice   Did you call anyone? Yes   Who did you  call? Other (comments)  (children)   Did you try to see or did see a doctor or nurse before you came? No   Why? no, dtr called a nurse at the hospital   Did you have  a follow-up appointment on discharge? No

## 2024-10-15 ENCOUNTER — DOCUMENTATION ONLY (OUTPATIENT)
Dept: CARDIOLOGY | Facility: CLINIC | Age: 89
End: 2024-10-15
Payer: MEDICARE

## 2024-10-15 VITALS
TEMPERATURE: 98 F | HEART RATE: 62 BPM | RESPIRATION RATE: 16 BRPM | SYSTOLIC BLOOD PRESSURE: 106 MMHG | BODY MASS INDEX: 35.57 KG/M2 | WEIGHT: 193.31 LBS | DIASTOLIC BLOOD PRESSURE: 62 MMHG | HEIGHT: 62 IN | OXYGEN SATURATION: 93 %

## 2024-10-15 DIAGNOSIS — I50.31 ACUTE DIASTOLIC HEART FAILURE: Primary | ICD-10-CM

## 2024-10-15 PROBLEM — I50.33 ACUTE ON CHRONIC DIASTOLIC CONGESTIVE HEART FAILURE: Status: ACTIVE | Noted: 2024-10-13

## 2024-10-15 PROBLEM — J90 PLEURAL EFFUSION: Status: RESOLVED | Noted: 2024-10-13 | Resolved: 2024-10-15

## 2024-10-15 LAB
ANION GAP SERPL CALC-SCNC: 15 MMOL/L (ref 8–16)
APTT PPP: 31.6 SEC (ref 21–32)
APTT PPP: 86.4 SEC (ref 21–32)
BASOPHILS # BLD AUTO: 0.03 K/UL (ref 0–0.2)
BASOPHILS # BLD AUTO: 0.03 K/UL (ref 0–0.2)
BASOPHILS NFR BLD: 0.4 % (ref 0–1.9)
BASOPHILS NFR BLD: 0.4 % (ref 0–1.9)
BUN SERPL-MCNC: 9 MG/DL (ref 8–23)
CALCIUM SERPL-MCNC: 9.3 MG/DL (ref 8.7–10.5)
CHLORIDE SERPL-SCNC: 88 MMOL/L (ref 95–110)
CO2 SERPL-SCNC: 39 MMOL/L (ref 23–29)
CREAT SERPL-MCNC: 0.8 MG/DL (ref 0.5–1.4)
DIFFERENTIAL METHOD BLD: ABNORMAL
DIFFERENTIAL METHOD BLD: ABNORMAL
EOSINOPHIL # BLD AUTO: 0.1 K/UL (ref 0–0.5)
EOSINOPHIL # BLD AUTO: 0.1 K/UL (ref 0–0.5)
EOSINOPHIL NFR BLD: 1.4 % (ref 0–8)
EOSINOPHIL NFR BLD: 1.4 % (ref 0–8)
ERYTHROCYTE [DISTWIDTH] IN BLOOD BY AUTOMATED COUNT: 15.8 % (ref 11.5–14.5)
ERYTHROCYTE [DISTWIDTH] IN BLOOD BY AUTOMATED COUNT: 15.8 % (ref 11.5–14.5)
EST. GFR  (NO RACE VARIABLE): >60 ML/MIN/1.73 M^2
GLUCOSE SERPL-MCNC: 99 MG/DL (ref 70–110)
HCT VFR BLD AUTO: 33.9 % (ref 37–48.5)
HCT VFR BLD AUTO: 33.9 % (ref 37–48.5)
HGB BLD-MCNC: 10.1 G/DL (ref 12–16)
HGB BLD-MCNC: 10.1 G/DL (ref 12–16)
IMM GRANULOCYTES # BLD AUTO: 0.03 K/UL (ref 0–0.04)
IMM GRANULOCYTES # BLD AUTO: 0.03 K/UL (ref 0–0.04)
IMM GRANULOCYTES NFR BLD AUTO: 0.4 % (ref 0–0.5)
IMM GRANULOCYTES NFR BLD AUTO: 0.4 % (ref 0–0.5)
LYMPHOCYTES # BLD AUTO: 2.4 K/UL (ref 1–4.8)
LYMPHOCYTES # BLD AUTO: 2.4 K/UL (ref 1–4.8)
LYMPHOCYTES NFR BLD: 28.5 % (ref 18–48)
LYMPHOCYTES NFR BLD: 28.5 % (ref 18–48)
MAGNESIUM SERPL-MCNC: 1.9 MG/DL (ref 1.6–2.6)
MCH RBC QN AUTO: 26.9 PG (ref 27–31)
MCH RBC QN AUTO: 26.9 PG (ref 27–31)
MCHC RBC AUTO-ENTMCNC: 29.8 G/DL (ref 32–36)
MCHC RBC AUTO-ENTMCNC: 29.8 G/DL (ref 32–36)
MCV RBC AUTO: 90 FL (ref 82–98)
MCV RBC AUTO: 90 FL (ref 82–98)
MONOCYTES # BLD AUTO: 0.6 K/UL (ref 0.3–1)
MONOCYTES # BLD AUTO: 0.6 K/UL (ref 0.3–1)
MONOCYTES NFR BLD: 6.5 % (ref 4–15)
MONOCYTES NFR BLD: 6.5 % (ref 4–15)
NEUTROPHILS # BLD AUTO: 5.4 K/UL (ref 1.8–7.7)
NEUTROPHILS # BLD AUTO: 5.4 K/UL (ref 1.8–7.7)
NEUTROPHILS NFR BLD: 62.8 % (ref 38–73)
NEUTROPHILS NFR BLD: 62.8 % (ref 38–73)
NRBC BLD-RTO: 0 /100 WBC
NRBC BLD-RTO: 0 /100 WBC
PLATELET # BLD AUTO: 282 K/UL (ref 150–450)
PLATELET # BLD AUTO: 282 K/UL (ref 150–450)
PMV BLD AUTO: 12.4 FL (ref 9.2–12.9)
PMV BLD AUTO: 12.4 FL (ref 9.2–12.9)
POTASSIUM SERPL-SCNC: 3.1 MMOL/L (ref 3.5–5.1)
RBC # BLD AUTO: 3.75 M/UL (ref 4–5.4)
RBC # BLD AUTO: 3.75 M/UL (ref 4–5.4)
SODIUM SERPL-SCNC: 142 MMOL/L (ref 136–145)
WBC # BLD AUTO: 8.52 K/UL (ref 3.9–12.7)
WBC # BLD AUTO: 8.52 K/UL (ref 3.9–12.7)

## 2024-10-15 PROCEDURE — 85730 THROMBOPLASTIN TIME PARTIAL: CPT | Performed by: INTERNAL MEDICINE

## 2024-10-15 PROCEDURE — 83735 ASSAY OF MAGNESIUM: CPT | Performed by: INTERNAL MEDICINE

## 2024-10-15 PROCEDURE — 63600175 PHARM REV CODE 636 W HCPCS: Performed by: INTERNAL MEDICINE

## 2024-10-15 PROCEDURE — 99233 SBSQ HOSP IP/OBS HIGH 50: CPT | Mod: ,,, | Performed by: INTERNAL MEDICINE

## 2024-10-15 PROCEDURE — 36415 COLL VENOUS BLD VENIPUNCTURE: CPT | Performed by: INTERNAL MEDICINE

## 2024-10-15 PROCEDURE — 36415 COLL VENOUS BLD VENIPUNCTURE: CPT | Performed by: HOSPITALIST

## 2024-10-15 PROCEDURE — 85025 COMPLETE CBC W/AUTO DIFF WBC: CPT | Performed by: INTERNAL MEDICINE

## 2024-10-15 PROCEDURE — 25000003 PHARM REV CODE 250: Performed by: INTERNAL MEDICINE

## 2024-10-15 PROCEDURE — 80048 BASIC METABOLIC PNL TOTAL CA: CPT | Performed by: INTERNAL MEDICINE

## 2024-10-15 PROCEDURE — 85730 THROMBOPLASTIN TIME PARTIAL: CPT | Mod: 91 | Performed by: HOSPITALIST

## 2024-10-15 PROCEDURE — 94761 N-INVAS EAR/PLS OXIMETRY MLT: CPT

## 2024-10-15 RX ORDER — FUROSEMIDE 40 MG/1
40 TABLET ORAL DAILY
Qty: 30 TABLET | Refills: 11 | Status: SHIPPED | OUTPATIENT
Start: 2024-10-15 | End: 2025-10-15

## 2024-10-15 RX ADMIN — FUROSEMIDE 40 MG: 10 INJECTION, SOLUTION INTRAVENOUS at 10:10

## 2024-10-15 RX ADMIN — HYDROCHLOROTHIAZIDE 25 MG: 25 TABLET ORAL at 10:10

## 2024-10-15 RX ADMIN — POTASSIUM BICARBONATE 35 MEQ: 391 TABLET, EFFERVESCENT ORAL at 07:10

## 2024-10-15 RX ADMIN — HEPARIN SODIUM 15 UNITS/KG/HR: 10000 INJECTION, SOLUTION INTRAVENOUS at 07:10

## 2024-10-15 RX ADMIN — LOSARTAN POTASSIUM 100 MG: 25 TABLET, FILM COATED ORAL at 10:10

## 2024-10-15 RX ADMIN — METOPROLOL TARTRATE 25 MG: 25 TABLET, FILM COATED ORAL at 10:10

## 2024-10-15 NOTE — PLAN OF CARE
Case Management Final Discharge Note    Per Margret with [facility], pt has been accepted for resumption of care.    Discharge Disposition: Home Health    New DME ordered / company name: None    Relevant SDOH / Transition of Care Barriers:  None identified at this time    Primary Caretaker and contact information: son, Mars Barragan [phone]; daughter, Virginia Barragan [phone]    Scheduled followup appointment: PCP with Dr. Gómez's NP Akilha Galarza 10/29 1:00PM    Referrals placed: cardiology - pt has established appointment with Holly Ackerman NP on 10/23    Transportation: private vehicle by one of her children      Patient educated on discharge services and updated on DC plan. Bedside RN notified, patient clear to discharge from Case Management Perspective.      10/15/24 1507   Final Note   Assessment Type Final Discharge Note   Anticipated Discharge Disposition Home-Health   Post-Acute Status   Coverage PHN   Discharge Delays None known at this time

## 2024-10-15 NOTE — ASSESSMENT & PLAN NOTE
Type 1 versus type 2.  Check 2D echo.  Continue heparin drip.  Aspirin Plavix.  Further evaluation to be determined by clinical course.  Has stayed chest pain-free and does not have any shortness of breath    10/15:  Had a detailed discussion with the patient about the risks benefits of further evaluation.  Patient continues to be chest pain-free.  Her echo did not show any significant large wall motion abnormalities.  At the current time patient states that she would like to be managed medically and is not interested in any invasive procedures or ischemic workup.  Does not want stress test.  We will continue to manage medically and follow-up in Cardiology Clinic.   Results for orders placed during the hospital encounter of 10/13/24    Echo    Interpretation Summary    Left Ventricle: The left ventricle is normal in size. Mildly increased wall thickness. There is concentric hypertrophy. There is normal systolic function with a visually estimated ejection fraction of 65 - 70%.    Right Ventricle: Mild right ventricular enlargement. Systolic function is reduced.    Left Atrium: Left atrium is severely dilated.    Right Atrium: Right atrium is severely dilated.    Aortic Valve: There is mild aortic regurgitation.    Mitral Valve: There is mild regurgitation.    Tricuspid Valve: There is moderate to severe regurgitation.    Pulmonary Artery: There is pulmonary hypertension. The estimated pulmonary artery systolic pressure is 63 mmHg.    IVC/SVC: Elevated venous pressure at 15 mmHg.

## 2024-10-15 NOTE — DISCHARGE SUMMARY
[facility] Medicine  Discharge Summary      Patient Name: Abbie Barragan  MRN: [id number]  [facility]: [id number]  Patient Class: IP- Inpatient  Admission Date: 10/13/2024  Hospital Length of Stay: 2 days  Discharge Date and Time:  10/15/2024 1:06 PM  Attending Physician: Lauro Guan MD   Discharging Provider: Lauro Guan MD  Primary Care Provider: Tanner Gómez MD    Primary Care Team: Networked reference to record PCT     HPI:   This is a case of 89-year-old female with a past medical history of peripheral vascular disease status post amputation unilaterally, atrial fibrillation on Eliquis, history of DVT, history of anemia, history of hypertension.  Patient presented to the emergency department's with right lower extremity swelling.  Ultrasound venous of the right lower extremity was negative for DVT.  Patient was found to have elevated troponin was NSTEMI criteria.  Cardiology service was consulted by ED provider.  Patient was started on heparin drip.  Patient will be admitted for further workup for her NSTEMI.  She was also found to have right pleural effusion on chest x-ray with interstitial edema.    * No surgery found *      Hospital Course:   88 y/o female recently admitted with SBO now presents with RLE swelling.  US with no evidence of DVT.  Patient noted to have elevated Troponin and Cards consulted.  Type 1 versus type 2 NSTEMI.  Started on heparin drip, Aspirin and Plavix. Has stayed chest pain-free and does not have any shortness of breath.  Cardiology had a detailed discussion with the patient about the risks benefits of further evaluation.  Patient continues to be chest pain-free.  Her echo did not show any significant large wall motion abnormalities.  At the current time patient states that she would like to be managed medically and is not interested in any invasive procedures or ischemic workup.  Does not want stress test.  We will continue to manage medically and  follow-up in Cardiology Clinic.  Symptoms possibly related to acute on chronic diastolic heart failure and patient given IV diuresis during hospital stay.  Will change home HCTZ to Lasix upon discharge.  She has remained afebrile and hemodynamically stable.  She will be discharged home to resume previous HH and follow up with PCP and Cardiology.       Goals of Care Treatment Preferences:  Code Status: Full Code      SDOH Screening:  The patient was screened for utility difficulties, food insecurity, transport difficulties, housing insecurity, and interpersonal safety and there were no concerns identified this admission.     Consults:   Consults (From admission, onward)          Status Ordering Provider     Inpatient consult to Cardiology  Once        Provider:  Bob Mills MD    Completed JANE KELLOGG            No new Assessment & Plan notes have been filed under this hospital service since the last note was generated.  Service: Hospital Medicine    Final Active Diagnoses:    Diagnosis Date Noted POA    PRINCIPAL PROBLEM:  Acute on chronic diastolic congestive heart failure [I50.33] 10/13/2024 Yes    NSTEMI (non-ST elevated myocardial infarction) [I21.4] 10/13/2024 Yes    Essential hypertension [I10] 10/02/2024 Yes    Anemia [D64.9] 07/14/2023 Yes    Chronic atrial fibrillation [I48.20] 07/12/2023 Yes    PVD (peripheral vascular disease) [I73.9] 07/14/2022 Yes      Problems Resolved During this Admission:    Diagnosis Date Noted Date Resolved POA    Pleural effusion [J90] 10/13/2024 10/15/2024 Yes       Discharged Condition: stable    Disposition: Home-Health Care Svc    Follow Up:   Follow-up Information       Tanner Gómez MD Follow up in 1 week(s).    Specialty: Internal Medicine  Contact information:  [address] [address] [phone]                           Patient Instructions:      Ambulatory referral/consult to Cardiology   Standing Status: Future   Referral Priority:  Routine Referral Type: Consultation   Referral Reason: Specialty Services Required   Requested Specialty: Cardiology   Number of Visits Requested: 1     Diet Cardiac     Notify your health care provider if you experience any of the following:  temperature >100.4     Notify your health care provider if you experience any of the following:  persistent nausea and vomiting or diarrhea     Notify your health care provider if you experience any of the following:  severe uncontrolled pain     Notify your health care provider if you experience any of the following:  difficulty breathing or increased cough     Notify your health care provider if you experience any of the following:  persistent dizziness, light-headedness, or visual disturbances     Notify your health care provider if you experience any of the following:  increased confusion or weakness     Activity as tolerated       Significant Diagnostic Studies: N/A    Pending Diagnostic Studies:       Procedure Component Value Units Date/Time    Echo [[id number]]     Order Status: Sent Lab Status: No result            Medications:  Reconciled Home Medications:      Medication List        START taking these medications      furosemide 40 MG tablet  Commonly known as: LASIX  Take 1 tablet (40 mg total) by mouth once daily.            CONTINUE taking these medications      acetaminophen 325 MG tablet  Commonly known as: TYLENOL  Take 2 tablets (650 mg total) by mouth every 6 (six) hours as needed for Pain.     alendronate 70 MG tablet  Commonly known as: FOSAMAX  Take 70 mg by mouth every 7 days.     amLODIPine 10 MG tablet  Commonly known as: NORVASC  Take 10 mg by mouth once daily.     apixaban 5 mg Tab  Commonly known as: ELIQUIS  Take 1 tablet (5 mg total) by mouth 2 (two) times daily.     cholecalciferol (vitamin D3) 25 mcg (1,000 unit) capsule  Commonly known as: VITAMIN D3  Take 1,000 Units by mouth once daily.     losartan 100 MG tablet  Commonly known as:  COZAAR  Take 100 mg by mouth once daily.     metoprolol tartrate 25 MG tablet  Commonly known as: LOPRESSOR  Take 1 tablet (25 mg total) by mouth 2 (two) times daily.     potassium chloride 10 MEQ Cpsr  Commonly known as: MICRO-K  Take 10 mEq by mouth once.     rosuvastatin 40 MG Tab  Commonly known as: CRESTOR  Take 1 tablet by mouth once daily.            STOP taking these medications      hydroCHLOROthiazide 25 MG tablet  Commonly known as: HYDRODIURIL              Indwelling Lines/Drains at time of discharge:   Lines/Drains/Airways       None                   Time spent on the discharge of patient: >31 minutes         Lauro Guan MD  Department of Hospital Medicine  [facility]

## 2024-10-15 NOTE — PROGRESS NOTES
"[facility]) nurse navigator notified of HFTCC candidate in need of education and introduction to 4-6 week program.      PT aao x 3 while lying in bed  with family at bedside. Introduced self to pt as HFTCC nurse navigator.     Patient given "Heart Failure [facility]itional [facility] Home Care Guide" which includes "Daily weight and symptom tracker".  Encouraged pt and caregiver to review information.      Reviewed the following key points of HFTCC program with pt and family:   1.) Take your medications as directed.    2.) Weight yourself daily   3.) Follow low salt and limited fluid diet.    4.) Stop smoking and start exercising   5.) Go to your appointments and call your team.      Pt reminded to follow Symptom tracker and to call at the onset of symptoms according to tracker.     Reviewed plan for follow up once discharged to include phone calls, in person and virtual visits to assist pt optimizing their heart failure medication regimen and encouraging healthy lifestyle modifications.  Reminded pt that program will assist them over the next 4-6 weeks and then patient will be transferred to long term care provider .  Reminded pt how to contact HFTCC navigator via phone and or via [employer].     Pt instructed appointment will be printed on hospital discharge paperwork.     Pt also reminded RN will call 48-72 hours after discharge to check on them.     PT and family verbalize read back of information given.  Encouraged pt and family to read over information often and contact team with any questions or concerns.     "

## 2024-10-15 NOTE — PLAN OF CARE
[facility]    HOME HEALTH ORDERS  FACE TO FACE ENCOUNTER    Patient Name: Abbie Barragan  YOB: 1935    PCP: Tanner Gómez MD   PCP Address: [address] [address]  PCP Phone Number: [phone]  PCP Fax: [phone]       Encounter Date: 10/15/2024    Admit to Home Health    Diagnoses:  Active Hospital Problems    Diagnosis  POA    *Acute on chronic diastolic congestive heart failure [I50.33]  Yes    NSTEMI (non-ST elevated myocardial infarction) [I21.4]  Yes    Essential hypertension [I10]  Yes    Anemia [D64.9]  Yes    Chronic atrial fibrillation [I48.20]  Yes    PVD (peripheral vascular disease) [I73.9]  Yes      Resolved Hospital Problems    Diagnosis Date Resolved POA    Pleural effusion [J90] 10/15/2024 Yes       Future Appointments   Date Time Provider Department Center   10/16/2024  1:00 PM Isidro Bell MD [facility]      Follow-up Information       Tanner Gómez MD Follow up in 1 week(s).    Specialty: Internal Medicine  Contact information:  [address] [address] [phone]                               I have seen and examined this patient face to face today. My clinical findings that support the need for the home health skilled services and home bound status are the following:  Weakness/numbness causing balance and gait disturbance due to Heart Failure making it taxing to leave home.    Allergies:Review of patient's allergies indicates:  No Known Allergies    Diet: cardiac diet and fluid restriction: 2L    Activities: activity as tolerated    Nursing:   SN to complete comprehensive assessment including routine vital signs. Instruct on disease process and s/s of complications to report to MD. Review/verify medication list sent home with the patient at time of discharge  and instruct patient/caregiver as needed. Frequency may be adjusted depending on start of care date.    Notify MD if SBP > 160 or <  90; DBP > 90 or < 50; HR > 120 or < 50; Temp > 101      CONSULTS:    Physical Therapy to evaluate and treat. Evaluate for home safety and equipment needs; Establish/upgrade home exercise program. Perform / instruct on therapeutic exercises, gait training, transfer training, and Range of Motion.  Occupational Therapy to evaluate and treat. Evaluate home environment for safety and equipment needs. Perform/Instruct on transfers, ADL training, ROM, and therapeutic exercises.   to evaluate for community resources/long-range planning.  Aide to provide assistance with personal care, ADLs, and vital signs.    MISCELLANEOUS CARE:  Heart Failure:      SN to instruct on the following:    Instruct on the definition of CHF.   Instruct on the signs/sympoms of CHF to be reported.   Instruct on and monitor daily weights.   Instruct on factors that cause exacerbation.   Instruct on action, dose, schedule, and side effects of medications.   Instruct on diet as prescribed.   Instruct on activity allowed.   Instruct on life-style modifications for life long management of CHF   SN to assess compliance with daily weights, diet, medications, fluid retention,    safety precautions, activities permitted and life-style modifications.   Additional 1-2 SN visits per week as needed for signs and symptoms     of CHF exacerbation.        Medications: Review discharge medications with patient and family and provide education.      Current Discharge Medication List        START taking these medications    Details   furosemide (LASIX) 40 MG tablet Take 1 tablet (40 mg total) by mouth once daily.  Qty: 30 tablet, Refills: 11           CONTINUE these medications which have NOT CHANGED    Details   acetaminophen (TYLENOL) 325 MG tablet Take 2 tablets (650 mg total) by mouth every 6 (six) hours as needed for Pain.      alendronate (FOSAMAX) 70 MG tablet Take 70 mg by mouth every 7 days.      amLODIPine (NORVASC) 10 MG tablet Take 10 mg by  mouth once daily.      apixaban (ELIQUIS) 5 mg Tab Take 1 tablet (5 mg total) by mouth 2 (two) times daily.  Qty: 60 tablet, Refills: 11      cholecalciferol, vitamin D3, (VITAMIN D3) 25 mcg (1,000 unit) capsule Take 1,000 Units by mouth once daily.      losartan (COZAAR) 100 MG tablet Take 100 mg by mouth once daily.      metoprolol tartrate (LOPRESSOR) 25 MG tablet Take 1 tablet (25 mg total) by mouth 2 (two) times daily.  Qty: 60 tablet, Refills: 11    Comments: .      potassium chloride (MICRO-K) 10 MEQ CpSR Take 10 mEq by mouth once.      rosuvastatin (CRESTOR) 40 MG Tab Take 1 tablet by mouth once daily.           STOP taking these medications       hydroCHLOROthiazide (HYDRODIURIL) 25 MG tablet Comments:   Reason for Stopping:               I certify that this patient is confined to her home and needs intermittent skilled nursing care, physical therapy, and occupational therapy.

## 2024-10-15 NOTE — SUBJECTIVE & OBJECTIVE
Interval History:  Had a detailed discussion with the patient regarding her troponin elevation.  She continues to be asymptomatic from the cardiovascular perspective.    Review of Systems   Constitutional: Negative.   HENT: Negative.     Eyes: Negative.    Cardiovascular: Negative.    Respiratory: Negative.     Endocrine: Negative.    Hematologic/Lymphatic: Negative.    Skin: Negative.    Musculoskeletal: Negative.    Gastrointestinal: Negative.    Genitourinary: Negative.    Neurological: Negative.    Psychiatric/Behavioral: Negative.     Allergic/Immunologic: Negative.      Objective:     Vital Signs (Most Recent):  Temp: 97.9 °F (36.6 °C) (10/15/24 1112)  Pulse: 70 (10/15/24 1112)  Resp: 17 (10/15/24 1112)  BP: (!) 142/64 (10/15/24 1112)  SpO2: (!) 92 % (10/15/24 1112) Vital Signs (24h Range):  Temp:  [97.5 °F (36.4 °C)-98.5 °F (36.9 °C)] 97.9 °F (36.6 °C)  Pulse:  [57-71] 70  Resp:  [16-19] 17  SpO2:  [87 %-97 %] 92 %  BP: (114-142)/(49-72) 142/64     Weight: 87.7 kg (193 lb 5.5 oz)  Body mass index is 35.36 kg/m².     SpO2: (!) 92 %         Intake/Output Summary (Last 24 hours) at 10/15/2024 1115  Last data filed at 10/15/2024 0944  Gross per 24 hour   Intake 727.39 ml   Output 2800 ml   Net -2072.61 ml       Lines/Drains/Airways       Peripheral Intravenous Line  Duration                  Peripheral IV - Single Lumen 10/13/24 1345 20 G 1 3/4 in Yes Right Antecubital 1 day         Peripheral IV - Single Lumen 10/13/24 1728 20 G Anterior;Left Upper Arm 1 day                       Physical Exam  Constitutional:       Appearance: Normal appearance. She is well-developed.   HENT:      Head: Normocephalic.   Eyes:      Pupils: Pupils are equal, round, and reactive to light.   Cardiovascular:      Rate and Rhythm: Normal rate and regular rhythm.   Pulmonary:      Effort: Pulmonary effort is normal.      Breath sounds: Normal breath sounds.   Abdominal:      General: Bowel sounds are normal.      Palpations: Abdomen  "is soft.      Tenderness: There is no abdominal tenderness.   Musculoskeletal:         General: Normal range of motion.      Cervical back: Normal range of motion and neck supple.   Skin:     General: Skin is warm.   Neurological:      Mental Status: She is alert and oriented to person, place, and time.            Significant Labs:     DATA:     Laboratory:  CBC:  Recent Labs   Lab 10/13/24  1521 10/14/24  0546 10/15/24  0426   WBC 11.63 8.07  8.07 8.52  8.52   Hemoglobin 11.0 L 9.8 L  9.8 L 10.1 L  10.1 L   Hematocrit 35.1 L 32.7 L  32.7 L 33.9 L  33.9 L   Platelets 313 271  271 282  282       CHEMISTRIES:  Recent Labs   Lab 10/08/24  0911 10/13/24  1344 10/14/24  0459 10/15/24  0426   Glucose 94 101 97 99   Sodium 144 143 142 142   Potassium 3.6 2.8 L 2.7 LL 3.1 L   BUN 18 6 L 7 L 9   Creatinine 0.7 0.7 0.7 0.8   Calcium 10.1 9.4 8.7 9.3   Magnesium 2.1  --  1.5 L 1.9       CARDIAC BIOMARKERS:  Recent Labs   Lab 10/13/24  1451 10/13/24  1842 10/13/24  2238   Troponin I 0.284 H 0.271 H 0.226 H       COAGS:  Recent Labs   Lab 07/12/23  1311 10/02/24  0637 10/13/24  1521   INR 1.1 1.0 1.1       LIPIDS/LFTS:  Recent Labs   Lab 10/02/24  0636 10/08/24  0911 10/13/24  1344   AST 13 21 17   ALT 12 15 12       No results found for: "HGBA1C"    TSH  Recent Labs   Lab 07/13/23  0817 10/08/24  0722   TSH 0.896 0.106 L       The ASCVD Risk score (Pop SANDERS, et al., 2019) failed to calculate for the following reasons:    The 2019 ASCVD risk score is only valid for ages 40 to 79    Risk score cannot be calculated because patient has a medical history suggesting prior/existing ASCVD       BNP    Lab Results   Component Value Date/Time     (H) 10/13/2024 01:44 PM     (H) 10/02/2024 01:29 AM     (H) 10/26/2023 07:31 AM     (H) 07/12/2023 09:40 AM     (H) 06/19/2019 10:05 AM            ECHO    Results for orders placed during the hospital encounter of 10/13/24    Echo    Interpretation " Summary    Left Ventricle: The left ventricle is normal in size. Mildly increased wall thickness. There is concentric hypertrophy. There is normal systolic function with a visually estimated ejection fraction of 65 - 70%.    Right Ventricle: Mild right ventricular enlargement. Systolic function is reduced.    Left Atrium: Left atrium is severely dilated.    Right Atrium: Right atrium is severely dilated.    Aortic Valve: There is mild aortic regurgitation.    Mitral Valve: There is mild regurgitation.    Tricuspid Valve: There is moderate to severe regurgitation.    Pulmonary Artery: There is pulmonary hypertension. The estimated pulmonary artery systolic pressure is 63 mmHg.    IVC/SVC: Elevated venous pressure at 15 mmHg.

## 2024-10-15 NOTE — NURSING
[facility]  Nurses Note -- 4 Eyes      10/15/2024       Skin assessed on: Q Shift      [x] No Pressure Injuries Present    []Prevention Measures Documented    [] Yes LDA  for Pressure Injury Previously documented     [] Yes New Pressure Injury Discovered   [] LDA for New Pressure Injury Added      Attending RN:  Brissa Lin LPN     Second RN:  Rosanne CORTEZ

## 2024-10-15 NOTE — NURSING
Nurse provided pt and family with discharge teaching. Pt showed readiness to be discharged. All questions answered. NAD noted.

## 2024-10-15 NOTE — HOSPITAL COURSE
89 y/o female recently admitted with SBO now presents with RLE swelling.  US with no evidence of DVT.  Patient noted to have elevated Troponin and Cards consulted.  Type 1 versus type 2 NSTEMI.  Started on heparin drip, Aspirin and Plavix. Has stayed chest pain-free and does not have any shortness of breath.  Cardiology had a detailed discussion with the patient about the risks benefits of further evaluation.  Patient continues to be chest pain-free.  Her echo did not show any significant large wall motion abnormalities.  At the current time patient states that she would like to be managed medically and is not interested in any invasive procedures or ischemic workup.  Does not want stress test.  We will continue to manage medically and follow-up in Cardiology Clinic.  Symptoms possibly related to acute on chronic diastolic heart failure and patient given IV diuresis during hospital stay.  Will change home HCTZ to Lasix upon discharge.  She has remained afebrile and hemodynamically stable.  She will be discharged home to resume previous HH and follow up with PCP and Cardiology.

## 2024-10-15 NOTE — PROGRESS NOTES
[facility] Telemetry  Cardiology  Progress Note    Patient Name: Abbie Barragan  MRN: [id number]  Admission Date: 10/13/2024  Hospital Length of Stay: 2 days  Code Status: Full Code   Attending Physician: Lauro Guan MD   Primary Care Physician: Tanner Gómez MD  Expected Discharge Date: 10/15/2024  Principal Problem:NSTEMI (non-ST elevated myocardial infarction)    Subjective:     Interval History:  Had a detailed discussion with the patient regarding her troponin elevation.  She continues to be asymptomatic from the cardiovascular perspective.    Review of Systems   Constitutional: Negative.   HENT: Negative.     Eyes: Negative.    Cardiovascular: Negative.    Respiratory: Negative.     Endocrine: Negative.    Hematologic/Lymphatic: Negative.    Skin: Negative.    Musculoskeletal: Negative.    Gastrointestinal: Negative.    Genitourinary: Negative.    Neurological: Negative.    Psychiatric/Behavioral: Negative.     Allergic/Immunologic: Negative.      Objective:     Vital Signs (Most Recent):  Temp: 97.9 °F (36.6 °C) (10/15/24 1112)  Pulse: 70 (10/15/24 1112)  Resp: 17 (10/15/24 1112)  BP: (!) 142/64 (10/15/24 1112)  SpO2: (!) 92 % (10/15/24 1112) Vital Signs (24h Range):  Temp:  [97.5 °F (36.4 °C)-98.5 °F (36.9 °C)] 97.9 °F (36.6 °C)  Pulse:  [57-71] 70  Resp:  [16-19] 17  SpO2:  [87 %-97 %] 92 %  BP: (114-142)/(49-72) 142/64     Weight: 87.7 kg (193 lb 5.5 oz)  Body mass index is 35.36 kg/m².     SpO2: (!) 92 %         Intake/Output Summary (Last 24 hours) at 10/15/2024 1115  Last data filed at 10/15/2024 0944  Gross per 24 hour   Intake 727.39 ml   Output 2800 ml   Net -2072.61 ml       Lines/Drains/Airways       Peripheral Intravenous Line  Duration                  Peripheral IV - Single Lumen 10/13/24 1345 20 G 1 3/4 in Yes Right Antecubital 1 day         Peripheral IV - Single Lumen 10/13/24 1728 20 G Anterior;Left Upper Arm 1 day                       Physical Exam  Constitutional:        "Appearance: Normal appearance. She is well-developed.   HENT:      Head: Normocephalic.   Eyes:      Pupils: Pupils are equal, round, and reactive to light.   Cardiovascular:      Rate and Rhythm: Normal rate and regular rhythm.   Pulmonary:      Effort: Pulmonary effort is normal.      Breath sounds: Normal breath sounds.   Abdominal:      General: Bowel sounds are normal.      Palpations: Abdomen is soft.      Tenderness: There is no abdominal tenderness.   Musculoskeletal:         General: Normal range of motion.      Cervical back: Normal range of motion and neck supple.   Skin:     General: Skin is warm.   Neurological:      Mental Status: She is alert and oriented to person, place, and time.            Significant Labs:     DATA:     Laboratory:  CBC:  Recent Labs   Lab 10/13/24  1521 10/14/24  0546 10/15/24  0426   WBC 11.63 8.07  8.07 8.52  8.52   Hemoglobin 11.0 L 9.8 L  9.8 L 10.1 L  10.1 L   Hematocrit 35.1 L 32.7 L  32.7 L 33.9 L  33.9 L   Platelets 313 271  271 282  282       CHEMISTRIES:  Recent Labs   Lab 10/08/24  0911 10/13/24  1344 10/14/24  0459 10/15/24  0426   Glucose 94 101 97 99   Sodium 144 143 142 142   Potassium 3.6 2.8 L 2.7 LL 3.1 L   BUN 18 6 L 7 L 9   Creatinine 0.7 0.7 0.7 0.8   Calcium 10.1 9.4 8.7 9.3   Magnesium 2.1  --  1.5 L 1.9       CARDIAC BIOMARKERS:  Recent Labs   Lab 10/13/24  1451 10/13/24  1842 10/13/24  2238   Troponin I 0.284 H 0.271 H 0.226 H       COAGS:  Recent Labs   Lab 07/12/23  1311 10/02/24  0637 10/13/24  1521   INR 1.1 1.0 1.1       LIPIDS/LFTS:  Recent Labs   Lab 10/02/24  0636 10/08/24  0911 10/13/24  1344   AST 13 21 17   ALT 12 15 12       No results found for: "HGBA1C"    TSH  Recent Labs   Lab 07/13/23  0817 10/08/24  0722   TSH 0.896 0.106 L       The ASCVD Risk score (Pop DK, et al., 2019) failed to calculate for the following reasons:    The 2019 ASCVD risk score is only valid for ages 40 to 79    Risk score cannot be calculated because " patient has a medical history suggesting prior/existing ASCVD       BNP    Lab Results   Component Value Date/Time     (H) 10/13/2024 01:44 PM     (H) 10/02/2024 01:29 AM     (H) 10/26/2023 07:31 AM     (H) 07/12/2023 09:40 AM     (H) 06/19/2019 10:05 AM            ECHO    Results for orders placed during the hospital encounter of 10/13/24    Echo    Interpretation Summary    Left Ventricle: The left ventricle is normal in size. Mildly increased wall thickness. There is concentric hypertrophy. There is normal systolic function with a visually estimated ejection fraction of 65 - 70%.    Right Ventricle: Mild right ventricular enlargement. Systolic function is reduced.    Left Atrium: Left atrium is severely dilated.    Right Atrium: Right atrium is severely dilated.    Aortic Valve: There is mild aortic regurgitation.    Mitral Valve: There is mild regurgitation.    Tricuspid Valve: There is moderate to severe regurgitation.    Pulmonary Artery: There is pulmonary hypertension. The estimated pulmonary artery systolic pressure is 63 mmHg.    IVC/SVC: Elevated venous pressure at 15 mmHg.        Assessment and Plan:     Brief HPI:     * NSTEMI (non-ST elevated myocardial infarction)  Type 1 versus type 2.  Check 2D echo.  Continue heparin drip.  Aspirin Plavix.  Further evaluation to be determined by clinical course.  Has stayed chest pain-free and does not have any shortness of breath    10/15:  Had a detailed discussion with the patient about the risks benefits of further evaluation.  Patient continues to be chest pain-free.  Her echo did not show any significant large wall motion abnormalities.  At the current time patient states that she would like to be managed medically and is not interested in any invasive procedures or ischemic workup.  Does not want stress test.  We will continue to manage medically and follow-up in Cardiology Clinic.   Results for orders placed during the  hospital encounter of 10/13/24    Echo    Interpretation Summary    Left Ventricle: The left ventricle is normal in size. Mildly increased wall thickness. There is concentric hypertrophy. There is normal systolic function with a visually estimated ejection fraction of 65 - 70%.    Right Ventricle: Mild right ventricular enlargement. Systolic function is reduced.    Left Atrium: Left atrium is severely dilated.    Right Atrium: Right atrium is severely dilated.    Aortic Valve: There is mild aortic regurgitation.    Mitral Valve: There is mild regurgitation.    Tricuspid Valve: There is moderate to severe regurgitation.    Pulmonary Artery: There is pulmonary hypertension. The estimated pulmonary artery systolic pressure is 63 mmHg.    IVC/SVC: Elevated venous pressure at 15 mmHg.      Essential hypertension  Patients blood pressure range in the last 24 hours was: BP  Min: 118/66  Max: 159/70.The patient's inpatient anti-hypertensive regimen is listed below:  Current Antihypertensives  hydroCHLOROthiazide tablet 25 mg, Daily, Oral  losartan tablet 100 mg, Daily, Oral  metoprolol tartrate (LOPRESSOR) tablet 25 mg, 2 times daily, Oral  furosemide injection 40 mg, Every 12 hours, Intravenous    Plan  - BP is controlled, no changes needed to their regimen  -     PVD (peripheral vascular disease)        Chronic atrial fibrillation  On Eliquis at home.  Currently on heparin drip.        VTE Risk Mitigation (From admission, onward)           Ordered     IP VTE HIGH RISK PATIENT  Once         10/13/24 1640     Place sequential compression device  Until discontinued         10/13/24 1640     Reason for No Pharmacological VTE Prophylaxis  Once        Question:  Reasons:  Answer:  Already adequately anticoagulated on oral Anticoagulants    10/13/24 1640     heparin 25,000 units in dextrose 5% (100 units/ml) IV bolus from bag LOW INTENSITY nomogram - [facility]  As needed (PRN)        Question:  Heparin Infusion Adjustment (DO NOT  MODIFY ANSWER)  Answer:  [email or web address]\epic\Images\Pharmacy\HeparinInfusions\heparin LOW INTENSITY nomogram for [facility] [id number]    10/13/24 1506     heparin 25,000 units in dextrose 5% (100 units/ml) IV bolus from bag LOW INTENSITY nomogram - [facility]  As needed (PRN)        Question:  Heparin Infusion Adjustment (DO NOT MODIFY ANSWER)  Answer:  [email or web address]\epic\Images\Pharmacy\HeparinInfusions\heparin LOW INTENSITY nomogram for [facility] [id number].pdf    10/13/24 1506     heparin 25,000 units in dextrose 5% 250 mL (100 units/mL) infusion LOW INTENSITY nomogram - [facility]  Continuous        Question:  Begin at (units/kg/hr)  Answer:  12    10/13/24 1506                    Bob Mills MD  Cardiology  [facility] - Telemetry      I will sign off.  Follow-up in Cardiology Clinic.

## 2024-10-15 NOTE — PLAN OF CARE
Problem: Adult Inpatient Plan of Care  Goal: Plan of Care Review  Outcome: Met  Goal: Patient-Specific Goal (Individualized)  Outcome: Met  Goal: Absence of Hospital-Acquired Illness or Injury  Outcome: Met  Goal: Optimal Comfort and Wellbeing  Outcome: Met  Goal: Readiness for Transition of Care  Outcome: Met     Problem: Wound  Goal: Optimal Coping  Outcome: Met  Goal: Optimal Functional Ability  Outcome: Met  Goal: Absence of Infection Signs and Symptoms  Outcome: Met  Goal: Improved Oral Intake  Outcome: Met  Goal: Optimal Pain Control and Function  Outcome: Met  Goal: Skin Health and Integrity  Outcome: Met  Goal: Optimal Wound Healing  Outcome: Met     Problem: Skin Injury Risk Increased  Goal: Skin Health and Integrity  Outcome: Met     Problem: Fall Injury Risk  Goal: Absence of Fall and Fall-Related Injury  Outcome: Met

## 2024-10-15 NOTE — PLAN OF CARE
Problem: Adult Inpatient Plan of Care  Goal: Plan of Care Review  Outcome: Progressing  Goal: Patient-Specific Goal (Individualized)  Outcome: Progressing  Goal: Absence of Hospital-Acquired Illness or Injury  Outcome: Progressing  Goal: Optimal Comfort and Wellbeing  Outcome: Progressing     Problem: Wound  Goal: Optimal Coping  Outcome: Progressing     Problem: Skin Injury Risk Increased  Goal: Skin Health and Integrity  Outcome: Progressing

## 2024-10-16 ENCOUNTER — OFFICE VISIT (OUTPATIENT)
Dept: SURGERY | Facility: CLINIC | Age: 89
End: 2024-10-16
Payer: MEDICARE

## 2024-10-16 VITALS
WEIGHT: 193.31 LBS | HEART RATE: 71 BPM | SYSTOLIC BLOOD PRESSURE: 112 MMHG | BODY MASS INDEX: 35.57 KG/M2 | HEIGHT: 62 IN | DIASTOLIC BLOOD PRESSURE: 66 MMHG

## 2024-10-16 DIAGNOSIS — K43.6 VENTRAL HERNIA WITH OBSTRUCTION AND WITHOUT GANGRENE: Primary | ICD-10-CM

## 2024-10-16 PROCEDURE — 1101F PT FALLS ASSESS-DOCD LE1/YR: CPT | Mod: CPTII,S$GLB,, | Performed by: SURGERY

## 2024-10-16 PROCEDURE — 1111F DSCHRG MED/CURRENT MED MERGE: CPT | Mod: CPTII,S$GLB,, | Performed by: SURGERY

## 2024-10-16 PROCEDURE — 99999 PR PBB SHADOW E&M-EST. PATIENT-LVL III: CPT | Mod: PBBFAC,,, | Performed by: SURGERY

## 2024-10-16 PROCEDURE — 1126F AMNT PAIN NOTED NONE PRSNT: CPT | Mod: CPTII,S$GLB,, | Performed by: SURGERY

## 2024-10-16 PROCEDURE — 99213 OFFICE O/P EST LOW 20 MIN: CPT | Mod: S$GLB,,, | Performed by: SURGERY

## 2024-10-16 PROCEDURE — 3288F FALL RISK ASSESSMENT DOCD: CPT | Mod: CPTII,S$GLB,, | Performed by: SURGERY

## 2024-10-16 PROCEDURE — 1159F MED LIST DOCD IN RCRD: CPT | Mod: CPTII,S$GLB,, | Performed by: SURGERY

## 2024-10-16 NOTE — PROGRESS NOTES
[facility] Provider Note (Initial Clinic) Consult Note    Date of original referral: 10/15/24  Age: 89 y.o.  Gender: female  Ethnicity: Not  or /a   Number of admissions for CHF within the preceding year: 1   Duration of CHF:   Type of Congestive Heart Failure: Diastolic   Etiology: Hypertrophic          Diagnostic Labs:   EKG - 10/14/2024  CXR - 10/13/2024  ECHO - 10/14/2024  Stress test -   Stress echo -   Pharmacologic stress -   Cardiac catheterization -    Cardiac MRI -     Lab Results   Component Value Date     10/15/2024     10/14/2024    K 3.1 (L) 10/15/2024    K 2.7 (LL) 10/14/2024    CL 88 (L) 10/15/2024    CL 96 10/14/2024    CO2 39 (H) 10/15/2024    CO2 36 (H) 10/14/2024    GLU 99 10/15/2024    GLU 97 10/14/2024    BUN 9 10/15/2024    BUN 7 (L) 10/14/2024    CREATININE 0.8 10/15/2024    CREATININE 0.7 10/14/2024    CALCIUM 9.3 10/15/2024    CALCIUM 8.7 10/14/2024    PROT 6.7 10/13/2024    PROT 7.3 10/08/2024    ALBUMIN 3.0 (L) 10/13/2024    ALBUMIN 2.8 (L) 10/08/2024    BILITOT 1.0 10/13/2024    BILITOT 1.0 10/08/2024    ALKPHOS 132 10/13/2024    ALKPHOS 154 (H) 10/08/2024    AST 17 10/13/2024    AST 21 10/08/2024    ALT 12 10/13/2024    ALT 15 10/08/2024    ANIONGAP 15 10/15/2024    ANIONGAP 10 10/14/2024    ESTGFRAFRICA >60 06/19/2019    EGFRNONAA 60 06/19/2019       Lab Results   Component Value Date    WBC 8.52 10/15/2024    WBC 8.52 10/15/2024    RBC 3.75 (L) 10/15/2024    RBC 3.75 (L) 10/15/2024    HGB 10.1 (L) 10/15/2024    HGB 10.1 (L) 10/15/2024    HCT 33.9 (L) 10/15/2024    HCT 33.9 (L) 10/15/2024    MCV 90 10/15/2024    MCV 90 10/15/2024    MCH 26.9 (L) 10/15/2024    MCH 26.9 (L) 10/15/2024    MCHC 29.8 (L) 10/15/2024    MCHC 29.8 (L) 10/15/2024    RDW 15.8 (H) 10/15/2024    RDW 15.8 (H) 10/15/2024     10/15/2024     10/15/2024    MPV 12.4 10/15/2024    MPV 12.4 10/15/2024    IMMGR 0.4 10/15/2024    IMMGR 0.4 10/15/2024    IGABS 0.03 10/15/2024    IGABS 0.03  10/15/2024    LYMPH 2.4 10/15/2024    LYMPH 28.5 10/15/2024    LYMPH 2.4 10/15/2024    LYMPH 28.5 10/15/2024    MONO 0.6 10/15/2024    MONO 6.5 10/15/2024    MONO 0.6 10/15/2024    MONO 6.5 10/15/2024    EOS 0.1 10/15/2024    EOS 0.1 10/15/2024    BASO 0.03 10/15/2024    BASO 0.03 10/15/2024    NRBC 0 10/15/2024    NRBC 0 10/15/2024    GRAN 5.4 10/15/2024    GRAN 62.8 10/15/2024    GRAN 5.4 10/15/2024    GRAN 62.8 10/15/2024    EOSINOPHIL 1.4 10/15/2024    EOSINOPHIL 1.4 10/15/2024    BASOPHIL 0.4 10/15/2024    BASOPHIL 0.4 10/15/2024       Lab Results   Component Value Date     (H) 10/13/2024     (H) 10/02/2024    MG 1.9 10/15/2024    MG 1.5 (L) 10/14/2024    PHOS 2.7 10/09/2024    PHOS 2.6 (L) 10/08/2024    TROPONINI 0.226 (H) 10/13/2024    TROPONINI 0.271 (H) 10/13/2024    TSH 0.106 (L) 10/08/2024    TSH 0.896 07/13/2023    FREET4 1.20 10/08/2024       Lab Results   Component Value Date    COLORU Yellow 10/02/2024    APPEARANCEUA Hazy (A) 10/02/2024    PHUR 7.0 10/02/2024    SPECGRAV 1.020 10/02/2024    PROTEINUA 1+ (A) 10/02/2024    GLUCUA Negative 10/02/2024    KETONESU Negative 10/02/2024    BILIRUBINUA Negative 10/02/2024    OCCULTUA Negative 10/02/2024    NITRITE Positive (A) 10/02/2024    LEUKOCYTESUR Negative 10/02/2024         Current Outpatient Medications on File Prior to Visit   Medication Sig Dispense Refill    acetaminophen (TYLENOL) 325 MG tablet Take 2 tablets (650 mg total) by mouth every 6 (six) hours as needed for Pain.      alendronate (FOSAMAX) 70 MG tablet Take 70 mg by mouth every 7 days.      amLODIPine (NORVASC) 10 MG tablet Take 10 mg by mouth once daily.      apixaban (ELIQUIS) 5 mg Tab Take 1 tablet (5 mg total) by mouth 2 (two) times daily. 60 tablet 11    cholecalciferol, vitamin D3, (VITAMIN D3) 25 mcg (1,000 unit) capsule Take 1,000 Units by mouth once daily.      furosemide (LASIX) 40 MG tablet Take 1 tablet (40 mg total) by mouth once daily. 30 tablet 11    losartan  (COZAAR) 100 MG tablet Take 100 mg by mouth once daily.      metoprolol tartrate (LOPRESSOR) 25 MG tablet Take 1 tablet (25 mg total) by mouth 2 (two) times daily. 60 tablet 11    potassium chloride (MICRO-K) 10 MEQ CpSR Take 10 mEq by mouth once.      rosuvastatin (CRESTOR) 40 MG Tab Take 1 tablet by mouth once daily.       Current Facility-Administered Medications on File Prior to Visit   Medication Dose Route Frequency Provider Last Rate Last Admin    lactated ringers infusion   Intravenous Continuous Jaquan Kim MD 0 mL/hr at 09/14/23 1338 New Bag at 10/02/24 1059    LIDOcaine (PF) 10 mg/ml (1%) injection 10 mg  1 mL Intradermal Once Jaquan Kim MD        [DISCONTINUED] acetaminophen tablet 650 mg  650 mg Oral Q8H PRN Ewelina, Noha, DO        [DISCONTINUED] acetaminophen tablet 650 mg  650 mg Oral Q4H PRN Ewelina, Noha, DO        [DISCONTINUED] aluminum-magnesium hydroxide-simethicone 200-200-20 mg/5 mL suspension 30 mL  30 mL Oral QID PRN Ewelina, Noha, DO        [DISCONTINUED] atorvastatin tablet 40 mg  40 mg Oral QHS Ewelina, Noha, DO   40 mg at 10/14/24 2015    [DISCONTINUED] dextrose 10% bolus 125 mL 125 mL  12.5 g Intravenous PRN Ewelina, Noha, DO        [DISCONTINUED] dextrose 10% bolus 250 mL 250 mL  25 g Intravenous PRN Ewelina, Noha, DO        [DISCONTINUED] furosemide injection 40 mg  40 mg Intravenous Q12H Ewelina, Noha, DO   40 mg at 10/15/24 1026    [DISCONTINUED] glucagon (human recombinant) injection 1 mg  1 mg Intramuscular PRN Ewelina, Noha, DO        [DISCONTINUED] glucose chewable tablet 16 g  16 g Oral PRN Ewelina, Noha, DO        [DISCONTINUED] glucose chewable tablet 24 g  24 g Oral PRN Ewelina, Noha, DO        [DISCONTINUED] heparin 25,000 units in dextrose 5% (100 units/ml) IV bolus from bag LOW INTENSITY nomogram - [facility]  60 Units/kg (Adjusted) Intravenous PRN Ewelina, Noha, DO   4,520 Units at 10/15/24 0702    [DISCONTINUED] heparin 25,000 units in dextrose 5% (100 units/ml)  IV bolus from bag LOW INTENSITY nomogram - [facility]  30 Units/kg (Adjusted) Intravenous PRN Ewelina, Noha, DO        [DISCONTINUED] heparin 25,000 units in dextrose 5% 250 mL (100 units/mL) infusion LOW INTENSITY nomogram - [facility]  0-40 Units/kg/hr (Adjusted) Intravenous Continuous Ewelina, Noha, DO 11.3 mL/hr at 10/15/24 0701 15 Units/kg/hr at 10/15/24 0701    [DISCONTINUED] hydroCHLOROthiazide tablet 25 mg  25 mg Oral Daily Ewelina, Noha, DO   25 mg at 10/15/24 1025    [DISCONTINUED] HYDROcodone-acetaminophen 5-325 mg per tablet 1 tablet  1 tablet Oral Q6H PRN Ewelina, Noha, DO        [DISCONTINUED] losartan tablet 100 mg  100 mg Oral Daily Ewelina, Noha, DO   100 mg at 10/15/24 1026    [DISCONTINUED] magnesium oxide tablet 800 mg  800 mg Oral PRN Ewelina, Noha, DO        [DISCONTINUED] magnesium oxide tablet 800 mg  800 mg Oral PRN Ewelina, Noha, DO        [DISCONTINUED] melatonin tablet 6 mg  6 mg Oral Nightly PRN Ewelina, Noha, DO   6 mg at 10/14/24 2015    [DISCONTINUED] metoprolol tartrate (LOPRESSOR) tablet 25 mg  25 mg Oral BID Ewelina, Noha, DO   25 mg at 10/15/24 1026    [DISCONTINUED] naloxone 0.4 mg/mL injection 0.02 mg  0.02 mg Intravenous PRN Ewelina, Noha, DO        [DISCONTINUED] ondansetron injection 4 mg  4 mg Intravenous Q6H PRN Ewelina, Noha, DO        [DISCONTINUED] pneumoc 20-bj conj-dip cr(PF) (PREVNAR-20 (PF)) injection Syrg 0.5 mL  0.5 mL Intramuscular vaccine x 1 dose Ewelina, Noha, DO        [DISCONTINUED] potassium bicarbonate disintegrating tablet 35 mEq  35 mEq Oral PRN Ewelina, Noha, DO   35 mEq at 10/15/24 0700    [DISCONTINUED] potassium bicarbonate disintegrating tablet 50 mEq  50 mEq Oral PRN Ewelina, Noha, DO        [DISCONTINUED] potassium bicarbonate disintegrating tablet 60 mEq  60 mEq Oral PRN Ewelina, Noha, DO   60 mEq at 10/14/24 0604    [DISCONTINUED] potassium, sodium phosphates 280-160-250 mg packet 2 packet  2 packet Oral PRN Luisa Theodore DO        [DISCONTINUED] potassium,  sodium phosphates 280-160-250 mg packet 2 packet  2 packet Oral PRN Ewelina, Noha, DO        [DISCONTINUED] potassium, sodium phosphates 280-160-250 mg packet 2 packet  2 packet Oral PRN Ewelina, Noha, DO        [DISCONTINUED] senna-docusate 8.6-50 mg per tablet 1 tablet  1 tablet Oral Daily PRN Ewelina, Noha, DO        [DISCONTINUED] sodium chloride 0.9% flush 2 mL  2 mL Intravenous Q12H PRN Ewelina, Noha, DO             HPI:  Patient is chair bound at baseline but can transfer bed to chair without SOB   Patient sleeps on 1 number of pillows   Patient wakes up SOB, has to get out of bed, associated cough, sputum and          Color-denies   Palpitations -  denies   Dizzy, light-headed, pre-syncope or syncope-denies   Since discharge frequency of performing weights, home weight and weight change-patient chairbound and unable to weigh self   Other information felt pertinent to HPI: 89 y/o female with a past medical history of peripheral vascular disease status post amputation unilaterally, atrial fibrillation on Eliquis, history of DVT, history of anemia, history of hypertension recently admitted with SBO now presents with RLE swelling. US with no evidence of DVT. Patient noted to have elevated Troponin and Cards consulted. Type 1 versus type 2 NSTEMI. Started on heparin drip, Aspirin and Plavix. Has stayed chest pain-free and does not have any shortness of breath. Cardiology had a detailed discussion with the patient about the risks benefits of further evaluation. Patient continues to be chest pain-free. Her echo did not show any significant large wall motion abnormalities. At the current time patient states that she would like to be managed medically and is not interested in any invasive procedures or ischemic workup. Does not want stress test. We will continue to manage medically and follow-up in Cardiology Clinic. Symptoms possibly related to acute on chronic diastolic heart failure and patient given IV diuresis  during hospital stay. Will change home HCTZ to Lasix upon discharge. She has remained afebrile and hemodynamically stable. She will be discharged home to resume previous HH and follow up with PCP and Cardiology. Patient being seen today in [facility] for GDMT and fluid volume management.      10/13 Echo:     Left Ventricle: The left ventricle is normal in size. Mildly increased wall thickness. There is concentric hypertrophy. There is normal systolic function with a visually estimated ejection fraction of 65 - 70%.    Right Ventricle: Mild right ventricular enlargement. Systolic function is reduced.    Left Atrium: Left atrium is severely dilated.    Right Atrium: Right atrium is severely dilated.    Aortic Valve: There is mild aortic regurgitation.    Mitral Valve: There is mild regurgitation.    Tricuspid Valve: There is moderate to severe regurgitation.    Pulmonary Artery: There is pulmonary hypertension. The estimated pulmonary artery systolic pressure is 63 mmHg.    IVC/SVC: Elevated venous pressure at 15 mmHg.        PHYSICAL:   Vitals:    10/23/24 0929   BP: 110/60   Pulse: (!) 56   Resp: 16        JVD: yes, clavicle   Heart rhythm: irregular  Cardiac murmur: No    S3: no  S4: no  Lungs: clear  Hepatojugular reflux: yes, clavicle  Edema: yes, +2 BLE      ASSESSMENT: HFpEF    PLAN:      Patient Instructions:   Instruct the patient to notify this clinic if HH, a physician or an advanced care provider wants to change medication one of their HF medications   Activity and Diet restrictions:   Recommend 2-3 gram sodium restriction and 1500cc- 2000cc fluid restriction.  Encourage physical activity with graded exercise program.  Requested patient to weigh themselves daily, and to notify us if their weight increases by more than 3 lbs in 1 day or 5 lbs in 3 days.    Assigned dry weight on home scale: patient unable to weigh self, unable to stand to weigh in clinic  Medication changes (include current dose and changed  dose): patient currently taking 40mg of lasix daily. Due to increased BNP, JVD and edema in BLE take 40mg BID today, tomorrow, and Friday. Resume 40mg daily on Saturday. Potassium 40meq today, then supplement 20meq potassium daily.  Upcoming labs and date anticipated: Repeat labs in one week, RTC in two weeks for labs and follow up.  Other diagnostic tests ordered: Patient has established cardiology appt 10/30.

## 2024-10-16 NOTE — PROGRESS NOTES
Surgery Clinic Note    Abbie Barragan is a 89 y.o. year old female in clinic today for follow up of open ventral hernia repair w mesh 2 wks ago. Had to return for re-admission with swelling, RLE, negative for DVT, now on lasix.  Her hernia site feels good, no pain. She eats and has normal BMs.  No f/c/n/v/cp    ROS:  Negative except above    PE:  Vitals:    10/16/24 1311   BP: 112/66   Pulse: 71       NAD  No belabored breathing  Abd soft nt nd  Incisions c/d/I. No evidence of hernia recurrence or infection    A/P:  Abbie Barragan is a 89 y.o. year old female s/p incarcerated open ventral hernia repair w mesh    -no heavy lifting 6 wks  -rtc 1 month    Isidro Bell  General Surgery - [facility]  10/16/2024

## 2024-10-23 ENCOUNTER — LAB VISIT (OUTPATIENT)
Dept: LAB | Facility: HOSPITAL | Age: 89
End: 2024-10-23
Payer: MEDICARE

## 2024-10-23 ENCOUNTER — OFFICE VISIT (OUTPATIENT)
Dept: CARDIOLOGY | Facility: CLINIC | Age: 89
End: 2024-10-23
Payer: MEDICARE

## 2024-10-23 ENCOUNTER — DOCUMENTATION ONLY (OUTPATIENT)
Dept: CARDIOLOGY | Facility: CLINIC | Age: 89
End: 2024-10-23

## 2024-10-23 VITALS
HEIGHT: 62 IN | BODY MASS INDEX: 35.36 KG/M2 | RESPIRATION RATE: 16 BRPM | SYSTOLIC BLOOD PRESSURE: 110 MMHG | DIASTOLIC BLOOD PRESSURE: 60 MMHG | HEART RATE: 56 BPM | OXYGEN SATURATION: 99 %

## 2024-10-23 DIAGNOSIS — I48.20 CHRONIC ATRIAL FIBRILLATION: ICD-10-CM

## 2024-10-23 DIAGNOSIS — I10 ESSENTIAL HYPERTENSION: ICD-10-CM

## 2024-10-23 DIAGNOSIS — I73.9 PAD (PERIPHERAL ARTERY DISEASE): ICD-10-CM

## 2024-10-23 DIAGNOSIS — I50.33 ACUTE ON CHRONIC DIASTOLIC CONGESTIVE HEART FAILURE: Primary | ICD-10-CM

## 2024-10-23 DIAGNOSIS — R60.9 EDEMA, UNSPECIFIED TYPE: Primary | ICD-10-CM

## 2024-10-23 DIAGNOSIS — I50.31 ACUTE DIASTOLIC HEART FAILURE: ICD-10-CM

## 2024-10-23 DIAGNOSIS — I65.23 BILATERAL CAROTID ARTERY STENOSIS: ICD-10-CM

## 2024-10-23 LAB
ALBUMIN SERPL BCP-MCNC: 3 G/DL (ref 3.5–5.2)
ALP SERPL-CCNC: 115 U/L (ref 40–150)
ALT SERPL W/O P-5'-P-CCNC: 11 U/L (ref 10–44)
ANION GAP SERPL CALC-SCNC: 10 MMOL/L (ref 8–16)
AST SERPL-CCNC: 11 U/L (ref 10–40)
BASOPHILS # BLD AUTO: 0.02 K/UL (ref 0–0.2)
BASOPHILS NFR BLD: 0.3 % (ref 0–1.9)
BILIRUB SERPL-MCNC: 0.7 MG/DL (ref 0.1–1)
BNP SERPL-MCNC: 453 PG/ML (ref 0–99)
BUN SERPL-MCNC: 9 MG/DL (ref 8–23)
CALCIUM SERPL-MCNC: 9.2 MG/DL (ref 8.7–10.5)
CHLORIDE SERPL-SCNC: 99 MMOL/L (ref 95–110)
CO2 SERPL-SCNC: 35 MMOL/L (ref 23–29)
CREAT SERPL-MCNC: 0.8 MG/DL (ref 0.5–1.4)
DIFFERENTIAL METHOD BLD: ABNORMAL
EOSINOPHIL # BLD AUTO: 0.1 K/UL (ref 0–0.5)
EOSINOPHIL NFR BLD: 2.2 % (ref 0–8)
ERYTHROCYTE [DISTWIDTH] IN BLOOD BY AUTOMATED COUNT: 15.6 % (ref 11.5–14.5)
EST. GFR  (NO RACE VARIABLE): >60 ML/MIN/1.73 M^2
GLUCOSE SERPL-MCNC: 127 MG/DL (ref 70–110)
HCT VFR BLD AUTO: 33.8 % (ref 37–48.5)
HGB BLD-MCNC: 10.4 G/DL (ref 12–16)
IMM GRANULOCYTES # BLD AUTO: 0.01 K/UL (ref 0–0.04)
IMM GRANULOCYTES NFR BLD AUTO: 0.2 % (ref 0–0.5)
LYMPHOCYTES # BLD AUTO: 1.3 K/UL (ref 1–4.8)
LYMPHOCYTES NFR BLD: 23 % (ref 18–48)
MAGNESIUM SERPL-MCNC: 1.9 MG/DL (ref 1.6–2.6)
MCH RBC QN AUTO: 28.4 PG (ref 27–31)
MCHC RBC AUTO-ENTMCNC: 30.8 G/DL (ref 32–36)
MCV RBC AUTO: 92 FL (ref 82–98)
MONOCYTES # BLD AUTO: 0.3 K/UL (ref 0.3–1)
MONOCYTES NFR BLD: 4.6 % (ref 4–15)
NEUTROPHILS # BLD AUTO: 4.1 K/UL (ref 1.8–7.7)
NEUTROPHILS NFR BLD: 69.7 % (ref 38–73)
NRBC BLD-RTO: 0 /100 WBC
PHOSPHATE SERPL-MCNC: 2.5 MG/DL (ref 2.7–4.5)
PLATELET # BLD AUTO: 322 K/UL (ref 150–450)
PMV BLD AUTO: 12 FL (ref 9.2–12.9)
POTASSIUM SERPL-SCNC: 3 MMOL/L (ref 3.5–5.1)
PROT SERPL-MCNC: 6.8 G/DL (ref 6–8.4)
RBC # BLD AUTO: 3.66 M/UL (ref 4–5.4)
SODIUM SERPL-SCNC: 144 MMOL/L (ref 136–145)
WBC # BLD AUTO: 5.83 K/UL (ref 3.9–12.7)

## 2024-10-23 PROCEDURE — 83880 ASSAY OF NATRIURETIC PEPTIDE: CPT

## 2024-10-23 PROCEDURE — 1126F AMNT PAIN NOTED NONE PRSNT: CPT | Mod: CPTII,S$GLB,,

## 2024-10-23 PROCEDURE — 99204 OFFICE O/P NEW MOD 45 MIN: CPT | Mod: S$GLB,,,

## 2024-10-23 PROCEDURE — 1160F RVW MEDS BY RX/DR IN RCRD: CPT | Mod: CPTII,S$GLB,,

## 2024-10-23 PROCEDURE — 99999 PR PBB SHADOW E&M-EST. PATIENT-LVL IV: CPT | Mod: PBBFAC,,,

## 2024-10-23 PROCEDURE — 1159F MED LIST DOCD IN RCRD: CPT | Mod: CPTII,S$GLB,,

## 2024-10-23 PROCEDURE — 80053 COMPREHEN METABOLIC PANEL: CPT

## 2024-10-23 PROCEDURE — 84100 ASSAY OF PHOSPHORUS: CPT

## 2024-10-23 PROCEDURE — 36415 COLL VENOUS BLD VENIPUNCTURE: CPT

## 2024-10-23 PROCEDURE — 85025 COMPLETE CBC W/AUTO DIFF WBC: CPT

## 2024-10-23 PROCEDURE — 83735 ASSAY OF MAGNESIUM: CPT

## 2024-10-23 PROCEDURE — 1111F DSCHRG MED/CURRENT MED MERGE: CPT | Mod: CPTII,S$GLB,,

## 2024-10-23 RX ORDER — POTASSIUM CHLORIDE 20 MEQ/1
20 TABLET, EXTENDED RELEASE ORAL DAILY
Qty: 30 TABLET | Refills: 11 | Status: SHIPPED | OUTPATIENT
Start: 2024-10-23 | End: 2025-10-23

## 2024-10-23 NOTE — PROGRESS NOTES
"[facility] Clinic([facility]) First Week Visit     Pt presents to clinic 10/23/2024 and accompanied by daughter in law Coleman.     Most Recent Hospital Discharge Date: 10/15/2024  Last admission Diagnosis/chief complaint:lower leg swelling        Visit Vitals:     Wt Readings from Last 3 Encounters:   10/16/24 87.7 kg (193 lb 5.5 oz)   10/15/24 87.7 kg (193 lb 5.5 oz)   10/08/24 96.2 kg (212 lb 1.3 oz)     Temp Readings from Last 3 Encounters:   10/15/24 98.4 °F (36.9 °C) (Oral)   10/09/24 98.1 °F (36.7 °C) (Oral)   10/26/23 97.9 °F (36.6 °C) (Oral)     BP Readings from Last 3 Encounters:   10/23/24 110/60   10/16/24 112/66   10/15/24 106/62     Pulse Readings from Last 3 Encounters:   10/23/24 (!) 46   10/16/24 71   10/15/24 62            Pt reports the following:  []  Shortness of Breath with activity  []  Shortness of Breath at rest/ sleeping on 2-3 pillows "some days"  []  Fatigue  [x]  Edema   [] Chest pain or tightness  [] Weight Increase since discharge  [] None of the above    Pt reports being in the yellow Zone. If in yellow/red, reminded that they should be calling [facility] today or now.      Medications:     Pt reports having all medications available and understands how to take them appropriately. Reminded pt to call prior to making any changes to medications.      Education:    [x] Confirmed pt has  "Heart Failure Transitional Care Clinic Home Care Guide" .   Reviewed key points as listed below.      Recommend 2 gram sodium restriction and 1500cc fluid restriction.  Encourage physical activity with graded exercise program.  Requested patient to weigh themselves daily, and to notify us if their weight increases by more than 3 lbs in 1 day or 5 lbs in 3 days.      [x] Gave / Reviewed "Daily Weight and Symptom Tracker".  Reviewed with patient when and how to call  [facility] according to "Yellow Zone" and "Red Zone".   Patient stated it is hard for her to stand with leg swelling and AKA. Patient " "educated on other signs and symptoms to report.               [x] Pt given list of low/high sodium food list. Fluid restricted explained to patient. Patient concerned she could not drink the 1.5 liter. Patient educated on what it means in regards to fluid intake. Patient and daughter verbalized understanding of all instructions.                   Watch for these Signs and Symptoms: If any of these occur, contact [facility] immediately:   Increase in shortness of breath with movement   Increase in swelling in your legs and ankles   Weight gain of more than 3 pounds in a night or 5 pounds in 3 days.   Difficulty breathing when you are lying down   Worsening fatigue or tiredness   Stomach bloating, a full feeling or a loss of appetite   Increased coughing--especially when you are lying down     MyChart and Care Companion:              Patient active on myChart? Yes, patient uses regularly.    Contacting our Team:              Reviewed with pt how to contact [facility] RN via phone or [employer] messaging.      HF [facility] Program Plan:  Pt educated on follow-up plan while in [facility] program.   [x]  PT given /reviewed upcoming appointment/check in dates. These will include weekly contact with RN or visits with providers over the next 4-6 weeks.                   [x]  Pt educated that they will transitioned to long term care provider team at week 4-6.  This team will be either Cardiology, PCP or Advanced Heart Failure depending on needs.          Pt was able to verbalize back to RN in their own words correct diet/fluid restrictions, necessity for exercise, warning signs and symptoms, when and how to contact their TCC team .       Plan: See NP note          [x]  Pt given AVS with follow up appointment for labs only for 10/28/2024 and 11/06/2024 labs and clinic [x]  Explained to pt they will have a phone "check in" by RN in/on           Will follow up with pt at next clinic visit and RN navigator available for pt questions, issues or " concerns.     Please refer to provider visit for additional details and assessment.

## 2024-10-23 NOTE — PATIENT INSTRUCTIONS
Take 40 Meq potassium once today then start 20meq potassium daily.    Take 40mg lasix this afternoon and 40mg lasix tomorrow and Thursday twice a day. Then resume 40mg lasix once daily.    Call clinic for weight gain of 2-3lbs in 24 hours, or 5 lbs in a week. Call for increased swelling and increased Shortness of breath.

## 2024-10-24 ENCOUNTER — EXTERNAL HOME HEALTH (OUTPATIENT)
Dept: HOME HEALTH SERVICES | Facility: HOSPITAL | Age: 89
End: 2024-10-24
Payer: MEDICARE

## 2024-10-28 ENCOUNTER — LAB VISIT (OUTPATIENT)
Dept: LAB | Facility: HOSPITAL | Age: 89
End: 2024-10-28
Payer: MEDICARE

## 2024-10-28 ENCOUNTER — DOCUMENTATION ONLY (OUTPATIENT)
Dept: CARDIOLOGY | Facility: CLINIC | Age: 89
End: 2024-10-28
Payer: MEDICARE

## 2024-10-28 DIAGNOSIS — I50.31 ACUTE DIASTOLIC HEART FAILURE: ICD-10-CM

## 2024-10-28 DIAGNOSIS — R60.9 EDEMA, UNSPECIFIED TYPE: ICD-10-CM

## 2024-10-28 LAB
ALBUMIN SERPL BCP-MCNC: 3.3 G/DL (ref 3.5–5.2)
ALBUMIN SERPL BCP-MCNC: 3.3 G/DL (ref 3.5–5.2)
ALP SERPL-CCNC: 131 U/L (ref 40–150)
ALP SERPL-CCNC: 131 U/L (ref 40–150)
ALT SERPL W/O P-5'-P-CCNC: 7 U/L (ref 10–44)
ALT SERPL W/O P-5'-P-CCNC: 7 U/L (ref 10–44)
ANION GAP SERPL CALC-SCNC: 11 MMOL/L (ref 8–16)
ANION GAP SERPL CALC-SCNC: 11 MMOL/L (ref 8–16)
AST SERPL-CCNC: 13 U/L (ref 10–40)
AST SERPL-CCNC: 13 U/L (ref 10–40)
BASOPHILS # BLD AUTO: 0.01 K/UL (ref 0–0.2)
BASOPHILS NFR BLD: 0.1 % (ref 0–1.9)
BILIRUB SERPL-MCNC: 0.9 MG/DL (ref 0.1–1)
BILIRUB SERPL-MCNC: 0.9 MG/DL (ref 0.1–1)
BNP SERPL-MCNC: 390 PG/ML (ref 0–99)
BNP SERPL-MCNC: 390 PG/ML (ref 0–99)
BUN SERPL-MCNC: 8 MG/DL (ref 8–23)
BUN SERPL-MCNC: 8 MG/DL (ref 8–23)
CALCIUM SERPL-MCNC: 10 MG/DL (ref 8.7–10.5)
CALCIUM SERPL-MCNC: 10 MG/DL (ref 8.7–10.5)
CHLORIDE SERPL-SCNC: 101 MMOL/L (ref 95–110)
CHLORIDE SERPL-SCNC: 101 MMOL/L (ref 95–110)
CO2 SERPL-SCNC: 35 MMOL/L (ref 23–29)
CO2 SERPL-SCNC: 35 MMOL/L (ref 23–29)
CREAT SERPL-MCNC: 0.8 MG/DL (ref 0.5–1.4)
CREAT SERPL-MCNC: 0.8 MG/DL (ref 0.5–1.4)
DIFFERENTIAL METHOD BLD: ABNORMAL
EOSINOPHIL # BLD AUTO: 0.2 K/UL (ref 0–0.5)
EOSINOPHIL NFR BLD: 2.3 % (ref 0–8)
ERYTHROCYTE [DISTWIDTH] IN BLOOD BY AUTOMATED COUNT: 14.8 % (ref 11.5–14.5)
EST. GFR  (NO RACE VARIABLE): >60 ML/MIN/1.73 M^2
EST. GFR  (NO RACE VARIABLE): >60 ML/MIN/1.73 M^2
GLUCOSE SERPL-MCNC: 103 MG/DL (ref 70–110)
GLUCOSE SERPL-MCNC: 103 MG/DL (ref 70–110)
HCT VFR BLD AUTO: 36.5 % (ref 37–48.5)
HGB BLD-MCNC: 11 G/DL (ref 12–16)
IMM GRANULOCYTES # BLD AUTO: 0.02 K/UL (ref 0–0.04)
IMM GRANULOCYTES NFR BLD AUTO: 0.3 % (ref 0–0.5)
LYMPHOCYTES # BLD AUTO: 2.6 K/UL (ref 1–4.8)
LYMPHOCYTES NFR BLD: 35.2 % (ref 18–48)
MAGNESIUM SERPL-MCNC: 1.9 MG/DL (ref 1.6–2.6)
MAGNESIUM SERPL-MCNC: 1.9 MG/DL (ref 1.6–2.6)
MCH RBC QN AUTO: 27.4 PG (ref 27–31)
MCHC RBC AUTO-ENTMCNC: 30.1 G/DL (ref 32–36)
MCV RBC AUTO: 91 FL (ref 82–98)
MONOCYTES # BLD AUTO: 0.4 K/UL (ref 0.3–1)
MONOCYTES NFR BLD: 4.7 % (ref 4–15)
NEUTROPHILS # BLD AUTO: 4.3 K/UL (ref 1.8–7.7)
NEUTROPHILS NFR BLD: 57.4 % (ref 38–73)
NRBC BLD-RTO: 0 /100 WBC
PHOSPHATE SERPL-MCNC: 3.1 MG/DL (ref 2.7–4.5)
PLATELET # BLD AUTO: 300 K/UL (ref 150–450)
PMV BLD AUTO: 12.1 FL (ref 9.2–12.9)
POTASSIUM SERPL-SCNC: 3.4 MMOL/L (ref 3.5–5.1)
POTASSIUM SERPL-SCNC: 3.4 MMOL/L (ref 3.5–5.1)
PROT SERPL-MCNC: 7.2 G/DL (ref 6–8.4)
PROT SERPL-MCNC: 7.2 G/DL (ref 6–8.4)
RBC # BLD AUTO: 4.02 M/UL (ref 4–5.4)
SODIUM SERPL-SCNC: 147 MMOL/L (ref 136–145)
SODIUM SERPL-SCNC: 147 MMOL/L (ref 136–145)
WBC # BLD AUTO: 7.47 K/UL (ref 3.9–12.7)

## 2024-10-28 PROCEDURE — 84100 ASSAY OF PHOSPHORUS: CPT

## 2024-10-28 PROCEDURE — 36415 COLL VENOUS BLD VENIPUNCTURE: CPT

## 2024-10-28 PROCEDURE — 83735 ASSAY OF MAGNESIUM: CPT

## 2024-10-28 PROCEDURE — 85025 COMPLETE CBC W/AUTO DIFF WBC: CPT

## 2024-10-28 PROCEDURE — 80053 COMPREHEN METABOLIC PANEL: CPT

## 2024-10-28 PROCEDURE — 83880 ASSAY OF NATRIURETIC PEPTIDE: CPT

## 2024-10-30 ENCOUNTER — OFFICE VISIT (OUTPATIENT)
Dept: CARDIOLOGY | Facility: CLINIC | Age: 89
End: 2024-10-30
Payer: MEDICARE

## 2024-10-30 ENCOUNTER — DOCUMENTATION ONLY (OUTPATIENT)
Dept: CARDIOLOGY | Facility: CLINIC | Age: 89
End: 2024-10-30
Payer: MEDICARE

## 2024-10-30 ENCOUNTER — TELEPHONE (OUTPATIENT)
Dept: CARDIOLOGY | Facility: CLINIC | Age: 89
End: 2024-10-30
Payer: MEDICARE

## 2024-10-30 VITALS
RESPIRATION RATE: 15 BRPM | WEIGHT: 181.44 LBS | HEART RATE: 87 BPM | DIASTOLIC BLOOD PRESSURE: 66 MMHG | OXYGEN SATURATION: 95 % | SYSTOLIC BLOOD PRESSURE: 140 MMHG | HEIGHT: 62 IN | BODY MASS INDEX: 33.39 KG/M2

## 2024-10-30 DIAGNOSIS — I65.23 BILATERAL CAROTID ARTERY STENOSIS: Primary | ICD-10-CM

## 2024-10-30 PROCEDURE — 99999 PR PBB SHADOW E&M-EST. PATIENT-LVL IV: CPT | Mod: PBBFAC,,, | Performed by: INTERNAL MEDICINE

## 2024-10-30 PROCEDURE — 99214 OFFICE O/P EST MOD 30 MIN: CPT | Mod: S$GLB,,, | Performed by: INTERNAL MEDICINE

## 2024-10-30 PROCEDURE — 3288F FALL RISK ASSESSMENT DOCD: CPT | Mod: CPTII,S$GLB,, | Performed by: INTERNAL MEDICINE

## 2024-10-30 PROCEDURE — 1101F PT FALLS ASSESS-DOCD LE1/YR: CPT | Mod: CPTII,S$GLB,, | Performed by: INTERNAL MEDICINE

## 2024-10-30 PROCEDURE — 1126F AMNT PAIN NOTED NONE PRSNT: CPT | Mod: CPTII,S$GLB,, | Performed by: INTERNAL MEDICINE

## 2024-10-30 PROCEDURE — 1111F DSCHRG MED/CURRENT MED MERGE: CPT | Mod: CPTII,S$GLB,, | Performed by: INTERNAL MEDICINE

## 2024-10-30 PROCEDURE — 1159F MED LIST DOCD IN RCRD: CPT | Mod: CPTII,S$GLB,, | Performed by: INTERNAL MEDICINE

## 2024-10-30 NOTE — PROGRESS NOTES
[facility] Provider Note (Follow-up) Consult Note      HPI:  Patient is chair bound and denies shortness of breath   Patient sleeps on 2 number of pillows   Patient wakes up SOB, has to get out of bed, associated cough, sputum and color   Palpitations -  denies   Dizzy, light-headed, pre-syncope or syncope-denies   Since discharge frequency of performing weights, home weight and weight change -unable to weigh self at home.   Other information felt pertinent to HPI: 88 y/o female with a past medical history of peripheral vascular disease status post amputation unilaterally, atrial fibrillation on Eliquis, history of DVT, history of anemia, history of hypertension recently admitted with SBO now presents with RLE swelling. US with no evidence of DVT. Patient noted to have elevated Troponin and Cards consulted. Type 1 versus type 2 NSTEMI. Started on heparin drip, Aspirin and Plavix. Has stayed chest pain-free and does not have any shortness of breath. Cardiology had a detailed discussion with the patient about the risks benefits of further evaluation. Patient continues to be chest pain-free. Her echo did not show any significant large wall motion abnormalities. At the current time patient states that she would like to be managed medically and is not interested in any invasive procedures or ischemic workup. Does not want stress test. We will continue to manage medically and follow-up in Cardiology Clinic. Symptoms possibly related to acute on chronic diastolic heart failure and patient given IV diuresis during hospital stay. Will change home HCTZ to Lasix upon discharge. She has remained afebrile and hemodynamically stable. She will be discharged home to resume previous HH and follow up with PCP and Cardiology. Patient being seen today in [facility] for GDMT and fluid volume management.        10/13 Echo:     Left Ventricle: The left ventricle is normal in size. Mildly increased wall thickness. There is concentric hypertrophy.  There is normal systolic function with a visually estimated ejection fraction of 65 - 70%.    Right Ventricle: Mild right ventricular enlargement. Systolic function is reduced.    Left Atrium: Left atrium is severely dilated.    Right Atrium: Right atrium is severely dilated.    Aortic Valve: There is mild aortic regurgitation.    Mitral Valve: There is mild regurgitation.    Tricuspid Valve: There is moderate to severe regurgitation.    Pulmonary Artery: There is pulmonary hypertension. The estimated pulmonary artery systolic pressure is 63 mmHg.    IVC/SVC: Elevated venous pressure at 15 mmHg.     PHYSICAL:   Vitals:    11/06/24 0901   BP: (!) 140/62   Pulse: (!) 54   Resp: 15       JVD: no   Heart rhythm: irregular  Cardiac murmur: No    S3: no  S4: no  Lungs: clear  Hepatojugular reflux: yes, clavicle  Edema: yes, +1 RLE      ASSESSMENT: HFpEF     PLAN:      Patient Instructions:   Instruct the patient to notify this clinic if HH, a physician or an advanced care provider wants to change medication one of their HF medications   Activity and Diet restrictions:   Recommend 2-3 gram sodium restriction and 1500cc- 2000cc fluid restriction.  Encourage physical activity with graded exercise program.  Requested patient to weigh themselves daily, and to notify us if their weight increases by more than 3 lbs in 1 day or 5 lbs in 3 days.    Assigned dry weight on home scale:  patient unable to weigh self, 181lbs in clinic  Medication changes (include current dose and changed dose): Increase lasix to 40mg BID. Continue 20meq potassium daily.   Upcoming labs and date anticipated: RTC in one week for labs and follow up appointment.  Other diagnostic tests ordered: Has followed up with Dr. Mills in general cardiology. Next follow up with gen cards to be in 6 months.

## 2024-11-06 ENCOUNTER — PATIENT MESSAGE (OUTPATIENT)
Dept: CARDIOLOGY | Facility: CLINIC | Age: 89
End: 2024-11-06

## 2024-11-06 ENCOUNTER — OFFICE VISIT (OUTPATIENT)
Dept: CARDIOLOGY | Facility: CLINIC | Age: 89
End: 2024-11-06
Payer: MEDICARE

## 2024-11-06 ENCOUNTER — LAB VISIT (OUTPATIENT)
Dept: LAB | Facility: HOSPITAL | Age: 89
End: 2024-11-06
Payer: MEDICARE

## 2024-11-06 VITALS
DIASTOLIC BLOOD PRESSURE: 62 MMHG | HEART RATE: 54 BPM | RESPIRATION RATE: 15 BRPM | HEIGHT: 62 IN | OXYGEN SATURATION: 97 % | SYSTOLIC BLOOD PRESSURE: 140 MMHG | BODY MASS INDEX: 33.19 KG/M2

## 2024-11-06 DIAGNOSIS — I10 ESSENTIAL HYPERTENSION: ICD-10-CM

## 2024-11-06 DIAGNOSIS — R60.9 EDEMA, UNSPECIFIED TYPE: ICD-10-CM

## 2024-11-06 DIAGNOSIS — I50.33 ACUTE ON CHRONIC DIASTOLIC CONGESTIVE HEART FAILURE: Primary | ICD-10-CM

## 2024-11-06 DIAGNOSIS — I73.9 PAD (PERIPHERAL ARTERY DISEASE): ICD-10-CM

## 2024-11-06 DIAGNOSIS — D64.9 ANEMIA, UNSPECIFIED TYPE: ICD-10-CM

## 2024-11-06 DIAGNOSIS — I48.20 CHRONIC ATRIAL FIBRILLATION: ICD-10-CM

## 2024-11-06 DIAGNOSIS — I50.31 ACUTE DIASTOLIC HEART FAILURE: ICD-10-CM

## 2024-11-06 DIAGNOSIS — I73.9 PVD (PERIPHERAL VASCULAR DISEASE): ICD-10-CM

## 2024-11-06 LAB
ALBUMIN SERPL BCP-MCNC: 3.4 G/DL (ref 3.5–5.2)
ALP SERPL-CCNC: 142 U/L (ref 40–150)
ALT SERPL W/O P-5'-P-CCNC: 7 U/L (ref 10–44)
ANION GAP SERPL CALC-SCNC: 10 MMOL/L (ref 8–16)
AST SERPL-CCNC: 13 U/L (ref 10–40)
BASOPHILS # BLD AUTO: 0.04 K/UL (ref 0–0.2)
BASOPHILS NFR BLD: 0.4 % (ref 0–1.9)
BILIRUB SERPL-MCNC: 0.7 MG/DL (ref 0.1–1)
BNP SERPL-MCNC: 366 PG/ML (ref 0–99)
BUN SERPL-MCNC: 11 MG/DL (ref 8–23)
CALCIUM SERPL-MCNC: 9.8 MG/DL (ref 8.7–10.5)
CHLORIDE SERPL-SCNC: 106 MMOL/L (ref 95–110)
CO2 SERPL-SCNC: 27 MMOL/L (ref 23–29)
CREAT SERPL-MCNC: 0.8 MG/DL (ref 0.5–1.4)
DIFFERENTIAL METHOD BLD: ABNORMAL
EOSINOPHIL # BLD AUTO: 0.2 K/UL (ref 0–0.5)
EOSINOPHIL NFR BLD: 1.7 % (ref 0–8)
ERYTHROCYTE [DISTWIDTH] IN BLOOD BY AUTOMATED COUNT: 14.8 % (ref 11.5–14.5)
EST. GFR  (NO RACE VARIABLE): >60 ML/MIN/1.73 M^2
GLUCOSE SERPL-MCNC: 100 MG/DL (ref 70–110)
HCT VFR BLD AUTO: 38.8 % (ref 37–48.5)
HGB BLD-MCNC: 11.7 G/DL (ref 12–16)
IMM GRANULOCYTES # BLD AUTO: 0.02 K/UL (ref 0–0.04)
IMM GRANULOCYTES NFR BLD AUTO: 0.2 % (ref 0–0.5)
LYMPHOCYTES # BLD AUTO: 3.7 K/UL (ref 1–4.8)
LYMPHOCYTES NFR BLD: 40.2 % (ref 18–48)
MAGNESIUM SERPL-MCNC: 1.9 MG/DL (ref 1.6–2.6)
MCH RBC QN AUTO: 27.6 PG (ref 27–31)
MCHC RBC AUTO-ENTMCNC: 30.2 G/DL (ref 32–36)
MCV RBC AUTO: 92 FL (ref 82–98)
MONOCYTES # BLD AUTO: 0.4 K/UL (ref 0.3–1)
MONOCYTES NFR BLD: 4.2 % (ref 4–15)
NEUTROPHILS # BLD AUTO: 4.9 K/UL (ref 1.8–7.7)
NEUTROPHILS NFR BLD: 53.3 % (ref 38–73)
NRBC BLD-RTO: 0 /100 WBC
PLATELET # BLD AUTO: 231 K/UL (ref 150–450)
PMV BLD AUTO: 12.8 FL (ref 9.2–12.9)
POTASSIUM SERPL-SCNC: 4.6 MMOL/L (ref 3.5–5.1)
PROT SERPL-MCNC: 7.6 G/DL (ref 6–8.4)
RBC # BLD AUTO: 4.24 M/UL (ref 4–5.4)
SODIUM SERPL-SCNC: 143 MMOL/L (ref 136–145)
WBC # BLD AUTO: 9.15 K/UL (ref 3.9–12.7)

## 2024-11-06 PROCEDURE — 85025 COMPLETE CBC W/AUTO DIFF WBC: CPT

## 2024-11-06 PROCEDURE — 1126F AMNT PAIN NOTED NONE PRSNT: CPT | Mod: CPTII,S$GLB,,

## 2024-11-06 PROCEDURE — 1160F RVW MEDS BY RX/DR IN RCRD: CPT | Mod: CPTII,S$GLB,,

## 2024-11-06 PROCEDURE — 80053 COMPREHEN METABOLIC PANEL: CPT

## 2024-11-06 PROCEDURE — 1101F PT FALLS ASSESS-DOCD LE1/YR: CPT | Mod: CPTII,S$GLB,,

## 2024-11-06 PROCEDURE — 83880 ASSAY OF NATRIURETIC PEPTIDE: CPT

## 2024-11-06 PROCEDURE — 99214 OFFICE O/P EST MOD 30 MIN: CPT | Mod: S$GLB,,,

## 2024-11-06 PROCEDURE — 1159F MED LIST DOCD IN RCRD: CPT | Mod: CPTII,S$GLB,,

## 2024-11-06 PROCEDURE — 83735 ASSAY OF MAGNESIUM: CPT

## 2024-11-06 PROCEDURE — 99999 PR PBB SHADOW E&M-EST. PATIENT-LVL IV: CPT | Mod: PBBFAC,,,

## 2024-11-06 PROCEDURE — 1111F DSCHRG MED/CURRENT MED MERGE: CPT | Mod: CPTII,S$GLB,,

## 2024-11-06 PROCEDURE — 3288F FALL RISK ASSESSMENT DOCD: CPT | Mod: CPTII,S$GLB,,

## 2024-11-06 RX ORDER — FUROSEMIDE 40 MG/1
40 TABLET ORAL 2 TIMES DAILY
Qty: 60 TABLET | Refills: 11 | Status: SHIPPED | OUTPATIENT
Start: 2024-11-06 | End: 2024-11-08

## 2024-11-06 NOTE — PATIENT INSTRUCTIONS
Activity and Diet restrictions:   Recommend 2-3 gram sodium restriction and 1500cc- 2000cc fluid restriction.  Call clinic for increased shortness of breath, or increased swelling.  Weigh yourself every day and call the office if your weight increases by more than 3 lbs in 1 day or 5 lbs in 3 days.     Return to clinic for labs and follow up appointment in one week.      Increase lasix to 40mg twice daily    Clinic will my chart lab results and any additional instructions

## 2024-11-06 NOTE — PROGRESS NOTES
HF TCC Provider Note (Follow-up) Consult Note      HPI:  Patient is chair bound and denies shortness of breath   Patient sleeps on 2 number of pillows   Patient wakes up SOB, has to get out of bed, associated cough, sputum and color-denjes   Palpitations -  denies   Dizzy, light-headed, pre-syncope or syncope-denies   Since discharge frequency of performing weights, home weight and weight change -unable to weigh self at home.   Other information felt pertinent to HPI: 88 y/o female with a past medical history of peripheral vascular disease status post amputation unilaterally, atrial fibrillation on Eliquis, history of DVT, history of anemia, history of hypertension recently admitted with SBO now presents with RLE swelling. US with no evidence of DVT. Patient noted to have elevated Troponin and Cards consulted. Type 1 versus type 2 NSTEMI. Started on heparin drip, Aspirin and Plavix. Has stayed chest pain-free and does not have any shortness of breath. Cardiology had a detailed discussion with the patient about the risks benefits of further evaluation. Patient continues to be chest pain-free. Her echo did not show any significant large wall motion abnormalities. At the current time patient states that she would like to be managed medically and is not interested in any invasive procedures or ischemic workup. Does not want stress test. We will continue to manage medically and follow-up in Cardiology Clinic. Symptoms possibly related to acute on chronic diastolic heart failure and patient given IV diuresis during hospital stay. Will change home HCTZ to Lasix upon discharge. She has remained afebrile and hemodynamically stable. She will be discharged home to resume previous HH and follow up with PCP and Cardiology. Patient being seen today in H[facility] for GDMT and fluid volume management.        10/13 Echo:     Left Ventricle: The left ventricle is normal in size. Mildly increased wall thickness. There is concentric  hypertrophy. There is normal systolic function with a visually estimated ejection fraction of 65 - 70%.    Right Ventricle: Mild right ventricular enlargement. Systolic function is reduced.    Left Atrium: Left atrium is severely dilated.    Right Atrium: Right atrium is severely dilated.    Aortic Valve: There is mild aortic regurgitation.    Mitral Valve: There is mild regurgitation.    Tricuspid Valve: There is moderate to severe regurgitation.    Pulmonary Artery: There is pulmonary hypertension. The estimated pulmonary artery systolic pressure is 63 mmHg.    IVC/SVC: Elevated venous pressure at 15 mmHg.     PHYSICAL:   Vitals:    11/13/24 0922   BP: (!) 128/58   Pulse: (!) 52   Resp: 18         JVD: no   Heart rhythm: irregular  Cardiac murmur: No    S3: no  S4: no  Lungs: clear  Hepatojugular reflux: no  Edema: no    ASSESSMENT: HFpEF     PLAN:      Patient Instructions:   Instruct the patient to notify this clinic if HH, a physician or an advanced care provider wants to change medication one of their HF medications   Activity and Diet restrictions:   Recommend 2-3 gram sodium restriction and 1500cc- 2000cc fluid restriction.  Encourage physical activity with graded exercise program.  Requested patient to weigh themselves daily, and to notify us if their weight increases by more than 3 lbs in 1 day or 5 lbs in 3 days.    Assigned dry weight on home scale:  patient unable to weigh self, declined weight at clinic  Medication changes (include current dose and changed dose): Continue lasix to 40mg BID. Patient states she ran out of potassium at home and did not refill it with the pharmacy. Instructed patient and caregiver to refill potassium today then take 40meq one time and continue 20meq daily. Euvolemic on exam.   Upcoming labs and date anticipated: Follow up labs in one week and return to clinic PRN  Other diagnostic tests ordered: Follow up appt with gen cards 12/18/24.

## 2024-11-08 RX ORDER — FUROSEMIDE 40 MG/1
40 TABLET ORAL 2 TIMES DAILY
Qty: 60 TABLET | Refills: 11 | Status: SHIPPED | OUTPATIENT
Start: 2024-11-08 | End: 2025-11-08

## 2024-11-11 ENCOUNTER — TELEPHONE (OUTPATIENT)
Dept: CARDIOLOGY | Facility: CLINIC | Age: 89
End: 2024-11-11
Payer: MEDICARE

## 2024-11-11 NOTE — TELEPHONE ENCOUNTER
Call patient to inform her that her prescription for formamide was sent and receive at pharmacy on file.

## 2024-11-12 ENCOUNTER — TELEPHONE (OUTPATIENT)
Dept: CARDIOLOGY | Facility: CLINIC | Age: 89
End: 2024-11-12
Payer: MEDICARE

## 2024-11-13 ENCOUNTER — TELEPHONE (OUTPATIENT)
Facility: CLINIC | Age: 89
End: 2024-11-13
Payer: MEDICARE

## 2024-11-13 ENCOUNTER — OFFICE VISIT (OUTPATIENT)
Dept: CARDIOLOGY | Facility: CLINIC | Age: 89
End: 2024-11-13
Payer: MEDICARE

## 2024-11-13 ENCOUNTER — LAB VISIT (OUTPATIENT)
Dept: LAB | Facility: HOSPITAL | Age: 89
End: 2024-11-13
Payer: MEDICARE

## 2024-11-13 VITALS
DIASTOLIC BLOOD PRESSURE: 58 MMHG | HEIGHT: 62 IN | BODY MASS INDEX: 33.19 KG/M2 | HEART RATE: 52 BPM | OXYGEN SATURATION: 97 % | SYSTOLIC BLOOD PRESSURE: 128 MMHG | RESPIRATION RATE: 18 BRPM

## 2024-11-13 DIAGNOSIS — I73.9 PAD (PERIPHERAL ARTERY DISEASE): ICD-10-CM

## 2024-11-13 DIAGNOSIS — R60.9 EDEMA, UNSPECIFIED TYPE: ICD-10-CM

## 2024-11-13 DIAGNOSIS — I50.30 HEART FAILURE WITH PRESERVED EJECTION FRACTION, UNSPECIFIED HF CHRONICITY: Primary | ICD-10-CM

## 2024-11-13 DIAGNOSIS — I48.20 CHRONIC ATRIAL FIBRILLATION: ICD-10-CM

## 2024-11-13 DIAGNOSIS — I10 ESSENTIAL HYPERTENSION: ICD-10-CM

## 2024-11-13 LAB
ALBUMIN SERPL BCP-MCNC: 3.2 G/DL (ref 3.5–5.2)
ALP SERPL-CCNC: 136 U/L (ref 40–150)
ALT SERPL W/O P-5'-P-CCNC: 11 U/L (ref 10–44)
ANION GAP SERPL CALC-SCNC: 9 MMOL/L (ref 8–16)
AST SERPL-CCNC: 14 U/L (ref 10–40)
BILIRUB SERPL-MCNC: 0.6 MG/DL (ref 0.1–1)
BNP SERPL-MCNC: 355 PG/ML (ref 0–99)
BUN SERPL-MCNC: 17 MG/DL (ref 8–23)
CALCIUM SERPL-MCNC: 9.6 MG/DL (ref 8.7–10.5)
CHLORIDE SERPL-SCNC: 105 MMOL/L (ref 95–110)
CO2 SERPL-SCNC: 32 MMOL/L (ref 23–29)
CREAT SERPL-MCNC: 1 MG/DL (ref 0.5–1.4)
EST. GFR  (NO RACE VARIABLE): 55 ML/MIN/1.73 M^2
GLUCOSE SERPL-MCNC: 106 MG/DL (ref 70–110)
MAGNESIUM SERPL-MCNC: 1.9 MG/DL (ref 1.6–2.6)
POTASSIUM SERPL-SCNC: 3.3 MMOL/L (ref 3.5–5.1)
PROT SERPL-MCNC: 7.2 G/DL (ref 6–8.4)
SODIUM SERPL-SCNC: 146 MMOL/L (ref 136–145)

## 2024-11-13 PROCEDURE — 1160F RVW MEDS BY RX/DR IN RCRD: CPT | Mod: CPTII,S$GLB,,

## 2024-11-13 PROCEDURE — 80053 COMPREHEN METABOLIC PANEL: CPT

## 2024-11-13 PROCEDURE — 36415 COLL VENOUS BLD VENIPUNCTURE: CPT

## 2024-11-13 PROCEDURE — 1126F AMNT PAIN NOTED NONE PRSNT: CPT | Mod: CPTII,S$GLB,,

## 2024-11-13 PROCEDURE — 83880 ASSAY OF NATRIURETIC PEPTIDE: CPT

## 2024-11-13 PROCEDURE — 99999 PR PBB SHADOW E&M-EST. PATIENT-LVL IV: CPT | Mod: PBBFAC,,,

## 2024-11-13 PROCEDURE — 99214 OFFICE O/P EST MOD 30 MIN: CPT | Mod: S$GLB,,,

## 2024-11-13 PROCEDURE — 1159F MED LIST DOCD IN RCRD: CPT | Mod: CPTII,S$GLB,,

## 2024-11-13 PROCEDURE — 83735 ASSAY OF MAGNESIUM: CPT

## 2024-11-13 PROCEDURE — 1111F DSCHRG MED/CURRENT MED MERGE: CPT | Mod: CPTII,S$GLB,,

## 2024-11-13 RX ORDER — METOPROLOL TARTRATE 25 MG/1
25 TABLET, FILM COATED ORAL 2 TIMES DAILY
Qty: 60 TABLET | Refills: 0 | Status: SHIPPED | OUTPATIENT
Start: 2024-11-13 | End: 2024-12-13

## 2024-11-13 NOTE — TELEPHONE ENCOUNTER
Returned patient call and left message ok to start potassium tomorrow on day it is supposed to be refilled. Take 40meq tomorrow and 20 meq daily after.

## 2024-11-14 ENCOUNTER — OFFICE VISIT (OUTPATIENT)
Dept: SURGERY | Facility: CLINIC | Age: 89
End: 2024-11-14
Payer: MEDICARE

## 2024-11-14 VITALS
BODY MASS INDEX: 33.39 KG/M2 | DIASTOLIC BLOOD PRESSURE: 75 MMHG | SYSTOLIC BLOOD PRESSURE: 122 MMHG | WEIGHT: 181.44 LBS | HEART RATE: 58 BPM | HEIGHT: 62 IN

## 2024-11-14 DIAGNOSIS — K43.6 VENTRAL HERNIA WITH OBSTRUCTION AND WITHOUT GANGRENE: Primary | ICD-10-CM

## 2024-11-14 PROCEDURE — 1111F DSCHRG MED/CURRENT MED MERGE: CPT | Mod: CPTII,S$GLB,, | Performed by: SURGERY

## 2024-11-14 PROCEDURE — 1126F AMNT PAIN NOTED NONE PRSNT: CPT | Mod: CPTII,S$GLB,, | Performed by: SURGERY

## 2024-11-14 PROCEDURE — 99213 OFFICE O/P EST LOW 20 MIN: CPT | Mod: S$GLB,,, | Performed by: SURGERY

## 2024-11-14 PROCEDURE — 3288F FALL RISK ASSESSMENT DOCD: CPT | Mod: CPTII,S$GLB,, | Performed by: SURGERY

## 2024-11-14 PROCEDURE — 99999 PR PBB SHADOW E&M-EST. PATIENT-LVL III: CPT | Mod: PBBFAC,,, | Performed by: SURGERY

## 2024-11-14 PROCEDURE — 1159F MED LIST DOCD IN RCRD: CPT | Mod: CPTII,S$GLB,, | Performed by: SURGERY

## 2024-11-14 PROCEDURE — 1101F PT FALLS ASSESS-DOCD LE1/YR: CPT | Mod: CPTII,S$GLB,, | Performed by: SURGERY

## 2024-11-14 NOTE — PROGRESS NOTES
Surgery Clinic Note    Abbie Barragan is a 89 y.o. year old female in clinic today for follow up of open ventral hernia repair w mesh. Doing great. Wound healed. No pain or bulging.  No f/c/n/v/sob/cp    ROS:  Negative except above    PE:  Vitals:    11/14/24 1303   BP: 122/75   Pulse: (!) 58       NAD  No belabored breathing  Abd soft nt nd  Incision c/d/I. Hernia repair well healed    A/P:  Abbie Barragan is a 89 y.o. year old female s/p open ventral hernia repair w mesh    -regular activity  -rtc 3 months to re-eval    Isidro Bell  General Surgery - [facility]  11/14/2024

## 2024-11-15 ENCOUNTER — DOCUMENT SCAN (OUTPATIENT)
Dept: HOME HEALTH SERVICES | Facility: HOSPITAL | Age: 89
End: 2024-11-15
Payer: MEDICARE

## 2024-11-20 ENCOUNTER — PATIENT MESSAGE (OUTPATIENT)
Dept: CARDIOLOGY | Facility: CLINIC | Age: 89
End: 2024-11-20
Payer: MEDICARE

## 2024-11-20 ENCOUNTER — LAB VISIT (OUTPATIENT)
Dept: LAB | Facility: HOSPITAL | Age: 89
End: 2024-11-20
Payer: MEDICARE

## 2024-11-20 ENCOUNTER — TELEPHONE (OUTPATIENT)
Facility: CLINIC | Age: 89
End: 2024-11-20
Payer: MEDICARE

## 2024-11-20 DIAGNOSIS — R60.9 EDEMA, UNSPECIFIED TYPE: ICD-10-CM

## 2024-11-20 LAB
ALBUMIN SERPL BCP-MCNC: 3.4 G/DL (ref 3.5–5.2)
ALP SERPL-CCNC: 145 U/L (ref 40–150)
ALT SERPL W/O P-5'-P-CCNC: 10 U/L (ref 10–44)
ANION GAP SERPL CALC-SCNC: 19 MMOL/L (ref 8–16)
AST SERPL-CCNC: 14 U/L (ref 10–40)
BILIRUB SERPL-MCNC: 0.7 MG/DL (ref 0.1–1)
BNP SERPL-MCNC: 345 PG/ML (ref 0–99)
BUN SERPL-MCNC: 15 MG/DL (ref 8–23)
CALCIUM SERPL-MCNC: 10.2 MG/DL (ref 8.7–10.5)
CHLORIDE SERPL-SCNC: 103 MMOL/L (ref 95–110)
CO2 SERPL-SCNC: 24 MMOL/L (ref 23–29)
CREAT SERPL-MCNC: 1 MG/DL (ref 0.5–1.4)
EST. GFR  (NO RACE VARIABLE): 55 ML/MIN/1.73 M^2
GLUCOSE SERPL-MCNC: 106 MG/DL (ref 70–110)
MAGNESIUM SERPL-MCNC: 1.9 MG/DL (ref 1.6–2.6)
POTASSIUM SERPL-SCNC: 3.7 MMOL/L (ref 3.5–5.1)
PROT SERPL-MCNC: 8 G/DL (ref 6–8.4)
SODIUM SERPL-SCNC: 146 MMOL/L (ref 136–145)

## 2024-11-20 PROCEDURE — 83880 ASSAY OF NATRIURETIC PEPTIDE: CPT

## 2024-11-20 PROCEDURE — 80053 COMPREHEN METABOLIC PANEL: CPT

## 2024-11-20 PROCEDURE — 83735 ASSAY OF MAGNESIUM: CPT

## 2024-11-20 PROCEDURE — 36415 COLL VENOUS BLD VENIPUNCTURE: CPT

## 2024-11-20 NOTE — TELEPHONE ENCOUNTER
Attempted to call to review lab results. Left message requesting call back and sent message via [employer]

## 2024-11-27 ENCOUNTER — DOCUMENTATION ONLY (OUTPATIENT)
Facility: CLINIC | Age: 89
End: 2024-11-27
Payer: MEDICARE

## 2024-11-27 NOTE — PROGRESS NOTES
"[facility]) DISCHARGE VISIT - PHONE     Called and spoke to [place]    Most Recent Hospital Discharge Date: 10/15/2024  Last admission Diagnosis/chief complaint:SOB    Pt discharge completed by phone related to patient's preferance      Pt reports the following:  []  Shortness of Breath with activity  []  Shortness of Breath at rest   []  Fatigue  []  Edema   [] Chest pain or tightness  [] Weight Increase since discharge  [x] None of the above    Medications:   Medication reconciliation completed today per RN.  Pt reports having all medications available and understands how to take them appropriately. Reminded pt to call prior to making any changes to medications.     Education:   [x] Confirmed pt still has  "Heart Failure Transitional Care Clinic Home Care Guide" .   Reviewed key points as listed below.     Recommend 2 gram sodium restriction and 1500cc fluid restriction.  Encourage physical activity with graded exercise program.  Requested patient to weigh themselves daily, and to notify us if their weight increases by more than 3 lbs in 1 day or 5 lbs in 3 days.     [x] Reviewed completed "Daily Weight and Symptom Tracker".  Reviewed with patient when and how to call HFTCC according to "Yellow Zone" and "Red Zone".       Watch for these Signs and Symptoms: If any of these occur, contact HFTCC immediately:   Increase in shortness of breath with movement   Increase in swelling in your legs and ankles   Weight gain of more than 3 pounds in a night or 5 pounds in 3 days.   Difficulty breathing when you are lying down   Worsening fatigue or tiredness   Stomach bloating, a full feeling or a loss of appetite   Increased coughing--especially when you are lying down    MyChart and Care Companion:   Patient active on myChart? Yes, patient uses regularly.    HF TCC Program Plan:  Pt has successfully completed HFTCC program.  Pt care to be transferred to Lina [facility]AD[facility] Cardiologist  for long term " care.     Pt educated on how to call their offices and how to call [facility] in the event of an after hour issue.    PT reminded to continue to follow recommendations made during the HFTCC program to include monitoring daily weights, taking medications according to list, following up to appointments per provider recommendations, stop smoking/ start exercising and following a heart friendly low salt, low fluid diet.      Pt was able to verbalize back to RN in their own words correct diet/fluid restrictions, necessity for exercise, warning signs and symptoms, when and how to contact their  Long term care team .      Plan: Will follow up with Cardiologist Dr. Mills.        [x]  Discussed upcoming appointments and/or plan for follow-up care with his/her PCP/Cardiology   Holly Ackerman, JUDD-BC   Electronic hand off completed : To  by routing epic note per   Please refer to provider note for additional details and assessment.

## 2024-12-04 ENCOUNTER — HOSPITAL ENCOUNTER (OUTPATIENT)
Dept: CARDIOLOGY | Facility: HOSPITAL | Age: 89
Discharge: HOME | End: 2024-12-04
Attending: INTERNAL MEDICINE
Payer: MEDICARE

## 2024-12-04 DIAGNOSIS — I65.23 BILATERAL CAROTID ARTERY STENOSIS: ICD-10-CM

## 2024-12-04 LAB
LEFT CBA DIAS: 7 CM/S
LEFT CBA SYS: 90 CM/S
LEFT CCA DIST DIAS: 12 CM/S
LEFT CCA DIST SYS: 91 CM/S
LEFT CCA MID DIAS: 14 CM/S
LEFT CCA MID SYS: 77 CM/S
LEFT CCA PROX DIAS: 12 CM/S
LEFT CCA PROX SYS: 84 CM/S
LEFT ECA DIAS: 15 CM/S
LEFT ECA SYS: 121 CM/S
LEFT ICA DIST DIAS: 22 CM/S
LEFT ICA DIST SYS: 70 CM/S
LEFT ICA MID DIAS: 27 CM/S
LEFT ICA MID SYS: 312 CM/S
LEFT ICA PROX DIAS: 51 CM/S
LEFT ICA PROX SYS: 294 CM/S
LEFT VERTEBRAL DIAS: 22 CM/S
LEFT VERTEBRAL SYS: 87 CM/S
OHS CV CAROTID RIGHT ICA EDV HIGHEST: 46
OHS CV CAROTID ULTRASOUND LEFT ICA/CCA RATIO: 3.43
OHS CV CAROTID ULTRASOUND RIGHT ICA/CCA RATIO: 2.15
OHS CV PV CAROTID LEFT HIGHEST CCA: 91
OHS CV PV CAROTID LEFT HIGHEST ICA: 312
OHS CV PV CAROTID RIGHT HIGHEST CCA: 91
OHS CV PV CAROTID RIGHT HIGHEST ICA: 196
OHS CV US CAROTID LEFT HIGHEST EDV: 51
RIGHT CBA DIAS: 11 CM/S
RIGHT CBA SYS: 92 CM/S
RIGHT CCA DIST DIAS: 12 CM/S
RIGHT CCA DIST SYS: 91 CM/S
RIGHT CCA MID DIAS: 9 CM/S
RIGHT CCA MID SYS: 75 CM/S
RIGHT CCA PROX DIAS: 10 CM/S
RIGHT CCA PROX SYS: 69 CM/S
RIGHT ECA DIAS: 7 CM/S
RIGHT ECA SYS: 144 CM/S
RIGHT ICA DIST DIAS: 18 CM/S
RIGHT ICA DIST SYS: 78 CM/S
RIGHT ICA MID DIAS: 24 CM/S
RIGHT ICA MID SYS: 155 CM/S
RIGHT ICA PROX DIAS: 46 CM/S
RIGHT ICA PROX SYS: 196 CM/S
RIGHT VERTEBRAL DIAS: 22 CM/S
RIGHT VERTEBRAL SYS: 87 CM/S

## 2024-12-04 PROCEDURE — 93880 EXTRACRANIAL BILAT STUDY: CPT | Mod: 26,,, | Performed by: INTERNAL MEDICINE

## 2024-12-04 PROCEDURE — 93880 EXTRACRANIAL BILAT STUDY: CPT

## 2024-12-09 ENCOUNTER — TELEPHONE (OUTPATIENT)
Dept: CARDIOLOGY | Facility: CLINIC | Age: 89
End: 2024-12-09
Payer: MEDICARE

## 2024-12-09 PROCEDURE — G0179 MD RECERTIFICATION HHA PT: HCPCS | Mod: ,,, | Performed by: SURGERY

## 2024-12-09 NOTE — TELEPHONE ENCOUNTER
I tried calling patient to reschedule 12/18/2024 appt to a later date due to  not having morning clinic. Patient did not answer, so I left a voicemail for them to call me back.

## 2024-12-10 ENCOUNTER — TELEPHONE (OUTPATIENT)
Dept: CARDIOLOGY | Facility: CLINIC | Age: 89
End: 2024-12-10
Payer: MEDICARE

## 2024-12-10 NOTE — TELEPHONE ENCOUNTER
I called patient back this morning and was able to get her appointment reschedule from 12/18 to 01/15/2025 @ 2:20 pm.

## 2024-12-11 ENCOUNTER — DOCUMENTATION ONLY (OUTPATIENT)
Facility: CLINIC | Age: 89
End: 2024-12-11
Payer: MEDICARE

## 2024-12-11 NOTE — PROGRESS NOTES
Called  Ms. Barragan to  let her know that the pharmacy was out of the medication and had to order it. Also please let her know that she needs to follow with her cardiologist for medication refills now because she has been discharged from our program

## 2025-01-15 ENCOUNTER — OFFICE VISIT (OUTPATIENT)
Dept: CARDIOLOGY | Facility: CLINIC | Age: 89
End: 2025-01-15
Payer: MEDICARE

## 2025-01-15 VITALS
SYSTOLIC BLOOD PRESSURE: 142 MMHG | RESPIRATION RATE: 15 BRPM | HEART RATE: 63 BPM | HEIGHT: 62 IN | WEIGHT: 180.69 LBS | DIASTOLIC BLOOD PRESSURE: 88 MMHG | BODY MASS INDEX: 33.25 KG/M2 | OXYGEN SATURATION: 93 %

## 2025-01-15 DIAGNOSIS — I50.30 HEART FAILURE WITH PRESERVED EJECTION FRACTION, UNSPECIFIED HF CHRONICITY: Primary | ICD-10-CM

## 2025-01-15 DIAGNOSIS — I65.29 STENOSIS OF CAROTID ARTERY, UNSPECIFIED LATERALITY: ICD-10-CM

## 2025-01-15 DIAGNOSIS — I48.20 CHRONIC ATRIAL FIBRILLATION: ICD-10-CM

## 2025-01-15 PROCEDURE — 1159F MED LIST DOCD IN RCRD: CPT | Mod: CPTII,S$GLB,, | Performed by: INTERNAL MEDICINE

## 2025-01-15 PROCEDURE — 99999 PR PBB SHADOW E&M-EST. PATIENT-LVL IV: CPT | Mod: PBBFAC,,, | Performed by: INTERNAL MEDICINE

## 2025-01-15 PROCEDURE — 3288F FALL RISK ASSESSMENT DOCD: CPT | Mod: CPTII,S$GLB,, | Performed by: INTERNAL MEDICINE

## 2025-01-15 PROCEDURE — G2211 COMPLEX E/M VISIT ADD ON: HCPCS | Mod: S$GLB,,, | Performed by: INTERNAL MEDICINE

## 2025-01-15 PROCEDURE — 1101F PT FALLS ASSESS-DOCD LE1/YR: CPT | Mod: CPTII,S$GLB,, | Performed by: INTERNAL MEDICINE

## 2025-01-15 PROCEDURE — 1126F AMNT PAIN NOTED NONE PRSNT: CPT | Mod: CPTII,S$GLB,, | Performed by: INTERNAL MEDICINE

## 2025-01-15 PROCEDURE — 99214 OFFICE O/P EST MOD 30 MIN: CPT | Mod: S$GLB,,, | Performed by: INTERNAL MEDICINE

## 2025-01-15 NOTE — PROGRESS NOTES
CARDIOVASCULAR CONSULTATION    REASON FOR CONSULT:   Abbie Barragan is a 89 y.o. female who presents for   Chief Complaint   Patient presents with    Follow-up        Referred by:  No referring provider defined for this encounter.      HISTORY OF PRESENT ILLNESS:     Last visit:    History of Present Illness    CHIEF COMPLAINT:  Abbie presents today for follow-up after recent hospital admission with elevated troponins.    CARDIOVASCULAR:  She reports a recent hospital admission due to elevated troponins, opting for medical management over invasive procedures. An echocardiogram during admission revealed an EF of 65% to 70% and pulmonary hypertension with a PASP of 63 mmHg. She denies experiencing any chest pain, tightness, shortness of breath, palpitations, or irregular heart rhythms since discharge. She expresses a preference to avoid invasive procedures, including aggressive interventions and angiograms. However, she agrees to cardiac resuscitation if her heart stops.    MEDICATIONS:  She reports taking Eliquis without any problems or bleeding. She is also taking a potassium supplement as prescribed, one in the morning and one in the evening, due to previously low potassium levels. She is taking Losartan for blood pressure management.    MEDICAL HISTORY:  She has a history of left leg amputation.         Jan 25:  CHIEF COMPLAINT:  Abbie presents today for follow-up.    CURRENT SYMPTOMS:  She reports feeling good with no complaints. She denies chest pain, tightness, shortness of breath, or palpitations.    CARDIOVASCULAR HISTORY:  She has a history of mild myocardial infarction and elevated pulmonary pressures. She declined recommended angiogram for coronary artery disease evaluation and right heart catheterization for pulmonary pressure assessment, citing [employer] beliefs regarding medical decisions.    MEDICATIONS:  She takes amlodipine 10 mg daily, apixaban 5 mg twice daily, furosemide 40 mg daily,  losartan 100 mg daily, metoprolol tartrate 25 mg twice daily, and rosuvastatin 40 mg daily. Medications are taken in morning and at night.    SOCIAL HISTORY:  She lives at home with family members who provide care.         Results for orders placed or performed in visit on 10/23/24   IN OFFICE EKG 12-LEAD (to [place])    Collection Time: 10/23/24 10:16 AM   Result Value Ref Range    QRS Duration 152 ms    [facility] QTC Calculation 494 ms    Narrative    Test Reason : I48.20,    Vent. Rate : 060 BPM     Atrial Rate : 087 BPM     P-R Int : 000 ms          QRS Dur : 152 ms      QT Int : 494 ms       P-R-T Axes : 000 073 000 degrees     QTc Int : 494 ms    Atrial fibrillation  Right bundle branch block  Abnormal ECG  When compared with ECG of 13-OCT-2024 12:42,  No significant change was found  Confirmed by Rell Mcfadden MD ([id number]) on 10/27/2024 12:18:31 PM    Referred By:             Confirmed By:eRll Mcfadden MD          ECHO    Results for orders placed during the hospital encounter of 10/13/24    Echo    Interpretation Summary    Left Ventricle: The left ventricle is normal in size. Mildly increased wall thickness. There is concentric hypertrophy. There is normal systolic function with a visually estimated ejection fraction of 65 - 70%.    Right Ventricle: Mild right ventricular enlargement. Systolic function is reduced.    Left Atrium: Left atrium is severely dilated.    Right Atrium: Right atrium is severely dilated.    Aortic Valve: There is mild aortic regurgitation.    Mitral Valve: There is mild regurgitation.    Tricuspid Valve: There is moderate to severe regurgitation.    Pulmonary Artery: There is pulmonary hypertension. The estimated pulmonary artery systolic pressure is 63 mmHg.    IVC/SVC: Elevated venous pressure at 15 mmHg.      STRESS TEST    No results found for this or any previous visit.        CATH    No results found for this or any previous visit.      PAST MEDICAL HISTORY:     Past Medical History:    Diagnosis Date    Above knee amputation status 1994    History of DVT of lower extremity 1994    LEFT    Hyperlipidemia     Hypertension     PAD (peripheral artery disease)        PAST SURGICAL HISTORY:     Past Surgical History:   Procedure Laterality Date    DIAGNOSTIC LAPAROSCOPY  9/14/2023    Procedure: LAPAROSCOPY, DIAGNOSTIC;  Surgeon: Isidro Bell MD;  Location: [facility];  Service: General;;    LEG AMPUTATION THROUGH KNEE      PARTIAL HYSTERECTOMY      1960's    REPAIR OF INCARCERATED VENTRAL HERNIA WITHOUT HISTORY OF PRIOR REPAIR N/A 10/2/2024    Procedure: REPAIR, HERNIA, VENTRAL, INCARCERATED, WITHOUT HISTORY OF PRIOR REPAIR;  Surgeon: Isidro Bell MD;  Location: [facility] OR;  Service: General;  Laterality: N/A;  REPAIR WITH MESH    ROBOT-ASSISTED CHOLECYSTECTOMY N/A 9/14/2023    Procedure: Diagnostic Laparoscopy Lysis of Adhesisions  Attempted Robotically;  Surgeon: Isidro Bell MD;  Location: [facility];  Service: General;  Laterality: N/A;  ATTEMPTED    VASCULAR SURGERY Left 1994    AORTOFEM BYPASS           SOCIAL HISTORY:     Social History     Socioeconomic History    Marital status: Single   Tobacco Use    Smoking status: Former    Smokeless tobacco: Never   Substance and Sexual Activity    Alcohol use: No    Drug use: Never     Social Drivers of Health     Financial Resource Strain: Low Risk  (10/18/2024)    Overall Financial Resource Strain (CARDIA)     Difficulty of Paying Living Expenses: Not very hard   Food Insecurity: No Food Insecurity (10/18/2024)    Hunger Vital Sign     Worried About Running Out of Food in the Last Year: Never true     Ran Out of Food in the Last Year: Never true   Transportation Needs: No Transportation Needs (10/13/2024)    TRANSPORTATION NEEDS     Transportation : No   Physical Activity: Insufficiently Active (10/18/2024)    Exercise Vital Sign     Days of Exercise per Week: 3 days     Minutes of Exercise per Session: 20 min   Stress: No Stress Concern Present  "(10/18/2024)    [employer] - Occupational Stress Questionnaire     Feeling of Stress : Not at all   Housing Stability: Low Risk  (10/18/2024)    Housing Stability Vital Sign     Unable to Pay for Housing in the Last Year: No     Homeless in the Last Year: No       FAMILY HISTORY:   No family history on file.    REVIEW OF SYSTEMS:   Review of Systems   Constitutional: Negative.   HENT: Negative.     Eyes: Negative.    Cardiovascular: Negative.    Respiratory: Negative.     Endocrine: Negative.    Hematologic/Lymphatic: Negative.    Skin: Negative.    Musculoskeletal: Negative.    Gastrointestinal: Negative.    Genitourinary: Negative.    Neurological: Negative.    Psychiatric/Behavioral: Negative.     Allergic/Immunologic: Negative.        A 10 point review of systems was performed and all the pertinent positives have been mentioned. Rest of review of systems was negative.        PHYSICAL EXAM:     Vitals:    01/15/25 1502   BP: (!) 142/88   Pulse: 63   Resp: 15    Body mass index is 33.04 kg/m².  Weight: 81.9 kg (180 lb 10.7 oz)   Height: 5' 2" (157.5 cm)     Physical Exam  Constitutional:       Appearance: Normal appearance. She is well-developed.   HENT:      Head: Normocephalic.   Eyes:      Pupils: Pupils are equal, round, and reactive to light.   Cardiovascular:      Rate and Rhythm: Normal rate and regular rhythm.   Pulmonary:      Effort: Pulmonary effort is normal.      Breath sounds: Normal breath sounds.   Abdominal:      General: Bowel sounds are normal.      Palpations: Abdomen is soft.      Tenderness: There is no abdominal tenderness.   Musculoskeletal:         General: Normal range of motion.      Cervical back: Normal range of motion and neck supple.      Comments: Left leg amputated   Skin:     General: Skin is warm.   Neurological:      Mental Status: She is alert and oriented to person, place, and time.         Physical Exam              DATA: "     Laboratory:  CBC:  Recent Labs   Lab 10/23/24  0905 10/28/24  0910 11/06/24  0851   WBC 5.83 7.47 9.15   Hemoglobin 10.4 L 11.0 L 11.7 L   Hematocrit 33.8 L 36.5 L 38.8   Platelets 322 300 231       CHEMISTRIES:  Recent Labs   Lab 11/06/24  0851 11/13/24  0845 11/20/24  0825   Glucose 100 106 106   Sodium 143 146 H 146 H   Potassium 4.6 3.3 L 3.7   BUN 11 17 15   Creatinine 0.8 1.0 1.0   Calcium 9.8 9.6 10.2   Magnesium 1.9 1.9 1.9       CARDIAC BIOMARKERS:  Recent Labs   Lab 10/13/24  1451 10/13/24  1842 10/13/24  2238   Troponin I 0.284 H 0.271 H 0.226 H       COAGS:  Recent Labs   Lab 07/12/23  1311 10/02/24  0637 10/13/24  1521   INR 1.1 1.0 1.1       LIPIDS/LFTS:  Recent Labs   Lab 11/06/24  0851 11/13/24  0845 11/20/24  0825   AST 13 14 14   ALT 7 L 11 10       % HEMOGLOBIN A1C   Date Value Ref Range Status   02/08/2023 5.6 <5.7 % of total Hgb Final     Comment:     For the purpose of screening for the presence of  diabetes:    <5.7%       Consistent with the absence of diabetes  5.7-6.4%    Consistent with increased risk for diabetes              (prediabetes)  > or =6.5%  Consistent with diabetes    This assay result is consistent with a decreased risk  of diabetes.    Currently, no consensus exists regarding use of  hemoglobin A1c for diagnosis of diabetes in children.    According to American Diabetes Association (ADA)  guidelines, hemoglobin A1c <7.0% represents optimal  control in non-pregnant diabetic patients. Different  metrics may apply to specific patient populations.   Standards of Medical Care in Diabetes(ADA).           TSH  Recent Labs   Lab 07/13/23  0817 10/08/24  0722   TSH 0.896 0.106 L       The ASCVD Risk score (Pop SANDERS, et al., 2019) failed to calculate for the following reasons:    The 2019 ASCVD risk score is only valid for ages 40 to 79    Risk score cannot be calculated because patient has a medical history suggesting prior/existing ASCVD       BNP    Lab Results   Component  Value Date/Time     (H) 11/20/2024 08:25 AM     (H) 11/13/2024 08:45 AM     (H) 11/06/2024 08:51 AM     (H) 10/28/2024 09:10 AM     (H) 10/28/2024 09:10 AM     (H) 10/23/2024 09:05 AM     (H) 10/13/2024 01:44 PM     (H) 10/02/2024 01:29 AM     (H) 10/26/2023 07:31 AM     (H) 07/12/2023 09:40 AM     (H) 06/19/2019 10:05 AM         ASSESSMENT AND PLAN     Patient Active Problem List   Diagnosis    Chronic atrial fibrillation    PVD (peripheral vascular disease)    Bilateral carotid artery stenosis    Status post above-knee amputation    Anemia    Gallstones    SBO (small bowel obstruction)    Essential hypertension    Acquired hypothyroidism    PAD (peripheral artery disease)    Low oxygen saturation    Hypophosphatemia    NSTEMI (non-ST elevated myocardial infarction)    Acute on chronic diastolic congestive heart failure    Heart failure with preserved ejection fraction, unspecified HF chronicity           ALLERGIES AND MEDICATION:   Review of patient's allergies indicates:  No Known Allergies     Medication List            Accurate as of January 15, 2025 11:59 PM. If you have any questions, ask your nurse or doctor.                CONTINUE taking these medications      acetaminophen 325 MG tablet  Commonly known as: TYLENOL  Take 2 tablets (650 mg total) by mouth every 6 (six) hours as needed for Pain.     alendronate 70 MG tablet  Commonly known as: FOSAMAX     amLODIPine 10 MG tablet  Commonly known as: NORVASC     apixaban 5 mg Tab  Commonly known as: ELIQUIS  Take 1 tablet (5 mg total) by mouth 2 (two) times daily.     cholecalciferol (vitamin D3) 25 mcg (1,000 unit) capsule  Commonly known as: VITAMIN D3     furosemide 40 MG tablet  Commonly known as: LASIX  Take 1 tablet (40 mg total) by mouth 2 (two) times daily.     losartan 100 MG tablet  Commonly known as: COZAAR     metoprolol tartrate 25 MG tablet  Commonly known as:  LOPRESSOR  Take 1 tablet (25 mg total) by mouth 2 (two) times daily.     potassium chloride SA 20 MEQ tablet  Commonly known as: K-DUR,KLOR-CON  Take 2 tablets by mouth today, THEN 1 tablet once daily thereafter     rosuvastatin 40 MG Tab  Commonly known as: CRESTOR              Orders Placed This Encounter   Procedures    Echo       Assessment & Plan      Assessment & Plan    IMPRESSION:  Previous mild heart attack (troponin 0.1) during hospital admission  Discussed potential for angiogram to evaluate for coronary artery stenosis, but patient declined  Stress test deemed unnecessary if patient unwilling to pursue angiogram regardless of results  The patient prefers not to know about potential stenosis    PLAN SUMMARY:  - Continue rosuvastatin 40 mg daily  - Continue furosemide 40 mg daily  - Continue metoprolol tartrate 25 mg twice daily  - Continue amlodipine 10 mg daily  - Continue losartan 100 mg daily  - Continue apixaban 5 mg twice daily  - Echo ordered before next follow-up  - Follow up in 3 months, morning appointment preferred    NON-ST ELEVATION MYOCARDIAL INFARCTION (NSTEMI):  - Continued metoprolol tartrate 25 mg twice daily.  - Continued rosuvastatin 40 mg daily.  - Echo ordered to be completed before next follow-up visit.    ATRIAL FIBRILLATION:  - Continued apixaban (Eliquis) 5 mg twice daily.    PULMONARY HYPERTENSION:  - Continued furosemide (Lasix) 40 mg daily.    HYPERTENSION:  - Continued amlodipine 10 mg daily.  - Continued losartan 100 mg daily.    CAROTID ARTERY STENOSIS:  Refer to vascular surgery      There is 40-49% right Internal Carotid Stenosis.    There is 70-79% left Internal Carotid Stenosis.      HYPOKALEMIA:  - Continued potassium supplement    DVT: ON ELIQUIS    HISTORY OF NON-STEMI: DID NOT WANT ANY INVASIVE PROCEDURES OR STRESS TEST FOR FURTHER EVALUATION              FOLLOW-UP:  - Follow up in 3 months; patient prefers morning appointments.           Thank you very much for  involving me in the care of your patient.  Please do not hesitate to contact me if there are any questions.      Bob Mills MD, FAC, [facility]  Interventional Cardiologist, [facility].     Visit today included increased complexity associated with the care of the episodic problem HISTORY OF DVT, HISTORY OF HYPERTENSION, ATRIAL FIBRILLATION, LONG-TERM ANTICOAGULATION, HISTORY OF NON-STEMI addressed and managing the longitudinal care of the patient due to the serious and/or complex managed problem(s)   Patient Active Problem List   Diagnosis    Chronic atrial fibrillation    PVD (peripheral vascular disease)    Bilateral carotid artery stenosis    Status post above-knee amputation    Anemia    Gallstones    SBO (small bowel obstruction)    Essential hypertension    Acquired hypothyroidism    PAD (peripheral artery disease)    Low oxygen saturation    Hypophosphatemia    NSTEMI (non-ST elevated myocardial infarction)    Acute on chronic diastolic congestive heart failure    Heart failure with preserved ejection fraction, unspecified HF chronicity     .        This note was dictated with the help of speech recognition software.  There might be un-intended errors and/or substitutions.    This note was generated with the assistance of ambient listening technology. Verbal consent was obtained by the patient and accompanying visitor(s) for the recording of patient appointment to facilitate this note. I attest to having reviewed and edited the generated note for accuracy, though some syntax or spelling errors may persist. Please contact the author of this note for any clarification.

## 2025-01-28 ENCOUNTER — EXTERNAL HOME HEALTH (OUTPATIENT)
Dept: HOME HEALTH SERVICES | Facility: HOSPITAL | Age: 89
End: 2025-01-28
Payer: MEDICARE

## 2025-05-16 ENCOUNTER — HOSPITAL ENCOUNTER (OUTPATIENT)
Dept: CARDIOLOGY | Facility: HOSPITAL | Age: 89
Discharge: HOME | End: 2025-05-16
Attending: INTERNAL MEDICINE
Payer: MEDICARE

## 2025-05-16 VITALS — HEIGHT: 62 IN | WEIGHT: 180 LBS | BODY MASS INDEX: 33.13 KG/M2

## 2025-05-16 DIAGNOSIS — I50.30 HEART FAILURE WITH PRESERVED EJECTION FRACTION, UNSPECIFIED HF CHRONICITY: ICD-10-CM

## 2025-05-16 LAB
AORTIC ROOT ANNULUS: 3.5 CM
AORTIC SIZE INDEX (SOV): 1.9 CM/M2
AORTIC SIZE INDEX: 1.7 CM/M2
AORTIC VALVE CUSP SEPERATION: 2.08 CM
ASCENDING AORTA: 3.1 CM
AV INDEX (PROSTH): 0.61
AV MEAN GRADIENT: 6 MMHG
AV PEAK GRADIENT: 10 MMHG
AV REGURGITATION PRESSURE HALF TIME: 553 MS
AV VALVE AREA BY VELOCITY RATIO: 2.1 CM²
AV VALVE AREA: 2.3 CM²
AV VELOCITY RATIO: 0.56
BSA FOR ECHO PROCEDURE: 1.89 M2
CV ECHO LV RWT: 0.76 CM
DOP CALC AO PEAK VEL: 1.6 M/S
DOP CALC AO VTI: 35.5 CM
DOP CALC LVOT AREA: 3.8 CM2
DOP CALC LVOT DIAMETER: 2.2 CM
DOP CALC LVOT PEAK VEL: 0.9 M/S
DOP CALC LVOT STROKE VOLUME: 82.8 CM3
DOP CALCLVOT PEAK VEL VTI: 21.8 CM
ECHO LV POSTERIOR WALL: 1.6 CM (ref 0.6–1.1)
FRACTIONAL SHORTENING: 38.1 % (ref 28–44)
INTERVENTRICULAR SEPTUM: 1.6 CM (ref 0.6–1.1)
IVC DIAMETER: 2.52 CM
IVRT: 100 MSEC
LA MAJOR: 7.4 CM
LA MINOR: 7.8 CM
LA WIDTH: 5.1 CM
LEFT ATRIUM SIZE: 4.2 CM
LEFT ATRIUM VOLUME INDEX: 76 ML/M2
LEFT ATRIUM VOLUME: 138 CM3
LEFT INTERNAL DIMENSION IN SYSTOLE: 2.6 CM (ref 2.1–4)
LEFT VENTRICLE DIASTOLIC VOLUME INDEX: 43.17 ML/M2
LEFT VENTRICLE DIASTOLIC VOLUME: 79 ML
LEFT VENTRICLE MASS INDEX: 150.9 G/M2
LEFT VENTRICLE SYSTOLIC VOLUME INDEX: 13.1 ML/M2
LEFT VENTRICLE SYSTOLIC VOLUME: 24 ML
LEFT VENTRICULAR INTERNAL DIMENSION IN DIASTOLE: 4.2 CM (ref 3.5–6)
LEFT VENTRICULAR MASS: 276.1 G
LVED V (TEICH): 78.77 ML
LVES V (TEICH): 23.57 ML
LVOT MG: 1.67 MMHG
LVOT MV: 0.6 CM/S
OHS CV RV/LV RATIO: 1.1 CM
PISA AR MAX VEL: 3.67 M/S
PISA TR MAX VEL: 3.9 M/S
PULM VEIN S/D RATIO: 0.48
PV PEAK D VEL: 0.42 M/S
PV PEAK GRADIENT: 4 MMHG
PV PEAK S VEL: 0.2 M/S
PV PEAK VELOCITY: 0.99 M/S
RA MAJOR: 6.23 CM
RA PRESSURE ESTIMATED: 8 MMHG
RA WIDTH: 4 CM
RIGHT VENTRICLE DIASTOLIC BASEL DIMENSION: 4.6 CM
RIGHT VENTRICULAR END-DIASTOLIC DIMENSION: 4.58 CM
RV TB RVSP: 12 MMHG
RV TISSUE DOPPLER FREE WALL SYSTOLIC VELOCITY 1 (APICAL 4 CHAMBER VIEW): 10.28 CM/S
SINUS: 3.48 CM
STJ: 2.4 CM
TR MAX PG: 62 MMHG
TRICUSPID ANNULAR PLANE SYSTOLIC EXCURSION: 1.6 CM
TV REST PULMONARY ARTERY PRESSURE: 69 MMHG
Z-SCORE OF LEFT VENTRICULAR DIMENSION IN END DIASTOLE: -1.88
Z-SCORE OF LEFT VENTRICULAR DIMENSION IN END SYSTOLE: -1.47

## 2025-05-16 PROCEDURE — 93306 TTE W/DOPPLER COMPLETE: CPT

## 2025-05-16 PROCEDURE — 93306 TTE W/DOPPLER COMPLETE: CPT | Mod: 26,,, | Performed by: INTERNAL MEDICINE

## 2025-05-21 ENCOUNTER — OFFICE VISIT (OUTPATIENT)
Dept: CARDIOLOGY | Facility: CLINIC | Age: 89
End: 2025-05-21
Payer: MEDICARE

## 2025-05-21 VITALS
OXYGEN SATURATION: 96 % | HEART RATE: 75 BPM | BODY MASS INDEX: 34.22 KG/M2 | HEIGHT: 62 IN | WEIGHT: 185.94 LBS | DIASTOLIC BLOOD PRESSURE: 107 MMHG | SYSTOLIC BLOOD PRESSURE: 172 MMHG

## 2025-05-21 DIAGNOSIS — I65.23 BILATERAL CAROTID ARTERY STENOSIS: ICD-10-CM

## 2025-05-21 DIAGNOSIS — I10 ESSENTIAL HYPERTENSION: ICD-10-CM

## 2025-05-21 DIAGNOSIS — I21.4 NSTEMI (NON-ST ELEVATED MYOCARDIAL INFARCTION): ICD-10-CM

## 2025-05-21 DIAGNOSIS — I73.9 PAD (PERIPHERAL ARTERY DISEASE): ICD-10-CM

## 2025-05-21 DIAGNOSIS — I27.20 PULMONARY HTN: ICD-10-CM

## 2025-05-21 DIAGNOSIS — I65.29 STENOSIS OF CAROTID ARTERY, UNSPECIFIED LATERALITY: ICD-10-CM

## 2025-05-21 DIAGNOSIS — I50.30 HEART FAILURE WITH PRESERVED EJECTION FRACTION, UNSPECIFIED HF CHRONICITY: Primary | ICD-10-CM

## 2025-05-21 DIAGNOSIS — I48.20 CHRONIC ATRIAL FIBRILLATION: ICD-10-CM

## 2025-05-21 LAB
OHS QRS DURATION: 148 MS
OHS QTC CALCULATION: 470 MS

## 2025-05-21 PROCEDURE — 99999 PR PBB SHADOW E&M-EST. PATIENT-LVL IV: CPT | Mod: PBBFAC,,, | Performed by: INTERNAL MEDICINE

## 2025-05-21 PROCEDURE — 93000 ELECTROCARDIOGRAM COMPLETE: CPT | Mod: S$GLB,,, | Performed by: INTERNAL MEDICINE

## 2025-05-21 NOTE — PROGRESS NOTES
CARDIOVASCULAR CONSULTATION    REASON FOR CONSULT:   Abbie Barragan is a 89 y.o. female who presents for   Chief Complaint   Patient presents with    Follow-up        Referred by:  No referring provider defined for this encounter.      HISTORY OF PRESENT ILLNESS:     Last visit:    History of Present Illness    CHIEF COMPLAINT:  Abbie presents today for follow-up after recent hospital admission with elevated troponins.    CARDIOVASCULAR:  She reports a recent hospital admission due to elevated troponins, opting for medical management over invasive procedures. An echocardiogram during admission revealed an EF of 65% to 70% and pulmonary hypertension with a PASP of 63 mmHg. She denies experiencing any chest pain, tightness, shortness of breath, palpitations, or irregular heart rhythms since discharge. She expresses a preference to avoid invasive procedures, including aggressive interventions and angiograms. However, she agrees to cardiac resuscitation if her heart stops.    MEDICATIONS:  She reports taking Eliquis without any problems or bleeding. She is also taking a potassium supplement as prescribed, one in the morning and one in the evening, due to previously low potassium levels. She is taking Losartan for blood pressure management.    MEDICAL HISTORY:  She has a history of left leg amputation.         Jan 25:  CHIEF COMPLAINT:  Abbie presents today for follow-up.    CURRENT SYMPTOMS:  She reports feeling good with no complaints. She denies chest pain, tightness, shortness of breath, or palpitations.    CARDIOVASCULAR HISTORY:  She has a history of mild myocardial infarction and elevated pulmonary pressures. She declined recommended angiogram for coronary artery disease evaluation and right heart catheterization for pulmonary pressure assessment, citing Worship beliefs regarding medical decisions.    MEDICATIONS:  She takes amlodipine 10 mg daily, apixaban 5 mg twice daily, furosemide 40 mg daily,  losartan 100 mg daily, metoprolol tartrate 25 mg twice daily, and rosuvastatin 40 mg daily. Medications are taken in morning and at night.    SOCIAL HISTORY:  She lives at home with family members who provide care.         Results for orders placed or performed in visit on 10/23/24   IN OFFICE EKG 12-LEAD (to [place])    Collection Time: 10/23/24 10:16 AM   Result Value Ref Range    QRS Duration 152 ms    [facility] QTC Calculation 494 ms    Narrative    Test Reason : I48.20,    Vent. Rate : 060 BPM     Atrial Rate : 087 BPM     P-R Int : 000 ms          QRS Dur : 152 ms      QT Int : 494 ms       P-R-T Axes : 000 073 000 degrees     QTc Int : 494 ms    Atrial fibrillation  Right bundle branch block  Abnormal ECG  When compared with ECG of 13-OCT-2024 12:42,  No significant change was found  Confirmed by Rell Mcfadden MD ([id number]) on 10/27/2024 12:18:31 PM    Referred By:             Confirmed By:Rell Mcfadden MD          ECHO    Results for orders placed during the hospital encounter of 10/13/24    Echo    Interpretation Summary    Left Ventricle: The left ventricle is normal in size. Mildly increased wall thickness. There is concentric hypertrophy. There is normal systolic function with a visually estimated ejection fraction of 65 - 70%.    Right Ventricle: Mild right ventricular enlargement. Systolic function is reduced.    Left Atrium: Left atrium is severely dilated.    Right Atrium: Right atrium is severely dilated.    Aortic Valve: There is mild aortic regurgitation.    Mitral Valve: There is mild regurgitation.    Tricuspid Valve: There is moderate to severe regurgitation.    Pulmonary Artery: There is pulmonary hypertension. The estimated pulmonary artery systolic pressure is 63 mmHg.    IVC/SVC: Elevated venous pressure at 15 mmHg.      May 25:    History of Present Illness    CHIEF COMPLAINT:  Abbie presents today for follow up.    CARDIOVASCULAR:  She has AF managed with Eliquis. She experiences white coat syndrome  with elevated BP readings in the office, though home readings average 130. [place] [facility] [place] recommended continued monitoring.    MOBILITY:  She uses a wheelchair for mobility and can stand for short periods of 2-3 minutes when necessary, such as to retrieve items. She is unable to walk independently.    MEDICAL HISTORY:  She has a history of smoking.    MEDICATIONS:  She takes amlodipine, losartan, and metoprolol for BP management, Eliquis for AF, and Crestor for cholesterol control.    LABS:  Last cholesterol check was in 2022.           PAST MEDICAL HISTORY:     Past Medical History:   Diagnosis Date    Above knee amputation status 1994    History of DVT of lower extremity 1994    LEFT    Hyperlipidemia     Hypertension     PAD (peripheral artery disease)        PAST SURGICAL HISTORY:     Past Surgical History:   Procedure Laterality Date    DIAGNOSTIC LAPAROSCOPY  9/14/2023    Procedure: LAPAROSCOPY, DIAGNOSTIC;  Surgeon: Isidro Bell MD;  Location: [facility] OR;  Service: General;;    LEG AMPUTATION THROUGH KNEE      PARTIAL HYSTERECTOMY      1960's    REPAIR OF INCARCERATED VENTRAL HERNIA WITHOUT HISTORY OF PRIOR REPAIR N/A 10/2/2024    Procedure: REPAIR, HERNIA, VENTRAL, INCARCERATED, WITHOUT HISTORY OF PRIOR REPAIR;  Surgeon: Isidro Bell MD;  Location: [facility] OR;  Service: General;  Laterality: N/A;  REPAIR WITH MESH    ROBOT-ASSISTED CHOLECYSTECTOMY N/A 9/14/2023    Procedure: Diagnostic Laparoscopy Lysis of Adhesisions  Attempted Robotically;  Surgeon: Isidro Bell MD;  Location: [facility] OR;  Service: General;  Laterality: N/A;  ATTEMPTED    VASCULAR SURGERY Left 1994    AORTOFEM BYPASS           SOCIAL HISTORY:     Social History     Socioeconomic History    Marital status: Single   Tobacco Use    Smoking status: Former    Smokeless tobacco: Never   Substance and Sexual Activity    Alcohol use: No    Drug use: Never     Social Drivers of  "Health     Financial Resource Strain: Low Risk  (10/18/2024)    Overall Financial Resource Strain (CARDIA)     Difficulty of Paying Living Expenses: Not very hard   Food Insecurity: No Food Insecurity (10/18/2024)    Hunger Vital Sign     Worried About Running Out of Food in the Last Year: Never true     Ran Out of Food in the Last Year: Never true   Transportation Needs: No Transportation Needs (10/13/2024)    TRANSPORTATION NEEDS     Transportation : No   Physical Activity: Insufficiently Active (10/18/2024)    Exercise Vital Sign     Days of Exercise per Week: 3 days     Minutes of Exercise per Session: 20 min   Stress: No Stress Concern Present (10/18/2024)    [employer] - Occupational Stress Questionnaire     Feeling of Stress : Not at all   Housing Stability: Unknown (10/18/2024)    Housing Stability Vital Sign     Unable to Pay for Housing in the Last Year: No     Homeless in the Last Year: No       FAMILY HISTORY:   No family history on file.    REVIEW OF SYSTEMS:   Review of Systems   Constitutional: Negative.   HENT: Negative.     Eyes: Negative.    Cardiovascular: Negative.    Respiratory: Negative.     Endocrine: Negative.    Hematologic/Lymphatic: Negative.    Skin: Negative.    Musculoskeletal: Negative.    Gastrointestinal: Negative.    Genitourinary: Negative.    Neurological: Negative.    Psychiatric/Behavioral: Negative.     Allergic/Immunologic: Negative.        A 10 point review of systems was performed and all the pertinent positives have been mentioned. Rest of review of systems was negative.        PHYSICAL EXAM:     Vitals:    05/21/25 1300   BP: (!) 172/107   Pulse: 75    Body mass index is 34.01 kg/m².  Weight: 84.4 kg (185 lb 15.3 oz)   Height: 5' 2" (157.5 cm)     Physical Exam  Constitutional:       Appearance: Normal appearance. She is well-developed.   HENT:      Head: Normocephalic.   Eyes:      Pupils: Pupils are equal, round, and reactive to light. "   Cardiovascular:      Rate and Rhythm: Normal rate and regular rhythm.   Pulmonary:      Effort: Pulmonary effort is normal.      Breath sounds: Normal breath sounds.   Abdominal:      General: Bowel sounds are normal.      Palpations: Abdomen is soft.      Tenderness: There is no abdominal tenderness.   Musculoskeletal:         General: Normal range of motion.      Cervical back: Normal range of motion and neck supple.      Comments: Left leg amputated   Skin:     General: Skin is warm.   Neurological:      Mental Status: She is alert and oriented to person, place, and time.         Physical Exam              DATA:     Laboratory:  CBC:  Recent Labs   Lab 10/23/24  0905 10/28/24  0910 11/06/24  0851   WBC 5.83 7.47 9.15   Hemoglobin 10.4 L 11.0 L 11.7 L   Hematocrit 33.8 L 36.5 L 38.8   Platelets 322 300 231       CHEMISTRIES:  Recent Labs   Lab 11/06/24  0851 11/13/24  0845 11/20/24  0825   Glucose 100 106 106   Sodium 143 146 H 146 H   Potassium 4.6 3.3 L 3.7   BUN 11 17 15   Creatinine 0.8 1.0 1.0   Calcium 9.8 9.6 10.2   Magnesium 1.9 1.9 1.9       CARDIAC BIOMARKERS:  Recent Labs   Lab 10/13/24  1451 10/13/24  1842 10/13/24  2238   Troponin I 0.284 H 0.271 H 0.226 H       COAGS:  Recent Labs   Lab 07/12/23  1311 10/02/24  0637 10/13/24  1521   INR 1.1 1.0 1.1       LIPIDS/LFTS:  Recent Labs   Lab 11/06/24  0851 11/13/24  0845 11/20/24  0825   AST 13 14 14   ALT 7 L 11 10       % HEMOGLOBIN A1C   Date Value Ref Range Status   02/08/2023 5.6 <5.7 % of total Hgb Final     Comment:     For the purpose of screening for the presence of  diabetes:    <5.7%       Consistent with the absence of diabetes  5.7-6.4%    Consistent with increased risk for diabetes              (prediabetes)  > or =6.5%  Consistent with diabetes    This assay result is consistent with a decreased risk  of diabetes.    Currently, no consensus exists regarding use of  hemoglobin A1c for diagnosis of diabetes in children.    According to  American Diabetes Association (ADA)  guidelines, hemoglobin A1c <7.0% represents optimal  control in non-pregnant diabetic patients. Different  metrics may apply to specific patient populations.   Standards of Medical Care in Diabetes(ADA).           TSH  Recent Labs   Lab 07/13/23  0817 10/08/24  0722   TSH 0.896 0.106 L       The ASCVD Risk score (Pop SANDERS, et al., 2019) failed to calculate for the following reasons:    The 2019 ASCVD risk score is only valid for ages 40 to 79    Risk score cannot be calculated because patient has a medical history suggesting prior/existing ASCVD       BNP    Lab Results   Component Value Date/Time     (H) 11/20/2024 08:25 AM     (H) 11/13/2024 08:45 AM     (H) 11/06/2024 08:51 AM     (H) 10/28/2024 09:10 AM     (H) 10/28/2024 09:10 AM     (H) 10/23/2024 09:05 AM     (H) 10/13/2024 01:44 PM     (H) 10/02/2024 01:29 AM     (H) 10/26/2023 07:31 AM     (H) 07/12/2023 09:40 AM     (H) 06/19/2019 10:05 AM         ASSESSMENT AND PLAN     Patient Active Problem List   Diagnosis    Chronic atrial fibrillation    PVD (peripheral vascular disease)    Bilateral carotid artery stenosis    Status post above-knee amputation    Anemia    Gallstones    SBO (small bowel obstruction)    Essential hypertension    Acquired hypothyroidism    PAD (peripheral artery disease)    Low oxygen saturation    Hypophosphatemia    NSTEMI (non-ST elevated myocardial infarction)    Acute on chronic diastolic congestive heart failure    Heart failure with preserved ejection fraction, unspecified HF chronicity           ALLERGIES AND MEDICATION:   Review of patient's allergies indicates:  No Known Allergies     Medication List            Accurate as of May 21, 2025  3:20 PM. If you have any questions, ask your nurse or doctor.                CONTINUE taking these medications      acetaminophen 325 MG tablet  Commonly known as:  TYLENOL  Take 2 tablets (650 mg total) by mouth every 6 (six) hours as needed for Pain.     alendronate 70 MG tablet  Commonly known as: FOSAMAX     amLODIPine 10 MG tablet  Commonly known as: NORVASC     apixaban 5 mg Tab  Commonly known as: ELIQUIS  Take 1 tablet (5 mg total) by mouth 2 (two) times daily.     cholecalciferol (vitamin D3) 25 mcg (1,000 unit) capsule  Commonly known as: VITAMIN D3     furosemide 40 MG tablet  Commonly known as: LASIX  Take 1 tablet (40 mg total) by mouth 2 (two) times daily.     losartan 100 MG tablet  Commonly known as: COZAAR     metoprolol tartrate 25 MG tablet  Commonly known as: LOPRESSOR  Take 1 tablet (25 mg total) by mouth 2 (two) times daily.     potassium chloride SA 20 MEQ tablet  Commonly known as: K-DUR,KLOR-CON  Take 2 tablets by mouth today, THEN 1 tablet once daily thereafter     rosuvastatin 40 MG Tab  Commonly known as: CRESTOR              Orders Placed This Encounter   Procedures    Lipid Panel    Ambulatory referral/consult to Pulmonary Hypertension    IN OFFICE EKG 12-LEAD (to [place])     Assessment & Plan      Assessment & Plan    IMPRESSION:  Elevated blood pressure in office, likely due to white coat syndrome.  Recent echocardiogram shows elevated pulmonary pressure (69 mmHg, up from 63 mmHg previously).  Diagnosed pulmonary HTN.  Previous mild heart attack (troponin 0.1) during hospital admission  Discussed potential for angiogram to evaluate for coronary artery stenosis, but patient declined  Stress test deemed unnecessary if patient unwilling to pursue angiogram regardless of results  The patient prefers not to know about potential stenosis    PLAN SUMMARY:  - Continue Eliquis for atrial fibrillation  - Continue amlodipine, losartan, and metoprolol for hypertension  - Continue Crestor for cholesterol management  - Order cholesterol blood test  - Refer to pulmonary HTN specialist at [facility]  - Follow-up in 3 months    ATRIAL  "FIBRILLATION:  - Continued eliquis for a-fib.    PULMONARY ARTERIAL HYPERTENSION:  - Referred patient to pulmonary HTN specialist at [facility] for evaluation and management of pulmonary HTN.    HYPERTENSION:  - Continued amlodipine, losartan, and metoprolol for blood pressure.    HYPERLIPIDEMIA:  - Continued Crestor for cholesterol.  - Ordered cholesterol blood test.    FOLLOW-UP:  - Follow up in 3 months.         CAROTID ARTERY STENOSIS:  Managed by her vascular surgeon at [facility] Dr. Kitchen      There is 40-49% right Internal Carotid Stenosis.    There is 70-79% left Internal Carotid Stenosis.    No results found for: "LDL"        HYPOKALEMIA:  - Continued potassium supplement    DVT: ON ELIQUIS    HISTORY OF NON-STEMI: DID NOT WANT ANY INVASIVE PROCEDURES OR STRESS TEST FOR FURTHER EVALUATION              FOLLOW-UP:  - Follow up in 3 months; patient prefers morning appointments.           Thank you very much for involving me in the care of your patient.  Please do not hesitate to contact me if there are any questions.      Bob Mills MD, FACC, [facility]  Interventional Cardiologist, [facility].     Visit today included increased complexity associated with the care of the episodic problem HISTORY OF DVT, HISTORY OF HYPERTENSION, ATRIAL FIBRILLATION, LONG-TERM ANTICOAGULATION, HISTORY OF NON-STEMI addressed and managing the longitudinal care of the patient due to the serious and/or complex managed problem(s)   Patient Active Problem List   Diagnosis    Chronic atrial fibrillation    PVD (peripheral vascular disease)    Bilateral carotid artery stenosis    Status post above-knee amputation    Anemia    Gallstones    SBO (small bowel obstruction)    Essential hypertension    Acquired hypothyroidism    PAD (peripheral artery disease)    Low oxygen saturation    Hypophosphatemia    NSTEMI (non-ST elevated myocardial infarction)    Acute on chronic diastolic congestive heart failure    Heart " failure with preserved ejection fraction, unspecified HF chronicity     .        This note was dictated with the help of speech recognition software.  There might be un-intended errors and/or substitutions.    This note was generated with the assistance of ambient listening technology. Verbal consent was obtained by the patient and accompanying visitor(s) for the recording of patient appointment to facilitate this note. I attest to having reviewed and edited the generated note for accuracy, though some syntax or spelling errors may persist. Please contact the author of this note for any clarification.

## 2025-05-22 ENCOUNTER — TELEPHONE (OUTPATIENT)
Dept: TRANSPLANT | Facility: CLINIC | Age: 89
End: 2025-05-22
Payer: MEDICARE

## 2025-05-22 DIAGNOSIS — Z79.899 POLYPHARMACY: Primary | ICD-10-CM

## 2025-05-22 DIAGNOSIS — I27.9 CHRONIC PULMONARY HEART DISEASE: ICD-10-CM

## 2025-05-22 DIAGNOSIS — R06.82 TACHYPNEA: ICD-10-CM

## 2025-06-25 ENCOUNTER — PATIENT MESSAGE (OUTPATIENT)
Dept: TRANSPLANT | Facility: CLINIC | Age: 89
End: 2025-06-25
Payer: MEDICARE

## 2025-08-05 ENCOUNTER — OFFICE VISIT (OUTPATIENT)
Dept: PULMONOLOGY | Facility: CLINIC | Age: 89
End: 2025-08-05
Payer: MEDICARE

## 2025-08-05 VITALS
HEART RATE: 84 BPM | HEIGHT: 62 IN | SYSTOLIC BLOOD PRESSURE: 140 MMHG | BODY MASS INDEX: 34.01 KG/M2 | OXYGEN SATURATION: 94 % | DIASTOLIC BLOOD PRESSURE: 72 MMHG

## 2025-08-05 DIAGNOSIS — I27.20 PULMONARY HTN: ICD-10-CM

## 2025-08-05 DIAGNOSIS — Z71.89 GOALS OF CARE, COUNSELING/DISCUSSION: ICD-10-CM

## 2025-08-05 DIAGNOSIS — G47.33 OSA (OBSTRUCTIVE SLEEP APNEA): Primary | ICD-10-CM

## 2025-08-05 PROCEDURE — 1101F PT FALLS ASSESS-DOCD LE1/YR: CPT | Mod: CPTII,S$GLB,, | Performed by: INTERNAL MEDICINE

## 2025-08-05 PROCEDURE — 1126F AMNT PAIN NOTED NONE PRSNT: CPT | Mod: CPTII,S$GLB,, | Performed by: INTERNAL MEDICINE

## 2025-08-05 PROCEDURE — 99204 OFFICE O/P NEW MOD 45 MIN: CPT | Mod: S$GLB,,, | Performed by: INTERNAL MEDICINE

## 2025-08-05 PROCEDURE — 3288F FALL RISK ASSESSMENT DOCD: CPT | Mod: CPTII,S$GLB,, | Performed by: INTERNAL MEDICINE

## 2025-08-05 PROCEDURE — 1159F MED LIST DOCD IN RCRD: CPT | Mod: CPTII,S$GLB,, | Performed by: INTERNAL MEDICINE

## 2025-08-05 PROCEDURE — G2211 COMPLEX E/M VISIT ADD ON: HCPCS | Mod: S$GLB,,, | Performed by: INTERNAL MEDICINE

## 2025-08-05 PROCEDURE — 99999 PR PBB SHADOW E&M-EST. PATIENT-LVL III: CPT | Mod: PBBFAC,,, | Performed by: INTERNAL MEDICINE

## 2025-08-05 NOTE — PROGRESS NOTES
Abbie Barragan  was seen as a new patient at the request  Bob Mills MD for the evaluation of  pulmonary htn.    CHIEF COMPLAINT:  Pulmonary Hypertension      HISTORY OF PRESENT ILLNESS: Abbie Barragan is a 89 y.o. female  has a past medical history of Above knee amputation status (1994), History of DVT of lower extremity (1994), Hyperlipidemia, Hypertension, and PAD (peripheral artery disease).  Patient is here with Virginia HINTON.  Patient was seen by Dr. Mills for HFpEF.   Patient was hospitalized 10/2024 for ileus a/w supraumbilical hernia.  S/p hernia repair.  Echo around that time with pasp of 63.  On 5/2025, patient was seen by Lina.  Patient deferred [facility]. Patient was referred to pulmonary htn clinic at [facility].  Unclear as for reason to see me today.      Today, patient denied chest.  No pnd.  No orthopnea.  No coughing or wheezing.  Wheelchair dependant due to left AKA due to PAD.      Additional Pulmonary History:   Occupational/Environmental Exposures:  retire    Exposure to Animals/Pets:  denied  Foreign Travel History:  denied  History of exposures to TB:  denied   Family History of Lung Cancer:  denied   Tobacco:  quit 1994  Childhood history of Lung Disease:  denied  Chest surgery or trauma:  denied    Per STOPBANG:    Snore:  possible  Tire:  rested   Observed apnea:  denied  Pressure (HTN):  yes    BMI:  Body mass index is 34.01 kg/m².   Age:  89 y.o.  Neck (inch):  14.5  Gender:  female    Total STOP BANG score = 3/8  Low risk DOLORES: 0-2, Intermediate risk DOLORES: 3-4, High risk DOLORES: 5+       PAST MEDICAL HISTORY:    Active Ambulatory Problems     Diagnosis Date Noted    Chronic atrial fibrillation 07/12/2023    PVD (peripheral vascular disease) 07/14/2022    Bilateral carotid artery stenosis 07/14/2022    Status post above-knee amputation 07/14/2022    Anemia 07/14/2023    Gallstones 09/14/2023    SBO (small bowel obstruction) 10/02/2024    Essential hypertension 10/02/2024    Acquired  hypothyroidism 10/02/2024    PAD (peripheral artery disease) 10/02/2024    Low oxygen saturation 10/05/2024    Hypophosphatemia 10/05/2024    NSTEMI (non-ST elevated myocardial infarction) 10/13/2024    Acute on chronic diastolic congestive heart failure 10/13/2024    Heart failure with preserved ejection fraction, unspecified HF chronicity 01/15/2025    DOLORES (obstructive sleep apnea) 08/06/2025    Pulmonary HTN 08/06/2025    Goals of care, counseling/discussion 08/06/2025     Resolved Ambulatory Problems     Diagnosis Date Noted    Cholelithiasis with acute cholecystitis 07/12/2023    Sepsis 07/12/2023    Hypoxia 07/16/2023    Pleural effusion 10/13/2024     Past Medical History:   Diagnosis Date    Above knee amputation status 1994    History of DVT of lower extremity 1994    Hyperlipidemia     Hypertension                 PAST SURGICAL HISTORY:    Past Surgical History:   Procedure Laterality Date    DIAGNOSTIC LAPAROSCOPY  9/14/2023    Procedure: LAPAROSCOPY, DIAGNOSTIC;  Surgeon: Isidro Bell MD;  Location: [facility] OR;  Service: General;;    LEG AMPUTATION THROUGH KNEE      PARTIAL HYSTERECTOMY      1960's    REPAIR OF INCARCERATED VENTRAL HERNIA WITHOUT HISTORY OF PRIOR REPAIR N/A 10/2/2024    Procedure: REPAIR, HERNIA, VENTRAL, INCARCERATED, WITHOUT HISTORY OF PRIOR REPAIR;  Surgeon: Isidro Bell MD;  Location: [facility] OR;  Service: General;  Laterality: N/A;  REPAIR WITH MESH    ROBOT-ASSISTED CHOLECYSTECTOMY N/A 9/14/2023    Procedure: Diagnostic Laparoscopy Lysis of Adhesisions  Attempted Robotically;  Surgeon: Isidro Bell MD;  Location: [facility] OR;  Service: General;  Laterality: N/A;  ATTEMPTED    VASCULAR SURGERY Left 1994    AORTOFEM BYPASS         FAMILY HISTORY:                No family history on file.    SOCIAL HISTORY:          Tobacco: Tobacco Use History[1]  alcohol use:    Social History     Substance and Sexual Activity   Alcohol Use No                   ALLERGIES:  Review of patient's  "allergies indicates:  No Known Allergies    CURRENT MEDICATIONS:  Current Medications[2]               REVIEW OF SYSTEMS:     Pulmonary related symptoms as per HPI.  Gen:  no weight loss, no fever, no night sweat  HEENT:  no visual changes, no sore throat, no hearing loss  CV:  No chest pain, no orthopnea, no PND  GI:  no melena, no hematochezia, no diarhea, no constipation.  :  no dysuria, no hematuria, no hesistancy, no dribbling  Neuro:  no syncope, no vertigo, no tinitus  Psych:  No homocide or suicide ideation; no depression.  Endocrine:  No heat or cold intolerance.  Sleep:  per hpi  Otherwise, a balance of systems reviewed is negative.          PHYSICAL EXAM:  Vitals:    08/05/25 1257   BP: (!) 140/72   Pulse: 84   SpO2: (!) 94%   Height: 5' 2" (1.575 m)   PainSc: 0-No pain     Body mass index is 34.01 kg/m².     GENERAL:  well develop; no apparent distress  HEENT:  no nasal congestion; no discharge noted; class 3 modified mallampatti.  +denture   NECK:  supple; no palpable masses.  CARDIO: regular rate and rhythm  PULM:  clear to auscultation bilaterally; no intercostals retractions; no accessory muscle usage   ABDOMEN:  soft nontender/nondistended.  +bowel sound  EXTREMITIES no cc;  left aka; +edema  NEURO:  CN II-XII intact.  5/5 motor in all extremities.  sensation grossly intact   to light touch.  PSYCH:  normal affect.  Alert and oriented x 4    LABS  Pulmonary Functions Testing Results(personally reviewed):  none  ABG (personally reviewed):  none  CXR (personally reviewed):  10/2/24 cardiomegaly.  Vascular congestion  CT CHEST(personally reviewed):  none    Echo 1/15/25    Left Ventricle: The left ventricle is normal in size. Moderately increased wall thickness. There is moderate concentric hypertrophy. There is normal systolic function with a visually estimated ejection fraction of 65 - 70%. Unable to assess diastolic function due to atrial fibrillation.    Right Ventricle: The right ventricle is " mildly dilated Systolic function is borderline low.    Aortic Valve: There is mild aortic regurgitation.    Tricuspid Valve: There is moderate regurgitation.    Pulmonary Artery: The estimated pulmonary artery systolic pressure is 69 mmHg.    Pt appears to be in AFib throughout exam.    ASSESSMENT/PLAN  Problem List Items Addressed This Visit       Goals of care, counseling/discussion    Overview   During this visit, I engaged the patient in the advance care planning process.  The patient and I reviewed the role for advance directives and their purpose in directing future healthcare if the patient's unable to speak for him/herself.  At this point in time, the patient does have full decision-making capacity.  We discussed different extreme health states that patient could experience, and reviewed what kind of medical care patient would want in those situations.  The patient communicated that if she was comatose and had little chance of a meaningful recovery, she would NOT want machines/life-sustaining treatments used beyond 1 month.  In addition to the above preference, patient  HAS NOT completed a living will to reflect these preferences.  The patient HAS designated her eldest son, Mars Barragan, as healthcare power of  to make decisions on her behalf.  In the case of cardiopulmonary arrest, patient does wish for CPR, intubation or cardioversion.  Advance care planning brochure given to patient.       16 minutes spent discussing GOC.          DOLORES (obstructive sleep apnea) - Primary    Overview   The patient symptomatically has snoring with findings of htn, elevated bmi, HFpEF. This warrants further investigation for possible obstructive sleep apnea.  Agreed to home sleep testing.           Relevant Orders    Home Sleep Study    Pulmonary HTN    Overview   pasp of 69.  Suspect who group 2 with valvular heart disease and grade III diastolic dysfunction.    Await pulmonary htn clinic appointment.  For now, bp  optimization and diuresis              Patient will No follow-ups on file.     CC: Send copy of this note to Bob Mills MD     Visit today included increased complexity associated with the care of the episodic problem nati addressed and managing the longitudinal care of the patient due to the serious and/or complex managed problem(s) nati.          [1]   Social History  Tobacco Use   Smoking Status Former   Smokeless Tobacco Never   [2]   Current Outpatient Medications   Medication Sig Dispense Refill    acetaminophen (TYLENOL) 325 MG tablet Take 2 tablets (650 mg total) by mouth every 6 (six) hours as needed for Pain.      alendronate (FOSAMAX) 70 MG tablet Take 70 mg by mouth every 7 days.      amLODIPine (NORVASC) 10 MG tablet Take 10 mg by mouth once daily.      apixaban (ELIQUIS) 5 mg Tab Take 1 tablet (5 mg total) by mouth 2 (two) times daily. 60 tablet 11    cholecalciferol, vitamin D3, (VITAMIN D3) 25 mcg (1,000 unit) capsule Take 1,000 Units by mouth once daily.      furosemide (LASIX) 40 MG tablet Take 1 tablet (40 mg total) by mouth 2 (two) times daily. 60 tablet 11    losartan (COZAAR) 100 MG tablet Take 100 mg by mouth once daily.      potassium chloride SA (K-DUR,KLOR-CON) 20 MEQ tablet Take 2 tablets by mouth today, THEN 1 tablet once daily thereafter 30 tablet 11    rosuvastatin (CRESTOR) 40 MG Tab Take 1 tablet by mouth once daily.      metoprolol tartrate (LOPRESSOR) 25 MG tablet Take 1 tablet (25 mg total) by mouth 2 (two) times daily. 60 tablet 0     No current facility-administered medications for this visit.     Facility-Administered Medications Ordered in Other Visits   Medication Dose Route Frequency Provider Last Rate Last Admin    lactated ringers infusion   Intravenous Continuous Jaquan Kim MD 0 mL/hr at 09/14/23 1338 New Bag at 10/02/24 1059    LIDOcaine (PF) 10 mg/ml (1%) injection 10 mg  1 mL Intradermal Once Jaquan Kim MD

## 2025-08-06 PROBLEM — Z71.89 GOALS OF CARE, COUNSELING/DISCUSSION: Status: ACTIVE | Noted: 2025-08-06

## 2025-08-06 PROBLEM — I27.20 PULMONARY HTN: Status: ACTIVE | Noted: 2025-08-06

## 2025-08-06 PROBLEM — G47.33 OSA (OBSTRUCTIVE SLEEP APNEA): Status: ACTIVE | Noted: 2025-08-06

## 2025-08-19 ENCOUNTER — OFFICE VISIT (OUTPATIENT)
Dept: CARDIOLOGY | Facility: CLINIC | Age: 89
End: 2025-08-19
Payer: MEDICARE

## 2025-08-19 VITALS
BODY MASS INDEX: 33.81 KG/M2 | OXYGEN SATURATION: 97 % | HEART RATE: 78 BPM | SYSTOLIC BLOOD PRESSURE: 177 MMHG | RESPIRATION RATE: 15 BRPM | WEIGHT: 183.75 LBS | DIASTOLIC BLOOD PRESSURE: 97 MMHG | HEIGHT: 62 IN

## 2025-08-19 DIAGNOSIS — I50.30 HEART FAILURE WITH PRESERVED EJECTION FRACTION, UNSPECIFIED HF CHRONICITY: ICD-10-CM

## 2025-08-19 DIAGNOSIS — I10 ESSENTIAL HYPERTENSION: Primary | ICD-10-CM

## 2025-08-19 DIAGNOSIS — I65.23 BILATERAL CAROTID ARTERY STENOSIS: ICD-10-CM

## 2025-08-19 DIAGNOSIS — I48.20 CHRONIC ATRIAL FIBRILLATION: ICD-10-CM

## 2025-08-19 DIAGNOSIS — I27.20 PULMONARY HTN: ICD-10-CM

## 2025-08-19 PROCEDURE — 3288F FALL RISK ASSESSMENT DOCD: CPT | Mod: CPTII,S$GLB,, | Performed by: INTERNAL MEDICINE

## 2025-08-19 PROCEDURE — 1126F AMNT PAIN NOTED NONE PRSNT: CPT | Mod: CPTII,S$GLB,, | Performed by: INTERNAL MEDICINE

## 2025-08-19 PROCEDURE — G2211 COMPLEX E/M VISIT ADD ON: HCPCS | Mod: S$GLB,,, | Performed by: INTERNAL MEDICINE

## 2025-08-19 PROCEDURE — 99214 OFFICE O/P EST MOD 30 MIN: CPT | Mod: S$GLB,,, | Performed by: INTERNAL MEDICINE

## 2025-08-19 PROCEDURE — 1101F PT FALLS ASSESS-DOCD LE1/YR: CPT | Mod: CPTII,S$GLB,, | Performed by: INTERNAL MEDICINE

## 2025-08-19 PROCEDURE — 99999 PR PBB SHADOW E&M-EST. PATIENT-LVL IV: CPT | Mod: PBBFAC,,, | Performed by: INTERNAL MEDICINE

## 2025-08-19 PROCEDURE — 1159F MED LIST DOCD IN RCRD: CPT | Mod: CPTII,S$GLB,, | Performed by: INTERNAL MEDICINE

## 2025-08-19 PROCEDURE — 93000 ELECTROCARDIOGRAM COMPLETE: CPT | Mod: S$GLB,,, | Performed by: INTERNAL MEDICINE

## 2025-08-20 ENCOUNTER — TELEPHONE (OUTPATIENT)
Dept: SLEEP MEDICINE | Facility: HOSPITAL | Age: 89
End: 2025-08-20
Payer: MEDICARE

## 2025-08-21 LAB
OHS QRS DURATION: 154 MS
OHS QTC CALCULATION: 469 MS

## 2025-08-29 ENCOUNTER — HOSPITAL ENCOUNTER (OUTPATIENT)
Dept: SLEEP MEDICINE | Facility: HOSPITAL | Age: 89
Discharge: HOME | End: 2025-08-29
Attending: INTERNAL MEDICINE
Payer: MEDICARE

## 2025-09-02 ENCOUNTER — TELEPHONE (OUTPATIENT)
Dept: SLEEP MEDICINE | Facility: HOSPITAL | Age: 89
End: 2025-09-02
Payer: MEDICARE

## (undated) DEVICE — SUT ETHIBOND EXCEL 0 MO6 18

## (undated) DEVICE — CANISTER SUCTION RIGID 2000CC

## (undated) DEVICE — SCISSOR HOT SHEARS XI

## (undated) DEVICE — HEMOCLIPS GREEN

## (undated) DEVICE — SOL IRR SOD CHL .9% POUR

## (undated) DEVICE — CONTAINER SPECIMEN 4OZ

## (undated) DEVICE — SUT SILK 3-0 SH 18IN BLACK

## (undated) DEVICE — PACK ENDOSCOPY GENERAL

## (undated) DEVICE — NDL HYPO REG 25G X 1 1/2

## (undated) DEVICE — TOWEL OR XRAY BLUE 17X26IN

## (undated) DEVICE — BLANKET UPPER BODY 78.7X29.9IN

## (undated) DEVICE — SYR 10CC LUER LOCK

## (undated) DEVICE — COVER OVERHEAD SURG LT BLUE

## (undated) DEVICE — ELECTRODE REM PLYHSV RETURN 9

## (undated) DEVICE — DRAPE INCISE IOBAN 2 23X17IN

## (undated) DEVICE — DRAPE THREE-QUARTER 53X77IN

## (undated) DEVICE — Device

## (undated) DEVICE — OBTURATOR BLADELESS 8MM XI CLR

## (undated) DEVICE — BINDER ABDOMINAL 9 46-62

## (undated) DEVICE — BLADE ELECTRO EDGE INSULATED

## (undated) DEVICE — CHLORAPREP W TINT 26ML APPL

## (undated) DEVICE — FORCEP FENESTRATED BIPOLAR

## (undated) DEVICE — SUT VICRYL 3-0 27 SH

## (undated) DEVICE — ADHESIVE DERMABOND MINI HV

## (undated) DEVICE — DRAPE COLUMN DAVINCI XI

## (undated) DEVICE — SUT ETHIBOND 0 CR/CT-2 8-18

## (undated) DEVICE — PAD PINK TRENDELENBURG POS XL

## (undated) DEVICE — SCISSOR CURVED ENDOPATH 5MM

## (undated) DEVICE — HOOK PERM MONOPOLAR CAUTERY

## (undated) DEVICE — SOL ELECTROLUBE ANTI-STIC

## (undated) DEVICE — GOWN NONREINF SET-IN SLV XL

## (undated) DEVICE — COVER LIGHT HANDLE

## (undated) DEVICE — SYR ONLY LUER LOCK 20CC

## (undated) DEVICE — NDL ECLIPSE SAFETY 18GX1-1/2IN

## (undated) DEVICE — GLOVE SIGNATURE ESSNTL LTX 7

## (undated) DEVICE — COVER TIP CURVED SCISSORS XI

## (undated) DEVICE — GLOVE SENSICARE PI GRN 7.5

## (undated) DEVICE — TUBING INSUFFLATION W/LUER LOK

## (undated) DEVICE — SUT MONOCRYL 4.0 PS2 CP496G

## (undated) DEVICE — SEE MEDLINE ITEM 157110

## (undated) DEVICE — SOL CLEARIFY VISUALIZATION LAP

## (undated) DEVICE — APPLIER MEDIUM LARGE CLIP

## (undated) DEVICE — APPLICATOR CHLORAPREP ORN 26ML

## (undated) DEVICE — SEAL UNIVERSAL 5MM-8MM XI

## (undated) DEVICE — DRAPE ARM DAVINCI XI

## (undated) DEVICE — GLOVE BIOGEL 7.0

## (undated) DEVICE — TROCAR KII BLLN 12MM 10CM